# Patient Record
Sex: FEMALE | Race: WHITE | Employment: PART TIME | URBAN - METROPOLITAN AREA
[De-identification: names, ages, dates, MRNs, and addresses within clinical notes are randomized per-mention and may not be internally consistent; named-entity substitution may affect disease eponyms.]

---

## 2017-04-28 ENCOUNTER — HOSPITAL ENCOUNTER (OUTPATIENT)
Dept: MAMMOGRAPHY | Age: 47
Discharge: HOME | End: 2017-04-28
Attending: FAMILY MEDICINE
Payer: COMMERCIAL

## 2017-04-28 DIAGNOSIS — Z12.31 ENCOUNTER FOR SCREENING MAMMOGRAM FOR BREAST CANCER: ICD-10-CM

## 2017-04-28 PROCEDURE — 77067 SCR MAMMO BI INCL CAD: CPT

## 2017-08-29 ENCOUNTER — APPOINTMENT (RX ONLY)
Dept: URBAN - METROPOLITAN AREA CLINIC 23 | Facility: CLINIC | Age: 47
Setting detail: DERMATOLOGY
End: 2017-08-29

## 2017-08-29 ENCOUNTER — HOSPITAL ENCOUNTER (OUTPATIENT)
Dept: GENERAL RADIOLOGY | Age: 47
Discharge: HOME | End: 2017-08-29
Payer: COMMERCIAL

## 2017-08-29 DIAGNOSIS — Z00.00 ENCOUNTER FOR GENERAL ADULT MEDICAL EXAMINATION WITHOUT ABNORMAL FINDINGS: ICD-10-CM

## 2017-08-29 DIAGNOSIS — L21.8 OTHER SEBORRHEIC DERMATITIS: ICD-10-CM

## 2017-08-29 DIAGNOSIS — Z87.2 PERSONAL HISTORY OF DISEASES OF THE SKIN AND SUBCUTANEOUS TISSUE: ICD-10-CM

## 2017-08-29 DIAGNOSIS — D49.2 NEOPLASM OF UNSPECIFIED BEHAVIOR OF BONE, SOFT TISSUE, AND SKIN: ICD-10-CM

## 2017-08-29 DIAGNOSIS — Z20.1 EXPOSURE TO TB: ICD-10-CM

## 2017-08-29 DIAGNOSIS — D22 MELANOCYTIC NEVI: ICD-10-CM

## 2017-08-29 DIAGNOSIS — L81.4 OTHER MELANIN HYPERPIGMENTATION: ICD-10-CM

## 2017-08-29 DIAGNOSIS — D17 BENIGN LIPOMATOUS NEOPLASM: ICD-10-CM

## 2017-08-29 DIAGNOSIS — Z71.89 OTHER SPECIFIED COUNSELING: ICD-10-CM

## 2017-08-29 PROBLEM — D48.5 NEOPLASM OF UNCERTAIN BEHAVIOR OF SKIN: Status: ACTIVE | Noted: 2017-08-29

## 2017-08-29 PROBLEM — Z71.1 PERSON WITH FEARED HEALTH COMPLAINT IN WHOM NO DIAGNOSIS IS MADE: Status: ACTIVE | Noted: 2017-08-29

## 2017-08-29 PROBLEM — D22.5 MELANOCYTIC NEVI OF TRUNK: Status: ACTIVE | Noted: 2017-08-29

## 2017-08-29 PROBLEM — D22.39 MELANOCYTIC NEVI OF OTHER PARTS OF FACE: Status: ACTIVE | Noted: 2017-08-29

## 2017-08-29 PROBLEM — D17.22 BENIGN LIPOMATOUS NEOPLASM OF SKIN AND SUBCUTANEOUS TISSUE OF LEFT ARM: Status: ACTIVE | Noted: 2017-08-29

## 2017-08-29 PROBLEM — D22.62 MELANOCYTIC NEVI OF LEFT UPPER LIMB, INCLUDING SHOULDER: Status: ACTIVE | Noted: 2017-08-29

## 2017-08-29 PROBLEM — D23.71 OTHER BENIGN NEOPLASM OF SKIN OF RIGHT LOWER LIMB, INCLUDING HIP: Status: ACTIVE | Noted: 2017-08-29

## 2017-08-29 PROBLEM — D22.61 MELANOCYTIC NEVI OF RIGHT UPPER LIMB, INCLUDING SHOULDER: Status: ACTIVE | Noted: 2017-08-29

## 2017-08-29 PROCEDURE — 71020 XR CHEST PA LAT: CPT

## 2017-08-29 PROCEDURE — 11310 SHAVE SKIN LESION 0.5 CM/<: CPT

## 2017-08-29 PROCEDURE — ? OTHER

## 2017-08-29 PROCEDURE — ? SHAVE REMOVAL

## 2017-08-29 PROCEDURE — ? COUNSELING

## 2017-08-29 PROCEDURE — ? BIOPSY BY SHAVE METHOD

## 2017-08-29 PROCEDURE — 99214 OFFICE O/P EST MOD 30 MIN: CPT | Mod: 25

## 2017-08-29 PROCEDURE — 11100: CPT | Mod: 59

## 2017-08-29 PROCEDURE — ? PRESCRIPTION

## 2017-08-29 RX ORDER — KETOCONAZOLE 20.5 MG/ML
SHAMPOO, SUSPENSION TOPICAL
Qty: 1 | Refills: 11 | Status: ERX | COMMUNITY
Start: 2017-08-29 | End: 2019-01-13

## 2017-08-29 RX ADMIN — KETOCONAZOLE: 20.5 SHAMPOO, SUSPENSION TOPICAL at 00:00

## 2017-08-29 ASSESSMENT — LOCATION SIMPLE DESCRIPTION DERM
LOCATION SIMPLE: RIGHT UPPER BACK
LOCATION SIMPLE: RIGHT SHOULDER
LOCATION SIMPLE: LEFT UPPER ARM
LOCATION SIMPLE: POSTERIOR SCALP
LOCATION SIMPLE: SCALP
LOCATION SIMPLE: RIGHT THIGH
LOCATION SIMPLE: LEFT POSTERIOR UPPER ARM
LOCATION SIMPLE: RIGHT CHEEK
LOCATION SIMPLE: RIGHT POPLITEAL SKIN
LOCATION SIMPLE: LEFT SHOULDER
LOCATION SIMPLE: RIGHT POSTERIOR UPPER ARM
LOCATION SIMPLE: RIGHT FOREHEAD
LOCATION SIMPLE: ABDOMEN

## 2017-08-29 ASSESSMENT — LOCATION DETAILED DESCRIPTION DERM
LOCATION DETAILED: RIGHT INFERIOR LATERAL MALAR CHEEK
LOCATION DETAILED: LEFT ANTERIOR PROXIMAL UPPER ARM
LOCATION DETAILED: RIGHT LATERAL UPPER BACK
LOCATION DETAILED: POSTERIOR MID-PARIETAL SCALP
LOCATION DETAILED: LEFT SUPERIOR PARIETAL SCALP
LOCATION DETAILED: RIGHT LATERAL ABDOMEN
LOCATION DETAILED: RIGHT INFERIOR FOREHEAD
LOCATION DETAILED: LEFT POSTERIOR SHOULDER
LOCATION DETAILED: RIGHT POSTERIOR SHOULDER
LOCATION DETAILED: PERIUMBILICAL SKIN
LOCATION DETAILED: EPIGASTRIC SKIN
LOCATION DETAILED: RIGHT ANTERIOR DISTAL THIGH
LOCATION DETAILED: LEFT ANTERIOR SHOULDER
LOCATION DETAILED: LEFT PROXIMAL POSTERIOR UPPER ARM
LOCATION DETAILED: RIGHT PROXIMAL POSTERIOR UPPER ARM
LOCATION DETAILED: RIGHT POPLITEAL SKIN

## 2017-08-29 ASSESSMENT — LOCATION ZONE DERM
LOCATION ZONE: SCALP
LOCATION ZONE: FACE
LOCATION ZONE: TRUNK
LOCATION ZONE: LEG
LOCATION ZONE: ARM

## 2017-08-29 NOTE — PROCEDURE: BIOPSY BY SHAVE METHOD
Silver Nitrate Text: The wound bed was treated with silver nitrate after the biopsy was performed.
Detail Level: Detailed
Cryotherapy Text: The wound bed was treated with cryotherapy after the biopsy was performed.
Billing Type: Third-Party Bill
Dressing: bandage
Render Post-Care Instructions In Note?: yes
X Size Of Lesion In Cm: 0
Notification Instructions: Patient will be notified of biopsy results. However, patient instructed to call the office if not contacted within 2 weeks.
Electrodesiccation Text: The wound bed was treated with electrodesiccation after the biopsy was performed.
Bill [id number] For Specimen Handling/Conveyance To Laboratory?: no
Accession #: [facility]
Curettage Text: The wound bed was treated with curettage after the biopsy was performed.
Wound Care: Vaseline
Anesthesia Volume In Cc: 0.1
Post-Care Instructions: I reviewed with the patient in detail post-care instructions. Patient is to keep the biopsy site dry overnight, and then apply vaseline twice daily until healed. Patient may apply hydrogen peroxide soaks to remove any crusting. Patient given a wound care sheet.
Anesthesia Type: 1% lidocaine without epinephrine and a 1:10 solution of 8.4% sodium bicarbonate
Type Of Destruction Used: Curettage
Biopsy Method: Personna blade
Consent: Written consent was obtained and risks were reviewed including but not limited to scarring, infection, bleeding, scabbing, incomplete removal, nerve damage and allergy to anesthesia.
Electrodesiccation And Curettage Text: The wound bed was treated with electrodesiccation and curettage after the biopsy was performed.
Biopsy Type: H and E
Hemostasis: Aluminum Chloride

## 2017-08-29 NOTE — PROCEDURE: SHAVE REMOVAL
Bill [id number] For Specimen Handling/Conveyance To Laboratory?: no
Accession #: [facility]
Path Notes (To The Dermatopathologist): Please check margins.
X Size Of Lesion In Cm (Optional): 0
Biopsy Method: Personna blade
Post-Care Instructions: I reviewed with the patient in detail post-care instructions. Patient is to keep the biopsy site dry overnight, and then apply vaseline twice daily until healed. Patient may apply hydrogen peroxide soaks to remove any crusting. Wound care sheet provided.
Anesthesia Volume In Cc: 0.5
Detail Level: Detailed
Wound Care: Vaseline
Size Of Lesion In Cm (Required): 0.4
Medical Necessity Clause: This procedure was medically necessary because the lesion that was rubs on shirt, scarf, and irritated.
Anesthesia Type: 1% lidocaine without epinephrine and a 1:10 solution of 8.4% sodium bicarbonate
Consent was obtained from the patient. The risks and benefits to therapy were discussed in detail. Specifically, the risks of infection, scarring, bleeding, prolonged wound healing, incomplete removal, allergy to anesthesia, nerve injury and recurrence were addressed. Prior to the procedure, the treatment site was clearly identified and confirmed by the patient. All components of Universal Protocol/PAUSE Rule completed.
Medical Necessity Information: It is in your best interest to select a reason for this procedure from the list below. All of these items fulfill various CMS LCD requirements except the new and changing color options.
Billing Type: Third-Party Bill
Hemostasis: Aluminum Chloride
Notification Instructions: Patient will be notified of biopsy results. However, patient instructed to call the office if not contacted within 2 weeks.
Render Post-Care Instructions In Note?: yes

## 2017-08-29 NOTE — PROCEDURE: OTHER
Detail Level: Zone
Note Text (......Xxx Chief Complaint.): This diagnosis correlates with the
Other (Free Text): Per pt, previously excised but has recurred. Recommended see surgeon that removed it previously
Other (Free Text): Pt complains of itching in these locations, skin is normal. \\n\\nSamples given: pramosone to use bid, coupon for Cera Ve anti itch cream. \\n\\nRecommended she stop essential oils as fragrance makes itching worse.

## 2018-01-01 ENCOUNTER — RX ONLY (OUTPATIENT)
Age: 48
Setting detail: RX ONLY
End: 2018-01-01

## 2018-01-16 ENCOUNTER — APPOINTMENT (RX ONLY)
Dept: URBAN - METROPOLITAN AREA CLINIC 23 | Facility: CLINIC | Age: 48
Setting detail: DERMATOLOGY
End: 2018-01-16

## 2018-01-16 DIAGNOSIS — D22 MELANOCYTIC NEVI: ICD-10-CM

## 2018-01-16 DIAGNOSIS — Z87.2 PERSONAL HISTORY OF DISEASES OF THE SKIN AND SUBCUTANEOUS TISSUE: ICD-10-CM

## 2018-01-16 DIAGNOSIS — L82.1 OTHER SEBORRHEIC KERATOSIS: ICD-10-CM

## 2018-01-16 DIAGNOSIS — L81.4 OTHER MELANIN HYPERPIGMENTATION: ICD-10-CM

## 2018-01-16 PROBLEM — D22.5 MELANOCYTIC NEVI OF TRUNK: Status: ACTIVE | Noted: 2018-01-16

## 2018-01-16 PROBLEM — L55.1 SUNBURN OF SECOND DEGREE: Status: ACTIVE | Noted: 2018-01-16

## 2018-01-16 PROBLEM — D22.62 MELANOCYTIC NEVI OF LEFT UPPER LIMB, INCLUDING SHOULDER: Status: ACTIVE | Noted: 2018-01-16

## 2018-01-16 PROBLEM — D22.61 MELANOCYTIC NEVI OF RIGHT UPPER LIMB, INCLUDING SHOULDER: Status: ACTIVE | Noted: 2018-01-16

## 2018-01-16 PROCEDURE — 99213 OFFICE O/P EST LOW 20 MIN: CPT | Mod: 25

## 2018-01-16 PROCEDURE — ? COUNSELING

## 2018-01-16 PROCEDURE — ? MEDICAL PHOTOGRAPHY REVIEW

## 2018-01-16 PROCEDURE — 11300 SHAVE SKIN LESION 0.5 CM/<: CPT

## 2018-01-16 PROCEDURE — ? SHAVE REMOVAL

## 2018-01-16 ASSESSMENT — LOCATION DETAILED DESCRIPTION DERM
LOCATION DETAILED: PERIUMBILICAL SKIN
LOCATION DETAILED: RIGHT POPLITEAL SKIN
LOCATION DETAILED: RIGHT MID-UPPER BACK
LOCATION DETAILED: RIGHT POSTERIOR SHOULDER
LOCATION DETAILED: RIGHT PROXIMAL POSTERIOR UPPER ARM
LOCATION DETAILED: RIGHT LATERAL ABDOMEN
LOCATION DETAILED: RIGHT LATERAL UPPER BACK
LOCATION DETAILED: RIGHT PROXIMAL CALF
LOCATION DETAILED: LEFT PROXIMAL POSTERIOR UPPER ARM
LOCATION DETAILED: LEFT POSTERIOR SHOULDER
LOCATION DETAILED: RIGHT INFERIOR LATERAL MALAR CHEEK
LOCATION DETAILED: LEFT ANTERIOR DISTAL UPPER ARM

## 2018-01-16 ASSESSMENT — LOCATION SIMPLE DESCRIPTION DERM
LOCATION SIMPLE: RIGHT UPPER BACK
LOCATION SIMPLE: LEFT POSTERIOR UPPER ARM
LOCATION SIMPLE: RIGHT POSTERIOR UPPER ARM
LOCATION SIMPLE: RIGHT POPLITEAL SKIN
LOCATION SIMPLE: RIGHT CALF
LOCATION SIMPLE: LEFT UPPER ARM
LOCATION SIMPLE: LEFT SHOULDER
LOCATION SIMPLE: RIGHT CHEEK
LOCATION SIMPLE: RIGHT SHOULDER
LOCATION SIMPLE: ABDOMEN

## 2018-01-16 ASSESSMENT — LOCATION ZONE DERM
LOCATION ZONE: LEG
LOCATION ZONE: ARM
LOCATION ZONE: TRUNK
LOCATION ZONE: FACE

## 2018-01-16 NOTE — PROCEDURE: SHAVE REMOVAL
Detail Level: Detailed
Render Post-Care Instructions In Note?: yes
Notification Instructions: Patient will be notified of biopsy results. However, patient instructed to call the office if not contacted within 2 weeks.
Wound Care: Vaseline
Hemostasis: Aluminum Chloride
Bill For Surgical Tray: no
Accession #: PC
Medical Necessity Clause: This procedure was medically necessary because the lesion that was
Size Of Lesion In Cm (Required): 0.3
Size Of Margin In Cm (Margins Are Not Added To Billing Dimensions): -
Biopsy Method: Personna blade
Anesthesia Volume In Cc: 0.5
Anesthesia Type: 1% lidocaine without epinephrine and a 1:10 solution of 8.4% sodium bicarbonate
Consent was obtained from the patient. The risks and benefits to therapy were discussed in detail. Specifically, the risks of infection, scarring, bleeding, prolonged wound healing, incomplete removal, allergy to anesthesia, nerve injury and recurrence were addressed. Prior to the procedure, the treatment site was clearly identified and confirmed by the patient. All components of Universal Protocol/PAUSE Rule completed.
Path Notes (To The Dermatopathologist): Please check margins.
X Size Of Lesion In Cm (Optional): 0
Billing Type: Third-Party Bill
Post-Care Instructions: I reviewed with the patient in detail post-care instructions. Patient is to keep the biopsy site dry overnight, and then apply vaseline twice daily until healed. Patient may apply hydrogen peroxide soaks to remove any crusting. Wound care sheet provided.
Medical Necessity Information: It is in your best interest to select a reason for this procedure from the list below. All of these items fulfill various CMS LCD requirements except the new and changing color options.

## 2018-01-16 NOTE — PROCEDURE: MEDICAL PHOTOGRAPHY REVIEW
Detail Level: Detailed
Number Of Photographs Reviewed: 4
Review Findings: no new or changing lesions

## 2018-04-26 PROBLEM — R10.13 DYSPEPSIA: Status: ACTIVE | Noted: 2018-04-26

## 2018-05-09 ENCOUNTER — HOSPITAL ENCOUNTER (OUTPATIENT)
Dept: MAMMOGRAPHY | Age: 48
Discharge: HOME | End: 2018-05-09
Attending: FAMILY MEDICINE
Payer: COMMERCIAL

## 2018-05-09 DIAGNOSIS — Z12.31 ENCOUNTER FOR SCREENING MAMMOGRAM FOR BREAST CANCER: ICD-10-CM

## 2018-05-09 PROCEDURE — 77067 SCR MAMMO BI INCL CAD: CPT

## 2018-07-12 ENCOUNTER — APPOINTMENT (RX ONLY)
Dept: URBAN - METROPOLITAN AREA CLINIC 23 | Facility: CLINIC | Age: 48
Setting detail: DERMATOLOGY
End: 2018-07-12

## 2018-07-12 DIAGNOSIS — D18.0 HEMANGIOMA: ICD-10-CM

## 2018-07-12 DIAGNOSIS — Z87.2 PERSONAL HISTORY OF DISEASES OF THE SKIN AND SUBCUTANEOUS TISSUE: ICD-10-CM

## 2018-07-12 DIAGNOSIS — L91.8 OTHER HYPERTROPHIC DISORDERS OF THE SKIN: ICD-10-CM

## 2018-07-12 DIAGNOSIS — D22 MELANOCYTIC NEVI: ICD-10-CM

## 2018-07-12 DIAGNOSIS — L90.5 SCAR CONDITIONS AND FIBROSIS OF SKIN: ICD-10-CM

## 2018-07-12 DIAGNOSIS — L57.0 ACTINIC KERATOSIS: ICD-10-CM

## 2018-07-12 PROBLEM — D22.72 MELANOCYTIC NEVI OF LEFT LOWER LIMB, INCLUDING HIP: Status: ACTIVE | Noted: 2018-07-12

## 2018-07-12 PROBLEM — D23.71 OTHER BENIGN NEOPLASM OF SKIN OF RIGHT LOWER LIMB, INCLUDING HIP: Status: ACTIVE | Noted: 2018-07-12

## 2018-07-12 PROBLEM — D22.5 MELANOCYTIC NEVI OF TRUNK: Status: ACTIVE | Noted: 2018-07-12

## 2018-07-12 PROBLEM — D18.01 HEMANGIOMA OF SKIN AND SUBCUTANEOUS TISSUE: Status: ACTIVE | Noted: 2018-07-12

## 2018-07-12 PROCEDURE — ? COUNSELING

## 2018-07-12 PROCEDURE — ? PATIENT SPECIFIC COUNSELING

## 2018-07-12 PROCEDURE — ? SKIN TAG REMOVAL

## 2018-07-12 PROCEDURE — 99213 OFFICE O/P EST LOW 20 MIN: CPT | Mod: 25

## 2018-07-12 PROCEDURE — 11200 RMVL SKIN TAGS UP TO&INC 15: CPT

## 2018-07-12 PROCEDURE — ? ADDITIONAL NOTES

## 2018-07-12 ASSESSMENT — LOCATION ZONE DERM
LOCATION ZONE: ARM
LOCATION ZONE: EAR
LOCATION ZONE: LEG
LOCATION ZONE: TRUNK
LOCATION ZONE: FACE

## 2018-07-12 ASSESSMENT — LOCATION DETAILED DESCRIPTION DERM
LOCATION DETAILED: RIGHT RIB CAGE
LOCATION DETAILED: LEFT SUPERIOR HELIX
LOCATION DETAILED: RIGHT INFRAMAMMARY CREASE (INNER QUADRANT)
LOCATION DETAILED: PERIUMBILICAL SKIN
LOCATION DETAILED: RIGHT INFERIOR LATERAL MALAR CHEEK
LOCATION DETAILED: LEFT LATERAL PROXIMAL PRETIBIAL REGION
LOCATION DETAILED: LEFT PROXIMAL PRETIBIAL REGION
LOCATION DETAILED: RIGHT POPLITEAL SKIN
LOCATION DETAILED: RIGHT LATERAL SUPERIOR CHEST
LOCATION DETAILED: LEFT ANTERIOR DISTAL UPPER ARM
LOCATION DETAILED: LEFT DISTAL PRETIBIAL REGION
LOCATION DETAILED: LEFT ANTERIOR DISTAL THIGH
LOCATION DETAILED: LEFT INFERIOR MEDIAL UPPER BACK
LOCATION DETAILED: RIGHT LATERAL ABDOMEN

## 2018-07-12 ASSESSMENT — LOCATION SIMPLE DESCRIPTION DERM
LOCATION SIMPLE: LEFT EAR
LOCATION SIMPLE: RIGHT POPLITEAL SKIN
LOCATION SIMPLE: LEFT THIGH
LOCATION SIMPLE: RIGHT BREAST
LOCATION SIMPLE: LEFT UPPER ARM
LOCATION SIMPLE: CHEST
LOCATION SIMPLE: RIGHT CHEEK
LOCATION SIMPLE: ABDOMEN
LOCATION SIMPLE: LEFT PRETIBIAL REGION
LOCATION SIMPLE: LEFT UPPER BACK

## 2018-07-12 NOTE — PROCEDURE: SKIN TAG REMOVAL
Anesthesia Volume In Cc: 3
Add Associated Diagnoses If Applicable When Selecting Medical Necessity: Yes
Include Z78.9 (Other Specified Conditions Influencing Health Status) As An Associated Diagnosis?: No
Medical Necessity Information: It is in your best interest to select a reason for this procedure from the list below. All of these items fulfill various CMS LCD requirements except the new and changing color options.
Consent: Written consent obtained and the risks of skin tag removal was reviewed with the patient including but not limited to bleeding, pigmentary change, infection, pain, and remote possibility of scarring.
Detail Level: Detailed
Medical Necessity Clause: This procedure was medically necessary because the lesions that were treated were:

## 2019-01-01 ENCOUNTER — RX ONLY (OUTPATIENT)
Age: 49
Setting detail: RX ONLY
End: 2019-01-01

## 2019-01-14 ENCOUNTER — APPOINTMENT (RX ONLY)
Dept: URBAN - METROPOLITAN AREA CLINIC 23 | Facility: CLINIC | Age: 49
Setting detail: DERMATOLOGY
End: 2019-01-14

## 2019-01-14 DIAGNOSIS — L57.0 ACTINIC KERATOSIS: ICD-10-CM

## 2019-01-14 DIAGNOSIS — D22 MELANOCYTIC NEVI: ICD-10-CM

## 2019-01-14 DIAGNOSIS — L91.8 OTHER HYPERTROPHIC DISORDERS OF THE SKIN: ICD-10-CM

## 2019-01-14 DIAGNOSIS — Z87.2 PERSONAL HISTORY OF DISEASES OF THE SKIN AND SUBCUTANEOUS TISSUE: ICD-10-CM

## 2019-01-14 DIAGNOSIS — D18.0 HEMANGIOMA: ICD-10-CM

## 2019-01-14 DIAGNOSIS — L82.1 OTHER SEBORRHEIC KERATOSIS: ICD-10-CM

## 2019-01-14 PROBLEM — D22.72 MELANOCYTIC NEVI OF LEFT LOWER LIMB, INCLUDING HIP: Status: ACTIVE | Noted: 2019-01-14

## 2019-01-14 PROBLEM — D22.61 MELANOCYTIC NEVI OF RIGHT UPPER LIMB, INCLUDING SHOULDER: Status: ACTIVE | Noted: 2019-01-14

## 2019-01-14 PROBLEM — D22.62 MELANOCYTIC NEVI OF LEFT UPPER LIMB, INCLUDING SHOULDER: Status: ACTIVE | Noted: 2019-01-14

## 2019-01-14 PROBLEM — D18.01 HEMANGIOMA OF SKIN AND SUBCUTANEOUS TISSUE: Status: ACTIVE | Noted: 2019-01-14

## 2019-01-14 PROBLEM — L29.8 OTHER PRURITUS: Status: ACTIVE | Noted: 2019-01-14

## 2019-01-14 PROBLEM — E66.01 OBESITY, MORBID (HCC): Status: ACTIVE | Noted: 2019-01-14

## 2019-01-14 PROBLEM — D22.71 MELANOCYTIC NEVI OF RIGHT LOWER LIMB, INCLUDING HIP: Status: ACTIVE | Noted: 2019-01-14

## 2019-01-14 PROBLEM — H91.90 UNSPECIFIED HEARING LOSS, UNSPECIFIED EAR: Status: ACTIVE | Noted: 2019-01-14

## 2019-01-14 PROBLEM — D22.5 MELANOCYTIC NEVI OF TRUNK: Status: ACTIVE | Noted: 2019-01-14

## 2019-01-14 PROCEDURE — ? SKIN TAG REMOVAL

## 2019-01-14 PROCEDURE — 17000 DESTRUCT PREMALG LESION: CPT

## 2019-01-14 PROCEDURE — ? RECOMMENDATIONS

## 2019-01-14 PROCEDURE — ? LIQUID NITROGEN

## 2019-01-14 PROCEDURE — ? COUNSELING

## 2019-01-14 PROCEDURE — 99214 OFFICE O/P EST MOD 30 MIN: CPT | Mod: 25

## 2019-01-14 PROCEDURE — 11200 RMVL SKIN TAGS UP TO&INC 15: CPT | Mod: 59

## 2019-01-14 ASSESSMENT — LOCATION SIMPLE DESCRIPTION DERM
LOCATION SIMPLE: RIGHT ANTERIOR NECK
LOCATION SIMPLE: LEFT POSTERIOR UPPER ARM
LOCATION SIMPLE: LEFT PRETIBIAL REGION
LOCATION SIMPLE: POSTERIOR NECK
LOCATION SIMPLE: RIGHT POPLITEAL SKIN
LOCATION SIMPLE: RIGHT CALF
LOCATION SIMPLE: ABDOMEN
LOCATION SIMPLE: LEFT UPPER BACK
LOCATION SIMPLE: LEFT UPPER ARM
LOCATION SIMPLE: CHEST
LOCATION SIMPLE: LEFT EAR
LOCATION SIMPLE: LEFT THIGH
LOCATION SIMPLE: RIGHT POSTERIOR UPPER ARM
LOCATION SIMPLE: RIGHT PRETIBIAL REGION
LOCATION SIMPLE: RIGHT CHEEK

## 2019-01-14 ASSESSMENT — LOCATION DETAILED DESCRIPTION DERM
LOCATION DETAILED: RIGHT POPLITEAL SKIN
LOCATION DETAILED: RIGHT INFERIOR LATERAL NECK
LOCATION DETAILED: LEFT ANTERIOR DISTAL THIGH
LOCATION DETAILED: LEFT PROXIMAL POSTERIOR UPPER ARM
LOCATION DETAILED: LEFT INFERIOR MEDIAL UPPER BACK
LOCATION DETAILED: RIGHT LATERAL ABDOMEN
LOCATION DETAILED: RIGHT LATERAL SUPERIOR CHEST
LOCATION DETAILED: RIGHT DISTAL PRETIBIAL REGION
LOCATION DETAILED: RIGHT DISTAL CALF
LOCATION DETAILED: LEFT MEDIAL TRAPEZIAL NECK
LOCATION DETAILED: LEFT SUPERIOR HELIX
LOCATION DETAILED: PERIUMBILICAL SKIN
LOCATION DETAILED: RIGHT DISTAL POSTERIOR UPPER ARM
LOCATION DETAILED: RIGHT INFERIOR LATERAL MALAR CHEEK
LOCATION DETAILED: LEFT PROXIMAL PRETIBIAL REGION
LOCATION DETAILED: LEFT DISTAL PRETIBIAL REGION
LOCATION DETAILED: LEFT ANTERIOR DISTAL UPPER ARM

## 2019-01-14 ASSESSMENT — LOCATION ZONE DERM
LOCATION ZONE: LEG
LOCATION ZONE: TRUNK
LOCATION ZONE: FACE
LOCATION ZONE: EAR
LOCATION ZONE: NECK
LOCATION ZONE: ARM

## 2019-01-14 NOTE — PROCEDURE: LIQUID NITROGEN
Render Post-Care Instructions In Note?: yes
Duration Of Freeze Thaw-Cycle (Seconds): 20
Number Of Freeze-Thaw Cycles: 1 freeze-thaw cycle
Detail Level: Detailed
Consent: The patient's consent was obtained including but not limited to risks of crusting, scabbing, blistering, scarring, darker or lighter pigmentary change, recurrence, incomplete removal and infection.
Post-Care Instructions: I reviewed with the patient in detail post-care instructions. Patient is to wear sunprotection, and avoid picking at any of the treated lesions. Pt may apply Vaseline to crusted or scabbing areas.

## 2019-01-14 NOTE — PROCEDURE: SKIN TAG REMOVAL
Medical Necessity Clause: This procedure was medically necessary because the lesions that were treated were:
Consent: Written consent obtained and the risks of skin tag removal was reviewed with the patient including but not limited to bleeding, pigmentary change, infection, pain, and remote possibility of scarring.
Add Associated Diagnoses If Applicable When Selecting Medical Necessity: Yes
Medical Necessity Information: It is in your best interest to select a reason for this procedure from the list below. All of these items fulfill various CMS LCD requirements except the new and changing color options.
Detail Level: Detailed
Include Z78.9 (Other Specified Conditions Influencing Health Status) As An Associated Diagnosis?: No
Anesthesia Volume In Cc: 3

## 2019-01-14 NOTE — PROCEDURE: RECOMMENDATIONS
Recommendation Preamble: The following recommendations were made during the visit: consultation with Briseida  in regards to laser.
Detail Level: Zone

## 2019-01-25 ENCOUNTER — APPOINTMENT (RX ONLY)
Dept: URBAN - METROPOLITAN AREA CLINIC 23 | Facility: CLINIC | Age: 49
Setting detail: DERMATOLOGY
End: 2019-01-25

## 2019-01-25 DIAGNOSIS — Z41.9 ENCOUNTER FOR PROCEDURE FOR PURPOSES OTHER THAN REMEDYING HEALTH STATE, UNSPECIFIED: ICD-10-CM

## 2019-01-25 PROCEDURE — ? GENTLEYAG

## 2019-01-25 ASSESSMENT — LOCATION DETAILED DESCRIPTION DERM
LOCATION DETAILED: PERIUMBILICAL SKIN
LOCATION DETAILED: LEFT MEDIAL SUPERIOR CHEST

## 2019-01-25 ASSESSMENT — LOCATION SIMPLE DESCRIPTION DERM
LOCATION SIMPLE: ABDOMEN
LOCATION SIMPLE: CHEST

## 2019-01-25 ASSESSMENT — LOCATION ZONE DERM: LOCATION ZONE: TRUNK

## 2019-01-25 NOTE — PROCEDURE: GENTLEYAG
Spot Size: 3 mm
Cooling: DCD setting
External Cooling Fan Speed: 0
Post-Care Instructions: I reviewed with the patient in detail post-care instructions. Patient should avoid sun for a minimum of 4 weeks before and after treatment.
Detail Level: Detailed
Pulse Duration: 3 ms
Fluence: 160
Consent: Written consent obtained, risks reviewed including but not limited to crusting, scabbing, blistering, scarring, darker or lighter pigmentary change, paradoxical hair regrowth, incomplete removal of hair and infection.
Treatment Number: 1
Price (Use Numbers Only, No Special Characters Or $): 918
Laser Type: Nd:YAG 1064nm
Endpoint: Immediate endpoint: perifollicular erythema and edema. Vaseline and ice applied. Post care reviewed with patients.\\n\\nCherries

## 2019-02-07 ENCOUNTER — HOSPITAL ENCOUNTER (OUTPATIENT)
Dept: LAB | Age: 49
Discharge: HOME | End: 2019-02-07

## 2019-02-07 PROCEDURE — 88312 SPECIAL STAINS GROUP 1: CPT

## 2019-02-07 PROCEDURE — 88305 TISSUE EXAM BY PATHOLOGIST: CPT

## 2019-03-26 ENCOUNTER — HOSPITAL ENCOUNTER (INPATIENT)
Age: 49
LOS: 2 days | Discharge: HOME | DRG: 274 | End: 2019-03-28
Attending: EMERGENCY MEDICINE | Admitting: INTERNAL MEDICINE
Payer: COMMERCIAL

## 2019-03-26 ENCOUNTER — APPOINTMENT (OUTPATIENT)
Dept: GENERAL RADIOLOGY | Age: 49
DRG: 274 | End: 2019-03-26
Attending: EMERGENCY MEDICINE
Payer: COMMERCIAL

## 2019-03-26 DIAGNOSIS — I47.1 SVT (SUPRAVENTRICULAR TACHYCARDIA) (HCC): Primary | ICD-10-CM

## 2019-03-26 PROBLEM — R07.9 CHEST PAIN: Status: ACTIVE | Noted: 2019-03-26

## 2019-03-26 PROBLEM — R06.02 SOB (SHORTNESS OF BREATH): Status: ACTIVE | Noted: 2019-03-26

## 2019-03-26 PROBLEM — I47.29 NSVT (NONSUSTAINED VENTRICULAR TACHYCARDIA): Status: ACTIVE | Noted: 2019-03-26

## 2019-03-26 PROBLEM — R77.8 ELEVATED TROPONIN: Status: ACTIVE | Noted: 2019-03-26

## 2019-03-26 LAB
ALBUMIN SERPL-MCNC: 3.1 G/DL (ref 3.5–5)
ALBUMIN/GLOB SERPL: 0.8 {RATIO} (ref 1.2–3.5)
ALP SERPL-CCNC: 107 U/L (ref 50–136)
ALT SERPL-CCNC: 30 U/L (ref 12–65)
ANION GAP SERPL CALC-SCNC: 8 MMOL/L (ref 7–16)
AST SERPL-CCNC: 54 U/L (ref 15–37)
ATRIAL RATE: 197 BPM
BASOPHILS # BLD: 0.1 K/UL (ref 0–0.2)
BASOPHILS NFR BLD: 0 % (ref 0–2)
BILIRUB SERPL-MCNC: 0.4 MG/DL (ref 0.2–1.1)
BUN SERPL-MCNC: 15 MG/DL (ref 6–23)
CALCIUM SERPL-MCNC: 8.9 MG/DL (ref 8.3–10.4)
CALCULATED P AXIS, ECG09: 71 DEGREES
CALCULATED R AXIS, ECG10: 14 DEGREES
CALCULATED T AXIS, ECG11: 87 DEGREES
CHLORIDE SERPL-SCNC: 106 MMOL/L (ref 98–107)
CO2 SERPL-SCNC: 25 MMOL/L (ref 21–32)
CREAT SERPL-MCNC: 1.07 MG/DL (ref 0.6–1)
D DIMER PPP FEU-MCNC: 0.29 UG/ML(FEU)
DIAGNOSIS, 93000: NORMAL
DIFFERENTIAL METHOD BLD: ABNORMAL
EOSINOPHIL # BLD: 0.1 K/UL (ref 0–0.8)
EOSINOPHIL NFR BLD: 1 % (ref 0.5–7.8)
ERYTHROCYTE [DISTWIDTH] IN BLOOD BY AUTOMATED COUNT: 14.3 % (ref 11.9–14.6)
GLOBULIN SER CALC-MCNC: 4.1 G/DL (ref 2.3–3.5)
GLUCOSE SERPL-MCNC: 152 MG/DL (ref 65–100)
HCT VFR BLD AUTO: 44.8 % (ref 35.8–46.3)
HGB BLD-MCNC: 14.2 G/DL (ref 11.7–15.4)
IMM GRANULOCYTES # BLD AUTO: 0.1 K/UL (ref 0–0.5)
IMM GRANULOCYTES NFR BLD AUTO: 1 % (ref 0–5)
LYMPHOCYTES # BLD: 1.9 K/UL (ref 0.5–4.6)
LYMPHOCYTES NFR BLD: 16 % (ref 13–44)
MCH RBC QN AUTO: 28.6 PG (ref 26.1–32.9)
MCHC RBC AUTO-ENTMCNC: 31.7 G/DL (ref 31.4–35)
MCV RBC AUTO: 90.3 FL (ref 79.6–97.8)
MONOCYTES # BLD: 0.6 K/UL (ref 0.1–1.3)
MONOCYTES NFR BLD: 5 % (ref 4–12)
NEUTS SEG # BLD: 9.2 K/UL (ref 1.7–8.2)
NEUTS SEG NFR BLD: 77 % (ref 43–78)
NRBC # BLD: 0 K/UL (ref 0–0.2)
PLATELET # BLD AUTO: 285 K/UL (ref 150–450)
PMV BLD AUTO: 11.9 FL (ref 9.4–12.3)
POTASSIUM SERPL-SCNC: 5.1 MMOL/L (ref 3.5–5.1)
PROT SERPL-MCNC: 7.2 G/DL (ref 6.3–8.2)
Q-T INTERVAL, ECG07: 240 MS
QRS DURATION, ECG06: 70 MS
QTC CALCULATION (BEZET), ECG08: 423 MS
RBC # BLD AUTO: 4.96 M/UL (ref 4.05–5.2)
SODIUM SERPL-SCNC: 139 MMOL/L (ref 136–145)
TROPONIN I BLD-MCNC: 0.1 NG/ML (ref 0.02–0.05)
TROPONIN I BLD-MCNC: 0.21 NG/ML (ref 0.02–0.05)
TROPONIN I SERPL-MCNC: 1.34 NG/ML (ref 0.02–0.05)
TROPONIN I SERPL-MCNC: <0.02 NG/ML (ref 0.02–0.05)
VENTRICULAR RATE, ECG03: 187 BPM
WBC # BLD AUTO: 11.9 K/UL (ref 4.3–11.1)

## 2019-03-26 PROCEDURE — 84484 ASSAY OF TROPONIN QUANT: CPT

## 2019-03-26 PROCEDURE — 74011250636 HC RX REV CODE- 250/636

## 2019-03-26 PROCEDURE — 99285 EMERGENCY DEPT VISIT HI MDM: CPT | Performed by: EMERGENCY MEDICINE

## 2019-03-26 PROCEDURE — 93458 L HRT ARTERY/VENTRICLE ANGIO: CPT

## 2019-03-26 PROCEDURE — C8929 TTE W OR WO FOL WCON,DOPPLER: HCPCS

## 2019-03-26 PROCEDURE — 93005 ELECTROCARDIOGRAM TRACING: CPT | Performed by: EMERGENCY MEDICINE

## 2019-03-26 PROCEDURE — 65660000000 HC RM CCU STEPDOWN

## 2019-03-26 PROCEDURE — 71045 X-RAY EXAM CHEST 1 VIEW: CPT

## 2019-03-26 PROCEDURE — 74011250636 HC RX REV CODE- 250/636: Performed by: INTERNAL MEDICINE

## 2019-03-26 PROCEDURE — 85379 FIBRIN DEGRADATION QUANT: CPT

## 2019-03-26 PROCEDURE — 77030019569 HC BND COMPR RAD TERU -B

## 2019-03-26 PROCEDURE — 96375 TX/PRO/DX INJ NEW DRUG ADDON: CPT | Performed by: EMERGENCY MEDICINE

## 2019-03-26 PROCEDURE — 85025 COMPLETE CBC W/AUTO DIFF WBC: CPT

## 2019-03-26 PROCEDURE — 74011250636 HC RX REV CODE- 250/636: Performed by: NURSE PRACTITIONER

## 2019-03-26 PROCEDURE — 96374 THER/PROPH/DIAG INJ IV PUSH: CPT | Performed by: EMERGENCY MEDICINE

## 2019-03-26 PROCEDURE — 99152 MOD SED SAME PHYS/QHP 5/>YRS: CPT

## 2019-03-26 PROCEDURE — B2111ZZ FLUOROSCOPY OF MULTIPLE CORONARY ARTERIES USING LOW OSMOLAR CONTRAST: ICD-10-PCS | Performed by: INTERNAL MEDICINE

## 2019-03-26 PROCEDURE — 74011000250 HC RX REV CODE- 250: Performed by: INTERNAL MEDICINE

## 2019-03-26 PROCEDURE — 77030004534 HC CATH ANGI DX INFN CARD -A

## 2019-03-26 PROCEDURE — B2151ZZ FLUOROSCOPY OF LEFT HEART USING LOW OSMOLAR CONTRAST: ICD-10-PCS | Performed by: INTERNAL MEDICINE

## 2019-03-26 PROCEDURE — 74011636320 HC RX REV CODE- 636/320: Performed by: INTERNAL MEDICINE

## 2019-03-26 PROCEDURE — 80053 COMPREHEN METABOLIC PANEL: CPT

## 2019-03-26 PROCEDURE — 4A023N7 MEASUREMENT OF CARDIAC SAMPLING AND PRESSURE, LEFT HEART, PERCUTANEOUS APPROACH: ICD-10-PCS | Performed by: INTERNAL MEDICINE

## 2019-03-26 PROCEDURE — 74011250637 HC RX REV CODE- 250/637: Performed by: NURSE PRACTITIONER

## 2019-03-26 PROCEDURE — 77030015766

## 2019-03-26 PROCEDURE — C1894 INTRO/SHEATH, NON-LASER: HCPCS

## 2019-03-26 PROCEDURE — C1769 GUIDE WIRE: HCPCS

## 2019-03-26 PROCEDURE — 36415 COLL VENOUS BLD VENIPUNCTURE: CPT

## 2019-03-26 RX ORDER — LORATADINE 10 MG/1
10 TABLET ORAL DAILY
Status: DISCONTINUED | OUTPATIENT
Start: 2019-03-27 | End: 2019-03-28 | Stop reason: HOSPADM

## 2019-03-26 RX ORDER — HEPARIN SODIUM 200 [USP'U]/100ML
2 INJECTION, SOLUTION INTRAVENOUS CONTINUOUS
Status: ACTIVE | OUTPATIENT
Start: 2019-03-26 | End: 2019-03-26

## 2019-03-26 RX ORDER — FENTANYL CITRATE 50 UG/ML
25-100 INJECTION, SOLUTION INTRAMUSCULAR; INTRAVENOUS
Status: DISCONTINUED | OUTPATIENT
Start: 2019-03-26 | End: 2019-03-28 | Stop reason: HOSPADM

## 2019-03-26 RX ORDER — SODIUM CHLORIDE 9 MG/ML
75 INJECTION, SOLUTION INTRAVENOUS CONTINUOUS
Status: DISCONTINUED | OUTPATIENT
Start: 2019-03-26 | End: 2019-03-26

## 2019-03-26 RX ORDER — ACETAMINOPHEN 325 MG/1
650 TABLET ORAL
Status: DISCONTINUED | OUTPATIENT
Start: 2019-03-26 | End: 2019-03-27 | Stop reason: SDUPTHER

## 2019-03-26 RX ORDER — LIDOCAINE HYDROCHLORIDE 10 MG/ML
2-10 INJECTION INFILTRATION; PERINEURAL
Status: DISCONTINUED | OUTPATIENT
Start: 2019-03-26 | End: 2019-03-28 | Stop reason: HOSPADM

## 2019-03-26 RX ORDER — HEPARIN SODIUM 5000 [USP'U]/ML
4000 INJECTION, SOLUTION INTRAVENOUS; SUBCUTANEOUS ONCE
Status: COMPLETED | OUTPATIENT
Start: 2019-03-26 | End: 2019-03-26

## 2019-03-26 RX ORDER — ONDANSETRON 2 MG/ML
4 INJECTION INTRAMUSCULAR; INTRAVENOUS
Status: ACTIVE | OUTPATIENT
Start: 2019-03-26 | End: 2019-03-27

## 2019-03-26 RX ORDER — SODIUM CHLORIDE 0.9 % (FLUSH) 0.9 %
5-40 SYRINGE (ML) INJECTION EVERY 8 HOURS
Status: DISCONTINUED | OUTPATIENT
Start: 2019-03-26 | End: 2019-03-26

## 2019-03-26 RX ORDER — MECLIZINE HYDROCHLORIDE 25 MG/1
25 TABLET ORAL
Status: DISCONTINUED | OUTPATIENT
Start: 2019-03-26 | End: 2019-03-28 | Stop reason: HOSPADM

## 2019-03-26 RX ORDER — ADENOSINE 3 MG/ML
6 INJECTION, SOLUTION INTRAVENOUS
Status: COMPLETED | OUTPATIENT
Start: 2019-03-26 | End: 2019-03-26

## 2019-03-26 RX ORDER — METOPROLOL TARTRATE 25 MG/1
12.5 TABLET, FILM COATED ORAL 2 TIMES DAILY
Status: DISCONTINUED | OUTPATIENT
Start: 2019-03-26 | End: 2019-03-28 | Stop reason: HOSPADM

## 2019-03-26 RX ORDER — SODIUM CHLORIDE 0.9 % (FLUSH) 0.9 %
5-40 SYRINGE (ML) INJECTION AS NEEDED
Status: DISCONTINUED | OUTPATIENT
Start: 2019-03-26 | End: 2019-03-27 | Stop reason: SDUPTHER

## 2019-03-26 RX ORDER — HYDROCODONE BITARTRATE AND ACETAMINOPHEN 5; 325 MG/1; MG/1
1 TABLET ORAL
Status: DISCONTINUED | OUTPATIENT
Start: 2019-03-26 | End: 2019-03-27 | Stop reason: SDUPTHER

## 2019-03-26 RX ORDER — ATORVASTATIN CALCIUM 40 MG/1
40 TABLET, FILM COATED ORAL DAILY
Status: DISCONTINUED | OUTPATIENT
Start: 2019-03-26 | End: 2019-03-28 | Stop reason: HOSPADM

## 2019-03-26 RX ORDER — MORPHINE SULFATE 2 MG/ML
2 INJECTION, SOLUTION INTRAMUSCULAR; INTRAVENOUS
Status: DISCONTINUED | OUTPATIENT
Start: 2019-03-26 | End: 2019-03-28 | Stop reason: HOSPADM

## 2019-03-26 RX ORDER — FAMOTIDINE 20 MG/1
20 TABLET, FILM COATED ORAL 2 TIMES DAILY
Status: DISCONTINUED | OUTPATIENT
Start: 2019-03-26 | End: 2019-03-28 | Stop reason: HOSPADM

## 2019-03-26 RX ORDER — MONTELUKAST SODIUM 10 MG/1
10 TABLET ORAL
Status: DISCONTINUED | OUTPATIENT
Start: 2019-03-26 | End: 2019-03-28 | Stop reason: HOSPADM

## 2019-03-26 RX ORDER — NITROGLYCERIN 0.4 MG/1
0.4 TABLET SUBLINGUAL
Status: DISCONTINUED | OUTPATIENT
Start: 2019-03-26 | End: 2019-03-28 | Stop reason: HOSPADM

## 2019-03-26 RX ORDER — ADENOSINE 3 MG/ML
INJECTION, SOLUTION INTRAVENOUS
Status: COMPLETED
Start: 2019-03-26 | End: 2019-03-26

## 2019-03-26 RX ORDER — SODIUM CHLORIDE 0.9 % (FLUSH) 0.9 %
5-40 SYRINGE (ML) INJECTION EVERY 8 HOURS
Status: DISCONTINUED | OUTPATIENT
Start: 2019-03-26 | End: 2019-03-27 | Stop reason: SDUPTHER

## 2019-03-26 RX ORDER — HEPARIN SODIUM 5000 [USP'U]/100ML
12-25 INJECTION, SOLUTION INTRAVENOUS
Status: DISCONTINUED | OUTPATIENT
Start: 2019-03-26 | End: 2019-03-26

## 2019-03-26 RX ORDER — MIDAZOLAM HYDROCHLORIDE 1 MG/ML
.5-2 INJECTION, SOLUTION INTRAMUSCULAR; INTRAVENOUS
Status: DISCONTINUED | OUTPATIENT
Start: 2019-03-26 | End: 2019-03-28 | Stop reason: HOSPADM

## 2019-03-26 RX ORDER — GUAIFENESIN 100 MG/5ML
324 LIQUID (ML) ORAL DAILY
Status: DISCONTINUED | OUTPATIENT
Start: 2019-03-26 | End: 2019-03-27

## 2019-03-26 RX ADMIN — FENTANYL CITRATE 50 MCG: 50 INJECTION, SOLUTION INTRAMUSCULAR; INTRAVENOUS at 14:45

## 2019-03-26 RX ADMIN — HEPARIN SODIUM 2 ML/HR: 5000 INJECTION, SOLUTION INTRAVENOUS; SUBCUTANEOUS at 14:38

## 2019-03-26 RX ADMIN — MIDAZOLAM HYDROCHLORIDE 2 MG: 1 INJECTION, SOLUTION INTRAMUSCULAR; INTRAVENOUS at 14:45

## 2019-03-26 RX ADMIN — HEPARIN SODIUM 2 ML: 10000 INJECTION, SOLUTION INTRAVENOUS; SUBCUTANEOUS at 14:52

## 2019-03-26 RX ADMIN — MIDAZOLAM HYDROCHLORIDE 2 MG: 1 INJECTION, SOLUTION INTRAMUSCULAR; INTRAVENOUS at 14:51

## 2019-03-26 RX ADMIN — ADENOSINE 6 MG: 3 INJECTION, SOLUTION INTRAVENOUS at 08:39

## 2019-03-26 RX ADMIN — ASPIRIN 81 MG 324 MG: 81 TABLET ORAL at 13:48

## 2019-03-26 RX ADMIN — PERFLUTREN 1 ML: 6.52 INJECTION, SUSPENSION INTRAVENOUS at 13:00

## 2019-03-26 RX ADMIN — MONTELUKAST SODIUM 10 MG: 10 TABLET, FILM COATED ORAL at 22:06

## 2019-03-26 RX ADMIN — HEPARIN SODIUM 4000 UNITS: 5000 INJECTION INTRAVENOUS; SUBCUTANEOUS at 14:03

## 2019-03-26 RX ADMIN — SODIUM CHLORIDE 75 ML/HR: 900 INJECTION, SOLUTION INTRAVENOUS at 13:47

## 2019-03-26 RX ADMIN — Medication 10 ML: at 22:06

## 2019-03-26 RX ADMIN — LIDOCAINE HYDROCHLORIDE 3 ML: 10 INJECTION, SOLUTION INFILTRATION; PERINEURAL at 14:51

## 2019-03-26 RX ADMIN — METOPROLOL TARTRATE 12.5 MG: 25 TABLET ORAL at 17:14

## 2019-03-26 RX ADMIN — ATORVASTATIN CALCIUM 40 MG: 40 TABLET, FILM COATED ORAL at 13:48

## 2019-03-26 RX ADMIN — IOPAMIDOL 90 ML: 755 INJECTION, SOLUTION INTRAVENOUS at 15:00

## 2019-03-26 RX ADMIN — FENTANYL CITRATE 50 MCG: 50 INJECTION, SOLUTION INTRAMUSCULAR; INTRAVENOUS at 14:51

## 2019-03-26 RX ADMIN — FAMOTIDINE 20 MG: 20 TABLET ORAL at 17:14

## 2019-03-26 NOTE — ED TRIAGE NOTES
Pt was brought to ER from upstairs emergently. Pt has HR of 195 and states her chest is tight and hurting, not radiating anywhere. Pt states it began at 0630 this morning and has not stopped. Pt denies being on any new meds and and was resting when this occurred. Pt states \"it feels like her heart is racing out of her chest\". Pt takes a 81mg aspirin.

## 2019-03-26 NOTE — H&P
[address] Cardiology History & Physical  
  
Date of  Admission: 3/26/2019  8:15 AM  
 
Primary Care Physician: Champ Welch Primary Cardiologist: BRO Admitting Physician: Amy Lopez CC: SVt with elevated troponin HPI:  Miles Alvarez is a 47 y.o. female with no prior cardiac history. She reports intermittent heart palpitations for months that would only last few seconds and resolve. Additional h/o adrenal insuff and been on supplements for years (diagnosed at a [facility] and has been on Natural supplementation for years; never seen endocrinology). Patient presented to ED at [facility] with heart palpitations. 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 She went to her floor and did several valsalva maneuvers without any relief. She then went to 3rd floor for nurse to check her. Nurse on floor checked pulse and was 204. She came down to ED for further care. She reports fatigue/decreased functional capacity over last several weeks and blaming it on her allergies. She would walk and get chest tightness than she would ignore. No family h/o premature CAD. In ED, EKG showed SVT rate 187. She was given adenosine and HR decreased to 95. Labs showed trop #1 was neg then trop #2 was  0.1, the #3 was . 21. D. Dimer neg. All other labs unremarkable. Currently remains with chest tightness; soreness scale 4/10 prior to ED visit 7/10. During my visit short run NSVT noted on ED monitor. Past Medical History:  
Diagnosis Date  Adrenal disorder, other 2007  
 adrenal fatigue  Bone spur of foot   
 right foot  Cancer ([facility] [address] 11/14/2017 Atypical skin  Chronic pain   
 lower back  Cough  Environmental allergies  GERD (gastroesophageal reflux disease)  History of positive PPD 1992  
 History of renal stone 4/1/2013  Insomnia  Lumbar degenerative disc disease 4/1/2013  Sore throat  Spastic bladder 2008  Thromboembolus ([address] [address] \" possible DVT after IVF procedure and loss of pregnancy \"  Vertigo  Vitamin D deficiency 2009  Wheezing Past Surgical History:  
Procedure Laterality Date  HX BLADDER SUSPENSION  2009  HX BREAST BIOPSY Right 2000  
 r excision infected spiderbite IMF  HX GYN  2006, 2008  
 removal of fibroid tumors from uterus x 2  
 HX MALIGNANT SKIN LESION EXCISION Right 11/14/2017 , rogelio Ruth; jawline  HX MOHS PROCEDURES Right 05/2015 Dr. Jonas Velazquez; precancerous abdomen Min [place] MYRINGOTOMY    
 [id number], 2007, 2014, 2015; Dr. Latosha Hartley  HX ORTHOPAEDIC Right 05/01/2015 Dr. Alma Matthew; rotater cuff  HX OTHER SURGICAL  2012 Fatty tumor removed from left shoulder  HX PARTIAL HYSTERECTOMY  11/11/15  
 robotic-assisted laparoscopic with salpingectomy bilateral; Dr. Eleni Miller; secondary to fibroid uterus/menorrhagia [address]  HX UROLOGICAL  2009  
 bladder sling; Dr. Salome Rice  HX WISDOM TEETH EXTRACTION Allergies Allergen Reactions  Adhesive Tape-Silicones Contact Dermatitis Causes blisters  Ciprofloxacin Rash  Sulfa (Sulfonamide Antibiotics) Rash Social History Socioeconomic History  Marital status:  Spouse name: Not on file  Number of children: Not on file  Years of education: Not on file  Highest education level: Not on file Occupational History  Not on file Social Needs  Financial resource strain: Not on file  Food insecurity:  
  Worry: Not on file Inability: Not on file  Transportation needs:  
  Medical: Not on file Non-medical: Not on file Tobacco Use  Smoking status: Never Smoker  Smokeless tobacco: Never Used Substance and Sexual Activity  Alcohol use: No  
 Drug use: No  
 Sexual activity: Yes  
  Partners: Male Lifestyle  Physical activity:  
  Days per week: Not on file Minutes per session: Not on file  Stress: Not on file Relationships  Social connections:  
  Talks on phone: Not on file Gets together: Not on file Attends [employer]ology service: Not on file Active member of club or organization: Not on file Attends meetings of clubs or organizations: Not on file Relationship status: Not on file  Intimate partner violence:  
  Fear of current or ex partner: Not on file Emotionally abused: Not on file Physically abused: Not on file Forced sexual activity: Not on file Other Topics Concern  Not on file Social History Narrative  Not on file Family History Problem Relation Age of Onset  Hypertension Father  Cancer Maternal Grandfather   
     bladder cancer  Psychiatric Disorder Maternal Grandfather Alzheimers  Cancer Paternal Grandmother 80  
     pancreatic cancer  Psychiatric Disorder Paternal Grandmother Alzheimer's  No Known Problems Mother  MS Sister  Neuropathy Brother  Cancer Paternal Aunt   
     colon, thyroid  Cancer Paternal Uncle   
     brain tumor, pancreatic, lung, colon  Stroke Paternal Aunt  Psychiatric Disorder Maternal Aunt   
     vascular dementia  Cancer Other   
     grandfather unknown  MS Other   
     grandfather unknown No current facility-administered medications for this encounter. Current Outpatient Medications Medication Sig  
 OTHER,NON-FORMULARY, Indications: Methyl Protect one cap daily Abundio Thomas, Indications: Iodoral one tab daily  OTHER,NON-FORMULARY, Indications: Catecholacalm cap tid  mv-min/iron/folic/calcium/vitK (WOMEN'S MULTIVITAMIN PO) Take  by mouth.  raNITIdine (ZANTAC) 150 mg tablet TAKE 1 TABLET BY MOUTH TWO  TIMES DAILY  mirabegron ER (MYRBETRIQ) 50 mg ER tablet Take 1 Tab by mouth daily.  melatonin 1 mg tablet Take  by mouth.  beclomethasone dipropionate (QNASL) 80 mcg/actuation HFAA 2 Sprays by Nasal route daily.  ofloxacin (FLOXIN) 0.3 % otic solution Administer 5 Drops into each ear daily.  meclizine (ANTIVERT) 25 mg tablet 1/2 - 1 po q6h prn dizziness  OTHER,NON-FORMULARY, Take 2 Tabs by mouth daily (after lunch). Adrenal complex  PRASTERONE, DHEA, (DHEA PO) Take 15 mg by mouth daily.  SELENIUM PO Take 1 Tab by mouth daily.  cholecalciferol, vitamin D3, 1,000 unit/drop drop Take 5 Drops by mouth daily.  OTHER nightly. Progesterone 40 mg daily days 1-14 and 30 mg 1 every am and 2 every pm days 15-28  allergy injection 2 shots every 3 weeks  LACTOBACILLUS ACIDOPHILUS (PROBIOTIC PO) Take 1 tablet by mouth daily.  montelukast (SINGULAIR) 10 mg tablet Take 10 mg by mouth nightly.  Levocetirizine (XYZAL) 5 mg tablet Take 5 mg by mouth daily. Take / use AM day of surgery  per anesthesia protocols.  Aspirin, Buffered 81 mg Tab Take 81 mg by mouth every morning. Take / use AM day of surgery  per anesthesia protocols. Review of Systems Review of Systems Constitution: Negative for diaphoresis and malaise/fatigue. HENT: Negative for congestion. Cardiovascular: Positive for chest pain (tightness) and palpitations. Negative for claudication, cyanosis, dyspnea on exertion, irregular heartbeat, leg swelling, near-syncope, orthopnea, paroxysmal nocturnal dyspnea and syncope. Respiratory: Positive for shortness of breath. Negative for cough and wheezing. Endocrine: Negative for cold intolerance and heat intolerance. Hematologic/Lymphatic: Does not bruise/bleed easily. Skin: Negative for nail changes. Neurological: Negative for dizziness, headaches and weakness. Subjective:  
 
Visit Vitals /71 Pulse 77 Temp 97.8 °F (36.6 °C) Resp 17 Ht 5' 6\" (1.676 m) Wt 116.1 kg (256 lb) LMP 10/30/2015 (Approximate) Comment: 11/11/15 SpO2 97% BMI 41.32 kg/m² No intake/output data recorded. No intake/output data recorded. Physical Exam: 
General: Well Developed, Well Nourished, No Acute Distress HEENT: pupils equal and round, no abnormalities noted Neck: supple, no JVD, no carotid bruits Heart: S1S2 with RRR without murmurs or gallops Lungs: Clear throughout auscultation bilaterally without adventitious sounds Abd: soft, nontender, nondistended, with good bowel sounds Ext: warm, no edema, calves supple/nontender, pulses 2+ bilaterally Skin: warm and dry Psychiatric: Normal mood and affect Neurologic: Alert and oriented X 3 Cardiographics Telemetry: SR 
ECG: Supraventricular tachycardia Non-specific ST-t wave changes Echocardiogram:  
 
Labs:  
Recent Results (from the past 24 hour(s)) EKG, 12 LEAD, INITIAL Collection Time: 03/26/19  8:25 AM  
Result Value Ref Range Ventricular Rate 187 BPM  
 Atrial Rate 197 BPM  
 QRS Duration 70 ms Q-T Interval 240 ms QTC Calculation (Bezet) 423 ms Calculated P Axis 71 degrees Calculated R Axis 14 degrees Calculated T Axis 87 degrees Diagnosis    
  !! AGE AND GENDER SPECIFIC ECG ANALYSIS !! Supraventricular tachycardia Non-specific ST-t wave changes Abnormal ECG No previous ECGs available Confirmed by Rom Alba MD (), Miles Keating ([id number]) on 3/26/2019 9:23:26 AM 
  
D DIMER Collection Time: 03/26/19  8:33 AM  
Result Value Ref Range D DIMER 0.29 <0.56 ug/ml(FEU) CBC WITH AUTOMATED DIFF Collection Time: 03/26/19  8:34 AM  
Result Value Ref Range WBC 11.9 (H) 4.3 - 11.1 K/uL  
 RBC 4.96 4.05 - 5.2 M/uL  
 HGB 14.2 11.7 - 15.4 g/dL HCT 44.8 35.8 - 46.3 % MCV 90.3 79.6 - 97.8 FL  
 MCH 28.6 26.1 - 32.9 PG  
 MCHC 31.7 31.4 - 35.0 g/dL  
 RDW 14.3 11.9 - 14.6 % PLATELET 894 477 - 698 K/uL MPV 11.9 9.4 - 12.3 FL ABSOLUTE NRBC 0.00 0.0 - 0.2 K/uL  
 DF AUTOMATED NEUTROPHILS 77 43 - 78 % LYMPHOCYTES 16 13 - 44 %  MONOCYTES 5 4.0 - 12.0 %  
 EOSINOPHILS 1 0.5 - 7.8 % BASOPHILS 0 0.0 - 2.0 % IMMATURE GRANULOCYTES 1 0.0 - 5.0 %  
 ABS. NEUTROPHILS 9.2 (H) 1.7 - 8.2 K/UL  
 ABS. LYMPHOCYTES 1.9 0.5 - 4.6 K/UL  
 ABS. MONOCYTES 0.6 0.1 - 1.3 K/UL  
 ABS. EOSINOPHILS 0.1 0.0 - 0.8 K/UL  
 ABS. BASOPHILS 0.1 0.0 - 0.2 K/UL  
 ABS. IMM. GRANS. 0.1 0.0 - 0.5 K/UL METABOLIC PANEL, COMPREHENSIVE Collection Time: 03/26/19  8:34 AM  
Result Value Ref Range Sodium 139 136 - 145 mmol/L Potassium 5.1 3.5 - 5.1 mmol/L Chloride 106 98 - 107 mmol/L  
 CO2 25 21 - 32 mmol/L Anion gap 8 7 - 16 mmol/L Glucose 152 (H) 65 - 100 mg/dL BUN 15 6 - 23 MG/DL Creatinine 1.07 (H) 0.6 - 1.0 MG/DL  
 GFR est AA >60 >60 ml/min/1.73m2 GFR est non-AA 58 (L) >60 ml/min/1.73m2 Calcium 8.9 8.3 - 10.4 MG/DL Bilirubin, total 0.4 0.2 - 1.1 MG/DL  
 ALT (SGPT) 30 12 - 65 U/L  
 AST (SGOT) 54 (H) 15 - 37 U/L Alk. phosphatase 107 50 - 136 U/L Protein, total 7.2 6.3 - 8.2 g/dL Albumin 3.1 (L) 3.5 - 5.0 g/dL Globulin 4.1 (H) 2.3 - 3.5 g/dL A-G Ratio 0.8 (L) 1.2 - 3.5    
TROPONIN I Collection Time: 03/26/19  8:34 AM  
Result Value Ref Range Troponin-I, Qt. <0.02 (L) 0.02 - 0.05 NG/ML  
POC TROPONIN-I Collection Time: 03/26/19 10:43 AM  
Result Value Ref Range Troponin-I (POC) 0.1 (H) 0.02 - 0.05 ng/ml POC TROPONIN-I Collection Time: 03/26/19 12:02 PM  
Result Value Ref Range Troponin-I (POC) 0.21 (H) 0.02 - 0.05 ng/ml Patient has been seen and examined by Dr. Kris Bass and he agrees with the following assessment and plan: 
 
 Assessment/Plan:  
  
  Principal Problem: 
  Elevated troponin (3/26/2019)- likely troponin leak from SVT but with chest tightness with recent  functional capacity changes. Will admit to tele, echo, start hep gtt, IVFs,  mg now, BB and statin. Keep npo for LHC likely today. Serial troponins. Active Problems: 
  Chest pain (3/26/2019)- see above SVT (supraventricular tachycardia) ([facility] [address] (3/26/2019)- aborted post adenosine. See above with adding   BB. SOB (shortness of breath) (3/26/2019)- see above and echo ordered NSVT (nonsustained ventricular tachycardia) ([facility] [address] (3/26/2019)- see above Adrenal insuff- discussed seeing endocrinology for w/u. Sol Miranda NP 
3/26/2019 12:35 PM 
 
I have personally seen and examined patient and agree with above assessment. I agree and confirm with findings with additional details/exceptions as listed below: No prior cardiac history. History of reported adrenal insufficiency diagnosed with prior salivary testing at a wellness center and taking natural supplements (never seen endocrine). Issues with intermittent palpitations for years which have been transient. Also some increased palpitations over the last few weeks. Had an episode this morning while coming to work and subsequently noted with heart rates up around 180-200. Went to the ER and SVT noted on EKG. Had chest discomfort/dyspnea. Has also had some increased dyspnea on exertion when doing some of her activities over the last couple weeks. In ER, SVT aborted with adenosine. Still currently reports some chest discomfort. Initial mildly elevated troponin at 0.1 and last check of 0.21. Also while in the ED, short run of nonsustained ventricular tachycardia. Plan coronary angiogram.  Continue heparin for now. Elevated troponin likely demand related but with persistent chest discomfort after SVT aborted and noted short run of NSVT, along with progressive recent dyspnea, plan coronary angiogram.  Risks/benefits/alternatives discussed with patient who agrees to procedure. Follow-up echocardiogram. Discussed possible contribution of supplements to clinical presentation/SVT and long-term recommend endocrinology evaluation.  Also SVT appears jeison dependent and consideration for ablation versus jeison agent and reassess post angiogram.  
Further recommendations pending clinical course.  
 
Nohelia Reddy MD 
3/26/2019  2:37 PM

## 2019-03-26 NOTE — PROGRESS NOTES
TRANSFER - IN REPORT: 
 
Verbal report received from [facility] RN(name) on Neema Pedersen  being received from cath lab(unit) for routine progression of care Report consisted of patients Situation, Background, Assessment and  
Recommendations(SBAR). Information from the following report(s) Procedure Summary was reviewed with the receiving nurse. Opportunity for questions and clarification was provided. Assessment completed upon patients arrival to unit and care assumed.

## 2019-03-26 NOTE — PROCEDURES
[address] [facility]  CARDIAC CATH    Name:  Isaiah Nascimento  MR#:  [id number]  :  1970  ACCOUNT #:  [de-identified]  DATE OF SERVICE:  2019    PROCEDURES PERFORMED:  Left heart cath, selective coronary arteriography, and left ventriculogram via the right radial approach. INDICATION:  Chest pain, mildly elevated troponin in the setting of SVT. Cardiac catheterization arranged by Dr. Jessica Medina. TECHNIQUE AND FINDINGS:  After informed consent was obtained, the patient was brought to the cardiac catheterization lab. The right radial artery was prepped and draped in the usual sterile fashion. Utilizing the modified Seldinger technique and micropuncture needle, the right radial artery was entered. A 6-Singaporean Terumo slender sheath was placed without difficulty. Radial cocktail consisting of 2000 units of heparin, 2 mg of verapamil and 200 mcg of nitroglycerin was administered. A 5-Singaporean Tiger 4.0 catheter was used to select and engage the ostium of the left main coronary artery and right coronary artery respectively. Selective injection verification was performed. A pigtail catheter was used to cross the aortic valve into the left ventricle. Hemodynamic measurements and left ventriculogram were obtained. Left ventricular aortic pressure gradient was obtained by pullback technique. At the conclusion of the diagnostic procedure, the radial sheath was removed and a pneumatic band was placed with excellent hemostasis. No complications were encountered. SEDATION:  Patient received 4 mg of Versed and 50 mcg of fentanyl for monitored supervised conscious sedation. Sedation start time was 1446 with a procedure completion time of 1509. Sedation was administered by Armida Lazo RN under my supervision. CONTRAST:  Isovue 90. HEMODYNAMICS RESULTS:  1. Aortic pressure 125/92.   2.  Left ventricular end-diastolic pressure is 15.  3.  There is no significant gradient across the aortic valve. ANGIOGRAPHIC RESULTS:  1. Left main coronary artery:  Large caliber vessel. Angiographically normal.  2.  LAD:  It is a medium caliber vessel. Angiographically normal.  3.  Ramus intermedius:  Medium caliber vessel. Normal.  4.  Left circumflex:  Large caliber nondominant vessel. Angiographically normal.  5.  First obtuse marginal artery:  Large caliber vessel. Angiographically normal.  6.  Right coronary artery is medium caliber dominant vessel. Angiographically normal.  7.  Right PDA:  Small caliber vessel. Normal.  8.  Right posterolateral branch:  Medium caliber vessel. Normal.  9.  Left ventriculogram shows normal left ventricular systolic function, EF 48-47%. Aortic root is nondilated. CONCLUSION:  1. Normal coronary arteries. 2.  Normal left ventricular systolic function. PLAN:  Ongoing medical therapy.       Hassel Denver, MD      [id number]  D:  03/26/2019 15:15  T:  03/26/2019 16:13  JOB #:  [id number]

## 2019-03-26 NOTE — PROGRESS NOTES
TRANSFER - OUT REPORT: 
 
Verbal report given to David Varela RN(name) on Marine Noble  being transferred to CPRU(unit) for routine progression of care Report consisted of patients Situation, Background, Assessment and  
Recommendations(SBAR). Information from the following report(s) Procedure Summary, MAR and Cardiac Rhythm NSR was reviewed with the receiving nurse. Lines:  
Peripheral IV 03/26/19 Left Hand (Active) Site Assessment Clean, dry, & intact 3/26/2019 11:09 AM  
Phlebitis Assessment 0 3/26/2019 11:09 AM  
Infiltration Assessment 0 3/26/2019 11:09 AM  
Dressing Status Clean, dry, & intact 3/26/2019 11:09 AM  
  
 
Opportunity for questions and clarification was provided. Patient transported with: 
 Registered Nurse [facility] Dr Alejandro Dove Diagnostic only Right radial access - TR band @ 1509 w/ 9ml air in band - site C/D/I Versed 4mg IV Fent 100mcg IV

## 2019-03-26 NOTE — PROGRESS NOTES
TRANSFER - OUT REPORT: 
 
Verbal report given to HERNÁN Roberts on Sheeba Winkler  being transferred to [facility] routine post - op Report consisted of patients Situation, Background, Assessment and  
Recommendations(SBAR). Information from the following report(s) SBAR, Procedure Summary, Intake/Output and MAR was reviewed with the receiving nurse. Lines:  
Peripheral IV 03/26/19 Left Hand (Active) Site Assessment Clean, dry, & intact; Clean;Dry 3/26/2019  3:18 PM  
Phlebitis Assessment 0 3/26/2019  3:18 PM  
Infiltration Assessment 0 3/26/2019  3:18 PM  
Dressing Status Clean, dry, & intact; Clean;Dry 3/26/2019  3:18 PM  
Dressing Type Tape;Transparent 3/26/2019  3:18 PM  
Hub Color/Line Status Patent; Infusing;Pink 3/26/2019  3:18 PM  
  
 
Opportunity for questions and clarification was provided.    
 
Patient transported with:

## 2019-03-26 NOTE — ED NOTES
This RN applied pads to patients and EKG monitoring at this time. Dr. Tai Arriola administered 6mg of Adenosine at [id number]. Pts HR decreased 95 at this time. Pt continuing to deep breathe.

## 2019-03-26 NOTE — ED PROVIDER NOTES
Patient is a 48 yo female who presents with heart palpitations and rapid heart rate. 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 States some chest tightness, denies any nausea or vomiting, no fevers or chills. The history is provided by the patient. No  was used. Rapid Heart Rate Associated symptoms include chest pain. Pertinent negatives include no abdominal pain, no headaches and no shortness of breath. Past Medical History:  
Diagnosis Date  Adrenal disorder, other 2007  
 adrenal fatigue  Bone spur of foot   
 right foot  Cancer ([facility] 11/14/2017 Atypical skin  Chronic pain   
 lower back  Cough  Environmental allergies  GERD (gastroesophageal reflux disease)  History of positive PPD 1992  
 History of renal stone 4/1/2013  Insomnia  Lumbar degenerative disc disease 4/1/2013  Sore throat  Spastic bladder 2008  Thromboembolus ([facility] [address] \" possible DVT after IVF procedure and loss of pregnancy \"  Vertigo  Vitamin D deficiency 2009  Wheezing Past Surgical History:  
Procedure Laterality Date  HX BLADDER SUSPENSION  2009  HX BREAST BIOPSY Right 2000  
 r excision infected spiderbite IMF  HX GYN  2006, 2008  
 removal of fibroid tumors from uterus x 2  
 HX MALIGNANT SKIN LESION EXCISION Right 11/14/2017 , rogelio Ruth; jawline  HX MOHS PROCEDURES Right 05/2015 Dr. Javon Forrester; precancerous abdomen Austyn Reyes MYRINGOTOMY    
 [id number], 2007, 2014, 2015; Dr. Brenda Fiore  HX ORTHOPAEDIC Right 05/01/2015 Dr. Torres Lam; rotater cuff  HX OTHER SURGICAL  2012 Fatty tumor removed from left shoulder  HX PARTIAL HYSTERECTOMY  11/11/15  
 robotic-assisted laparoscopic with salpingectomy bilateral; Dr. Salima Boston; secondary to fibroid uterus/menorrhagia [address] [address] [place]  HX UROLOGICAL  2009  
 bladder sling; Dr. Alia Lopez  HX WISDOM TEETH EXTRACTION Family History:  
Problem Relation Age of Onset  Hypertension Father  Cancer Maternal Grandfather   
     bladder cancer  Psychiatric Disorder Maternal Grandfather Alzheimers  Cancer Paternal Grandmother 80  
     pancreatic cancer  Psychiatric Disorder Paternal Grandmother Alzheimer's  No Known Problems Mother  MS Sister  Neuropathy Brother  Cancer Paternal Aunt   
     colon, thyroid  Cancer Paternal Uncle   
     brain tumor, pancreatic, lung, colon  Stroke Paternal Aunt  Psychiatric Disorder Maternal Aunt   
     vascular dementia  Cancer Other   
     grandfather unknown  MS Other   
     grandfather unknown Social History Socioeconomic History  Marital status:  Spouse name: Not on file  Number of children: Not on file  Years of education: Not on file  Highest education level: Not on file Occupational History  Not on file Social Needs  Financial resource strain: Not on file  Food insecurity:  
  Worry: Not on file Inability: Not on file  Transportation needs:  
  Medical: Not on file Non-medical: Not on file Tobacco Use  Smoking status: Never Smoker  Smokeless tobacco: Never Used Substance and Sexual Activity  Alcohol use: No  
 Drug use: No  
 Sexual activity: Yes  
  Partners: Male Lifestyle  Physical activity:  
  Days per week: Not on file Minutes per session: Not on file  Stress: Not on file Relationships  Social connections:  
  Talks on phone: Not on file Gets together: Not on file Attends [employer] service: Not on file Active member of club or organization: Not on file Attends meetings of clubs or organizations: Not on file Relationship status: Not on file  Intimate partner violence:  
  Fear of current or ex partner: Not on file Emotionally abused: Not on file Physically abused: Not on file Forced sexual activity: Not on file Other Topics Concern  Not on file Social History Narrative  Not on file ALLERGIES: Adhesive tape-silicones; Ciprofloxacin; and Sulfa (sulfonamide antibiotics) Review of Systems Constitutional: Negative for chills and fever. HENT: Negative for rhinorrhea and sore throat. Eyes: Negative for visual disturbance. Respiratory: Negative for cough and shortness of breath. Cardiovascular: Positive for chest pain and palpitations. Negative for leg swelling. Gastrointestinal: Negative for abdominal pain, diarrhea, nausea and vomiting. Genitourinary: Negative for dysuria and frequency. Musculoskeletal: Negative for back pain and neck pain. Skin: Negative for rash. Neurological: Negative for weakness and headaches. Psychiatric/Behavioral: The patient is not nervous/anxious. Vitals:  
 03/26/19 3826 03/26/19 9967 03/26/19 0446 03/26/19 5229 BP: 107/70 111/66 125/66 Pulse: (!) 204 100 95 92 Resp: 19 18 25 15 Temp:      
SpO2: 95% 96% 96% 97% Weight:      
Height:      
      
 
Physical Exam  
Constitutional: She is oriented to person, place, and time. She appears well-developed and well-nourished. HENT:  
Head: Normocephalic. Right Ear: External ear normal.  
Left Ear: External ear normal.  
Eyes: Pupils are equal, round, and reactive to light. Conjunctivae and EOM are normal.  
Neck: Normal range of motion. Neck supple. No tracheal deviation present. Cardiovascular: Regular rhythm, normal heart sounds and intact distal pulses. No murmur heard. Rapid rate to 200, regular rhythm Pulmonary/Chest: Effort normal and breath sounds normal. No respiratory distress. Abdominal: Soft. There is no tenderness. Musculoskeletal: Normal range of motion. Neurological: She is alert and oriented to person, place, and time. No cranial nerve deficit. Skin: No rash noted. Nursing note and vitals reviewed. MDM Number of Diagnoses or Management Options Amount and/or Complexity of Data Reviewed Clinical lab tests: ordered and reviewed Tests in the radiology section of CPT®: ordered and reviewed Tests in the medicine section of CPT®: ordered and reviewed Review and summarize past medical records: yes Risk of Complications, Morbidity, and/or Mortality Presenting problems: high Diagnostic procedures: high Management options: high Patient Progress Patient progress: stable Procedures Recent Results (from the past 12 hour(s)) EKG, 12 LEAD, INITIAL Collection Time: 03/26/19  8:25 AM  
Result Value Ref Range Ventricular Rate 187 BPM  
 Atrial Rate 197 BPM  
 QRS Duration 70 ms Q-T Interval 240 ms QTC Calculation (Bezet) 423 ms Calculated P Axis 71 degrees Calculated R Axis 14 degrees Calculated T Axis 87 degrees Diagnosis    
  !! AGE AND GENDER SPECIFIC ECG ANALYSIS !! Supraventricular tachycardia Non-specific ST-t wave changes Abnormal ECG No previous ECGs available Confirmed by Julia Marquez MD (), Bertha Suarez ([id number]) on 3/26/2019 9:23:26 AM 
  
D DIMER Collection Time: 03/26/19  8:33 AM  
Result Value Ref Range D DIMER 0.29 <0.56 ug/ml(FEU) CBC WITH AUTOMATED DIFF Collection Time: 03/26/19  8:34 AM  
Result Value Ref Range WBC 11.9 (H) 4.3 - 11.1 K/uL  
 RBC 4.96 4.05 - 5.2 M/uL  
 HGB 14.2 11.7 - 15.4 g/dL HCT 44.8 35.8 - 46.3 % MCV 90.3 79.6 - 97.8 FL  
 MCH 28.6 26.1 - 32.9 PG  
 MCHC 31.7 31.4 - 35.0 g/dL  
 RDW 14.3 11.9 - 14.6 % PLATELET 516 736 - 634 K/uL MPV 11.9 9.4 - 12.3 FL ABSOLUTE NRBC 0.00 0.0 - 0.2 K/uL  
 DF AUTOMATED NEUTROPHILS 77 43 - 78 % LYMPHOCYTES 16 13 - 44 % MONOCYTES 5 4.0 - 12.0 % EOSINOPHILS 1 0.5 - 7.8 %  BASOPHILS 0 0.0 - 2.0 %  
 IMMATURE GRANULOCYTES 1 0.0 - 5.0 %  
 ABS. NEUTROPHILS 9.2 (H) 1.7 - 8.2 K/UL  
 ABS. LYMPHOCYTES 1.9 0.5 - 4.6 K/UL  
 ABS. MONOCYTES 0.6 0.1 - 1.3 K/UL  
 ABS. EOSINOPHILS 0.1 0.0 - 0.8 K/UL  
 ABS. BASOPHILS 0.1 0.0 - 0.2 K/UL  
 ABS. IMM. GRANS. 0.1 0.0 - 0.5 K/UL METABOLIC PANEL, COMPREHENSIVE Collection Time: 03/26/19  8:34 AM  
Result Value Ref Range Sodium 139 136 - 145 mmol/L Potassium 5.1 3.5 - 5.1 mmol/L Chloride 106 98 - 107 mmol/L  
 CO2 25 21 - 32 mmol/L Anion gap 8 7 - 16 mmol/L Glucose 152 (H) 65 - 100 mg/dL BUN 15 6 - 23 MG/DL Creatinine 1.07 (H) 0.6 - 1.0 MG/DL  
 GFR est AA >60 >60 ml/min/1.73m2 GFR est non-AA 58 (L) >60 ml/min/1.73m2 Calcium 8.9 8.3 - 10.4 MG/DL Bilirubin, total 0.4 0.2 - 1.1 MG/DL  
 ALT (SGPT) 30 12 - 65 U/L  
 AST (SGOT) 54 (H) 15 - 37 U/L Alk. phosphatase 107 50 - 136 U/L Protein, total 7.2 6.3 - 8.2 g/dL Albumin 3.1 (L) 3.5 - 5.0 g/dL Globulin 4.1 (H) 2.3 - 3.5 g/dL A-G Ratio 0.8 (L) 1.2 - 3.5    
TROPONIN I Collection Time: 03/26/19  8:34 AM  
Result Value Ref Range Troponin-I, Qt. <0.02 (L) 0.02 - 0.05 NG/ML  
POC TROPONIN-I Collection Time: 03/26/19 10:43 AM  
Result Value Ref Range Troponin-I (POC) 0.1 (H) 0.02 - 0.05 ng/ml POC TROPONIN-I Collection Time: 03/26/19 12:02 PM  
Result Value Ref Range Troponin-I (POC) 0.21 (H) 0.02 - 0.05 ng/ml Xr Chest [facility] Result Date: 3/26/2019 Portable chest x-ray INDICATION: Supraventricular tachycardia. Chest tightness COMPARISON: 08/29/2017 FINDINGS: EKG leads are present over the chest. Cardiac silhouette and mediastinal contour normal. Lungs are clear. No pulmonary edema or pleural effusion. The surrounding bones are intact. IMPRESSION: No acute abnormality. 48 yo female with SVT: 
 
 Troponin continues to rise, discussed with cardiology and will admit for further workup and trending troponins.

## 2019-03-26 NOTE — PROCEDURES
Brief Cardiac Procedure Note    Patient: Sandra Atkins MRN: [id number]  SSN: [id number]    YOB: 1970  Age: 47 y.o. Sex: female      Date of Procedure: 3/26/2019     Pre-procedure Diagnosis: Chest pain    Post-procedure Diagnosis: Non-cardiac chest pain    Procedure: Left Heart Catheterization    Brief Description of Procedure: As above    Performed By: Americo He MD     Assistants: None    Anesthesia: Moderate Sedation    Estimated Blood Loss: Less than 10 mL      Specimens: None    Implants: None    Findings: Normal coronary arteries    Complications: None    Recommendations: Continue medical therapy.     Signed By: Americo He MD     March 26, 2019

## 2019-03-26 NOTE — ED NOTES
Pt resting in her bed with family at bedside. Pt states not being in any discomfort at this time. Bed locked and lowered with call light within reach.

## 2019-03-26 NOTE — ED NOTES
TRANSFER - OUT REPORT: 
 
Verbal report given to [address] (name) on Radha Weaver  being transferred to room 302 (unit) for routine progression of care Report consisted of patients Situation, Background, Assessment and  
Recommendations(SBAR). Information from the following report(s) ED Summary was reviewed with the receiving nurse. Lines:  
Peripheral IV 03/26/19 Left Hand (Active) Site Assessment Clean, dry, & intact 3/26/2019 11:09 AM  
Phlebitis Assessment 0 3/26/2019 11:09 AM  
Infiltration Assessment 0 3/26/2019 11:09 AM  
Dressing Status Clean, dry, & intact 3/26/2019 11:09 AM  
  
 
Opportunity for questions and clarification was provided. Patient transported with: 
 Monitor, pt belongings, fluids.

## 2019-03-26 NOTE — PROGRESS NOTES
Patient admitted to floor. Placed on monitor. Assessed. Discussed POC. Instructed to keep right wrist immobile. Verbalized understanding. Oriented to room. Call light within reach. Instructed to call for assist. 
 
Dual admission skin assessment completed. Right radial cath site. TR band in place. No other abnormalities noted. Sacrum intact.

## 2019-03-26 NOTE — PROGRESS NOTES
TRANSFER - IN REPORT: 
 
Verbal report received from Parker Mai RN (name) on Farrah Ferro  being received from Cath Lab (unit) for routine progression of care Report consisted of patients Situation, Background, Assessment and  
Recommendations(SBAR). Information from the following report(s) SBAR, Kardex, Procedure Summary and MAR was reviewed with the receiving nurse. Opportunity for questions and clarification was provided. Assessment completed upon patients arrival to unit and care assumed.

## 2019-03-26 NOTE — PROGRESS NOTES
Bedside and Verbal shift change report given to self (oncoming nurse) by Robert Sinclair (offgoing nurse). Report included the following information SBAR, Kardex, Intake/Output and MAR.

## 2019-03-27 ENCOUNTER — ANESTHESIA EVENT (OUTPATIENT)
Dept: SURGERY | Age: 49
DRG: 274 | End: 2019-03-27
Payer: COMMERCIAL

## 2019-03-27 ENCOUNTER — ANESTHESIA (OUTPATIENT)
Dept: SURGERY | Age: 49
DRG: 274 | End: 2019-03-27
Payer: COMMERCIAL

## 2019-03-27 LAB
ACT BLD: 312 SECS (ref 70–128)
ANION GAP SERPL CALC-SCNC: 6 MMOL/L (ref 7–16)
BUN SERPL-MCNC: 14 MG/DL (ref 6–23)
CALCIUM SERPL-MCNC: 8.4 MG/DL (ref 8.3–10.4)
CHLORIDE SERPL-SCNC: 106 MMOL/L (ref 98–107)
CHOLEST SERPL-MCNC: 161 MG/DL
CO2 SERPL-SCNC: 26 MMOL/L (ref 21–32)
CREAT SERPL-MCNC: 0.84 MG/DL (ref 0.6–1)
ERYTHROCYTE [DISTWIDTH] IN BLOOD BY AUTOMATED COUNT: 14.3 % (ref 11.9–14.6)
GLUCOSE SERPL-MCNC: 87 MG/DL (ref 65–100)
HCT VFR BLD AUTO: 38.6 % (ref 35.8–46.3)
HDLC SERPL-MCNC: 50 MG/DL (ref 40–60)
HDLC SERPL: 3.2 {RATIO}
HGB BLD-MCNC: 12.3 G/DL (ref 11.7–15.4)
LDLC SERPL CALC-MCNC: 86 MG/DL
LIPID PROFILE,FLP: ABNORMAL
MCH RBC QN AUTO: 28.7 PG (ref 26.1–32.9)
MCHC RBC AUTO-ENTMCNC: 31.9 G/DL (ref 31.4–35)
MCV RBC AUTO: 90.2 FL (ref 79.6–97.8)
NRBC # BLD: 0 K/UL (ref 0–0.2)
PLATELET # BLD AUTO: 236 K/UL (ref 150–450)
PMV BLD AUTO: 11.3 FL (ref 9.4–12.3)
POTASSIUM SERPL-SCNC: 4.2 MMOL/L (ref 3.5–5.1)
RBC # BLD AUTO: 4.28 M/UL (ref 4.05–5.2)
SODIUM SERPL-SCNC: 138 MMOL/L (ref 136–145)
TRIGL SERPL-MCNC: 125 MG/DL (ref 35–150)
TROPONIN I SERPL-MCNC: 1.23 NG/ML (ref 0.02–0.05)
VLDLC SERPL CALC-MCNC: 25 MG/DL (ref 6–23)
WBC # BLD AUTO: 8.9 K/UL (ref 4.3–11.1)

## 2019-03-27 PROCEDURE — C1893 INTRO/SHEATH, FIXED,NON-PEEL: HCPCS

## 2019-03-27 PROCEDURE — 93005 ELECTROCARDIOGRAM TRACING: CPT | Performed by: INTERNAL MEDICINE

## 2019-03-27 PROCEDURE — 36415 COLL VENOUS BLD VENIPUNCTURE: CPT

## 2019-03-27 PROCEDURE — 85347 COAGULATION TIME ACTIVATED: CPT

## 2019-03-27 PROCEDURE — 93621 COMP EP EVL L PAC&REC C SINS: CPT

## 2019-03-27 PROCEDURE — 74011250637 HC RX REV CODE- 250/637: Performed by: INTERNAL MEDICINE

## 2019-03-27 PROCEDURE — 80048 BASIC METABOLIC PNL TOTAL CA: CPT

## 2019-03-27 PROCEDURE — 77030027107 HC PTCH EXT REF CARTO3 J&J -F

## 2019-03-27 PROCEDURE — 76060000034 HC ANESTHESIA 1.5 TO 2 HR: Performed by: INTERNAL MEDICINE

## 2019-03-27 PROCEDURE — 02583ZZ DESTRUCTION OF CONDUCTION MECHANISM, PERCUTANEOUS APPROACH: ICD-10-PCS | Performed by: INTERNAL MEDICINE

## 2019-03-27 PROCEDURE — 74011250637 HC RX REV CODE- 250/637: Performed by: NURSE PRACTITIONER

## 2019-03-27 PROCEDURE — 80061 LIPID PANEL: CPT

## 2019-03-27 PROCEDURE — 93622 COMP EP EVAL L VENTR PAC&REC: CPT

## 2019-03-27 PROCEDURE — 93462 L HRT CATH TRNSPTL PUNCTURE: CPT

## 2019-03-27 PROCEDURE — 74011000250 HC RX REV CODE- 250

## 2019-03-27 PROCEDURE — 77030020506 HC NDL TRNSPTL NRG BAYL -F

## 2019-03-27 PROCEDURE — 74011000258 HC RX REV CODE- 258

## 2019-03-27 PROCEDURE — 84484 ASSAY OF TROPONIN QUANT: CPT

## 2019-03-27 PROCEDURE — C1732 CATH, EP, DIAG/ABL, 3D/VECT: HCPCS

## 2019-03-27 PROCEDURE — 77030035291 HC TBNG PMP SMARTABLATE J&J -B

## 2019-03-27 PROCEDURE — 93613 INTRACARDIAC EPHYS 3D MAPG: CPT

## 2019-03-27 PROCEDURE — 93662 INTRACARDIAC ECG (ICE): CPT

## 2019-03-27 PROCEDURE — 93623 PRGRMD STIMJ&PACG IV RX NFS: CPT

## 2019-03-27 PROCEDURE — 85027 COMPLETE CBC AUTOMATED: CPT

## 2019-03-27 PROCEDURE — 74011250636 HC RX REV CODE- 250/636

## 2019-03-27 PROCEDURE — 93653 COMPRE EP EVAL TX SVT: CPT

## 2019-03-27 PROCEDURE — C1894 INTRO/SHEATH, NON-LASER: HCPCS

## 2019-03-27 PROCEDURE — C1759 CATH, INTRA ECHOCARDIOGRAPHY: HCPCS

## 2019-03-27 PROCEDURE — 65660000000 HC RM CCU STEPDOWN

## 2019-03-27 RX ORDER — PROPOFOL 10 MG/ML
INJECTION, EMULSION INTRAVENOUS
Status: DISCONTINUED | OUTPATIENT
Start: 2019-03-27 | End: 2019-03-27 | Stop reason: HOSPADM

## 2019-03-27 RX ORDER — GUAIFENESIN 100 MG/5ML
81 LIQUID (ML) ORAL DAILY
Status: DISCONTINUED | OUTPATIENT
Start: 2019-03-27 | End: 2019-03-28 | Stop reason: HOSPADM

## 2019-03-27 RX ORDER — HEPARIN SODIUM 1000 [USP'U]/ML
INJECTION, SOLUTION INTRAVENOUS; SUBCUTANEOUS AS NEEDED
Status: DISCONTINUED | OUTPATIENT
Start: 2019-03-27 | End: 2019-03-27 | Stop reason: HOSPADM

## 2019-03-27 RX ORDER — ACETAMINOPHEN 325 MG/1
650 TABLET ORAL
Status: DISCONTINUED | OUTPATIENT
Start: 2019-03-27 | End: 2019-03-28 | Stop reason: HOSPADM

## 2019-03-27 RX ORDER — SODIUM CHLORIDE, SODIUM LACTATE, POTASSIUM CHLORIDE, CALCIUM CHLORIDE 600; 310; 30; 20 MG/100ML; MG/100ML; MG/100ML; MG/100ML
INJECTION, SOLUTION INTRAVENOUS
Status: DISCONTINUED | OUTPATIENT
Start: 2019-03-27 | End: 2019-03-27 | Stop reason: HOSPADM

## 2019-03-27 RX ORDER — PROTAMINE SULFATE 10 MG/ML
INJECTION, SOLUTION INTRAVENOUS AS NEEDED
Status: DISCONTINUED | OUTPATIENT
Start: 2019-03-27 | End: 2019-03-27 | Stop reason: HOSPADM

## 2019-03-27 RX ORDER — SODIUM CHLORIDE 0.9 % (FLUSH) 0.9 %
5-40 SYRINGE (ML) INJECTION AS NEEDED
Status: DISCONTINUED | OUTPATIENT
Start: 2019-03-27 | End: 2019-03-28 | Stop reason: HOSPADM

## 2019-03-27 RX ORDER — SODIUM CHLORIDE 0.9 % (FLUSH) 0.9 %
5-40 SYRINGE (ML) INJECTION EVERY 8 HOURS
Status: DISCONTINUED | OUTPATIENT
Start: 2019-03-27 | End: 2019-03-28 | Stop reason: HOSPADM

## 2019-03-27 RX ORDER — HYDROCODONE BITARTRATE AND ACETAMINOPHEN 5; 325 MG/1; MG/1
1 TABLET ORAL
Status: DISCONTINUED | OUTPATIENT
Start: 2019-03-27 | End: 2019-03-28 | Stop reason: HOSPADM

## 2019-03-27 RX ORDER — PROPOFOL 10 MG/ML
INJECTION, EMULSION INTRAVENOUS AS NEEDED
Status: DISCONTINUED | OUTPATIENT
Start: 2019-03-27 | End: 2019-03-27 | Stop reason: HOSPADM

## 2019-03-27 RX ADMIN — FAMOTIDINE 20 MG: 20 TABLET ORAL at 17:48

## 2019-03-27 RX ADMIN — PROPOFOL 70 MG: 10 INJECTION, EMULSION INTRAVENOUS at 15:26

## 2019-03-27 RX ADMIN — Medication 10 ML: at 05:22

## 2019-03-27 RX ADMIN — PROPOFOL 30 MG: 10 INJECTION, EMULSION INTRAVENOUS at 15:35

## 2019-03-27 RX ADMIN — ASPIRIN 81 MG 81 MG: 81 TABLET ORAL at 10:00

## 2019-03-27 RX ADMIN — HEPARIN SODIUM 18000 UNITS: 1000 INJECTION, SOLUTION INTRAVENOUS; SUBCUTANEOUS at 16:06

## 2019-03-27 RX ADMIN — METOPROLOL TARTRATE 12.5 MG: 25 TABLET ORAL at 08:33

## 2019-03-27 RX ADMIN — HYDROCODONE BITARTRATE AND ACETAMINOPHEN 1 TABLET: 5; 325 TABLET ORAL at 20:35

## 2019-03-27 RX ADMIN — FAMOTIDINE 20 MG: 20 TABLET ORAL at 08:34

## 2019-03-27 RX ADMIN — METOPROLOL TARTRATE 12.5 MG: 25 TABLET ORAL at 17:48

## 2019-03-27 RX ADMIN — PROPOFOL 20 MG: 10 INJECTION, EMULSION INTRAVENOUS at 15:36

## 2019-03-27 RX ADMIN — ATORVASTATIN CALCIUM 40 MG: 40 TABLET, FILM COATED ORAL at 08:33

## 2019-03-27 RX ADMIN — LORATADINE 10 MG: 10 TABLET ORAL at 08:33

## 2019-03-27 RX ADMIN — SODIUM CHLORIDE, SODIUM LACTATE, POTASSIUM CHLORIDE, CALCIUM CHLORIDE: 600; 310; 30; 20 INJECTION, SOLUTION INTRAVENOUS at 15:23

## 2019-03-27 RX ADMIN — PROPOFOL 100 MCG/KG/MIN: 10 INJECTION, EMULSION INTRAVENOUS at 15:26

## 2019-03-27 RX ADMIN — Medication 10 ML: at 21:39

## 2019-03-27 RX ADMIN — PROTAMINE SULFATE 50 MG: 10 INJECTION, SOLUTION INTRAVENOUS at 16:37

## 2019-03-27 RX ADMIN — MONTELUKAST SODIUM 10 MG: 10 TABLET, FILM COATED ORAL at 21:39

## 2019-03-27 NOTE — CONSULTS
[facility] Cardiology/Electrophyiology Consult                Date of  Admission: 3/26/2019  8:15 AM     CC/Reason for consult: SVT    Floyd Mas is a 47 y.o. female admitted for Elevated troponin [R74.8]; Chest pain [R07.9]. 47 y.o. female with no prior cardiac history who presented to the ER with complaints of rapid palpitations, chest pain and shortness of breath and was noted to be in SVT. Pt report she has had intermittent palpitations for months that would only last few seconds and resolve. 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 She went to her floor and did several valsalva maneuvers without any relief. She then went to 3rd floor for nurse to check her. Nurse on floor checked pulse and was 204. She came down to ED for further care. She reports fatigue/decreased functional capacity over last several weeks and blaming it on her allergies. She would walk and get chest tightness than she would ignore. No family h/o premature CAD. In ED, EKG showed SVT rate 187. She was given adenosine and HR decreased to 95. Labs showed trop #1 was neg then trop #2 was  0.1, the #3 was . 21. D. Dimer neg. She underwent cardiac cath that was unremarkable. Pt SVT is a recurrent problem, uncontrolled and worsening, no aggravating or alleviating factors with symptoms as noted above. Cardiac PMH: (Old records have been reviewed and summarized below)  Cath (3/27/19):  Findings: Normal coronary arteries    TTE (3/27/19): normal EF, normal echo    Patient Active Problem List   Diagnosis Code    Lumbar degenerative disc disease M51.36    History of renal stone Z87.442    History of positive PPD R76.11    Acute venous embolism and thrombosis of other specified veins(453.89) I82.890    Allergic rhinitis J30.9    Urge incontinence N39.41    Urinary frequency R35.0    Dyspepsia R10.13    Obesity, morbid ([address] E66.01    Chest pain R07.9  SVT [facility]ra[facility]ricular tachycardia) ([facility]) I47.1    Elevated troponin R74.8    SOB (shortness of breath) R06.02    NSVT (nonsustained ventricular tachycardia) ([facility]) I47.2       Past Medical History:   Diagnosis Date    Adrenal disorder, other 2007    adrenal fatigue    Bone spur of foot     right foot     Cancer ([facility] 11/14/2017    Atypical skin    Chronic pain     lower back    Cough     Environmental allergies     GERD (gastroesophageal reflux disease)     History of positive PPD 1992    History of renal stone 4/1/2013    Insomnia     Lumbar degenerative disc disease 4/1/2013    Sore throat     Spastic bladder 2008    Thromboembolus ([facility]     \" possible DVT after IVF procedure and loss of pregnancy \"     Vertigo     Vitamin D deficiency 2009    Wheezing       Past Surgical History:   Procedure Laterality Date    HX BLADDER SUSPENSION  2009    HX BREAST BIOPSY Right 2000    r excision infected spiderbite IMF    HX GYN  2006, 2008    removal of fibroid tumors from uterus x 2    H[facility] MALIG[facility]    HX MOHS PROCEDURES Right 05/2015    Dr. Enio Mckeon; precancerous abdomen    HX MYRINGOTOMY      1978, 2007, 2014, 2015; Dr. Nydia Germain Right 05/01/2015    Dr. Jony Hancock; rotater cuff    HX OTHER SURGICAL  2012    Fatty tumor removed from left shoulder    HX PARTIAL HYSTERECTOMY  11/11/15    robotic-assisted laparoscopic with salpingectomy bilateral; Dr. Glen Mahoney; secondary to fibroid uterus/menorrhagia    HX RHINOPLASTY  1985    HX TONSIL AND ADENOIDECTOMY  1978    HX UROLOGICAL  2009    bladder sling; Dr. Reddy Bell EXTRACTION       Allergies   Allergen Reactions    Adhesive Tape-Silicones Contact Dermatitis     Causes blisters    Ciprofloxacin Rash    Sulfa (Sulfonamide Antibiotics) Rash      Family History   Problem Relation Age of Onset    Hypertension Father     Cancer Maternal Grandfather         bladder cancer    Psychiatric Disorder Maternal Grandfather         Alzheimers    Cancer Paternal Grandmother 80        pancreatic cancer    Psychiatric Disorder Paternal Grandmother         Alzheimer's    No Known Problems Mother     MS Sister     Neuropathy Brother     Cancer Paternal Aunt         colon, thyroid    Cancer Paternal Uncle         brain tumor, pancreatic, lung, colon    Stroke Paternal Aunt     Psychiatric Disorder Maternal Aunt         vascular dementia    Cancer Other         grandfather unknown    MS Other         grandfather unknown        Current Facility-Administered Medications   Medication Dose Route Frequency    beclomethasone dipropionate HFAA 2 Spray (Patient Supplied)  2 Spray Nasal DAILY    loratadine (CLARITIN) tablet 10 mg  10 mg Oral DAILY    meclizine (ANTIVERT) tablet 25 mg  25 mg Oral Q6H PRN    mirabegron ER (MYRBETRIQ) tablet 50 mg (Patient Supplied)  50 mg Oral DAILY    montelukast (SINGULAIR) tablet 10 mg  10 mg Oral QHS    multivitamin, stress formula (STRESS TAB) tablet 1 Tab  1 Tab Oral DAILY    famotidine (PEPCID) tablet 20 mg  20 mg Oral BID    sodium chloride (NS) flush 5-40 mL  5-40 mL IntraVENous Q8H    sodium chloride (NS) flush 5-40 mL  5-40 mL IntraVENous PRN    nitroglycerin (NITROSTAT) tablet 0.4 mg  0.4 mg SubLINGual Q5MIN PRN    morphine injection 2 mg  2 mg IntraVENous Q4H PRN    aspirin chewable tablet 324 mg  324 mg Oral DAILY    metoprolol tartrate (LOPRESSOR) tablet 12.5 mg  12.5 mg Oral BID    atorvastatin (LIPITOR) tablet 40 mg  40 mg Oral DAILY    fentaNYL citrate (PF) injection  mcg   mcg IntraVENous Multiple    lidocaine (XYLOCAINE) 10 mg/mL (1 %) injection 2-10 mL  2-10 mL IntraDERMal Multiple    midazolam (VERSED) injection 0.5-2 mg  0.5-2 mg IntraVENous Multiple    sodium chloride (NS) flush 5-40 mL  5-40 mL IntraVENous PRN    acetaminophen (TYLENOL) tablet 650 mg  650 mg Oral Q4H PRN    HYDROcodone-acetaminophen (NORCO) 5-325 mg per tablet 1 Tab  1 Tab Oral Q4H PRN    ondansetron (ZOFRAN) injection 4 mg  4 mg IntraVENous ONCE PRN       Review of Symptoms:  A comprehensive ROS was performed with the pertinent positives and negatives mentioned in the HPI, all other systems reviewed and are negative       Physical Exam  Vitals:    03/26/19 1713 03/26/19 2112 03/27/19 0029 03/27/19 0523   BP: 137/58 135/60 139/69 142/59   Pulse: 79 79 81 74   Resp: 20 17 17 17   Temp: 97.8 °F (36.6 °C) 98 °F (36.7 °C) 98.2 °F (36.8 °C) 96.2 °F (35.7 °C)   SpO2: 97% 96% 98% 96%   Weight:    120.1 kg (264 lb 12.8 oz)   Height:           Physical Exam:  General appearance - Alert, well appearing, and in no distress   Mental status - Affect appropriate to mood. Eyes - Sclerae anicteric,  ENMT - Hearing grossly normal bilaterally, Dental hygiene good. Neck - Carotids upstroke normal bilaterally, no bruits, no JVD. Resp - Clear to auscultation, no wheezes, rales or rhonchi, symmetric air entry. Heart - Normal rate, regular rhythm, normal S1, S2, no murmurs, rubs, clicks or gallops. GI - Soft, nontender, nondistended, no masses or organomegaly. Neurological - Grossly intact - normal speech, no focal findings  Musculoskeletal - No joint tenderness, deformity or swelling, no muscular tenderness noted. Extremities - Peripheral pulses normal, no pedal edema, no clubbing or cyanosis. Skin - Normal coloration and turgor. Psych -  oriented to person, place, and time.        Cardiographics    Telemetry:   ECG (Indpendently visualized and interpreted): narrow complex tachycardia, rate 187  Echocardiogram: see above    Labs:   Recent Labs     03/27/19  0406 03/26/19  0834    139   K 4.2 5.1   BUN 14 15   CREA 0.84 1.07*   GLU 87 152*   WBC 8.9 11.9*   HGB 12.3 14.2   HCT 38.6 44.8    285   TRIGL 125  --    HDL 50  --         Assessment:      Principal Problem:    Elevated troponin (3/26/2019)    Active Problems:    Chest pain (3/26/2019)      SVT (supraventricular tachycardia) (HCC) (3/26/2019)      SOB (shortness of breath) (3/26/2019)      NSVT (nonsustained ventricular tachycardia) ([address] (3/26/2019)           Plan:   1. SVT, uncontrolled: I had a long discussion with the Pt today regarding the diagnosis of supraventricular tachycardia and treatment options including AV jeison blocking drugs, antiarrhythmic therapy, catheter ablation and/or the combination of the above. I discussed at length the advantages and disadvantages of all treatment strategies and possible complications from cardiac ablation including femoral vascular access complications, damage to the heart tissue, bleeding around the heart, the need for emergent open heart surgery, heart attack, stroke, possible need for pacemaker post procedure, or even death. We also discussed some of the limitations of an electrophysiology study including non-inducibility that would limit ablation options. After discussion, the patient would like to proceed. --EPS and possible SVT ablation    2. Elevated troponin: demand ischemia, normal cardiac cath, plan as noted above. Thank you very much for this referral. We appreciate the opportunity to participate in this patient's care. We will follow along with above stated plan. Austin Ward MD, MS  Cardiology/Electrophysiology

## 2019-03-27 NOTE — PROGRESS NOTES
Care Management Interventions PCP Verified by CM: Yes(has f/u appt 4/30/2019) Palliative Care Criteria Met (RRAT>21 & CHF Dx)?: No(RRAT 3 Dx Chest pain ) Transition of Care Consult (CM Consult): Discharge Planning Discharge Durable Medical Equipment: No(none) Physical Therapy Consult: No 
Occupational Therapy Consult: No 
Speech Therapy Consult: No 
Current Support Network: Lives with Spouse Confirm Follow Up Transport: Self Plan discussed with Pt/Family/Caregiver: Yes Freedom of Choice Offered: Yes Discharge Location Discharge Placement: Home Met with patient for d/c planning. Patient alert and oriented x 3, independent of ADL's and lives with her . 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 She requires no DME and is able to drive. She has [employer] and is able to obtain her medications at [facility] [phone] for short term medications and long term medications she obtains through [facility] mail order. She voices no concerns or needs for d/c. Current d/c plan is home with spouse when medically stable.   
-

## 2019-03-27 NOTE — PROGRESS NOTES
TRANSFER - IN REPORT: 
 
Ver[facility] (name[facility] on Radhika Cowart  being received from Cath Lab (unit) for routine progression of care Report consisted of patients Situation, Background, Assessment and  
Recommendations(SBAR). Information from the following report(s) SBAR, Kardex, Procedure Summary and MAR was reviewed with the receiving nurse. Opportunity for questions and clarification was provided. Assessment completed upon patients arrival to unit and care assumed.

## 2019-03-27 NOTE — PROGRESS NOTES
TRANSFER - OUT REPORT: 
 
Verbal report given to Misa Waller RN(name) on Marine Noble  being transferred to telemetry(unit) for routine progression of care Report consisted of patients Situation, Background, Assessment and  
Recommendations(SBAR). Information from the following report(s) Procedure Summary and Cardiac Rhythm normal sinus was reviewed with the receiving nurse. Lines:  
Peripheral IV 03/26/19 Left Hand (Active) Site Assessment Clean, dry, & intact 3/27/2019 11:55 AM  
Phlebitis Assessment 0 3/27/2019 11:55 AM  
Infiltration Assessment 0 3/27/2019 11:55 AM  
Dressing Status Clean, dry, & intact 3/27/2019 11:55 AM  
Dressing Type Tape;Transparent 3/27/2019 11:55 AM  
Hub Color/Line Status Patent 3/27/2019 11:55 AM  
  
 
Opportunity for questions and clarification was provided. Patient transported with: 
 O2 @ 0 liters

## 2019-03-27 NOTE — PROGRESS NOTES
Critical result called to RN by lab resulting a troponin of 1.34. Dr Pallavi Angeles notified and no new orders received. Will continue to monitor.

## 2019-03-27 NOTE — PROGRESS NOTES
Initial visit to assess pt's spiritual needs. Pt in cath lab for a procedure, no family member present. Left a card.  
 
 
Chaplain Preet Mclean MDiv,ThM,PhD

## 2019-03-27 NOTE — PROGRESS NOTES
Bedside and verbal report given to HERNÁN Roberts by Gianfranco Scruggs. Report included the following information SBAR, Kardex, Intake/Output and MAR. Oncoming RN assumed care of the patient.

## 2019-03-27 NOTE — PROCEDURES
Pre-Electrophysiology Diagnosis  1. Supraventricular tachycardia     Procedure Performed  1. Comprehensive EP Study  2. Ablation of a supraventricular tachycardia  3. Left atrial pacing recording from the coronary sinus. 4. 3-D Electroanatomical mapping  5. Transeptal puncture  6. Intracardiac echocardiography  7. LV pacing and recording  8. IV drug infusion    Anesthesia: MAC     Estimated Blood Loss: Less than 10 mL     Specimens: * No specimens in log *     Procedure: The patient was brought to the electrophysiology lab in the fasting state. The patient was then prepped and draped in sterile fashion. 1% local lidocaine was infiltrated into the subcutaneous tissues in the right inguinal crease overlying the right femoral vein. Venous access was then obtained x3 using modified Seldinger technique with placement of three 8 Fr short sidearm sheaths (during the procedure one of the 8 Fr short sheaths was exchanged for an 8.5 Fr SLO long sheath). A decapolar CS catheter was inserted via an 8Fr sheath and positioned in the mid coronary sinus, used during the EP study to interpret LA activation and used to pace the left atrium. A [employer] irrigated ablation catheter was inserted via an 8Fr sheath and positioned in the RV and at the location of the His. A comprehensive EP study was performed with attempted arrhythmia induction (see measurements of intra-cardiac conduction and induced SVT description below). The EP study included assessment of AV conduction with CS pacing, VA conduction with right ventricular pacing, baseline measurements and His localization. At baseline, retrograde conduction was noted to be eccentric with distal CS activation as the earliest atrial activation.   With retrograde 1:1 pacing, the CS activation was non-decremental and the activation pattern was initially a fusion of retrograde VA conduction and retrograde left sided AP conduction and reached VA block prior to AP block. Atrial pacing revealed conduction only down the AV node with no antegrade AP conduction. There was no evidence of dual AV node physiology. The patient was placed on isuprel and further attempts were made to induce a tachycardia, and the patient consistently developed a narrow complex tachycardia with a VA time of 90 msec. With the introduction of His refractory PVCs, the atrial activation was advanced and reset the tachycardia all consistent with orthodromic AVRT. Jay Jay Belch was used to create a LA map, and using ICE catheter guidance with direct visualization of the foramen ovale, a transeptal puncture was created with advancement of the SLO catheter into the left atrium. Weight based heparin was initiated and a heparin drip started to maintain a goal -350. An 3.5mm porous-irrigated Smart Touch BioSClickstter map catheter was advanced through the SLO into the left atrium and CARTO was used to create an activation map of the mitral annulus at the earliest retrograde ventricular activation while pacing the distal CS. The ablation catheter was also placed in the LV, documented and interpreted LV electrograms and used to visualize retrograde AP conduction. At the lateral anterior left atrium, there was a signal consistent with an AP potential with mapping the earliest atrial activation during SVT. With RF, there tachycardia terminated after about 3-4 seconds. Additional RF lesions were placed for consolidation and with lateral LV pacing just under the AP there was no retrograde AP conduction. Lack of AP conduction remained after a 20 minute waiting period. Post ablation, an EP study was performed revealing no evidence of an additional AP and no evidence of sustained dual AV jeison physiology.            Baseline Intervals    QRS duration: 67 msec  DC interval: 160 msec  RR interval: 701 msec  AH interval: 70 msec  HV interval: 46 msec    EP Study    AV Wenchebach: 330 msec  AV jeison ERP: less than or equal to 210 msec  Atrial ERP: 210 msec  VA [place]bach: 320 msec    Tachycardia Cycle Length: 337 msec    At the completion of the final comprehensive EP study, all catheters were removed, and sheaths pulled. The patient tolerated the procedure well with no acute complications recognized. Post Procedure Diagnosis: successful left anterolateral AP ablation for orthodromic AVRT    Summary:   1. Comprehensive EP study with induction of an SVT. 2. Successful ablation of left sided AP for AVRT. 3. Pt tolerated the procedure well. 4. Family updated. Teresa Ward MD, MS  Clinical Cardiac Electrophysiology  3/27/2019  4:24 PM

## 2019-03-27 NOTE — PROGRESS NOTES
Bedside and Verbal shift change report given to self (oncoming nurse) by Nai Williamson (offgoing nurse). Report included the following information SBAR, Kardex, Intake/Output and MAR.

## 2019-03-27 NOTE — ANESTHESIA POSTPROCEDURE EVALUATION
Procedure(s): 
EP STUDY. total IV anesthesia Anesthesia Post Evaluation Patient location during evaluation: bedside Patient participation: complete - patient participated Level of consciousness: responsive to verbal stimuli Pain management: satisfactory to patient Airway patency: patent Anesthetic complications: no 
Cardiovascular status: hemodynamically stable Respiratory status: spontaneous ventilation Hydration status: stable Vitals Value Taken Time /68 3/27/2019  4:57 PM  
Temp 36.4 °C (97.5 °F) 3/27/2019  4:56 PM  
Pulse 77 3/27/2019  4:59 PM  
Resp 12 3/27/2019  4:59 PM  
SpO2 94 % 3/27/2019  4:59 PM  
Vitals shown include unvalidated device data.

## 2019-03-27 NOTE — ANESTHESIA PREPROCEDURE EVALUATION
Relevant Problems No relevant active problems Anesthetic History No history of anesthetic complications Review of Systems / Medical History Patient summary reviewed, nursing notes reviewed and pertinent labs reviewed Pulmonary Within defined limits Neuro/Psych Within defined limits Cardiovascular Dysrhythmias : SVT Exercise tolerance: >4 METS Comments: Echo- normal EF no valve abnl. Cath- clean coronaries GI/Hepatic/Renal 
  
GERD: well controlled Endo/Other Morbid obesity Other Findings Physical Exam 
 
Airway Mallampati: II 
TM Distance: > 6 cm Neck ROM: normal range of motion Mouth opening: Normal 
 
 Cardiovascular Regular rate and rhythm,  S1 and S2 normal,  no murmur, click, rub, or gallop Dental 
No notable dental hx Pulmonary Breath sounds clear to auscultation Abdominal 
 
 
 
 Other Findings Anesthetic Plan ASA: 3 Anesthesia type: total IV anesthesia Induction: Intravenous Anesthetic plan and risks discussed with: Patient Discussed TIVA with its benefits (lower risk of nausea and sore throat, etc.) and risks including possible awareness, patient understands and elects to proceed

## 2019-03-28 VITALS
SYSTOLIC BLOOD PRESSURE: 135 MMHG | WEIGHT: 260.6 LBS | HEART RATE: 93 BPM | RESPIRATION RATE: 18 BRPM | TEMPERATURE: 97.6 F | DIASTOLIC BLOOD PRESSURE: 82 MMHG | BODY MASS INDEX: 41.88 KG/M2 | OXYGEN SATURATION: 97 % | HEIGHT: 66 IN

## 2019-03-28 LAB
ATRIAL RATE: 73 BPM
CALCULATED P AXIS, ECG09: 55 DEGREES
CALCULATED R AXIS, ECG10: 14 DEGREES
CALCULATED T AXIS, ECG11: 54 DEGREES
DIAGNOSIS, 93000: NORMAL
P-R INTERVAL, ECG05: 154 MS
Q-T INTERVAL, ECG07: 414 MS
QRS DURATION, ECG06: 70 MS
QTC CALCULATION (BEZET), ECG08: 456 MS
VENTRICULAR RATE, ECG03: 73 BPM

## 2019-03-28 PROCEDURE — 74011250637 HC RX REV CODE- 250/637: Performed by: NURSE PRACTITIONER

## 2019-03-28 PROCEDURE — 74011250637 HC RX REV CODE- 250/637: Performed by: INTERNAL MEDICINE

## 2019-03-28 RX ORDER — METOPROLOL TARTRATE 25 MG/1
12.5 TABLET, FILM COATED ORAL 2 TIMES DAILY
Qty: 15 TAB | Refills: 11 | Status: SHIPPED | OUTPATIENT
Start: 2019-03-28 | End: 2019-04-11 | Stop reason: SDUPTHER

## 2019-03-28 RX ORDER — ATORVASTATIN CALCIUM 40 MG/1
40 TABLET, FILM COATED ORAL DAILY
Qty: 30 TAB | Refills: 11 | Status: SHIPPED | OUTPATIENT
Start: 2019-03-28 | End: 2019-03-28

## 2019-03-28 RX ADMIN — ATORVASTATIN CALCIUM 40 MG: 40 TABLET, FILM COATED ORAL at 08:13

## 2019-03-28 RX ADMIN — B-COMPLEX W/ C & FOLIC ACID TAB 1 TABLET: TAB at 08:13

## 2019-03-28 RX ADMIN — LORATADINE 10 MG: 10 TABLET ORAL at 08:13

## 2019-03-28 RX ADMIN — ASPIRIN 81 MG 81 MG: 81 TABLET ORAL at 08:13

## 2019-03-28 RX ADMIN — METOPROLOL TARTRATE 12.5 MG: 25 TABLET ORAL at 08:13

## 2019-03-28 RX ADMIN — FAMOTIDINE 20 MG: 20 TABLET ORAL at 08:13

## 2019-03-28 RX ADMIN — Medication 10 ML: at 05:18

## 2019-03-28 NOTE — PROGRESS NOTES
Care Management Interventions PCP Verified by CM: Yes(has f/u appt 4/30/2019) Palliative Care Criteria Met (RRAT>21 & CHF Dx)?: No(RRAT 3 Dx Chest pain ) Mode of Transport at Discharge: Other (see comment)() Transition of Care Consult (CM Consult): Discharge Planning Discharge Durable Medical Equipment: No(none) Physical Therapy Consult: No 
Occupational Therapy Consult: No 
Speech Therapy Consult: No 
Current Support Network: Lives with Spouse Confirm Follow Up Transport: Self Plan discussed with Pt/Family/Caregiver: Yes Freedom of Choice Offered: Yes Discharge Location Discharge Placement: Home Patient ready for d/c home. She voices no concerns or needs. Patient d/c home with spouse.

## 2019-03-28 NOTE — PROGRESS NOTES
[facility] CARDIOLOGY PROGRESS NOTE 
      
 
3/28/2019 6:50 AM 
 
Admit Date: 3/26/2019 Admit Diagnosis: Elevated troponin [R74.8]; Chest pain [R07.9] Subjective: No complaints this AM, no chest pain or shortness of breath Interval History: (History of pertinent interval events obtained from nursing staff) S/p cardiac ablation yesterday without immediate complications ROS: 
GEN:  No fever or chills Cardiovascular:  As noted above Pulmonary:  As noted above Neuro:  No new focal motor or sensory loss Objective:  
 
Vitals:  
 03/27/19 1800 03/27/19 2046 03/28/19 0122 03/28/19 9790 BP: 132/76 121/79 100/60 105/56 Pulse: 76 73 77 78 Resp: 18 19 19 19 Temp: 98 °F (36.7 °C) 98.1 °F (36.7 °C) 97.9 °F (36.6 °C) 97.8 °F (36.6 °C) SpO2: 98% 95% 96% 95% Weight:    118.2 kg (260 lb 9.6 oz) Height:      
 
 
Physical Exam: 
Martha Quilcene, Well Nourished, No Acute Distress, Alert & Oriented x 3, appropriate mood. Neck- supple, no JVD 
CV- regular rate and rhythm no MRG Lung- clear bilaterally Abd- soft, nontender, nondistended Ext- no edema bilaterally, R femoral access site without hematoma or bruits Skin- warm and dry Current Facility-Administered Medications Medication Dose Route Frequency  aspirin chewable tablet 81 mg  81 mg Oral DAILY  sodium chloride (NS) flush 5-40 mL  5-40 mL IntraVENous Q8H  
 sodium chloride (NS) flush 5-40 mL  5-40 mL IntraVENous PRN  
 acetaminophen (TYLENOL) tablet 650 mg  650 mg Oral Q4H PRN  
 HYDROcodone-acetaminophen (NORCO) 5-325 mg per tablet 1 Tab  1 Tab Oral Q4H PRN  
 beclomethasone dipropionate HFAA 2 Spray (Patient Supplied)  2 Spray Nasal DAILY  loratadine (CLARITIN) tablet 10 mg  10 mg Oral DAILY  meclizine (ANTIVERT) tablet 25 mg  25 mg Oral Q6H PRN  mirabegron ER (MYRBETRIQ) tablet 50 mg (Patient Supplied)  50 mg Oral DAILY  montelukast (SINGULAIR) tablet 10 mg  10 mg Oral QHS  multivitamin, stress formula (STRESS TAB) tablet 1 Tab  1 Tab Oral DAILY  famotidine (PEPCID) tablet 20 mg  20 mg Oral BID  nitroglycerin (NITROSTAT) tablet 0.4 mg  0.4 mg SubLINGual Q5MIN PRN  
 morphine injection 2 mg  2 mg IntraVENous Q4H PRN  
 metoprolol tartrate (LOPRESSOR) tablet 12.5 mg  12.5 mg Oral BID  atorvastatin (LIPITOR) tablet 40 mg  40 mg Oral DAILY  fentaNYL citrate (PF) injection  mcg   mcg IntraVENous Multiple  lidocaine (XYLOCAINE) 10 mg/mL (1 %) injection 2-10 mL  2-10 mL IntraDERMal Multiple  midazolam (VERSED) injection 0.5-2 mg  0.5-2 mg IntraVENous Multiple Data Review:  
Recent Results (from the past 24 hour(s)) POC ACTIVATED CLOTTING TIME Collection Time: 03/27/19  4:22 PM  
Result Value Ref Range Activated Clotting Time (POC) 312 (H) 70 - 128 SECS  
EKG, 12 LEAD, INITIAL Collection Time: 03/27/19  6:16 PM  
Result Value Ref Range Ventricular Rate 73 BPM  
 Atrial Rate 73 BPM  
 P-R Interval 154 ms QRS Duration 70 ms Q-T Interval 414 ms QTC Calculation (Bezet) 456 ms Calculated P Axis 55 degrees Calculated R Axis 14 degrees Calculated T Axis 54 degrees Diagnosis Normal sinus rhythm Normal ECG When compared with ECG of 26-MAR-2019 08:25, 
Vent. rate has decreased  BPM 
ST no longer depressed in Lateral leads T wave inversion no longer evident in Lateral leads EKG:  (EKG has been independently visualized by me with interpretation below) Assessment:  
 
Principal Problem: 
  Elevated troponin (3/26/2019) Active Problems: 
  Chest pain (3/26/2019) SVT (supraventricular tachycardia) ([address] (3/26/2019) SOB (shortness of breath) (3/26/2019) NSVT (nonsustained ventricular tachycardia) ([address] (3/26/2019) Plan: 1.  SVT:  Pt underwent cardiac ablation for SVT with ablation of left sided AP yesterday without immediate complication, no questions or concerns this AM, ready and stable for discharge with appropriate follow up. Teresa Ward MD 
Cardiology/Electrophysiology

## 2019-03-28 NOTE — PROGRESS NOTES
Problem: Falls - Risk of 
Goal: *Absence of Falls Description Document Yehuda Dust Fall Risk and appropriate interventions in the flowsheet. Outcome: Progressing Towards Goal 
Note:  
Fall Risk Interventions: 
  
Pt instructed to call for assistance. Medication Interventions: Patient to call before getting OOB, Teach patient to arise slowly

## 2019-03-28 NOTE — PROGRESS NOTES
Right groin suture removed successfully. 4x4 gauze and tegaderm applied. Site is clean, dry and intact with no apparent bleeding or hematoma. Will continue to monitor.

## 2019-03-28 NOTE — DISCHARGE INSTRUCTIONS
Cardiac Catheterization/Angiography Discharge Instructions    *Check the puncture site frequently for swelling or bleeding. If you see any bleeding, lie down and apply pressure over the area with a clean town or washcloth. Notify your doctor for any redness, swelling, drainage or oozing from the puncture site. Notify your doctor for any fever or chills. *If the leg or arm with the puncture becomes cold, numb or painful, call [facility] at  [phone]. *Activity should be limited for the next 48 hours. Climb stairs as little as possible and avoid any stooping, bending or strenuous activity for 48 hours. No heavy lifting (anything over 10 pounds) for three days. *Do not drive for 48 hours. *You may resume your usual diet. Drink more fluids than usual.    *Have a responsible person drive you home and stay with you for at least 24 hours after your heart catheterization/angiography. *You may remove the bandage from your Right and Arm and Groin in 24 hours. You may shower in 24 hours. No tub baths, hot tubs or swimming for one week. Do not place any lotions, creams, powders, ointments over the puncture site for one week. You may place a clean band-aid over the puncture site each day for 5 days. Change this daily. Patient Education        Electrophysiology Study and Catheter Ablation: What to Expect at [address] [place] [address] had an electrophysiology study for a problem with your heartbeat. You may also have had a catheter ablation to try to correct the problem. You may have swelling, bruising, or a small lump around the site where the catheters went into your body. These should go away in 3 to 4 weeks. Do not exercise hard or lift anything heavy for a week. You may be able to go back to work and to your normal routine in 1 or 2 days. This care sheet gives you a general idea about how long it will take for you to recover. But each person recovers at a different pace.  Follow the steps below to get better as quickly as possible. How can you care for yourself at home? Activity    · For 1 week, do not lift anything that would make you strain. This may include heavy grocery bags and milk containers, a heavy briefcase or backpack, cat litter or dog food bags, a vacuum , or a child.     · For 1 week, do not exercise hard or do any activity that could strain your blood vessels or the site where the catheters went into your body.     · Ask your doctor when it is okay to have sex.     · You may shower 24 to 48 hours after the procedure, if your doctor okays it. Pat the incision dry. Do not take a bath for 1 week, or until your doctor tells you it is okay. Diet   · You can eat your normal diet. If your stomach is upset, try bland, low-fat foods like plain rice, broiled chicken, toast, and yogurt.     · Drink plenty of fluids (unless your doctor tells you not to). Medicines    · Your doctor will tell you if and when you can restart your medicines. He or she will also give you instructions about taking any new medicines.     · If you take blood thinners, such as warfarin (Coumadin), clopidogrel (Plavix), or aspirin, be sure to talk to your doctor. He or she will tell you if and when to start taking those medicines again. Make sure that you understand exactly what your doctor wants you to do.     · Ask your doctor if you can take acetaminophen (Tylenol) for pain. Do not take aspirin for 3 days, unless your doctor says it is okay.     · Check with your doctor before you take aspirin or anti-inflammatory medicines to reduce pain and swelling. These include ibuprofen (Advil, Motrin) and naproxen (Aleve).   · Make sure you know which heart medicines to continue and which ones to stop.  Ask your doctor if you are not sure.   [place] site care    · You can remove your bandages the day after the procedure.     · If you are bleeding, lie down and press on the area for 15 minutes to try to make it stop. If the bleeding does not stop, call your doctor or seek immediate medical care. Follow-up care is a key part of your treatment and safety. Be sure to make and go to all appointments, and call your doctor if you are having problems. It's also a good idea to know your test results and keep a list of the medicines you take. When should you call for help? Call 911 anytime you think you may need emergency care. For example, call if:    · You passed out (lost consciousness).     · You have symptoms of a heart attack. These may include:  ? Chest pain or pressure, or a strange feeling in the chest.  ? Sweating. ? Shortness of breath. ? Nausea or vomiting. ? Pain, pressure, or a strange feeling in the back, neck, jaw, or upper belly, or in one or both shoulders or arms. ? Lightheadedness or sudden weakness. ? A fast or irregular heartbeat. After you call 911, the  may tell you to chew 1 adult-strength or 2 to 4 low-dose aspirin. Wait for an ambulance. Do not try to drive yourself.     · You have symptoms of a stroke. These may include:  ? Sudden numbness, tingling, weakness, or loss of movement in your face, arm, or leg, especially on only one side of your body. ? Sudden vision changes. ? Sudden trouble speaking. ? Sudden confusion or trouble understanding simple statements. ? Sudden problems with walking or balance. ? A sudden, severe headache that is different from past headaches.    Call your doctor now or seek immediate medical care if:    · You are bleeding from the area where the catheter was put in your artery.     · You have a fast-growing, painful lump at the catheter site.     · You have signs of infection, such as:  ? Increased pain, swelling, warmth, or redness. ? Red streaks leading from the catheter site. ? Pus draining from the catheter site.   ? A fever.     · Your leg or arm looks blue or feels cold, numb, or tingly.    Watch closely for any changes in your health, and be sure to contact your doctor if you have any problems. Where can you learn more? Go to http://clifton-bereket.info/. Enter [phone] in the search box to learn more about \"Electrophysiology Study and Catheter Ablation: What to Expect at Home. \"  Current as of: July 22, 2018  Content Version: 11.9  © 8776-8179 [employer]. Care instructions adapted under license by [employer] (which disclaims liability or warranty for this information). If you have questions about a medical condition or this instruction, always ask your healthcare professional. [place] [address] any warranty or liability for your use of this information.

## 2019-03-28 NOTE — PROGRESS NOTES
Discharge instructions reviewed with patient. Prescriptions given for lopressor and med info sheets provided for all new medications. Opportunity for questions provided. Patient voiced understanding of all discharge instructions.

## 2019-03-28 NOTE — DISCHARGE SUMMARY
[address] [facility] Cardiology Discharge Summary     Patient ID:  Dyana Heimlich  [id number]  47 y.o.  1970    Admit date: 3/26/2019    Discharge date:  3/28/2019    Admitting Physician: Sandy Burton MD     Discharge Physician: CUBA Gerard/Dr. Ronald Camara     Admission Diagnoses: Elevated troponin [R74.8]  Chest pain [R07.9]    Discharge Diagnoses:    Diagnosis    Chest pain    SVT (supraventricular tachycardia) ([address]    Elevated troponin    SOB (shortness of breath)    NSVT (nonsustained ventricular tachycardia) (HCC)    Obesity, morbid (HCC)    Dyspepsia    Urge incontinence    Urinary frequency    Allergic rhinitis    Acute venous embolism and thrombosis of other specified veins(453.89)    Lumbar degenerative disc disease    History of renal stone    History of positive PPD       Cardiology Procedures this admission:  Diagnostic left heart catheterization, echo, SVT ablation   Consults: EP    Hospital Course: Patient presented to the emergency department of [facility] with complaints of palpitations and fatigue. She was noted to be in SVT w . She was given adenosine and HR decreased to 95. Patient was evaluated and subsequently admitted for further cardiac evaluation and treatment. Cardiac enzymes increased to . 21 on third troponin (first two negative). On monitor in ER she had a brief run of NSVT. She was placed on heparin. [facility] was planned for 3-26-19. Patient underwent cardiac catheterization by Dr. Loida Gómez which showed normal coronaries. Patient tolerated the procedure well and returned to the telemetry floor for recovery. An echocardiogram was performed with report as follows:     -  Left ventricle: Systolic function was normal. Ejection fraction was  estimated in the range of 60 % to 65 %. There were no regional wall motion  abnormalities. Wall thickness was mildly increased. EP was consulted and felt she needed an SVT ablation.   Elevated troponin thought to be due to demand ischemia. He was taken to the cath lab on 3-27 by Dr Fran Short and SVT ablation performed. Pt tolerated the procedure and was monitored on telemetry. The morning of 3/28/2019 patient was up feeling well without any complaints of chest pain or shortness of breath. Patient's right radial and femoral scath sites were clean, dry and intact without hematoma or bruit. Patient's labs were WNL. Patient was seen and examined by Dr. Fran Short and determined stable and ready for discharge. The patient will follow up with [facility] Dr Fran Short April 19 at 11:00[facility] office. DISPOSITION: The patient is being discharged home in stable condition on a low saturated fat, low cholesterol and low salt diet. The patient is instructed to advance activities as tolerated to the limit of fatigue or shortness of breath. The patient is instructed to avoid all heavy lifting for 5 days. The patient is instructed to watch the cath site for bleeding/oozing; if seen, the patient is instructed to apply firm pressure with a clean cloth and call [facility] at [phone]. The patient is instructed to watch for signs of infection which include: increasing area of redness, fever/hot to touch or purulent drainage at the catheterization site. The patient is instructed not to soak in a bathtub for 7-10 days, but is cleared to shower. The patient is instructed to call the office or return to the ER for immediate evaluation for any shortness of breath or chest pain not relieved by NTG. Discharge Exam:   Visit Vitals  /56 (BP 1 Location: Right arm, BP Patient Position: At rest)   Pulse 78   Temp 97.8 °F (36.6 °C)   Resp 19   Ht 5' 6\" (1.676 m)   Wt 118.2 kg (260 lb 9.6 oz)   LMP 10/30/2015 (Approximate) Comment: 11/11/15   SpO2 95%   BMI 42.06 kg/m²     Patient has been seen by Dr. Fran Short: see his progress note for exam details.     Recent Results (from the past 24 hour(s))   POC ACTIVATED CLOTTING TIME Collection Time: 03/27/19  4:22 PM   Result Value Ref Range    Activated Clotting Time (POC) 312 (H) 70 - 128 SECS   EKG, 12 LEAD, INITIAL    Collection Time: 03/27/19  6:16 PM   Result Value Ref Range    Ventricular Rate 73 BPM    Atrial Rate 73 BPM    P-R Interval 154 ms    QRS Duration 70 ms    Q-T Interval 414 ms    QTC Calculation (Bezet) 456 ms    Calculated P Axis 55 degrees    Calculated R Axis 14 degrees    Calculated T Axis 54 degrees    Diagnosis       Normal sinus rhythm  Normal ECG  When compared with ECG of 26-MAR-2019 08:25,  Vent. rate has decreased  BPM  ST no longer depressed in Lateral leads  T wave inversion no longer evident in Lateral leads  Confirmed by Donnie James ([id number]) on 3/28/2019 7:56:30 AM           Patient Instructions:   Current Discharge Medication List      START taking these medications    Details   metoprolol tartrate (LOPRESSOR) 25 mg tablet Take 0.5 Tabs by mouth two (2) times a day. Qty: 15 Tab, Refills: 6         CONTINUE these medications which have NOT CHANGED    Details   !! OTHER,NON-FORMULARY, Indications: Methyl Protect one cap daily      !! OTHER,NON-FORMULARY, Indications: Iodoral one tab daily      !! OTHER,NON-FORMULARY, Indications: Catecholacalm cap tid      mv-min/iron/folic/calcium/vitK (WOMEN'S MULTIVITAMIN PO) Take  by mouth. raNITIdine (ZANTAC) 150 mg tablet TAKE 1 TABLET BY MOUTH TWO  TIMES DAILY  Qty: 180 Tab, Refills: 3    Associated Diagnoses: Dyspepsia      mirabegron ER (MYRBETRIQ) 50 mg ER tablet Take 1 Tab by mouth daily. Qty: 30 Tab, Refills: 11    Associated Diagnoses: Urge incontinence      melatonin 1 mg tablet Take  by mouth. beclomethasone dipropionate (QNASL) 80 mcg/actuation HFAA 2 Sprays by Nasal route daily. Qty: 8.7 g, Refills: 11    Associated Diagnoses: Non-seasonal allergic rhinitis, unspecified allergic rhinitis trigger      ofloxacin (FLOXIN) 0.3 % otic solution Administer 5 Drops into each ear daily.   Qty: 10 mL, Refills: 5    Associated Diagnoses: Bilateral otitis media, unspecified chronicity, unspecified otitis media type      meclizine (ANTIVERT) 25 mg tablet 1/2 - 1 po q6h prn dizziness  Qty: 30 Tab, Refills: 1    Associated Diagnoses: Dizziness      !! OTHER,NON-FORMULARY, Take 2 Tabs by mouth daily (after lunch). Adrenal complex      PRASTERONE, DHEA, (DHEA PO) Take 15 mg by mouth daily. SELENIUM PO Take 1 Tab by mouth daily. cholecalciferol, vitamin D3, 1,000 unit/drop drop Take 5 Drops by mouth daily. OTHER nightly. Progesterone 40 mg daily days 1-14 and 30 mg 1 every am and 2 every pm days 15-28      allergy injection 2 shots every 3 weeks      LACTOBACILLUS ACIDOPHILUS (PROBIOTIC PO) Take 1 tablet by mouth daily. montelukast (SINGULAIR) 10 mg tablet Take 10 mg by mouth nightly. Levocetirizine (XYZAL) 5 mg tablet Take 5 mg by mouth daily. Take / use AM day of surgery  per anesthesia protocols. Aspirin, Buffered 81 mg Tab Take 81 mg by mouth every morning. Take / use AM day of surgery  per anesthesia protocols. !! - Potential duplicate medications found. Please discuss with provider.             Signed:  Joe Vargas PA-C  3/28/2019  8:06 AM

## 2019-03-28 NOTE — PROGRESS NOTES
Bedside and verbal report given to Dwayne Rodriguez RN by Pierre Enriquez. Report included the following information SBAR, Kardex, Intake/Output and MAR. Oncoming RN assumed care of the patient. R groin ablation site visualized with oncoming RN. Site appears to have no bleeding or hematoma.

## 2019-04-30 PROBLEM — I47.29 NSVT (NONSUSTAINED VENTRICULAR TACHYCARDIA): Status: RESOLVED | Noted: 2019-03-26 | Resolved: 2019-04-30

## 2019-04-30 PROBLEM — R07.9 CHEST PAIN: Status: RESOLVED | Noted: 2019-03-26 | Resolved: 2019-04-30

## 2019-04-30 PROBLEM — R77.8 ELEVATED TROPONIN: Status: RESOLVED | Noted: 2019-03-26 | Resolved: 2019-04-30

## 2019-05-07 ENCOUNTER — APPOINTMENT (OUTPATIENT)
Dept: PHYSICAL THERAPY | Age: 49
End: 2019-05-07
Payer: COMMERCIAL

## 2019-05-14 ENCOUNTER — HOSPITAL ENCOUNTER (OUTPATIENT)
Dept: PHYSICAL THERAPY | Age: 49
Discharge: HOME | End: 2019-05-14
Payer: COMMERCIAL

## 2019-05-14 PROCEDURE — 97161 PT EVAL LOW COMPLEX 20 MIN: CPT

## 2019-05-14 PROCEDURE — 97110 THERAPEUTIC EXERCISES: CPT

## 2019-05-14 PROCEDURE — 97530 THERAPEUTIC ACTIVITIES: CPT

## 2019-05-14 NOTE — PROGRESS NOTES
Kasey Perkins  : 1970  [place]: [facility] [place]  Secondary[facility]  [facility] [address] [facility], [place], [facility] [address] [facility]  [phone]   [phone]        OUTPATIENT PHYSICAL THERAPY: Daily Treatment Note 2019  Visit Count:  1  In addition to the evaluation, the following treatments were rendered. MEDICAL/REFERRING DIAGNOSIS:  Overactive bladder [N32.81]   REFERRING PHYSICIAN: Anastasiia Ramos MD  Pre-treatment Symptoms/Complaints:  Patient   Pain: Initial:   0 Post Session:  0/10   Medications Last Reviewed:  19    Updated Objective Findings:  See evaluation note from today   TREATMENT:   THERAPEUTIC ACTIVITY: ( 23 minutes): Therapeutic activities  including functional activity education on appropriate voiding frequency, first urge deferment, adequate types of spacing of fluid intake and bowel health contributions on overall health to improve self-management of condition. Bladder diary issued this visit with extensive instructions on completing over the course of 3 days (2 weekdays, 1 weekend day) in order to reveal contributions to symptoms, patterns and increase patient's awareness of condition. THERAPEUTIC EXERCISE: (15 minutes minutes):  Exercises per grid below to improve strength and coordination. Required moderate visual, verbal, manual and tactile cues to promote proper body alignment and promote proper body posture. Progressed complexity of movement as indicated.      Date:  19 Date:   Date:     Activity/Exercise Parameters Parameters Parameters   Supine endurance kegels x10 with 5 sec hold     Supine quick flick Kegels x5     Diaphragmatic breathing 5'                                 (Used abbreviations: MET - muscle energy technique; SCS- Strain counter strain; CTM- Connective tissue mobilizations; CR- Contract/relax; SP- Sustained pressure, TrP- Trigger point release, IASTM- Instrument assisted soft tissue mobilizations, TDN- Trigger point dry needling). Exercises:  Patient instructed in pelvic floor exercises listed below:  Supine endurance kegels 5 sec holds, quick flick kegels x5. [employer]  The following educational topics were reviewed with patient:  Urge suppression techniques, healthy bladder habits, bladder irritants. Treatment/Session Summary:    · Response to Treatment:  Patient verbalized understanding and exhibited no complications of procedures performed.   · Communication/Consultation:  None today  · Equipment provided today:  None today  · Recommendations/Intent for next treatment session: Next visit will focus on biofeedback, bladder diary, continued education on urge deferrment  Total Treatment Billable Duration:  38 minutes  PT Patient Time In/Time Out  Time In: 1425  Time Out: [address], DPT    Future Appointments   Date Time Provider Tramaine Morgan   5/20/2019  5:00 PM SFE RANDALL BI ROOM 3 SFERMAM SFE   5/21/2019  2:30 PM HENRI Shipman SFD   6/18/2019  2:30 PM HENRI RiveraDORPT SFD   6/25/2019  2:30 PM HENRI RievraDORPT SFD   7/16/2019  3:00 PM SFE ASSESSMENT RM 02 SFEORPA SFE   5/7/2020  9:00 AM Rohith Aponte MD SSA FPA FPA

## 2019-05-14 NOTE — PROGRESS NOTES
Marline Robbins  : 1970  [place]: [place]  Secondary:  Therapy Center at [facility] [address] [facility], [place] [address] [address] [facility]  [phone]   [phone]        OUTPATIENT PHYSICAL THERAPY:Initial Assessment, Daily Note and PM 2019    ICD-10: Treatment Diagnosis: Mixed incontinence (N39.46), Muscle weakness (generalized) (M62.81), Other lack of coordination (R27.8)  Precautions/Allergies:   Adhesive tape-silicones; Ciprofloxacin; and Sulfa (sulfonamide antibiotics)   MD Orders: Evaluate and treat MEDICAL/REFERRING DIAGNOSIS:  Overactive bladder [N32.81]   DATE OF ONSET:   REFERRING PHYSICIAN: Flory Young MD  RETURN PHYSICIAN APPOINTMENT:      INITIAL ASSESSMENT:  Marline Robbins is a 47y.o. year old female with mixed incontinence. Contributing factors include a pelvic floor strength via P/E/R/F = 2/4/4/5. Patient's right PF strength = 2/5, Left = 3/5. Patient is unable localize PF musculature without cueing. Exhibits compensatory muscle strategies including gluteal muscle, abdominal muscles and adductor muscles. Noted pain on palpation of R levator ani mm, BLE adductor muscles and abdominal muscles. Absent cough reflex. She would benefit form skilled PT to address the above deficits. She would benefit from participating in a PF strengthening program with biofeedback for correct mm activation for improved mm activation as well as education on urge suppression techniques. PROBLEM LIST (Impacting functional limitations):  1. Decreased Strength  2. Decreased Activity Tolerance  3. Increased Fatigue  4. Decreased [place] with Home Exercise Program INTERVENTIONS PLANNED:  1. Electrical Stimulation  2. Home Exercise Program (HEP)  3. Neuromuscular Re-education/Strengthening  4. Therapeutic Activites  5. Therapeutic Exercise/Strengthening  6.  Transcutaneous Electrical Nerve Stimulation (TENS)   TREATMENT PLAN:  Effective Dates: 2019 TO 8/12/2019 (90 days). Frequency/Duration: 1 time a week for 90 Days  GOALS: (Goals have been discussed and agreed upon with patient.)  Short term Functional Goals: Time  Frame: 6 weeks  1. Pelvic floor muscle strength will improve to at least 3/5 allowing for no more than 3 episodes of urinary incontinence per week. 2. Patient will present with normal pelvic floor muscle coordination to reduce risk of leakage. 3. Patient will verbalize the effects of bladder irritants and avoid as able to reduce abnormal bladder urgency. 4. Patient will use the pelvic brace exercise with all increases in intra-abdominal pressure to reduce or prevent stress urinary incontinence episodes. 5. Patient will decrease nocturia to 1x or less to minimize negative impact on her sleep health (currently nocturia 2-3 times per night with interrupted sleep). 6. Patient will be able to demonstrate effective pelvic floor exercises for home program, progressing to longer holds in upright, in order to improve muscle endurance and prevent UUI en route to the bathroom. Discharge Goals: Time Frame: 12 weeks  1. Pelvic floor muscle strength will improve to at least 4/5 allowing for no more than 1 episodes of urinary incontinence per month. 2. Patient's self-reported pad usage will decrease to 0 pads to reduce financial strain associated within incontinence. 3. Patient will be able to utilize deferral strategies to reach bathroom >75% of the time without UUI. 4. Patient will demonstrate improved pelvic floor muscle coordination for bladder control by ability to perform at least 10 quick contractions in 10 seconds. 5. Patient will improve pelvic floor musculature endurance to 10 repetitions to reduce episodes of urinary incontinence when walking to the bathroom (currently performs 4 repetitions). Outcome Measure: Tool Used: Jimmie's Health Questionnaire  Score:  Initial: 69  Most Recent: 69  (Date: 5/14/19 )   Interpretation of Score:  The entire symptom severity scale was scored on a (best) to 100 (worst) scale. Medical Necessity:   · Patient demonstrates excellent rehab potential due to higher previous functional level. Reason for Services/Other Comments:  · Patient continues to require modification of therapeutic interventions to increase complexity of exercises. Total Duration: Evaluation 15 minutes, treatment 38 minutes  PT Patient Time In/Time Out  Time In: 1423  Time Out: 1530    Rehabilitation Potential For Stated Goals: Excellent  Regarding Sree Espinosa's therapy, I certify that the treatment plan above will be carried out by a therapist or under their direction. Thank you for this referral,  Carl Knight DPT     Referring Physician Signature: Sarahy Murphy MD              Date                    PAIN/SUBJECTIVE:   Initial:   0/10 Post Session:  0/10     HISTORY:   History of Present Injury/Illness (Reason for Referral):  History of reemex adjustable sling in 2009 for stress incontinence which helped some, however has had urinary incontinence off/on over the past 17 years. Multiple fibroids. Dawn catheter ripped urethra during fibroidectomy. She has had urodynamic testing performed and the MD determiend she did not have stress incontinence, at least it was not reproducible with coughing, laughing, sneezing during her testing - she reports she does have loss of urine in these situations at home. Reports that she has urgency upon getting out of the car and going up the steps at home. 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 Steps seem to be a trigger. Difficulty with long car rides due to surgery. Daughter has special needs which is increasingly stressful. Changed from Oxybutinin after being on it for 10 years about 6 months ago and was switched to Myrebetriq 6. Initially she had improvement for but now is worse than ever. Some pain on pubic bone with intercourse but otherwise no pelvic pain or dyspareunia. Urinary: Frequency 10-12 x/day, 2-3 x/night. Positive for mixed urinary incontinence (АНДРЕЙ), urgency, frequency, incomplete emptying, urinary hesitancy, dysuria, hematuria, nocturia. She reports hematuria which her MD is aware of and she has had throughout her lifespan. Pt uses pads for protection; 2 pads per day (PPD). Denies stress incomplete emptying, urinary hesitancy, dysuria. Fluid intake: 70-80 oz water/day; bladder irritants include: orange juice, apple juice, occasional caffeine (all only occasional). Bowel: Frequency daily 2-3. Negative for pain with bowel movement (BM), pushing/straining with BM, incomplete emptying, fecal incontinence, constipation. Use of stool softeners or laxatives? no  Sexual: Pt is sexually active. Male partners. Contraception/birth control: No - hysterectomy. Dysparunia: yes if he hits sling dyspareunia. Pelvic Organ Prolapse/Pelvic Pain: Location: none per MD and patient. Past Medical History/Comorbidities:   Ms. Whit Rodriguez  has a past medical history of Adrenal disorder, other (2007), Bone spur of foot, Cancer ([facility] [address] (11/14/2017), Chronic pain, Cough, Environmental allergies, GERD (gastroesophageal reflux disease), History of positive PPD (1992), History of renal stone (4/1/2013), Insomnia, Lumbar degenerative disc disease (4/1/2013), Sore throat, Spastic bladder (2008), Thromboembolus ([address] Vertigo, Vitamin D deficiency (2009), and Wheezing.   Ms. Whit Rodriguez  has a past surgical history that includes hx myringotomy; hx bladder suspension (2009); hx tonsil and adenoidectomy (1978); hx other surgical (2012); hx wisdom teeth extraction; hx mohs procedure (Right, 05/2015); hx partial hysterectomy (11/11/15); hx orthopaedic (Right, 05/01/2015); hx rhinoplasty (1985); hx urological (2009); hx malignant skin lesion excision (Right, 11/14/2017); hx breast biopsy (Right, 2000); pr cardiac surg procedure unlist (03/272019); hx colonoscopy (02/07/2019); and hx gyn (2006, 2008). Social History/Living Environment: ,   , works at an [employer] and independent. Have you ever had any pelvic trauma (orthopedic in nature, fall, MVA, etc.)? Ripped goode catheter during fibroidectomy in 2003. Have you ever experienced any unwanted physical or sexual contact? No  Have you ever experienced any form of medical trauma (GYN, urological, GI, etc)? Goode catheter ripped out. Prior Level of Function/Work/Activity:  Independent with ambulation, ADLs. Reports difficulty with donning shoes due to body habitus. Aware of need for weight loss. Ambulatory/Rehab Services H2 Model Falls Risk Assessment    Risk Factors:       No Risk Factors Identified Ability to Rise from Chair:       (0)  Ability to rise in a single movement    Falls Prevention Plan:       No modifications necessary   Total: (5 or greater = High Risk): 0   n ©2010 [facility] [address] [place] [address] Patent #[id number]. Federal Law prohibits the replication, distribution or use without written permission from [facility] [employer]     Current Medications:       Current Outpatient Medications:     solifenacin (VESICARE) 5 mg tablet, Take 5 mg by mouth., Disp: , Rfl:     raNITIdine (ZANTAC) 150 mg tablet, TAKE 1 TABLET BY MOUTH TWO  TIMES DAILY, Disp: 180 Tab, Rfl: 3    mirabegron ER (MYRBETRIQ) 50 mg ER tablet, Take 1 Tab by mouth daily. , Disp: 30 Tab, Rfl: 11    fluticasone propionate (FLOVENT DISKUS) 100 mcg/actuation dsdv, Take  by inhalation. , Disp: , Rfl:     OTHER,NON-FORMULARY,, Indications: Methyl Protect one cap daily, Disp: , Rfl:     OTHER,NON-FORMULARY,, Indications:  Iodoral one tab daily, Disp: , Rfl:     OTHER,NON-FORMULARY,, Indications: Catecholacalm cap tid, Disp: , Rfl:     mv-min/iron/folic/calcium/vitK (WOMEN'S MULTIVITAMIN PO), Take  by mouth., Disp: , Rfl:     melatonin 1 mg tablet, Take  by mouth., Disp: , Rfl:     beclomethasone dipropionate (QNASL) 80 mcg/actuation HFAA, 2 Sprays by Nasal route daily. , Disp: 8.7 g, Rfl: 11    OTHER,NON-FORMULARY,, Take 2 Tabs by mouth daily (after lunch). Adrenal complex, Disp: , Rfl:     PRASTERONE, DHEA, (DHEA PO), Take 15 mg by mouth daily. , Disp: , Rfl:     SELENIUM PO, Take 1 Tab by mouth daily. , Disp: , Rfl:     cholecalciferol, vitamin D3, 1,000 unit/drop drop, Take 5 Drops by mouth daily. , Disp: , Rfl:     OTHER, nightly. Progesterone 40 mg daily days 1-14 and 30 mg 1 every am and 2 every pm days 15-28, Disp: , Rfl:     allergy injection, 2 shots every 3 weeks, Disp: , Rfl:     LACTOBACILLUS ACIDOPHILUS (PROBIOTIC PO), Take 1 tablet by mouth daily. , Disp: , Rfl:     montelukast (SINGULAIR) 10 mg tablet, Take 10 mg by mouth nightly., Disp: , Rfl:     Levocetirizine (XYZAL) 5 mg tablet, Take 5 mg by mouth daily. Take / use AM day of surgery  per anesthesia protocols. , Disp: , Rfl:     Aspirin, Buffered 81 mg Tab, Take 81 mg by mouth every morning. Take / use AM day of surgery  per anesthesia protocols. , Disp: , Rfl:    Date Last Reviewed:  5/14/2019   Number of Personal Factors/Comorbidities that affect the Plan of Care: 3+: HIGH COMPLEXITY   EXAMINATION:   Patient gave consent for internal pelvic floor muscle exam. Chaperone offered, patient declined. OBSERVATION:   External Observation:   · Voluntary Contraction: present  · Voluntary Relaxation: present  · Involuntary Contraction: no contraction seen  · Involuntary Relaxation: n/a  · Perineal Body Assessment: normal  · Anal Riley: normal  · Skin Integrity: intact. · Vaginal Vault Size: within normal limits    PALPATION:  Superficial Pelvic Floor Musculature (PFM): Tender? Intermediate PFM Tender? Deep PFM Tender?    R Superficial Transverse Perineal n R Deep Transverse Perineal n R. Puborectalis n   L Superficial Transverse Perineal n L Deep Transverse Perineal n L. Puborectalis n   R Ischiocavernosus n R Compressor Urethra n R. Pubococcygeus y   L Ischiocavernosus n L Compressor Urethra n L. Pubococcygeus n   R Bulbocavernosus n   R. Ileococcygeus y   L Bulbocavernosus n   L. Ileococcygeus n       R. Obturator Internus n       L. Obturator Internus n       R. Coccygeus y       L. Coccygeus n     RANGE OF MOTION:  Decreased - decreased contraction present, WFL bearing down    STRENGTH:  P: Power, E: Endurance, R: Repetitions, QF: Quick Flicks, TrA: Transverse Abdominus  P 2   E 4   R 4   QF 5   TrA present     COORDINATION:  Diaphragmatic breathing (DB): patient able to perform with cueing    SPECIAL TESTS:  Q-tip test: normal  Supine cough test: cough reflex not present. Able to locate and perform with verbal cues. POP-Q: (Pelvic Organ Prolapse - Quantification Exam) per MD note: none present     Body Structures Involved:  1. Nerves  2. Muscles Body Functions Affected:  1. Sensory/Pain  2. Genitourinary  3. Neuromusculoskeletal  4. Movement Related Activities and Participation Affected:  1. Self Care  2. Interpersonal Interactions and Relationships  3.  Community, Social and [place]   Number of elements (examined above) that affect the Plan of Care: 3: MODERATE COMPLEXITY   CLINICAL PRESENTATION:   Presentation: Stable and uncomplicated: LOW COMPLEXITY   CLINICAL DECISION MAKING:   Use of outcome tool(s) and clinical judgement create a POC that gives a: Clear prediction of patient's progress: LOW COMPLEXITY      PT Patient Time In/Time Out  Time In: 1423  Time Out: 17412 Wilfredo Colmenares, DPT

## 2019-05-20 ENCOUNTER — HOSPITAL ENCOUNTER (OUTPATIENT)
Dept: MAMMOGRAPHY | Age: 49
Discharge: HOME | End: 2019-05-20
Attending: FAMILY MEDICINE
Payer: COMMERCIAL

## 2019-05-20 DIAGNOSIS — Z12.31 VISIT FOR SCREENING MAMMOGRAM: ICD-10-CM

## 2019-05-20 PROCEDURE — 77067 SCR MAMMO BI INCL CAD: CPT

## 2019-05-21 ENCOUNTER — HOSPITAL ENCOUNTER (OUTPATIENT)
Dept: PHYSICAL THERAPY | Age: 49
Discharge: HOME | End: 2019-05-21
Payer: COMMERCIAL

## 2019-05-21 PROCEDURE — 97140 MANUAL THERAPY 1/> REGIONS: CPT

## 2019-05-21 PROCEDURE — 97530 THERAPEUTIC ACTIVITIES: CPT

## 2019-05-21 PROCEDURE — 97110 THERAPEUTIC EXERCISES: CPT

## 2019-05-21 NOTE — PROGRESS NOTES
Edwin Linares  : 1970  Primary: [place] Ovens [place]  Secondary:  Therapy Center at [facility]  [address], [place] [address] [address] [facility]  [phone]   [phone]        OUTPATIENT PHYSICAL THERAPY: Daily Treatment Note 2019  Visit Count:  2    MEDICAL/REFERRING DIAGNOS work. IS:  Overactive bladder [N32.81]   REFERRING PHYSICIAN: Sherolyn Spurling, MD  Pre-treatment Symptoms/Complaints:  Leakage over the weekend at work and at home. Just small leakages to where she needed (or should have) changed the pad. No large leaks. She admits that while doing the bladder diary, she was able to perform urge suppression techniques and decreased her voids per day to 5-8, however still experienced leaking in these instances. Reports she will be out of town for the next two weeks. Pain: Initial:   0 Post Session:  0/10   Medications Last Reviewed:  19    Updated Objective Findings:  none   TREATMENT:   THERAPEUTIC ACTIVITY: (25 minutes): Therapeutic activities  including functional activity education on controlling urinary urge including quick flicks, diaphragmatic breathing and distractions techniques to improve self-management of condition. Patient reports she feels doing a Sudoku puzzle would be a good distraction technique for her to utilize in order to control her urinay urge. She plans to incorporate this into her daily life. Patient will begin a voiding schedule of every 1 hour this week to begin bladder retraining strategies. Further functional activity education provided including review of bladder diary and identification of triggers and diet that may affect her bladder urgency to increase patient's independence of managing her symptoms. THERAPEUTIC EXERCISE: (15 minutes):  Exercises per grid below to improve strength and coordination.   Required moderate visual, verbal, manual and tactile cues to promote proper body alignment and promote proper body posture well as to locate correct muscles without substitutions of rectus abdominis or gluteal muscles. Progressed complexity of movement as indicated. Required cocontraction of transfer abdominal mm to improve pelvic floor muscle contraction. Date:  5/14/19 Date:  5/21/19 Date:     Activity/Exercise Parameters Parameters Parameters   Supine endurance kegels x10 with 5 sec hold x10 with 5 second holds    Supine quick flick Kegels x5 2x5    Diaphragmatic breathing 5' 5'    tranverse abdominal contractions  x10 with 3 second holds                        Manual Therapy: (13 minutes): Soft tissue mobilization was utilized and necessary because of the patient's painful spasm and restricted motion of soft tissue. Trigger point release and rolling performed to B adductor muscles with improvement noted in tenderness. Connective tissue mobilization/scar mobilization performed in abdominal region as well as rolling to improve abdominal muscle contractions and facilitate PFM contraction. (Used abbreviations: MET - muscle energy technique; SCS- Strain counter strain; CTM- Connective tissue mobilizations; CR- Contract/relax; SP- Sustained pressure, TrP- Trigger point release, IASTM- Instrument assisted soft tissue mobilizations, TDN- Trigger point dry needling). [employer]  The following educational topics were reviewed with patient:  urge suppression techniques, reviewed bladder diary, toileting positioning. Treatment/Session Summary:    · Response to Treatment:  Patient verbalized understanding and exhibited no complications of procedures performed. · Communication/Consultation:  None today  · Equipment provided today:  None today  · Recommendations/Intent for next treatment session: Next visit will focus on biofeedback, continued education on urge deferrment, progression of Kegel exercises and abdominal bracing.   Total Treatment Billable Duration:  53 minutes     Sunita Stroud DPT    Future Appointments Date Time Provider Tramaine Morgan   5/21/2019  2:30 PM Rony Vasquez, [facility]   6/18/2019  2:30 PM Cody JERONIMO DPT SFDORPT SFD   6/25/2019  2:30 PM Fabi Easton DPT SFDORPT SFD   7/16/2019  3:00 PM SFE ASSESSMENT RM 02 SFEORPA SFE   5/7/2020  9:00 AM Catie Sandoval MD SSA FPA FPA

## 2019-06-18 ENCOUNTER — HOSPITAL ENCOUNTER (OUTPATIENT)
Dept: PHYSICAL THERAPY | Age: 49
Discharge: HOME | End: 2019-06-18
Payer: COMMERCIAL

## 2019-06-18 PROCEDURE — 97110 THERAPEUTIC EXERCISES: CPT

## 2019-06-18 NOTE — PROGRESS NOTES
Iram Ibrahim  : 1970  Primary: [place]  Secondary:  Therapy Center at [facility]  [address] [facility] [address], [address], [place], [address] [facility]  [phone]   [phone]        OUTPATIENT PHYSICAL THERAPY: Daily Treatment Note 2019  Visit Count:  3    MEDICAL/REFERRING DIAGNOS work. IS:  Overactive bladder [N32.81]   REFERRING PHYSICIAN: Linda Briggs MD  Pre-treatment Symptoms/Complaints: Patient just returned from vacation today. Reports over the two weeks, she had 2 episodes of large leakage where she had to change underwear but does admit she limited her fluid intake during this vacation. She reports ability to perform quick flick muscle contractions, however has not been working on her endurance holds that much. Discussed returning bowel regiment to normal with normal food/fluid intake. Overall reports she feels she is progressing. Pain: Initial: Pain Intensity 1: 0 0 Post Session:  0/10   Medications Last Reviewed:  19    Updated Objective Findings:  none   TREATMENT:   THERAPEUTIC ACTIVITY: (minutes): Therapeutic activities  including functional activity education on controlling urinary urge including quick flicks, diaphragmatic breathing and distractions techniques to improve self-management of condition. Patient reports she feels doing a Sudoku puzzle would be a good distraction technique for her to utilize in order to control her urinay urge. She plans to incorporate this into her daily life. Patient will begin a voiding schedule of every 1 hour this week to begin bladder retraining strategies. Further functional activity education provided including review of bladder diary and identification of triggers and diet that may affect her bladder urgency to increase patient's independence of managing her symptoms. THERAPEUTIC EXERCISE: (38 minutes):  Exercises per grid below to improve strength and coordination.   Required moderate visual, verbal, manual and tactile cues to promote proper body alignment and promote proper body posture well as to locate correct muscles without substitutions of rectus abdominis or gluteal muscles. Progressed complexity of movement as indicated. Required cocontraction of transfer abdominal mm to improve pelvic floor muscle contraction. Used biofeedback this session to provide visual cues to patient and improve awareness/propriocepton of muscles. Good progress in recruitment noted throughout session. seated     Date:  5/14/19 Date:  5/21/19 Date:     Activity/Exercise Parameters Parameters Parameters   Supine endurance kegels x10 with 5 sec hold x10 with 5 second holds    Supine quick flick Kegels x5 2x5    Diaphragmatic breathing 5' 5' 5'   tranverse abdominal contractions  x10 with 3 second holds 5   Seated endurance kegels   2x10 with 10 second holds (unable)  3x10 with 5 second holds   Seated quick flicks   [id number]           Manual Therapy: ( minutes): Soft tissue mobilization was utilized and necessary because of the patient's painful spasm and restricted motion of soft tissue. Trigger point release and rolling performed to B adductor muscles with improvement noted in tenderness. Connective tissue mobilization/scar mobilization performed in abdominal region as well as rolling to improve abdominal muscle contractions and facilitate PFM contraction. (Used abbreviations: MET - muscle energy technique; SCS- Strain counter strain; CTM- Connective tissue mobilizations; CR- Contract/relax; SP- Sustained pressure, TrP- Trigger point release, IASTM- Instrument assisted soft tissue mobilizations, TDN- Trigger point dry needling). [employer]  The following educational topics were reviewed with patient:  urge supression techniques, bowel habits. Treatment/Session Summary:    · Response to Treatment:  Patient verbalized understanding and exhibited no complications of procedures performed.   · Communication/Consultation:  None today  · Equipment provided today:  None today  · Recommendations/Intent for next treatment session: Next visit will focus on biofeedback, continued education on urge deferrment, progression of Kegel exercises and abdominal bracing.   Total Treatment Billable Duration:  38 minutes  PT Patient Time In/Time Out  Time In: 1416  Time Out: 9396  Solitario Ma, PT, DPT    Future Appointments   Date Time Provider Tramaine Morgan   6/25/2019  2:30 PM Harinder Marquis PT, DPT [facility] SFD   7/2/2019  2:30 PM Craig Stone PT, DPT [facility] [id number]   7/9/2019  2:30 PM Craig Stone PT, DPT [facility] Elisha Tidwell   7/16/2019  3:00 PM SFE ASSESSMENT RM 02 SFEORPA SFE   5/7/2020  9:00 AM Vee Harding MD SSA FPA FPA

## 2019-06-25 ENCOUNTER — HOSPITAL ENCOUNTER (OUTPATIENT)
Dept: PHYSICAL THERAPY | Age: 49
Discharge: HOME | End: 2019-06-25
Payer: COMMERCIAL

## 2019-06-25 PROCEDURE — 97014 ELECTRIC STIMULATION THERAPY: CPT

## 2019-06-25 PROCEDURE — 97110 THERAPEUTIC EXERCISES: CPT

## 2019-06-25 PROCEDURE — 97140 MANUAL THERAPY 1/> REGIONS: CPT

## 2019-06-25 NOTE — PROGRESS NOTES
Oneco Salts  : 1970  [place]  Secondary:  [facility] [address], [place], [facility]  [phone]   [phone]        OUTPATIENT PHYSICAL THERAPY: Daily Treatment Note 2019  Visit Count:  4    MEDICAL/REFERRING DIAGNOS work. IS:  Overactive bladder [N32.81]   REFERRING PHYSICIAN: Dwayne Valverde MD  Pre-treatment Symptoms/Complaints: Patient reports improvement in her urinary deferral techniques, however is still experiencing leakage. She practiced endurance holds this week, reports she is able to hold 3-4 seconds but overall she continues to feel like she is progressing. Had to change clothes 1 time in the past week, which is progress. Pain: Initial:   0 Post Session:  0/10   Medications Last Reviewed:  19    Updated Objective Findings:  none   TREATMENT:   THERAPEUTIC ACTIVITY: (minutes): Therapeutic activities  including functional activity education on controlling urinary urge including quick flicks, diaphragmatic breathing and distractions techniques to improve self-management of condition. Patient reports she feels doing a Sudoku puzzle would be a good distraction technique for her to utilize in order to control her urinay urge. She plans to incorporate this into her daily life. Patient will begin a voiding schedule of every 1 hour this week to begin bladder retraining strategies. Further functional activity education provided including review of bladder diary and identification of triggers and diet that may affect her bladder urgency to increase patient's independence of managing her symptoms. THERAPEUTIC EXERCISE: (15 minutes):  Exercises per grid below to improve strength and coordination.   Required moderate visual, verbal, manual and tactile cues to promote proper body alignment and promote proper body posture well as to locate correct muscles without substitutions of rectus abdominis or gluteal muscles. Progressed complexity of movement as indicated. Required cocontraction of transfer abdominal mm to improve pelvic floor muscle contraction. Used biofeedback this session to provide visual cues to patient and improve awareness/propriocepton of muscles. Good progress in recruitment noted throughout session. seated     Date:  5/14/19 Date:  5/21/19 Date:   Date:  6/25/19    Activity/Exercise Parameters Parameters Parameters     Supine endurance kegels x10 with 5 sec hold x10 with 5 second holds      Supine quick flick Kegels x5 2x5      Diaphragmatic breathing 5' 5' 5'     tranverse abdominal contractions  x10 with 3 second holds 5     Seated endurance kegels   2x10 with 10 second holds (unable)  3x10 with 5 second holds     Seated quick flicks   [id number]     Supine bridging with contraction per bridge    x12 with cues for breathing    Supine bridging with endurance hold contraction PFM and trA    x12 with cues for breathing    Supine clam shells with contraction per bridge    x12 with cues for breathing    Standing heel raise with PFM and trA contraction    x12 with cues for breathing      Manual Therapy: ( 25 minutes): Soft tissue mobilization was utilized and necessary because of the patient's painful spasm and restricted motion of soft tissue. Trigger point release and rolling performed to B adductor muscles with improvement noted in tenderness. Connective tissue mobilization/scar mobilization performed in abdominal region as well as rolling to improve abdominal muscle contractions and facilitate PFM contraction. (Used abbreviations: MET - muscle energy technique; SCS- Strain counter strain; CTM- Connective tissue mobilizations; CR- Contract/relax; SP- Sustained pressure, TrP- Trigger point release, IASTM- Instrument assisted soft tissue mobilizations, TDN- Trigger point dry needling). MODALITIES: (15 (untimed) minutes):       *  Electrical Stimulation Therapy (for urge incontinence) was provided with intensity adjusted throughout treatment to patient tolerance. Electrodes placed on RLE posterior tibial nerve distribution on 12 Hz with 10:10 settings     [facility] Portal  The following educational topics were reviewed with patient:  None today. Treatment/Session Summary:    · Response to Treatment:  Patient verbalized understanding and exhibited no complications of procedures performed. · Communication/Consultation:  None today  · Equipment provided today:  None today  · Recommendations/Intent for next treatment session: Next visit will focus on biofeedback, continued education on urge deferrment, progression of Kegel exercises and abdominal bracing.   Total Treatment Billable Duration:  40 minutes     Lori Crowley, PT, DPT    Future Appointments   Date Time Provider Tramaine Morgan   6/25/2019  2:30 PM Jud Lofton, PT, DPT [facility] SFD   7/2/2019  2:30 PM Miguel Sun, PT, DPT [facility] SFD   7/9/2019  2:30 PM Miguel Sun PT, DPT [facility]   7/16/2019  3:00 PM SFE ASSESSMENT RM 02 SFEORPA SFE   5/7/2020  9:00 AM Dre Morley MD [facility] FPA FPA

## 2019-07-02 ENCOUNTER — HOSPITAL ENCOUNTER (OUTPATIENT)
Dept: PHYSICAL THERAPY | Age: 49
Discharge: HOME | End: 2019-07-02
Payer: COMMERCIAL

## 2019-07-02 PROCEDURE — 97140 MANUAL THERAPY 1/> REGIONS: CPT

## 2019-07-02 PROCEDURE — 97110 THERAPEUTIC EXERCISES: CPT

## 2019-07-02 NOTE — PROGRESS NOTES
Nichole Molina  : 1970  Primary: Abraham [place]ana [place]  Secondary:  [facility] [address], [place], [facility] [address] [facility]  [phone]   [phone]        OUTPATIENT PHYSICAL THERAPY: Daily Treatment Note 2019  Visit Count:  5    MEDICAL/REFERRING DIAGNOS work. IS:  Overactive bladder [N32.81]   REFERRING PHYSICIAN: Carlos Bain MD  Pre-treatment Symptoms/Complaints: Patient reports improvement in her urinary deferral techniques, has been able to completely defer urination until she decided to void 3 times and reports no leakage this week. She has continued to practice her PFM contractions including quick flicks and endurance holds and expresses she feels more confidence in locating and correctly activation PFM musculature. Did not have to change clothes at all this week. Great progress towards goals with less leakage, improved deferral technique and improved location and activation of PFM muscles. Pain: Initial:   0 Post Session:  0/10   Medications Last Reviewed:  19    Updated Objective Findings:  none   TREATMENT:   THERAPEUTIC ACTIVITY: (minutes): Therapeutic activities  including functional activity education on controlling urinary urge including quick flicks, diaphragmatic breathing and distractions techniques to improve self-management of condition. Patient reports she feels doing a Sudoku puzzle would be a good distraction technique for her to utilize in order to control her urinay urge. She plans to incorporate this into her daily life. Patient will begin a voiding schedule of every 1 hour this week to begin bladder retraining strategies. Further functional activity education provided including review of bladder diary and identification of triggers and diet that may affect her bladder urgency to increase patient's independence of managing her symptoms.     THERAPEUTIC EXERCISE: (28 minutes):  Exercises per grid below to improve strength and coordination. Required moderate visual, verbal, manual and tactile cues to promote proper body alignment and promote proper body posture well as to locate correct muscles without substitutions of rectus abdominis or gluteal muscles. Progressed complexity of movement as indicated. Required cocontraction of transfer abdominal mm to improve pelvic floor muscle contraction. Used biofeedback this session to provide visual cues to patient and improve awareness/propriocepton of muscles. Good progress in recruitment noted throughout session. seated     Date:  5/14/19 Date:  5/21/19 Date:   Date:  6/25/19 Date:   7/2/19   Activity/Exercise Parameters Parameters Parameters     Supine endurance kegels x10 with 5 sec hold x10 with 5 second holds   x10 with 3 second hold   Supine quick flick Kegels x5 2x5   3x10   Diaphragmatic breathing 5' 5' 5'     tranverse abdominal contractions  x10 with 3 second holds 5  x110   Seated endurance kegels   2x10 with 10 second holds (unable)  3x10 with 5 second holds  2x10 with 3 second holds   Seated quick flicks   [id number]     Supine bridging with contraction per bridge    x12 with cues for breathing    Supine bridging with endurance hold contraction PFM and trA    x12 with cues for breathing    Supine clam shells with contraction per bridge    x12 with cues for breathing    Standing heel raise with PFM and trA contraction    x12 with cues for breathing 5x5 did not require cues for breathing   Sit to stand with PFM contraction     3x5, noted patient with difficulty maintaining PFM contraction   Standing marching with PFM contraction     5x5             Manual Therapy: ( 27 minutes): Soft tissue mobilization was utilized and necessary because of the patient's painful spasm and restricted motion of soft tissue. Trigger point release and rolling performed to B adductor muscles with improvement noted in tenderness.  Connective tissue mobilization/scar mobilization performed in abdominal region as well as rolling to improve abdominal muscle contractions and facilitate PFM contraction. (Used abbreviations: MET - muscle energy technique; SCS- Strain counter strain; CTM- Connective tissue mobilizations; CR- Contract/relax; SP- Sustained pressure, TrP- Trigger point release, IASTM- Instrument assisted soft tissue mobilizations, TDN- Trigger point dry needling). [email or web address] Portal  The following educational topics were reviewed with patient:  Patient provided with [facility] - BECKER Diet May Affect Your Bladder\" handout and continued discussion of deferral techniques. discussed attempting to spend days at home without use of pad. .  Treatment/Session Summary:    · Response to Treatment:  Patient verbalized understanding and exhibited no complications of procedures performed. · Communication/Consultation:  None today  · Equipment provided today:  None today  · Recommendations/Intent for next treatment session: Next visit will focus on biofeedback, continued education on urge deferrment, progression of Kegel exercises to more functional activities and abdominal bracing.   Total Treatment Billable Duration:  55 minutes     Marco Dietrich PT, DPT    Future Appointments   Date Time Provider Tramaine Morgan   7/2/2019  2:30 PM Quiana Lam PT, DPT [facility] SFD   7/9/2019  2:30 PM Ira Guallpa PT, DPT [facility]   7/16/2019 10:15 AM Pete Silverman PT, DPT [facility] SFD   7/16/2019  3:00 PM SFE ASSESSMENT RM 02 SFEORPA SFE   7/23/2019  2:30 PM Ira Guallpa PT, DPT [facility]   5/7/2020  9:00 AM Yolanda Nye MD SSA FPA FPA

## 2019-07-09 ENCOUNTER — HOSPITAL ENCOUNTER (OUTPATIENT)
Dept: PHYSICAL THERAPY | Age: 49
Discharge: HOME | End: 2019-07-09
Payer: COMMERCIAL

## 2019-07-09 PROCEDURE — 97110 THERAPEUTIC EXERCISES: CPT

## 2019-07-09 PROCEDURE — 97140 MANUAL THERAPY 1/> REGIONS: CPT

## 2019-07-09 PROCEDURE — 97530 THERAPEUTIC ACTIVITIES: CPT

## 2019-07-09 NOTE — PROGRESS NOTES
Susan Alex  : 1970  Primary: [place]on [place]  Secondary:  [facility] at [facility] [address], [address], [place], [address] [place]  [phone]   [phone]        OUTPATIENT PHYSICAL THERAPY: Daily Treatment Note 2019  Visit Count:  6    MEDICAL/REFERRING DIAGNOS work. IS:  Overactive bladder [N32.81]   REFERRING PHYSICIAN: Elena Mcdermott MD  Pre-treatment Symptoms/Complaints: Patient reports improvement in her urinary deferral techniques, has been able to completely defer urination until she decided to void without having to think about strategies, states, \"It's coming more naturally. \" She has continued to practice her PFM contractions including quick flicks and endurance holds and expresses she feels more confidence in locating and correctly activation PFM musculature. Had one episode of leakage resulting in fully soaking a pad, but otherwise no leakage this week. Great progress towards goals with less leakage, improved deferral technique and improved location and activation of PFM muscles. Pain: Initial:   0 Post Session:  0/10   Medications Last Reviewed:  19    Updated Objective Findings:  none   TREATMENT:   THERAPEUTIC ACTIVITY: (10 minutes): Therapeutic activities  including functional activity education on controlling urinary urge including quick flicks, diaphragmatic breathing and distractions techniques to improve self-management of condition. Discussed removing use of pad when she is going to be home all day one day this week to begin weaning from pads. THERAPEUTIC EXERCISE: (23 minutes):  Exercises per grid below to improve strength and coordination. Required moderate visual, verbal, manual and tactile cues to promote proper body alignment and promote proper body posture well as to locate correct muscles without substitutions of rectus abdominis or gluteal muscles. Progressed complexity of movement as indicated.  Required cocontraction of transfer abdominal mm to improve pelvic floor muscle contraction. Good progress in recruitment noted throughout session and patient able to determine when she has lost contraction and correct     Date:  5/14/19 Date:  5/21/19 Date:   Date:  6/25/19 Date:   7/2/19 Date:  7/9/19    Activity/Exercise Parameters Parameters Parameters       Supine endurance kegels x10 with 5 sec hold x10 with 5 second holds   x10 with 3 second hold     Supine quick flick Kegels x5 2x5   3x10     Diaphragmatic breathing 5' 5' 5'       tranverse abdominal contractions  x10 with 3 second holds 5  x110     Seated endurance kegels   2x10 with 10 second holds (unable)  3x10 with 5 second holds  2x10 with 3 second holds     Seated quick flicks   [id number]       Supine bridging with contraction per bridge    x12 with cues for breathing      Supine bridging with endurance hold contraction PFM and trA    x12 with cues for breathing      Supine clam shells with contraction per bridge    x12 with cues for breathing      Standing heel raise with PFM and trA contraction    x12 with cues for breathing 5x5 did not require cues for breathing     Sit to stand with PFM contraction     3x5, noted patient with difficulty maintaining PFM contraction     Standing marching with PFM contraction     5x5     Lifting weighted box with PFM and TrA contraction      x10    Lifting weight box with PFM and TrA endurance hold      x5 10' walk    Chops (abdominal rotation exercise) with PFM and TrA contraction      B x10                           Manual Therapy: ( 30 minutes): Soft tissue mobilization was utilized and necessary because of the patient's painful spasm and restricted motion of soft tissue. Trigger point release and rolling performed to B adductor muscles with improvement noted in tenderness.  Connective tissue mobilization/scar mobilization performed in abdominal region as well as rolling to improve abdominal muscle contractions and facilitate PFM contraction. (Used abbreviations: MET - muscle energy technique; SCS- Strain counter strain; CTM- Connective tissue mobilizations; CR- Contract/relax; SP- Sustained pressure, TrP- Trigger point release, IASTM- Instrument assisted soft tissue mobilizations, TDN- Trigger point dry needling). [employer] Portal  The following educational topics were reviewed with patient:  Attempting a day without a pad to wean from pad usage. Treatment/Session Summary:    · Response to Treatment:  Patient verbalized understanding and exhibited no complications of procedures performed. · Communication/Consultation:  None today  · Equipment provided today:  None today  · Recommendations/Intent for next treatment session: Next visit will focus on biofeedback, continued education on urge deferrment, progression of Kegel exercises to more functional activities and abdominal bracing.   Total Treatment Billable Duration:  53 minutes  PT Patient Time In/Time Out  Time In: 2833  Time Out: Wu Salguero, PT, DPT    Future Appointments   Date Time Provider Tramaine Morgan   7/16/2019 11:00 AM Bernetta Libman, PT, DPT [facility]   7/16/2019  3:00 PM SFE ASSESSMENT RM 02 SFEORPA SFE   7/23/2019  2:30 PM Elizabeth Mejía, PT, DPT [facility]   5/7/2020  9:00 AM Ara Santizo MD [facility] FPA FPA

## 2019-07-16 ENCOUNTER — HOSPITAL ENCOUNTER (OUTPATIENT)
Dept: PHYSICAL THERAPY | Age: 49
Discharge: HOME | End: 2019-07-16
Payer: COMMERCIAL

## 2019-07-16 ENCOUNTER — HOSPITAL ENCOUNTER (OUTPATIENT)
Dept: SURGERY | Age: 49
Discharge: HOME | End: 2019-07-16
Attending: PLASTIC SURGERY
Payer: COMMERCIAL

## 2019-07-16 VITALS
SYSTOLIC BLOOD PRESSURE: 117 MMHG | BODY MASS INDEX: 42.57 KG/M2 | DIASTOLIC BLOOD PRESSURE: 75 MMHG | HEIGHT: 67 IN | RESPIRATION RATE: 16 BRPM | OXYGEN SATURATION: 95 % | HEART RATE: 91 BPM | WEIGHT: 271.25 LBS | TEMPERATURE: 97.7 F

## 2019-07-16 LAB — HGB BLD-MCNC: 14.6 G/DL (ref 11.7–15.4)

## 2019-07-16 PROCEDURE — 97110 THERAPEUTIC EXERCISES: CPT

## 2019-07-16 PROCEDURE — 97140 MANUAL THERAPY 1/> REGIONS: CPT

## 2019-07-16 PROCEDURE — 85018 HEMOGLOBIN: CPT

## 2019-07-16 RX ORDER — OFLOXACIN 3 MG/ML
1 SOLUTION/ DROPS OPHTHALMIC AS NEEDED
COMMUNITY

## 2019-07-16 NOTE — PERIOP NOTES
Patient verified name and     Order for consent not found in EHR and unable to match consent with case posting; patient verified. Type 2 surgery, walk in assessment complete. Labs per surgeon: none;   Labs per anesthesia protocol: hemoglobin; results 14.6~within anesthesia guidelines. EKG: recent EKG 19~within anesthesia guidelines, placed on chart along with ECHO 3/26/19; cath report 3/29/19; cardiology progress note 19 ~all for anesthesia review. Hibiclens and instructions given per hospital policy. Patient provided with and instructed on educational handouts including Guide to Surgery, Pain Management, Hand Hygiene, Blood Transfusion Education, and [facility] Brochure. Patient answered medical/surgical history questions at their best of ability. All prior to admission medications documented in [facility]. Original medication prescription bottle NOT visualized during patient appointment. Patient instructed to hold all vitamins 7 days prior to surgery and NSAIDS 5 days prior to surgery, patient verbalized understanding. Prescription medications to hold: none    Patient instructed to continue previous medications as prescribed prior to surgery and to take the following medications the day of surgery according to anesthesia guidelines with a small sip of water: use Qnasal spray, Flovent Diskus inhaler, Xyzal, Myrbetriq, Zantac. Patient teach back successful and patient demonstrates knowledge of instructions.

## 2019-07-16 NOTE — PROGRESS NOTES
Malgorzata Mcclelland  : 1970  [place]: [place]  Secondary:  [facility] at [facility] [address], [address], [place], [address] [facility]  [phone]   [phone]        OUTPATIENT PHYSICAL THERAPY: Daily Treatment Note 2019  Visit Count:  7    MEDICAL/REFERRING DIAGNOS work. IS:  Overactive bladder [N32.81]   REFERRING PHYSICIAN: Jeffrey Bahena MD  Pre-treatment Symptoms/Complaints: Patient reports improvement in her urinary deferral techniques, has been able to completely defer urination until she decided to void without having to think about strategies, states, \"It's coming more naturally. \" She has continued to practice her PFM contractions including quick flicks and endurance holds and expresses she feels more confidence in locating and correctly activation PFM musculature. She awakens only once per night at this time. Reports \"I'm 80% better. \" Had one episode of leakage resulting in quarter size leakage when she was attempting to go without a pad, but otherwise no leakage this week. Great progress towards goals with less leakage, improved deferral technique and improved location and activation of PFM muscles as well as endurance. Pain: Initial:   0 Post Session:  0/10   Medications Last Reviewed:  19    Updated Objective Findings:  none   TREATMENT:   THERAPEUTIC ACTIVITY: (0 minutes): Therapeutic activities  including functional activity education on controlling urinary urge including quick flicks, diaphragmatic breathing and distractions techniques to improve self-management of condition. Discussed removing use of pad when she is going to be home all day one day this week to begin weaning from pads. THERAPEUTIC EXERCISE: (23 minutes):  Exercises per grid below to improve strength and coordination.   Required moderate visual, verbal, manual and tactile cues to promote proper body alignment and promote proper body posture well as to locate correct muscles without substitutions of rectus abdominis or gluteal muscles. Progressed complexity of movement as indicated. Required cocontraction of transfer abdominal mm to improve pelvic floor muscle contraction. Good progress in recruitment noted throughout session and patient able to determine when she has lost contraction and correct     Date:  5/14/19 Date:  5/21/19 Date:   Date:  6/25/19 Date:   7/2/19 Date:  7/9/19 Date:  7/16/19   Activity/Exercise Parameters Parameters Parameters       Supine endurance kegels x10 with 5 sec hold x10 with 5 second holds   x10 with 3 second hold     Supine quick flick Kegels x5 2x5   3x10     Diaphragmatic breathing 5' 5' 5'       tranverse abdominal contractions  x10 with 3 second holds 5  x110     Seated endurance kegels   2x10 with 10 second holds (unable)  3x10 with 5 second holds  2x10 with 3 second holds     Seated quick flicks   [id number]       Supine bridging with contraction per bridge    x12 with cues for breathing      Supine bridging with endurance hold contraction PFM and trA    x12 with cues for breathing      Supine clam shells with contraction per bridge    x12 with cues for breathing      Standing heel raise with PFM and trA contraction    x12 with cues for breathing 5x5 did not require cues for breathing     Sit to stand with PFM contraction     3x5, noted patient with difficulty maintaining PFM contraction     Standing marching with PFM contraction     5x5     Lifting weighted box with PFM and TrA contraction      x10    Lifting weight box with PFM and TrA endurance hold      x5 10' walk    Chops (abdominal rotation exercise) with PFM and TrA contraction      B x10     Wall sits       4 x 15' with endurance hold   Monster walk       4x10 feet with contraction on each squat   Hip 3 way        x10 B A   Standing row       x10 B A               Manual Therapy: ( 30 minutes):  Soft tissue mobilization was utilized and necessary because of the patient's painful spasm and restricted motion of soft tissue. Trigger point release and rolling performed to B adductor muscles with improvement noted in tenderness. Connective tissue mobilization/scar mobilization performed in abdominal region as well as rolling to improve abdominal muscle contractions and facilitate PFM contraction. (Used abbreviations: MET - muscle energy technique; SCS- Strain counter strain; CTM- Connective tissue mobilizations; CR- Contract/relax; SP- Sustained pressure, TrP- Trigger point release, IASTM- Instrument assisted soft tissue mobilizations, TDN- Trigger point dry needling). [employer] Portal  The following educational topics were reviewed with patient:  Attempting a day without a pad to wean from pad usage. Treatment/Session Summary:    · Response to Treatment:  Patient verbalized understanding and exhibited no complications of procedures performed. · Communication/Consultation:  None today  · Equipment provided today:  None today  · Recommendations/Intent for next treatment session: Next visit will focus on biofeedback, continued education on urge deferrment, progression of Kegel exercises to more functional activities and abdominal bracing.   Total Treatment Billable Duration:  53 minutes     Alicia Bailey PT, DPT    Future Appointments   Date Time Provider Tramaine Morgan   7/16/2019 11:00 AM Ruddy Hudson PT, DPT [facility]   7/16/2019  3:00 PM SFE ASSESSMENT RM 02 SFEORPA SFE   7/23/2019  2:30 PM Leny Wallace PT, DPT [facility]   7/24/2019 12:00 AM SFE [facility] SFE   5/7/2020  9:00 AM MD KATHERINE Zuniga FPA FPA

## 2019-07-23 ENCOUNTER — HOSPITAL ENCOUNTER (OUTPATIENT)
Dept: PHYSICAL THERAPY | Age: 49
Discharge: HOME | End: 2019-07-23
Payer: COMMERCIAL

## 2019-07-23 ENCOUNTER — ANESTHESIA EVENT (OUTPATIENT)
Dept: SURGERY | Age: 49
End: 2019-07-23
Payer: COMMERCIAL

## 2019-07-23 PROCEDURE — 97140 MANUAL THERAPY 1/> REGIONS: CPT

## 2019-07-23 PROCEDURE — 97110 THERAPEUTIC EXERCISES: CPT

## 2019-07-23 NOTE — PROGRESS NOTES
Lamberto Barraza  : 1970  [place]  Secondary:  [facility] at [facility]  [address], [address], [address], [address] [facility]  [phone]   [phone]        OUTPATIENT PHYSICAL THERAPY: Daily Treatment Note 2019  Visit Count:  8    MEDICAL/REFERRING DIAGNOS work. IS:  Overactive bladder [N32.81]   REFERRING PHYSICIAN: Cris Aj MD  Pre-treatment Symptoms/Complaints: Patient reports she had a \"bad week. \" had to come off hormone therapy and off several of her bladder medications resulting in significant increase in incontinence this week despite attempts to utilize urge suppression techniques and had to change clothes x4 due to loss of urine. Increase in stressful situations with daughter and has surgery tomorrow. Reports she will be off medications for at least a week post surgery. Reports she was feeling significantly better prior to coming off her bladder medications. Pain: Initial: Pain Intensity 1: 0 0 Post Session:  0/10   Medications Last Reviewed:  19    Updated Objective Findings:  none   TREATMENT:   THERAPEUTIC ACTIVITY: (0 minutes): Therapeutic activities  including functional activity education on controlling urinary urge including quick flicks, diaphragmatic breathing and distractions techniques to improve self-management of condition. Discussed removing use of pad when she is going to be home all day one day this week to begin weaning from pads. THERAPEUTIC EXERCISE: (15 minutes):  Exercises per grid below to improve strength and coordination. Required moderate visual, verbal, manual and tactile cues to promote proper body alignment and promote proper body posture well as to locate correct muscles without substitutions of rectus abdominis or gluteal muscles. Progressed complexity of movement as indicated. Required cocontraction of transfer abdominal mm to improve pelvic floor muscle contraction.  Good progress in recruitment noted throughout session and patient able to determine when she has lost contraction and correct. Biofeedback utilized this session with significant improvement in endurance ability and coordination of PFM. Date:  7/23/19 Date:  5/21/19 Date:   Date:  6/25/19 Date:   7/2/19 Date:  7/9/19 Date:  7/16/19   Activity/Exercise Parameters Parameters Parameters       Supine endurance kegels 2x10 with 10 sec hold x10 with 5 second holds   x10 with 3 second hold     Supine quick flick Kegels 2I92 2x5   3x10     Diaphragmatic breathing  5' 5'       tranverse abdominal contractions  x10 with 3 second holds 5  x110     Seated endurance kegels   2x10 with 10 second holds (unable)  3x10 with 5 second holds  2x10 with 3 second holds     Seated quick flicks   3E97       Supine bridging with contraction per bridge    x12 with cues for breathing      Supine bridging with endurance hold contraction PFM and trA    x12 with cues for breathing      Supine clam shells with contraction per bridge    x12 with cues for breathing      Standing heel raise with PFM and trA contraction    x12 with cues for breathing 5x5 did not require cues for breathing     Sit to stand with PFM contraction     3x5, noted patient with difficulty maintaining PFM contraction     Standing marching with PFM contraction     5x5     Lifting weighted box with PFM and TrA contraction      x10    Lifting weight box with PFM and TrA endurance hold      x5 10' walk    Chops (abdominal rotation exercise) with PFM and TrA contraction      B x10     Wall sits       4 x 15' with endurance hold   Monster walk       4x10 feet with contraction on each squat   Hip 3 way        x10 B A   Standing row       x10 B A               Manual Therapy: ( 23 minutes): Soft tissue mobilization was utilized and necessary because of the patient's painful spasm and restricted motion of soft tissue.  Trigger point release and rolling performed to B adductor muscles with improvement noted in tenderness. Connective tissue mobilization/scar mobilization performed in abdominal region as well as rolling to improve abdominal muscle contractions and facilitate PFM contraction. (Used abbreviations: MET - muscle energy technique; SCS- Strain counter strain; CTM- Connective tissue mobilizations; CR- Contract/relax; SP- Sustained pressure, TrP- Trigger point release, IASTM- Instrument assisted soft tissue mobilizations, TDN- Trigger point dry needling). Carena Portal  The following educational topics were reviewed with patient:  None today. Treatment/Session Summary:    · Response to Treatment:  Patient verbalized understanding and exhibited no complications of procedures performed. · Communication/Consultation:  None today  · Equipment provided today:  None today  · Recommendations/Intent for next treatment session: Next visit will focus on biofeedback, continued education on urge deferrment, progression of Kegel exercises to more functional activities and abdominal bracing.   Total Treatment Billable Duration:  53 minutes  PT Patient Time In/Time Out  Time In: 4812  Time Out: 0886  Evelyn Mart PT, DPT    Future Appointments   Date Time Provider Tramaine Morgan   7/24/2019 12:00 AM E [facility]   8/20/2019 10:15 AM Cooper Holt PT, DPT [facility]   5/7/2020  9:00 AM Brie Armendariz MD [facility]PA [facility]

## 2019-07-24 ENCOUNTER — HOSPITAL ENCOUNTER (OUTPATIENT)
Age: 49
Setting detail: OUTPATIENT SURGERY
Discharge: HOME | End: 2019-07-24
Attending: PLASTIC SURGERY | Admitting: PLASTIC SURGERY
Payer: COMMERCIAL

## 2019-07-24 ENCOUNTER — ANESTHESIA (OUTPATIENT)
Dept: SURGERY | Age: 49
End: 2019-07-24
Payer: COMMERCIAL

## 2019-07-24 ENCOUNTER — HOSPITAL ENCOUNTER (OUTPATIENT)
Dept: SURGERY | Age: 49
Setting detail: OUTPATIENT SURGERY
Discharge: HOME | End: 2019-07-24
Payer: SELF-PAY

## 2019-07-24 VITALS
DIASTOLIC BLOOD PRESSURE: 59 MMHG | TEMPERATURE: 98.9 F | RESPIRATION RATE: 18 BRPM | BODY MASS INDEX: 42.6 KG/M2 | HEART RATE: 90 BPM | WEIGHT: 272 LBS | SYSTOLIC BLOOD PRESSURE: 117 MMHG | OXYGEN SATURATION: 96 %

## 2019-07-24 PROCEDURE — 74011250636 HC RX REV CODE- 250/636

## 2019-07-24 PROCEDURE — 77030016570 HC BLNKT BAIR HGGR 3M -B: Performed by: ANESTHESIOLOGY

## 2019-07-24 PROCEDURE — 77030031139 HC SUT VCRL2 J&J -A: Performed by: PLASTIC SURGERY

## 2019-07-24 PROCEDURE — 74011250636 HC RX REV CODE- 250/636: Performed by: ANESTHESIOLOGY

## 2019-07-24 PROCEDURE — 77030009845 HC ONTMNT BACITRCN PATT -A: Performed by: PLASTIC SURGERY

## 2019-07-24 PROCEDURE — 74011250636 HC RX REV CODE- 250/636: Performed by: PLASTIC SURGERY

## 2019-07-24 PROCEDURE — 74011000250 HC RX REV CODE- 250: Performed by: PLASTIC SURGERY

## 2019-07-24 PROCEDURE — 77030027515 HC TBNG LIPO SUC MDCO -B: Performed by: PLASTIC SURGERY

## 2019-07-24 PROCEDURE — 77030020255 HC SOL INJ LR 1000ML BG: Performed by: PLASTIC SURGERY

## 2019-07-24 PROCEDURE — 77030037088 HC TUBE ENDOTRACH ORAL NSL COVD-A: Performed by: ANESTHESIOLOGY

## 2019-07-24 PROCEDURE — 77030008459 HC STPLR SKN COOP -B: Performed by: PLASTIC SURGERY

## 2019-07-24 PROCEDURE — 76210000020 HC REC RM PH II FIRST 0.5 HR: Performed by: PLASTIC SURGERY

## 2019-07-24 PROCEDURE — 77030032490 HC SLV COMPR SCD KNE COVD -B: Performed by: PLASTIC SURGERY

## 2019-07-24 PROCEDURE — 76010000172 HC OR TIME 2.5 TO 3 HR INTENSV-TIER 1: Performed by: PLASTIC SURGERY

## 2019-07-24 PROCEDURE — 76060000038 HC ANESTHESIA 3.5 TO 4 HR: Performed by: PLASTIC SURGERY

## 2019-07-24 PROCEDURE — 76210000063 HC OR PH I REC FIRST 0.5 HR: Performed by: PLASTIC SURGERY

## 2019-07-24 PROCEDURE — 74011000250 HC RX REV CODE- 250

## 2019-07-24 PROCEDURE — 76060000036 HC ANESTHESIA 2.5 TO 3 HR: Performed by: PLASTIC SURGERY

## 2019-07-24 PROCEDURE — 76010000160 HC OR TIME 0.5 TO 1 HR INTENSV-TIER 1

## 2019-07-24 PROCEDURE — 76060000032 HC ANESTHESIA 0.5 TO 1 HR

## 2019-07-24 PROCEDURE — 77030018836 HC SOL IRR NACL ICUM -A: Performed by: PLASTIC SURGERY

## 2019-07-24 PROCEDURE — 77030019908 HC STETH ESOPH SIMS -A: Performed by: ANESTHESIOLOGY

## 2019-07-24 PROCEDURE — 77030008467 HC STPLR SKN COVD -B: Performed by: PLASTIC SURGERY

## 2019-07-24 PROCEDURE — 76010000173 HC OR TIME 3 TO 3.5 HR INTENSV-TIER 1: Performed by: PLASTIC SURGERY

## 2019-07-24 PROCEDURE — 77030039425 HC BLD LARYNG TRULITE DISP TELE -A: Performed by: ANESTHESIOLOGY

## 2019-07-24 PROCEDURE — 77030008536 HC TBNG LIPO SUC GRAM -A: Performed by: PLASTIC SURGERY

## 2019-07-24 PROCEDURE — 74011250637 HC RX REV CODE- 250/637: Performed by: ANESTHESIOLOGY

## 2019-07-24 PROCEDURE — 77030002966 HC SUT PDS J&J -A: Performed by: PLASTIC SURGERY

## 2019-07-24 PROCEDURE — 88307 TISSUE EXAM BY PATHOLOGIST: CPT

## 2019-07-24 RX ORDER — EPHEDRINE SULFATE 50 MG/ML
INJECTION, SOLUTION INTRAVENOUS AS NEEDED
Status: DISCONTINUED | OUTPATIENT
Start: 2019-07-24 | End: 2019-07-24 | Stop reason: HOSPADM

## 2019-07-24 RX ORDER — GLYCOPYRROLATE 0.2 MG/ML
INJECTION INTRAMUSCULAR; INTRAVENOUS AS NEEDED
Status: DISCONTINUED | OUTPATIENT
Start: 2019-07-24 | End: 2019-07-24 | Stop reason: HOSPADM

## 2019-07-24 RX ORDER — FENTANYL CITRATE 50 UG/ML
100 INJECTION, SOLUTION INTRAMUSCULAR; INTRAVENOUS ONCE
Status: DISCONTINUED | OUTPATIENT
Start: 2019-07-24 | End: 2019-07-24 | Stop reason: HOSPADM

## 2019-07-24 RX ORDER — DEXAMETHASONE SODIUM PHOSPHATE 4 MG/ML
INJECTION, SOLUTION INTRA-ARTICULAR; INTRALESIONAL; INTRAMUSCULAR; INTRAVENOUS; SOFT TISSUE AS NEEDED
Status: DISCONTINUED | OUTPATIENT
Start: 2019-07-24 | End: 2019-07-24 | Stop reason: HOSPADM

## 2019-07-24 RX ORDER — LIDOCAINE HYDROCHLORIDE 10 MG/ML
0.1 INJECTION INFILTRATION; PERINEURAL AS NEEDED
Status: DISCONTINUED | OUTPATIENT
Start: 2019-07-24 | End: 2019-07-24 | Stop reason: HOSPADM

## 2019-07-24 RX ORDER — FENTANYL CITRATE 50 UG/ML
INJECTION, SOLUTION INTRAMUSCULAR; INTRAVENOUS AS NEEDED
Status: DISCONTINUED | OUTPATIENT
Start: 2019-07-24 | End: 2019-07-24 | Stop reason: HOSPADM

## 2019-07-24 RX ORDER — KETOROLAC TROMETHAMINE 30 MG/ML
INJECTION, SOLUTION INTRAMUSCULAR; INTRAVENOUS AS NEEDED
Status: DISCONTINUED | OUTPATIENT
Start: 2019-07-24 | End: 2019-07-24 | Stop reason: HOSPADM

## 2019-07-24 RX ORDER — HYDROMORPHONE HYDROCHLORIDE 2 MG/ML
0.5 INJECTION, SOLUTION INTRAMUSCULAR; INTRAVENOUS; SUBCUTANEOUS
Status: DISCONTINUED | OUTPATIENT
Start: 2019-07-24 | End: 2019-07-24 | Stop reason: HOSPADM

## 2019-07-24 RX ORDER — EPINEPHRINE 1 MG/ML
INJECTION, SOLUTION, CONCENTRATE INTRAVENOUS AS NEEDED
Status: DISCONTINUED | OUTPATIENT
Start: 2019-07-24 | End: 2019-07-24 | Stop reason: HOSPADM

## 2019-07-24 RX ORDER — BUPIVACAINE HYDROCHLORIDE 5 MG/ML
INJECTION, SOLUTION EPIDURAL; INTRACAUDAL AS NEEDED
Status: DISCONTINUED | OUTPATIENT
Start: 2019-07-24 | End: 2019-07-24 | Stop reason: HOSPADM

## 2019-07-24 RX ORDER — ROCURONIUM BROMIDE 10 MG/ML
INJECTION, SOLUTION INTRAVENOUS AS NEEDED
Status: DISCONTINUED | OUTPATIENT
Start: 2019-07-24 | End: 2019-07-24 | Stop reason: HOSPADM

## 2019-07-24 RX ORDER — NALOXONE HYDROCHLORIDE 0.4 MG/ML
0.2 INJECTION, SOLUTION INTRAMUSCULAR; INTRAVENOUS; SUBCUTANEOUS AS NEEDED
Status: DISCONTINUED | OUTPATIENT
Start: 2019-07-24 | End: 2019-07-24 | Stop reason: HOSPADM

## 2019-07-24 RX ORDER — ACETAMINOPHEN 10 MG/ML
INJECTION, SOLUTION INTRAVENOUS AS NEEDED
Status: DISCONTINUED | OUTPATIENT
Start: 2019-07-24 | End: 2019-07-24 | Stop reason: HOSPADM

## 2019-07-24 RX ORDER — GABAPENTIN 300 MG/1
300 CAPSULE ORAL ONCE
Status: COMPLETED | OUTPATIENT
Start: 2019-07-24 | End: 2019-07-24

## 2019-07-24 RX ORDER — PROPOFOL 10 MG/ML
INJECTION, EMULSION INTRAVENOUS AS NEEDED
Status: DISCONTINUED | OUTPATIENT
Start: 2019-07-24 | End: 2019-07-24 | Stop reason: HOSPADM

## 2019-07-24 RX ORDER — LIDOCAINE HYDROCHLORIDE 20 MG/ML
INJECTION, SOLUTION EPIDURAL; INFILTRATION; INTRACAUDAL; PERINEURAL AS NEEDED
Status: DISCONTINUED | OUTPATIENT
Start: 2019-07-24 | End: 2019-07-24 | Stop reason: HOSPADM

## 2019-07-24 RX ORDER — NEOSTIGMINE METHYLSULFATE 1 MG/ML
INJECTION INTRAVENOUS AS NEEDED
Status: DISCONTINUED | OUTPATIENT
Start: 2019-07-24 | End: 2019-07-24 | Stop reason: HOSPADM

## 2019-07-24 RX ORDER — MIDAZOLAM HYDROCHLORIDE 1 MG/ML
2 INJECTION, SOLUTION INTRAMUSCULAR; INTRAVENOUS
Status: DISCONTINUED | OUTPATIENT
Start: 2019-07-24 | End: 2019-07-24 | Stop reason: HOSPADM

## 2019-07-24 RX ORDER — SUCCINYLCHOLINE CHLORIDE 20 MG/ML
INJECTION INTRAMUSCULAR; INTRAVENOUS AS NEEDED
Status: DISCONTINUED | OUTPATIENT
Start: 2019-07-24 | End: 2019-07-24 | Stop reason: HOSPADM

## 2019-07-24 RX ORDER — MIDAZOLAM HYDROCHLORIDE 1 MG/ML
2 INJECTION, SOLUTION INTRAMUSCULAR; INTRAVENOUS ONCE
Status: COMPLETED | OUTPATIENT
Start: 2019-07-24 | End: 2019-07-24

## 2019-07-24 RX ORDER — ONDANSETRON 2 MG/ML
INJECTION INTRAMUSCULAR; INTRAVENOUS AS NEEDED
Status: DISCONTINUED | OUTPATIENT
Start: 2019-07-24 | End: 2019-07-24 | Stop reason: HOSPADM

## 2019-07-24 RX ORDER — OXYCODONE HYDROCHLORIDE 5 MG/1
5 TABLET ORAL
Status: DISCONTINUED | OUTPATIENT
Start: 2019-07-24 | End: 2019-07-24 | Stop reason: HOSPADM

## 2019-07-24 RX ORDER — APREPITANT 40 MG/1
40 CAPSULE ORAL ONCE
Status: COMPLETED | OUTPATIENT
Start: 2019-07-24 | End: 2019-07-24

## 2019-07-24 RX ORDER — SODIUM CHLORIDE, SODIUM LACTATE, POTASSIUM CHLORIDE, CALCIUM CHLORIDE 600; 310; 30; 20 MG/100ML; MG/100ML; MG/100ML; MG/100ML
75 INJECTION, SOLUTION INTRAVENOUS CONTINUOUS
Status: DISCONTINUED | OUTPATIENT
Start: 2019-07-24 | End: 2019-07-24 | Stop reason: HOSPADM

## 2019-07-24 RX ORDER — SCOLOPAMINE TRANSDERMAL SYSTEM 1 MG/1
1 PATCH, EXTENDED RELEASE TRANSDERMAL ONCE
Status: DISCONTINUED | OUTPATIENT
Start: 2019-07-24 | End: 2019-07-24 | Stop reason: HOSPADM

## 2019-07-24 RX ADMIN — SODIUM CHLORIDE, SODIUM LACTATE, POTASSIUM CHLORIDE, AND CALCIUM CHLORIDE: 600; 310; 30; 20 INJECTION, SOLUTION INTRAVENOUS at 13:57

## 2019-07-24 RX ADMIN — PROPOFOL 200 MG: 10 INJECTION, EMULSION INTRAVENOUS at 12:23

## 2019-07-24 RX ADMIN — EPHEDRINE SULFATE 5 MG: 50 INJECTION, SOLUTION INTRAVENOUS at 13:07

## 2019-07-24 RX ADMIN — SODIUM CHLORIDE, SODIUM LACTATE, POTASSIUM CHLORIDE, AND CALCIUM CHLORIDE 25 ML/HR: 600; 310; 30; 20 INJECTION, SOLUTION INTRAVENOUS at 10:39

## 2019-07-24 RX ADMIN — EPHEDRINE SULFATE 10 MG: 50 INJECTION, SOLUTION INTRAVENOUS at 12:38

## 2019-07-24 RX ADMIN — LIDOCAINE HYDROCHLORIDE 0.1 ML: 10 INJECTION, SOLUTION INFILTRATION; PERINEURAL at 10:39

## 2019-07-24 RX ADMIN — LIDOCAINE HYDROCHLORIDE 80 MG: 20 INJECTION, SOLUTION EPIDURAL; INFILTRATION; INTRACAUDAL; PERINEURAL at 12:23

## 2019-07-24 RX ADMIN — FENTANYL CITRATE 25 MCG: 50 INJECTION, SOLUTION INTRAMUSCULAR; INTRAVENOUS at 13:29

## 2019-07-24 RX ADMIN — ROCURONIUM BROMIDE 10 MG: 10 INJECTION, SOLUTION INTRAVENOUS at 14:02

## 2019-07-24 RX ADMIN — EPHEDRINE SULFATE 10 MG: 50 INJECTION, SOLUTION INTRAVENOUS at 13:15

## 2019-07-24 RX ADMIN — ROCURONIUM BROMIDE 20 MG: 10 INJECTION, SOLUTION INTRAVENOUS at 12:29

## 2019-07-24 RX ADMIN — GLYCOPYRROLATE 0.4 MG: 0.2 INJECTION INTRAMUSCULAR; INTRAVENOUS at 15:27

## 2019-07-24 RX ADMIN — FENTANYL CITRATE 25 MCG: 50 INJECTION, SOLUTION INTRAMUSCULAR; INTRAVENOUS at 14:59

## 2019-07-24 RX ADMIN — KETOROLAC TROMETHAMINE 30 MG: 30 INJECTION, SOLUTION INTRAMUSCULAR; INTRAVENOUS at 15:21

## 2019-07-24 RX ADMIN — ROCURONIUM BROMIDE 10 MG: 10 INJECTION, SOLUTION INTRAVENOUS at 12:23

## 2019-07-24 RX ADMIN — ACETAMINOPHEN 1000 MG: 10 INJECTION, SOLUTION INTRAVENOUS at 15:05

## 2019-07-24 RX ADMIN — ONDANSETRON 4 MG: 2 INJECTION INTRAMUSCULAR; INTRAVENOUS at 15:18

## 2019-07-24 RX ADMIN — NEOSTIGMINE METHYLSULFATE 3 MG: 1 INJECTION INTRAVENOUS at 15:27

## 2019-07-24 RX ADMIN — Medication 3 G: at 12:32

## 2019-07-24 RX ADMIN — FENTANYL CITRATE 25 MCG: 50 INJECTION, SOLUTION INTRAMUSCULAR; INTRAVENOUS at 12:51

## 2019-07-24 RX ADMIN — FENTANYL CITRATE 25 MCG: 50 INJECTION, SOLUTION INTRAMUSCULAR; INTRAVENOUS at 13:54

## 2019-07-24 RX ADMIN — ROCURONIUM BROMIDE 10 MG: 10 INJECTION, SOLUTION INTRAVENOUS at 13:13

## 2019-07-24 RX ADMIN — DEXAMETHASONE SODIUM PHOSPHATE 10 MG: 4 INJECTION, SOLUTION INTRA-ARTICULAR; INTRALESIONAL; INTRAMUSCULAR; INTRAVENOUS; SOFT TISSUE at 12:47

## 2019-07-24 RX ADMIN — GABAPENTIN 300 MG: 300 CAPSULE ORAL at 10:33

## 2019-07-24 RX ADMIN — FENTANYL CITRATE 100 MCG: 50 INJECTION, SOLUTION INTRAMUSCULAR; INTRAVENOUS at 12:23

## 2019-07-24 RX ADMIN — EPHEDRINE SULFATE 5 MG: 50 INJECTION, SOLUTION INTRAVENOUS at 13:12

## 2019-07-24 RX ADMIN — APREPITANT 40 MG: 40 CAPSULE ORAL at 11:42

## 2019-07-24 RX ADMIN — MIDAZOLAM 2 MG: 1 INJECTION INTRAMUSCULAR; INTRAVENOUS at 11:46

## 2019-07-24 RX ADMIN — SUCCINYLCHOLINE CHLORIDE 160 MG: 20 INJECTION INTRAMUSCULAR; INTRAVENOUS at 12:23

## 2019-07-24 NOTE — ANESTHESIA PREPROCEDURE EVALUATION
Relevant Problems   No relevant active problems       Anesthetic History     PONV          Review of Systems / Medical History  Patient summary reviewed, nursing notes reviewed and pertinent labs reviewed    Pulmonary  Within defined limits        Undiagnosed apnea         Neuro/Psych   Within defined limits           Cardiovascular            Dysrhythmias : SVT      Exercise tolerance: >4 METS  Comments: Echo- normal EF no valve abnl.   Cath- clean coronaries   GI/Hepatic/Renal     GERD: well controlled           Endo/Other        Morbid obesity and arthritis     Other Findings            Physical Exam    Airway  Mallampati: II  TM Distance: > 6 cm  Neck ROM: normal range of motion   Mouth opening: Normal     Cardiovascular  Regular rate and rhythm,  S1 and S2 normal,  no murmur, click, rub, or gallop  Rhythm: regular  Rate: normal         Dental  No notable dental hx       Pulmonary  Breath sounds clear to auscultation               Abdominal  GI exam deferred       Other Findings            Anesthetic Plan    ASA: 3  Anesthesia type: general          Induction: Intravenous  Anesthetic plan and risks discussed with: Patient and Spouse

## 2019-07-24 NOTE — H&P
History and Physical      Review of Systems    Physical Exam    46 yo WF chronic upper back, neck, shoulder pain and large breasts. No hx breast gland pathology. PMH: Asthma, hx TB exposure, Intrauterine clots and IVC filter, no DVT or PE, Skin lesions, continence issues, bladder sling, T&A, rotator cuff, Septoplasty & turbinates, in-vitro fert. Meds:  Numerous supplements, asthma meds, zantac, omeprazole. ASA held weeks ago    Allergic: Sulfa, Cipro, tape    EXAM:    Chest Clear  Heart RRR, no M  Breasts: large ptotic, no masses or discharge, 39 JJ wants B/C. Plan 800+ gm excision.     A/P:  Symptomatic breast hypertrophy for reduction mammaplasty

## 2019-08-20 ENCOUNTER — HOSPITAL ENCOUNTER (OUTPATIENT)
Dept: PHYSICAL THERAPY | Age: 49
Discharge: HOME | End: 2019-08-20
Payer: COMMERCIAL

## 2019-08-20 PROCEDURE — 97140 MANUAL THERAPY 1/> REGIONS: CPT

## 2019-08-20 PROCEDURE — 97530 THERAPEUTIC ACTIVITIES: CPT

## 2019-08-20 NOTE — PROGRESS NOTES
Emeka Gresham  : 1970  Primary: [facility]  Secondary:  [facility] at [facility]  [address] [facility], [address] [place] [address], [facility] [address] [facility]  [phone]   [phone]        OUTPATIENT PHYSICAL THERAPY:Recertification and AM 2019    ICD-10: Treatment Diagnosis: Mixed incontinence (N39.46), Muscle weakness (generalized) (M62.81), Other lack of coordination (R27.8)  Precautions/Allergies:   Adhesive tape-silicones; Ciprofloxacin; and Sulfa (sulfonamide antibiotics)   MD Orders: Evaluate and treat MEDICAL/REFERRING DIAGNOSIS:  Overactive bladder [N32.81]   DATE OF ONSET:   REFERRING PHYSICIAN: Joanie Mckinnon MD  RETURN PHYSICIAN APPOINTMENT:      Progress With Physical Therapy Thus Far:  Emeka Gresham is a 47y.o. year old female with mixed incontinence. Contributing factors include a pelvic floor strength via P/E/R/F = 3/5//10. Patient's right PF strength = 3/5, Left = 3/5. Patient is now able to localize PF musculature without cueing and exhibits improved strength. She was progressing well with physical therapy, reporting 75-85% improvement in incontinence,  however she had a planned breat reduction in July which required taking several weeks off of physical therapy as well as having to come off medications that were supporting her resulting in increased incontinence over the past month and a half. She continues to exhibit some compensatory muscle strategies and breathing holding when completing PFM contractions, which improves with cues. Noted pain on palpation of BLE adductor muscles and abdominal muscles. She would benefit form skilled PT to address the above deficits. She would benefit from participating in a PF strengthening program with biofeedback for correct mm activation for improved mm activation as well as education on urge suppression techniques. PROBLEM LIST (Impacting functional limitations):  1. Decreased Strength  2.  Decreased Activity Tolerance  3. Increased Fatigue  4. Decreased [place] with Home Exercise Program INTERVENTIONS PLANNED:  1. Balance Exercise  2. Electrical Stimulation  3. Home Exercise Program (HEP)  4. Manual Therapy  5. Neuromuscular Re-education/Strengthening  6. Therapeutic Activites  7. Therapeutic Exercise/Strengthening  8. Transcutaneous Electrical Nerve Stimulation (TENS)  9. Transfer Training   TREATMENT PLAN:  Effective Dates: 08/20/19 to 11/18/2019   Frequency/Duration: 1 time a week for 90 Days  GOALS: (Goals have been discussed and agreed upon with patient.)  Short term Functional Goals: Time  Frame: 6 weeks  1. Pelvic floor muscle strength will improve to at least 4/5 allowing for no more than 1 episodes of urinary incontinence per week. Goal Updated 8/20/19  2. Patient will present with normal pelvic floor muscle coordination to reduce risk of leakage. 3. Patient will verbalize the effects of bladder irritants and avoid as able to reduce abnormal bladder urgency. 4. Patient will use the pelvic brace exercise with all increases in intra-abdominal pressure to reduce or prevent stress urinary incontinence episodes. 5. Patient will decrease nocturia to 1x or less to minimize negative impact on her sleep health (currently nocturia 2-3 times per night with interrupted sleep). 6. Patient will be able to demonstrate effective pelvic floor exercises for home program, progressing to longer holds in upright, in order to improve muscle endurance and prevent UUI en route to the bathroom. Discharge Goals: Time Frame: 12 weeks  1. Pelvic floor muscle strength will improve to at least 4/5 allowing for no more than 1 episodes of urinary incontinence per month. 2. Patient's self-reported pad usage will decrease to 0 pads to reduce financial strain associated within incontinence. 3. Patient will be able to utilize deferral strategies to reach bathroom >75% of the time without UUI.   4. Patient will demonstrate improved pelvic floor muscle endurance for bladder control by ability to perform a PFM contraction for 10 seconds. 5. Patient will improve pelvic floor musculature endurance to 10 repetitions to reduce episodes of urinary incontinence when walking to the bathroom (currently performs 4 repetitions). Outcome Measure: Tool Used: Jimmie's Health Questionnaire  Score:  Initial: 69  Most Recent: 69  (Date: 5/14/19 )   Interpretation of Score: The entire symptom severity scale was scored on a (best) to 100 (worst) scale. Medical Necessity:   · Patient demonstrates excellent rehab potential due to higher previous functional level. Reason for Services/Other Comments:  · Patient continues to require modification of therapeutic interventions to increase complexity of exercises. Total Duration:   PT Patient Time In/Time Out  Time In: 1022  Time Out: 1120    Rehabilitation Potential For Stated Goals: Excellent  Regarding Sree Espinosa's therapy, I certify that the treatment plan above will be carried out by a therapist or under their direction. Thank you for this referral,  Merlene Soto, PT, DPT     Referring Physician Signature: Chris Juan MD              Date                    PAIN/SUBJECTIVE:   Initial: Pain Intensity 1: 0 0/10 Post Session:  0/10     HISTORY:   History of Present Injury/Illness (Reason for Referral):  History of reemex adjustable sling in 2009 for stress incontinence which helped some, however has had urinary incontinence off/on over the past 17 years. Multiple fibroids. Dawn catheter ripped urethra during fibroidectomy. She has had urodynamic testing performed and the MD determiend she did not have stress incontinence, at least it was not reproducible with coughing, laughing, sneezing during her testing - she reports she does have loss of urine in these situations at home. Reports that she has urgency upon getting out of the car and going up the steps at home.  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 Steps seem to be a trigger. Difficulty with long car rides due to surgery. Daughter has special needs which is increasingly stressful. Changed from Oxybutinin after being on it for 10 years about 6 months ago and was switched to Myrebetriq 6. Initially she had improvement for but now is worse than ever. Some pain on pubic bone with intercourse but otherwise no pelvic pain or dyspareunia. Urinary: Frequency 10-12 x/day, 2-3 x/night. Positive for mixed urinary incontinence (АНДРЕЙ), urgency, frequency, incomplete emptying, urinary hesitancy, dysuria, hematuria, nocturia. She reports hematuria which her MD is aware of and she has had throughout her lifespan. Pt uses pads for protection; 2 pads per day (PPD). Denies stress incomplete emptying, urinary hesitancy, dysuria. Fluid intake: 70-80 oz water/day; bladder irritants include: orange juice, apple juice, occasional caffeine (all only occasional). Bowel: Frequency daily 2-3. Negative for pain with bowel movement (BM), pushing/straining with BM, incomplete emptying, fecal incontinence, constipation. Use of stool softeners or laxatives? no  Sexual: Pt is sexually active. Male partners. Contraception/birth control: No - hysterectomy. Dysparunia: yes if he hits sling dyspareunia. Pelvic Organ Prolapse/Pelvic Pain: Location: none per MD and patient.     Past Medical History/Comorbidities:   Ms. Sarah Schuster  has a past medical history of Adrenal disorder, other (2007), Allergic rhinitis, Bone spur of foot, Cancer ([facility] [address] (11/14/2017), Chest pain, Chronic pain, Cough, Dyspepsia, Elevated troponin, Environmental allergies, GERD (gastroesophageal reflux disease), History of positive PPD (1992), History of renal stone (4/1/2013), Insomnia, Lumbar degenerative disc disease (4/1/2013), Nausea & vomiting, Obesity, Snores, SOB (shortness of breath), Sore throat, Spastic bladder (2008), SVT (supraventricular tachycardia) ([address] Thromboembolus ([address] Urge incontinence, Urinary frequency, Vertigo, Vitamin D deficiency (2009), Wheezing, and Wheezing. She also has no past medical history of Aneurysm ([address] Asthma, Autoimmune disease ([address] CAD (coronary artery disease), Chronic kidney disease, Chronic obstructive pulmonary disease ([address] Coagulation disorder ([address] Diabetes ([address] Difficult intubation, Endocarditis, Heart failure ([address] Hypertension, Liver disease, Malignant hyperthermia due to anesthesia, Nicotine vapor product user, Non-nicotine vapor product user, Pseudocholinesterase deficiency, Psychiatric disorder, PUD (peptic ulcer disease), Rheumatic fever, Seizures ([address] Sleep apnea, Stroke ([address] or Thyroid disease. Ms. Polly Cardenas  has a past surgical history that includes hx myringotomy; hx bladder suspension (2009); hx tonsil and adenoidectomy (1978); hx other surgical (2012); hx wisdom teeth extraction; hx mohs procedure (Right, 05/2015); hx partial hysterectomy (11/11/15); hx orthopaedic (Right, 05/01/2015); hx rhinoplasty (1985); hx urological (2009); hx malignant skin lesion excision (Right, 11/14/2017); hx breast biopsy (Right, 2000); pr cardiac surg procedure unlist (03/27/2019); hx colonoscopy (02/07/2019); hx gyn (2006, 2008); hx heart catheterization (03/26/2019); and hx breast reduction (Bilateral, 7/24/2019). Social History/Living Environment: ,   , works at an [employer] and independent. Have you ever had any pelvic trauma (orthopedic in nature, fall, MVA, etc.)? Ripped goode catheter during fibroidectomy in 2003. Have you ever experienced any unwanted physical or sexual contact? No  Have you ever experienced any form of medical trauma (GYN, urological, GI, etc)? Goode catheter ripped out. Prior Level of Function/Work/Activity:  Independent with ambulation, ADLs.  Reports difficulty with donning shoes due to body habitus. Aware of need for weight loss. Ambulatory/Rehab Services H2 Model Falls Risk Assessment    Risk Factors:       No Risk Factors Identified Ability to Rise from Chair:       (0)  Ability to rise in a single movement    Falls Prevention Plan:       No modifications necessary   Total: (5 or greater = High Risk): 0   n ©2010 [facility] . [facility] States Patent #[id number]. Federal Law prohibits the replication, distribution or use without written permission from [facility] of [address] [place] [address]     Current Medications:       Current Outpatient Medications:     ofloxacin (FLOXIN) 0.3 % ophthalmic solution, 1 Drop as needed. In bilateral ears if needed, Disp: , Rfl:     solifenacin (VESICARE) 5 mg tablet, Take 5 mg by mouth nightly. Indications: Frequent Urination, Disp: , Rfl:     raNITIdine (ZANTAC) 150 mg tablet, TAKE 1 TABLET BY MOUTH TWO  TIMES DAILY (Patient taking differently: Take 150 mg by mouth two (2) times a day. TAKE 1 TABLET BY MOUTH TWO  TIMES DAILY; Take / use AM day of surgery  per anesthesia protocols. Indications: gastroesophageal reflux disease), Disp: 180 Tab, Rfl: 3    mirabegron ER (MYRBETRIQ) 50 mg ER tablet, Take 1 Tab by mouth daily. (Patient taking differently: Take 50 mg by mouth daily. Take / use AM day of surgery  per anesthesia protocols. Indications: Frequent Urination), Disp: 30 Tab, Rfl: 11    fluticasone propionate (FLOVENT DISKUS) 100 mcg/actuation dsdv, Take 2 Puffs by inhalation two (2) times a day. Take / use AM day of surgery  per anesthesia protocols. Indications: Controller Medication for Asthma, Disp: , Rfl:     OTHER,NON-FORMULARY,, Indications: Methyl Protect one cap daily, Disp: , Rfl:     OTHER,NON-FORMULARY,, daily. Iodine /potassium supplement  Indications: Iodoral 1/2 tab daily, Disp: , Rfl:     OTHER,NON-FORMULARY,, three (3) times daily as needed.  Indications: Catecholacalm cap tid, Disp: , Rfl:     mv-min/iron/folic/calcium/vitK (WOMEN'S MULTIVITAMIN PO), Take  by mouth., Disp: , Rfl:     melatonin 1 mg tablet, Take 1 mg by mouth nightly., Disp: , Rfl:     beclomethasone dipropionate (QNASL) 80 mcg/actuation HFAA, 2 Sprays by Nasal route daily. (Patient taking differently: 2 Sprays by Nasal route daily. Take / use AM day of surgery  per anesthesia protocols. Indications: inflammation of the nose due to an allergy), Disp: 8.7 g, Rfl: 11    OTHER,NON-FORMULARY,, Take 2 Tabs by mouth daily (after lunch). Adrenal complex, Disp: , Rfl:     PRASTERONE, DHEA, (DHEA PO), Take 15 mg by mouth daily. DHEA/testosterone in the progesterone cream, Disp: , Rfl:     SELENIUM PO, Take 1 Tab by mouth daily. , Disp: , Rfl:     cholecalciferol, vitamin D3, 1,000 unit/drop drop, Take 5 Drops by mouth daily. , Disp: , Rfl:     allergy injection, 2 shots every 3 weeks, Disp: , Rfl:     LACTOBACILLUS ACIDOPHILUS (PROBIOTIC PO), Take 1 tablet by mouth daily. , Disp: , Rfl:     montelukast (SINGULAIR) 10 mg tablet, Take 10 mg by mouth nightly. Indications: inflammation of the nose due to an allergy, Controller Medication for Asthma, Disp: , Rfl:     Levocetirizine (XYZAL) 5 mg tablet, Take 5 mg by mouth daily. Take / use AM day of surgery  per anesthesia protocols. Indications: inflammation of the nose due to an allergy, Disp: , Rfl:    Date Last Reviewed:  8/20/2019   Number of Personal Factors/Comorbidities that affect the Plan of Care: 3+: HIGH COMPLEXITY   EXAMINATION:   Patient gave consent for internal pelvic floor muscle exam. Chaperone offered, patient declined. OBSERVATION:   External Observation:   · Voluntary Contraction: present  · Voluntary Relaxation: present  · Involuntary Contraction: present  · Involuntary Relaxation: present  · Perineal Body Assessment: normal  · Anal Parrish: normal  · Skin Integrity: intact.   · Vaginal Vault Size: within normal limits    PALPATION:  Superficial Pelvic Floor Musculature (PFM): Tender? Intermediate PFM Tender? Deep PFM Tender? R Superficial Transverse Perineal n R Deep Transverse Perineal n R. Puborectalis n   L Superficial Transverse Perineal n L Deep Transverse Perineal n L. Puborectalis n   R Ischiocavernosus n R Compressor Urethra n R. Pubococcygeus n   L Ischiocavernosus n L Compressor Urethra n L. Pubococcygeus n   R Bulbocavernosus n   R. Ileococcygeus n   L Bulbocavernosus n   L. Ileococcygeus n       R. Obturator Internus n       L. Obturator Internus n       R. Coccygeus n       L. Coccygeus n     RANGE OF MOTION:  Decreased - decreased contraction present, WFL bearing down    STRENGTH:  P: Power, E: Endurance, R: Repetitions, QF: Quick Flicks, TrA: Transverse Abdominus  P 3   E 5   R 4   QF 10   TrA present     COORDINATION:  Diaphragmatic breathing (DB): patient able to perform with cueing    SPECIAL TESTS:  Q-tip test: normal  Supine cough test: cough reflex not present. Able to locate and perform with verbal cues. POP-Q: (Pelvic Organ Prolapse - Quantification Exam) per MD note: none present     Body Structures Involved:  1. Nerves  2. Muscles Body Functions Affected:  1. Sensory/Pain  2. Genitourinary  3. Neuromusculoskeletal  4. Movement Related Activities and Participation Affected:  1. Self Care  2. Interpersonal Interactions and Relationships  3.  Community, Social and [place]   Number of elements (examined above) that affect the Plan of Care: 3: MODERATE COMPLEXITY   CLINICAL PRESENTATION:   Presentation: Evolving clinical presentation with changing clinical characteristics: MODERATE COMPLEXITY   CLINICAL DECISION MAKING:   Use of outcome tool(s) and clinical judgement create a POC that gives a: Questionable prediction of patient's progress: MODERATE COMPLEXITY      PT Patient Time In/Time Out  Time In: 1022  Time Out: Dariusz PT, DPT

## 2019-08-20 NOTE — PROGRESS NOTES
Maru Chung  : 1970  Primary: Malia Michaels Of Donita Kramer*  [facility]:  [facility], [address]  [phone]   [phone]        OUTPATIENT PHYSICAL THERAPY: Daily Treatment Note 2019  Visit Count:  Visit 1    MEDICAL/REFERRING DIAGNOS work. IS:  Overactive bladder [N32.81]   REFERRING PHYSICIAN: Rick Barajas MD  Pre-treatment Symptoms/Complaints: Patient reports continued increase in episodes of incontinence, reports she is still off her bladder medications and hormone therapy since breat reduction surgery. She reports beginning a weight loss program soon, encouraged her and discussed how this will likely improve outcomes of PF therapy and decreased incidence of incontinence episodes. She has not completely lost urine, however has continued to wear pads and experiencing more leaking. Reports she is permitted to begin bladder medications/hormmone therapy again this week. Reports she was feeling significantly better prior to coming off her bladder medications. Pain: Initial: Pain Intensity 1: 0  Post Session:  0/10   Medications Last Reviewed:  19    Updated Objective Findings:  none   TREATMENT:   THERAPEUTIC ACTIVITY: (0 minutes): Therapeutic activities  including functional activity education on controlling urinary urge including quick flicks, diaphragmatic breathing and distractions techniques to improve self-management of condition. Discussed removing use of pad when she is going to be home all day one day this week to begin weaning from pads. THERAPEUTIC EXERCISE: (15 minutes):  Exercises per grid below to improve strength and coordination. Required moderate visual, verbal, manual and tactile cues to promote proper body alignment and promote proper body posture well as to locate correct muscles without substitutions of rectus abdominis or gluteal muscles.   Progressed complexity of movement as indicated. Required cocontraction of transfer abdominal mm to improve pelvic floor muscle contraction. Good progress in recruitment noted throughout session and patient able to determine when she has lost contraction and correct. Date:  8/20/19 Date:  5/21/19 Date:   Date:  6/25/19 Date:   7/2/19 Date:  7/9/19 Date:  7/16/19   Activity/Exercise Parameters Parameters Parameters       Supine endurance kegels 4x10 with 10 sec hold with focus on breathing x10 with 5 second holds   x10 with 3 second hold     Supine quick flick Kegels [id number] 2x5   3x10     Diaphragmatic breathing x10' 5' 5'       tranverse abdominal contractions x10 x10 with 3 second holds 5  x110     Seated endurance kegels   2x10 with 10 second holds (unable)  3x10 with 5 second holds  2x10 with 3 second holds     Seated quick flicks   [id number]       Supine bridging with contraction per bridge    x12 with cues for breathing      Supine bridging with endurance hold contraction PFM and trA    x12 with cues for breathing      Supine clam shells with contraction per bridge    x12 with cues for breathing      Standing heel raise with PFM and trA contraction    x12 with cues for breathing 5x5 did not require cues for breathing     Sit to stand with PFM contraction     3x5, noted patient with difficulty maintaining PFM contraction     Standing marching with PFM contraction     5x5     Lifting weighted box with PFM and TrA contraction      x10    Lifting weight box with PFM and TrA endurance hold      x5 10' walk    Chops (abdominal rotation exercise) with PFM and TrA contraction      B x10     Wall sits       4 x 15' with endurance hold   Monster walk       4x10 feet with contraction on each squat   Hip 3 way        x10 B A   Standing row       x10 B A               Manual Therapy: ( 40 minutes): Soft tissue mobilization was utilized and necessary because of the patient's painful spasm and restricted motion of soft tissue.  Trigger point release and rolling performed to B adductor muscles with improvement noted in tenderness. Connective tissue mobilization/scar mobilization performed in abdominal region as well as rolling to improve abdominal muscle contractions and facilitate PFM contraction. (Used abbreviations: MET - muscle energy technique; SCS- Strain counter strain; CTM- Connective tissue mobilizations; CR- Contract/relax; SP- Sustained pressure, TrP- Trigger point release, IASTM- Instrument assisted soft tissue mobilizations, TDN- Trigger point dry needling). [employer]  The following educational topics were reviewed with patient:  None today. Treatment/Session Summary:    · Response to Treatment:  Patient verbalized understanding and exhibited no complications of procedures performed. · Communication/Consultation:  None today  · Equipment provided today:  None today  · Recommendations/Intent for next treatment session: Next visit will focus on biofeedback, continued education on urge deferrment, progression of Kegel exercises to more functional activities and abdominal bracing.   Total Treatment Billable Duration:  55 minutes  PT Patient Time In/Time Out  Time In: 1022  Time Out: [address] PT, DPT    Future Appointments   Date Time Provider Tramaine Cathy   8/27/2019  1:00 PM Ruddy Hudson PT, DPT [facility]   5/7/2020  9:00 AM Rickie Butterfield MD [facility] FPA FPA

## 2019-08-27 ENCOUNTER — HOSPITAL ENCOUNTER (OUTPATIENT)
Dept: PHYSICAL THERAPY | Age: 49
Discharge: HOME | End: 2019-08-27
Payer: COMMERCIAL

## 2019-08-27 PROCEDURE — 97110 THERAPEUTIC EXERCISES: CPT

## 2019-08-27 PROCEDURE — 97530 THERAPEUTIC ACTIVITIES: CPT

## 2019-08-27 PROCEDURE — 97140 MANUAL THERAPY 1/> REGIONS: CPT

## 2019-08-27 NOTE — PROGRESS NOTES
Susan Abi  : 1970  Primary: Sheron Cruz Of [facility]ramer*  Secondary:  [facility], [facility], [address]  [phone]   [phone]        OUTPATIENT PHYSICAL THERAPY: Daily Treatment Note 2019  Visit Count:  Visit 2    MEDICAL/REFERRING DIAGNOS work. IS:  Overactive bladder [N32.81]   REFERRING PHYSICIAN: Elena Mcdermott MD  Pre-treatment Symptoms/Complaints: Patient with 1 episode of severe urinary incontinence this week, daily small leakages noted, which is still improved from initial evaluation. She began bladder medications and hormone therapy again last Saturday, was evaluated for sleep apnea and reports she will likely be getting BIPAP in the near future due to results of sleep apnea test. Discussed the relevance of this in terms of urinary incontinence. She has been experiencing increased stress lately with the starting of school with her daughter and notes incontinence worse with high stress. Her eventual list is to be able to maintain continence without use of anticholinergics, as her doctor discussed with her increased incidence of dementia in patients with long term use of anticholinergics. Pain: Initial:   0/10 Post Session:  0/10   Medications Last Reviewed:  19    Updated Objective Findings:  none   TREATMENT:   THERAPEUTIC ACTIVITY: (15 minutes): Instruction on functional pelvic brace exercise including feedforward activation of pelvic floor muscles on exhale prior to activity that increases intra-abdominal pressure (IAP) such as cough, sneeze, laugh, sit to stand, lifting object to decrease pressure on pelvic organs during exertional activities.  Instruction on coordinated pelvic floor and diaphragmatic breathing to improve kinesthetic awareness of pelvic muscle mobility and restore proper motor recruitment patterns with breathing, posture, and functional movement (e.g. appropriate lift/contraction with increased IAP such as a cough, laugh, sneeze to prevent incontinence). Functional training on progressive relaxation training to deregulate nervous system and improve urgency when experiencing stressful activities. THERAPEUTIC EXERCISE: (10 minutes):  Exercises per grid below to improve strength and coordination. Required moderate visual, verbal, manual and tactile cues to promote proper body alignment and promote proper body posture well as to locate correct muscles without substitutions of rectus abdominis or gluteal muscles. Progressed complexity of movement as indicated. Required cocontraction of transfer abdominal mm to improve pelvic floor muscle contraction. Good progress in recruitment noted throughout session and patient able to determine when she has lost contraction and correct.       Date:  8/20/19 Date:  8/27/19 Date:   Date:  6/25/19 Date:   7/2/19 Date:  7/9/19 Date:  7/16/19   Activity/Exercise Parameters Parameters Parameters       Supine endurance kegels 4x10 with 10 sec hold with focus on breathing    x10 with 3 second hold     Supine quick flick Kegels [id number] R04 with focus on breathing with PF drops in between each contraction   3x10     Diaphragmatic breathing x10' 10' during all exercise to improve coordination of breathing and PFM contraction 5'       tranverse abdominal contractions x10  5  x110     Seated endurance kegels   2x10 with 10 second holds (unable)  3x10 with 5 second holds  2x10 with 3 second holds     Seated quick flicks   [id number]       Supine bridging with contraction per bridge    x12 with cues for breathing      Supine bridging with endurance hold contraction PFM and trA    x12 with cues for breathing      Supine clam shells with contraction per bridge    x12 with cues for breathing      Standing heel raise with PFM and trA contraction    x12 with cues for breathing 5x5 did not require cues for breathing     Sit to stand with PFM contraction     3x5, noted patient with difficulty maintaining PFM contraction     Standing marching with PFM contraction     5x5     Lifting weighted box with PFM and TrA contraction      x10    Lifting weight box with PFM and TrA endurance hold      x5 10' walk    Chops (abdominal rotation exercise) with PFM and TrA contraction      B x10     Wall sits       4 x 15' with endurance hold   Monster walk       4x10 feet with contraction on each squat   Hip 3 way        x10 B A   Standing row       x10 B A               Manual Therapy: ( 30 minutes): Soft tissue mobilization was utilized and necessary because of the patient's painful spasm and restricted motion of soft tissue. Trigger point release and rolling performed to B adductor muscles with improvement noted in tenderness. Connective tissue mobilization/scar mobilization performed in abdominal region as well as rolling to improve abdominal muscle contractions and facilitate PFM contraction. (Used abbreviations: MET - muscle energy technique; SCS- Strain counter strain; CTM- Connective tissue mobilizations; CR- Contract/relax; SP- Sustained pressure, TrP- Trigger point release, IASTM- Instrument assisted soft tissue mobilizations, TDN- Trigger point dry needling). [email or web address] Portal  The following educational topics were reviewed with patient:  None today. Treatment/Session Summary:    · Response to Treatment:  Patient verbalized understanding and exhibited no complications of procedures performed. · Communication/Consultation:  None today  · Equipment provided today:  None today  · Recommendations/Intent for next treatment session: Next visit will focus on biofeedback, continued education on urge deferrment, progression of Kegel exercises to more functional activities and abdominal bracing as well as relaxation techniques to improve down regulation of nervous system.   Total Treatment Billable Duration:  55 minutes  PT Patient Time In/Time Out  Time In: 1254  Time Out: [address] JEANNE Veliz, DPT    Future Appointments   Date Time Provider Tramaine Morgan   9/10/2019  1:00 PM Ed Wu PT, DPT [facility]   5/7/2020  9:00 AM Boni Gordon MD [facility] FPA FPA

## 2019-09-10 ENCOUNTER — HOSPITAL ENCOUNTER (OUTPATIENT)
Dept: PHYSICAL THERAPY | Age: 49
Discharge: HOME | End: 2019-09-10
Payer: COMMERCIAL

## 2019-09-10 PROCEDURE — 97140 MANUAL THERAPY 1/> REGIONS: CPT

## 2019-09-10 PROCEDURE — 97110 THERAPEUTIC EXERCISES: CPT

## 2019-09-10 NOTE — PROGRESS NOTES
Liban Pinto  : 1970  Primary: Karl Rodríguez Of Donita Kramer*  Secondary:  [facility]  [address], [address] [place] [address], [address]  [phone]   [phone]        OUTPATIENT PHYSICAL THERAPY: Daily Treatment Note 9/10/2019  Visit Count:  Visit 2    MEDICAL/REFERRING DIAGNOS work. IS:  Overactive bladder [N32.81]   REFERRING PHYSICIAN: Shannan Santoro MD  Pre-treatment Symptoms/Complaints: Patient with GI bug this week experiencing nausea and vomiting and urinary incontinence with increased intraabdominal pressure as well as fecal incontinence. Multiple stressors present this week as well. Her goal is to be able to maintain continence without use of anticholinergics, as her doctor discussed with her increased incidence of dementia in patients with long term use of anticholinergics. Reports dehydration this week, encouraged fluids with electrolytes and discussing with MD if cramps don't improve. Pain: Initial: Pain Intensity 1: 0 0/10 Post Session:  0/10   Medications Last Reviewed:  09/10/19    Updated Objective Findings:  none   TREATMENT:   THERAPEUTIC ACTIVITY: ( minutes): Instruction on functional pelvic brace exercise including feedforward activation of pelvic floor muscles on exhale prior to activity that increases intra-abdominal pressure (IAP) such as cough, sneeze, laugh, sit to stand, lifting object to decrease pressure on pelvic organs during exertional activities. Instruction on coordinated pelvic floor and diaphragmatic breathing to improve kinesthetic awareness of pelvic muscle mobility and restore proper motor recruitment patterns with breathing, posture, and functional movement (e.g. appropriate lift/contraction with increased IAP such as a cough, laugh, sneeze to prevent incontinence).  Functional training on progressive relaxation training to deregulate nervous system and improve urgency when experiencing stressful activities. THERAPEUTIC EXERCISE: (10 minutes):  Exercises per grid below to improve strength and coordination. Required moderate visual, verbal, manual and tactile cues to promote proper body alignment and promote proper body posture well as to locate correct muscles without substitutions of rectus abdominis or gluteal muscles. Progressed complexity of movement as indicated. Required cocontraction of transfer abdominal mm to improve pelvic floor muscle contraction. Good progress in recruitment noted throughout session and patient able to determine when she has lost contraction and correct.       Date:  8/20/19 Date:  8/27/19 Date:   Date:  6/25/19 Date:   7/2/19 Date:  7/9/19 Date:  7/16/19   Activity/Exercise Parameters Parameters Parameters       Supine endurance kegels 4x10 with 10 sec hold with focus on breathing    x10 with 3 second hold     Supine quick flick Kegels 3D94 H09 with focus on breathing with PF drops in between each contraction   3x10     Diaphragmatic breathing x10' 10' during all exercise to improve coordination of breathing and PFM contraction 5'       tranverse abdominal contractions x10  5  x110     Seated endurance kegels   2 x 10 with verbal cues for drop on inhale, contraction on exhale to improve breathing mechanics   2x10 with 3 second holds 2 x 10 with verbal cues for drop on inhale, contraction on exhale to improve breathing mechanics     Seated quick flicks          Supine bridging with contraction per bridge    x12 with cues for breathing      Supine bridging with endurance hold contraction PFM and trA    x12 with cues for breathing      Supine clam shells with contraction per bridge    x12 with cues for breathing      Standing heel raise with PFM and trA contraction    x12 with cues for breathing 5x5 did not require cues for breathing     Sit to stand with PFM contraction     3x5, noted patient with difficulty maintaining PFM contraction     Standing marching with PFM contraction     5x5     Lifting weighted box with PFM and TrA contraction      x10    Lifting weight box with PFM and TrA endurance hold      x5 10' walk    Chops (abdominal rotation exercise) with PFM and TrA contraction      B x10     Wall sits       4 x 15' with endurance hold   Monster walk       4x10 feet with contraction on each squat   Hip 3 way        x10 B A   Standing row       x10 B A   Modified prudencio stretch   Attempted, patient experienced cramping in B hip flexors         Manual Therapy: ( 35 minutes): Soft tissue mobilization was utilized and necessary because of the patient's painful spasm and restricted motion of soft tissue. Trigger point release and rolling performed to B adductor muscles with improvement noted in tenderness. Trigger point release in B hip flexors. Connective tissue mobilization/scar mobilization performed in abdominal region as well as rolling to improve abdominal muscle contractions and facilitate PFM contraction. (Used abbreviations: MET - muscle energy technique; SCS- Strain counter strain; CTM- Connective tissue mobilizations; CR- Contract/relax; SP- Sustained pressure, TrP- Trigger point release, IASTM- Instrument assisted soft tissue mobilizations, TDN- Trigger point dry needling). [employer] Portal  The following educational topics were reviewed with patient:  None today. Treatment/Session Summary:    · Response to Treatment:  Patient experienced cramping when attempting to stretch B hip flexors after hip flexor trigger point release. · Communication/Consultation:  None today  · Equipment provided today:  None today  · Recommendations/Intent for next treatment session: Next visit will focus on biofeedback, continued education on urge deferrment, progression of Kegel exercises to more functional activities and abdominal bracing as well as relaxation techniques to improve down regulation of nervous system.   Total Treatment Billable Duration:  45 minutes  PT Patient Time In/Time Out  Time In: 6823  Time Out: 1345  Kathrine Freeman, PT, DPT    Future Appointments   Date Time Provider Tramaine Morgan   9/26/2019  1:00 PM Manuel Aguilar PT, DPT [facility]   5/7/2020  9:00 AM Nito Castaneda MD [facility] FPA FPA

## 2019-09-26 ENCOUNTER — HOSPITAL ENCOUNTER (OUTPATIENT)
Dept: PHYSICAL THERAPY | Age: 49
Discharge: HOME | End: 2019-09-26
Payer: COMMERCIAL

## 2019-09-26 PROCEDURE — 97140 MANUAL THERAPY 1/> REGIONS: CPT

## 2019-09-26 PROCEDURE — 97530 THERAPEUTIC ACTIVITIES: CPT

## 2019-09-26 PROCEDURE — 97110 THERAPEUTIC EXERCISES: CPT

## 2019-10-24 ENCOUNTER — HOSPITAL ENCOUNTER (OUTPATIENT)
Dept: PHYSICAL THERAPY | Age: 49
Discharge: HOME | End: 2019-10-24
Payer: COMMERCIAL

## 2019-10-24 PROCEDURE — 97140 MANUAL THERAPY 1/> REGIONS: CPT

## 2019-10-24 NOTE — PROGRESS NOTES
Ed Shorten  : 1970  Primary: Mackenzie Faustin Of Donita Kramer*  [facility]:  [facility], [facility], [place], [facility]  [phone]   [phone]      OUTPATIENT PHYSICAL THERAPY: Progress Note 10/24/2019  Visit Count:  Visit 5    MEDICAL/REFERRING DIAGNOS work. IS:  Overactive bladder [N32.81]   REFERRING PHYSICIAN: Steven Torres MD  Pre-treatment Symptoms/Complaints: Patient reports improvement in leakage overall, having nights where she can sleep throughout the night without issues, however occasionally having \"a small bubble pop, then completely losing control,\" occasionally. She has returned to 75% overall improvement in urinary symptoms. Overall, she has made progress towards short term goals 4, 5 and 6, long term goals 3, 4 and 5. Overall had made steady progress throughout therapy care with setback noted when she had to stop bladder medications and had breast reduction surgery. Noted she has also lost 26 lbs on a diet and reports improvement with this. Improved control of urine upon going into stairs and unlocking door to get inside when she gets home. Still has some urge incontinence with running water later at night. Pain: Initial: Pain Intensity 1: 0 0/10 Post Session:  0/10   Medications Last Reviewed:  10/24/19    Updated Objective Findings:  none   TREATMENT:   THERAPEUTIC ACTIVITY: (  minutes): Instruction on functional pelvic brace exercise including feedforward activation of pelvic floor muscles on exhale prior to activity that increases intra-abdominal pressure (IAP) such as cough, sneeze, laugh, sit to stand, lifting object to decrease pressure on pelvic organs during exertional activities.  Instruction on coordinated pelvic floor and diaphragmatic breathing to improve kinesthetic awareness of pelvic muscle mobility and restore proper motor recruitment patterns with breathing, posture, and functional movement (e.g. appropriate lift/contraction with increased IAP such as a cough, laugh, sneeze to prevent incontinence). Functional training on progressive relaxation training to deregulate nervous system and improve urgency when experiencing stressful activities. THERAPEUTIC EXERCISE: ( minutes):  Exercises per grid below to improve strength and coordination. Required moderate visual, verbal, manual and tactile cues to promote proper body alignment and promote proper body posture well as to locate correct muscles without substitutions of rectus abdominis or gluteal muscles. Progressed complexity of movement as indicated. Required cocontraction of transfer abdominal mm to improve pelvic floor muscle contraction. Good progress in recruitment noted throughout session and patient able to determine when she has lost contraction and correct. Date:  9/26/19         Activity/Exercise Parameters         Supine endurance kegels 4x10 with 10 sec hold with focus on breathing         Supine quick flick Kegels 2T44 without substitution of TrA contraction         Diaphragmatic breathing          tranverse abdominal contractions          Seated endurance kegels          Seated quick flicks          Supine bridging with contraction per bridge          Supine bridging with endurance hold contraction PFM and trA          Supine clam shells with contraction per bridge          Standing heel raise with PFM and trA contraction          Sit to stand with PFM contraction          Standing marching with PFM contraction          Lifting weighted box with PFM and TrA contraction          Lifting weight box with PFM and TrA endurance hold          Chops (abdominal rotation exercise) with PFM and TrA contraction          Wall sits          Monster walk          Hip 3 way           Standing row          Modified prudencio stretch            Manual Therapy: ( 55 minutes):  Soft tissue mobilization was utilized and necessary because of the patient's painful spasm and restricted motion of soft tissue. Trigger point release and rolling performed to B adductor muscles and abdominal muscles with improvement noted in tenderness. Trigger point release in B hip flexors. Connective tissue mobilization/scar mobilization performed in abdominal region as well as rolling to improve abdominal muscle contractions and facilitate PFM contraction. Instrument assisted soft tissue mobilization with improvement noted in muscle flexibility and motion. Contract relax used to stretch B hamstring muscles with improvement noted in range with stretching. (Used abbreviations: MET - muscle energy technique; SCS- Strain counter strain; CTM- Connective tissue mobilizations; CR- Contract/relax; SP- Sustained pressure, TrP- Trigger point release, IASTM- Instrument assisted soft tissue mobilizations, TDN- Trigger point dry needling). [employer] Portal  The following educational topics were reviewed with patient:  Urge suppression techniques reiterated as patient with difficulty following them with increments of 5-10 minutes of suppression initially, increasing to 2-3 hours eventually. Distraction techniques including podcasts to improve urgency while in the car. Treatment/Session Summary:    · Response to Treatment:  Patient verbalized understanding of all treatment. · Communication/Consultation:  None today  · Equipment provided today:  None today  · Recommendations/Intent for next treatment session: Next visit will focus on biofeedback, continued education on urge deferrment, progression of Kegel exercises to more functional activities and abdominal bracing as well as relaxation techniques to improve down regulation of nervous system.   Total Treatment Billable Duration:  55 minutes  PT Patient Time In/Time Out  Time In: 3244  Time Out: Nohemy Ambrosio, PT, DPT    Future Appointments   Date Time Provider Tramaine Cathy   11/7/2019  2:00 PM Fitz Cosme, PT, DPT [facility] SFD   11/26/2019  2:00 PM Darinel Crawford, PT, DPT [facility]   5/7/2020  9:00 AM Therman Epley Redgie Leavens, MD [facility] FPA FPA

## 2019-10-31 NOTE — PROGRESS NOTES
Myrtle Baugh  : 1970  Primary: [facility]  Secondary:  [facility] [address] [facility], [place] [address] [address]  [phone]   [phone]        OUTPATIENT PHYSICAL THERAPY:Progress Report 10/24/19   ICD-10: Treatment Diagnosis: Mixed incontinence (N39.46), Muscle weakness (generalized) (M62.81), Other lack of coordination (R27.8)  Precautions/Allergies:   Adhesive tape-silicones; Ciprofloxacin; and Sulfa (sulfonamide antibiotics)   MD Orders: Evaluate and treat MEDICAL/REFERRING DIAGNOSIS:  Overactive bladder [N32.81]   DATE OF ONSET:   REFERRING PHYSICIAN: Hua Jarvis MD  RETURN PHYSICIAN APPOINTMENT:      Progress Note:  Myrtle Baugh is a 47y.o. year old female with mixed incontinence. She has been seen for 14 visits. Patient reports improvement in leakage overall, having nights where she can sleep throughout the night without issues, however occasionally having \"a small bubble pop, then completely losing control,\" occasionally. She has returned to 75% overall improvement in urinary symptoms. Overall, she has made progress towards short term goals 4, 5 and 6, long term goals 3, 4 and 5. Overall had made steady progress throughout therapy care with setback noted when she had to stop bladder medications and had breast reduction surgery. Noted she has also lost 26 lbs on a diet and reports improvement with this. Improved control of urine upon going into stairs and unlocking door to get inside when she gets home. Still has some urge incontinence with running water later at night. Recommend continued therapy with focus on manual therapy techniques to improve patient's ability to coordinate PFM and abdominal muscles as well as continued strengthening to decrease patient's urinary symptoms. She has made good progress throughout therapy despite setback when she had to come off all bladder medications.  Reports significant improvement from before therapy when she was on 2 bladder medications. PROBLEM LIST (Impacting functional limitations):  1. Decreased Strength  2. Decreased Activity Tolerance  3. Increased Fatigue  4. Decreased [place] with Home Exercise Program INTERVENTIONS PLANNED:  1. Electrical Stimulation  2. Home Exercise Program (HEP)  3. Neuromuscular Re-education/Strengthening  4. Therapeutic Activites  5. Therapeutic Exercise/Strengthening  6. Transcutaneous Electrical Nerve Stimulation (TENS)   TREATMENT PLAN:  Effective Dates: 08/20/19 to 11/18/2019 (90 days). Frequency/Duration: 1 time a week for 90 Days  GOALS: (Goals have been discussed and agreed upon with patient.)  Short term Functional Goals: Time  Frame: 6 weeks  1. Pelvic floor muscle strength will improve to at least 3/5 allowing for no more than 3 episodes of urinary incontinence per week. 2. Patient will present with normal pelvic floor muscle coordination to reduce risk of leakage. 3. Patient will verbalize the effects of bladder irritants and avoid as able to reduce abnormal bladder urgency. 4. Patient will use the pelvic brace exercise with all increases in intra-abdominal pressure to reduce or prevent stress urinary incontinence episodes. 5. Patient will decrease nocturia to 1x or less to minimize negative impact on her sleep health (currently nocturia 2-3 times per night with interrupted sleep). 6. Patient will be able to demonstrate effective pelvic floor exercises for home program, progressing to longer holds in upright, in order to improve muscle endurance and prevent UUI en route to the bathroom. Discharge Goals: Time Frame: 12 weeks  1. Pelvic floor muscle strength will improve to at least 4/5 allowing for no more than 1 episodes of urinary incontinence per month. 2. Patient's self-reported pad usage will decrease to 0 pads to reduce financial strain associated within incontinence.   3. Patient will be able to utilize deferral strategies to reach bathroom >75% of the time without UUI. 4. Patient will demonstrate improved pelvic floor muscle coordination for bladder control by ability to perform at least 10 quick contractions in 10 seconds. 5. Patient will improve pelvic floor musculature endurance to 10 repetitions to reduce episodes of urinary incontinence when walking to the bathroom (currently performs 4 repetitions). Outcome Measure: Tool Used: Jimmie's Health Questionnaire  Score:  Initial: 69  Most Recent: 69  (Date: 5/14/19 )   Interpretation of Score: The entire symptom severity scale was scored on a (best) to 100 (worst) scale. Medical Necessity:   · Patient demonstrates excellent rehab potential due to higher previous functional level. Reason for Services/Other Comments:  · Patient continues to require modification of therapeutic interventions to increase complexity of exercises. Total Duration: Evaluation 15 minutes, treatment 38 minutes       Rehabilitation Potential For Stated Goals: Excellent  Regarding Sree Espinosa's therapy, I certify that the treatment plan above will be carried out by a therapist or under their direction. Thank you for this referral,  Yolie Pedersen, PT, DPT     Referring Physician Signature: Rico Lynn MD              Date                    PAIN/SUBJECTIVE:   Initial:   0/10 Post Session:  0/10     HISTORY:   History of Present Injury/Illness (Reason for Referral):  History of reemex adjustable sling in 2009 for stress incontinence which helped some, however has had urinary incontinence off/on over the past 17 years. Multiple fibroids. Dawn catheter ripped urethra during fibroidectomy. She has had urodynamic testing performed and the MD determiend she did not have stress incontinence, at least it was not reproducible with coughing, laughing, sneezing during her testing - she reports she does have loss of urine in these situations at home.  Reports that she has urgency upon getting out of the car and going up the steps at home. 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 Steps seem to be a trigger. Difficulty with long car rides due to surgery. Daughter has special needs which is increasingly stressful. Changed from Oxybutinin after being on it for 10 years about 6 months ago and was switched to Myrebetriq 6. Initially she had improvement for but now is worse than ever. Some pain on pubic bone with intercourse but otherwise no pelvic pain or dyspareunia. Urinary: Frequency 7-9 x/day, 0-2 x/night. Positive for mixed urinary incontinence (АНДРЕЙ), urgency, frequency, incomplete emptying, urinary hesitancy, dysuria, hematuria, nocturia. She reports hematuria which her MD is aware of and she has had throughout her lifespan. Pt uses pads for protection; 2 pads per day (PPD). Denies stress incomplete emptying, urinary hesitancy, dysuria. Fluid intake: 70-80 oz water/day; bladder irritants include: orange juice, apple juice, occasional caffeine (all only occasional). Bowel: Frequency daily 2-3. Negative for pain with bowel movement (BM), pushing/straining with BM, incomplete emptying, fecal incontinence, constipation. Use of stool softeners or laxatives? no  Sexual: Pt is sexually active. Male partners. Contraception/birth control: No - hysterectomy. Dysparunia: yes if he hits sling dyspareunia. Pelvic Organ Prolapse/Pelvic Pain: Location: none per MD and patient.     Past Medical History/Comorbidities:   Ms. Priya Wilder  has a past medical history of Adrenal disorder, other (2007), Allergic rhinitis, Bone spur of foot, Cancer ([facility] [address] (11/14/2017), Chest pain, Chronic pain, Cough, Dyspepsia, Elevated troponin, Environmental allergies, GERD (gastroesophageal reflux disease), History of positive PPD (1992), History of renal stone (4/1/2013), Insomnia, Lumbar degenerative disc disease (4/1/2013), Nausea & vomiting, Obesity, Snores, SOB (shortness of breath), Sore throat, Spastic bladder (2008), SVT (supraventricular tachycardia) ([address] Thromboembolus ([address] Urge incontinence, Urinary frequency, Vertigo, Vitamin D deficiency (2009), Wheezing, and Wheezing. She also has no past medical history of Aneurysm ([address] Asthma, Autoimmune disease ([address] CAD (coronary artery disease), Chronic kidney disease, Chronic obstructive pulmonary disease ([address] Coagulation disorder ([address] Diabetes ([address] Difficult intubation, Endocarditis, Heart failure ([address] Hypertension, Liver disease, Malignant hyperthermia due to anesthesia, Nicotine vapor product user, Non-nicotine vapor product user, Pseudocholinesterase deficiency, Psychiatric disorder, PUD (peptic ulcer disease), Rheumatic fever, Seizures ([address] Sleep apnea, Stroke ([address] or Thyroid disease. Ms. Daniella Quinn  has a past surgical history that includes hx myringotomy; hx bladder suspension (2009); hx tonsil and adenoidectomy (1978); hx other surgical (2012); hx wisdom teeth extraction; hx mohs procedure (Right, 05/2015); hx partial hysterectomy (11/11/15); hx orthopaedic (Right, 05/01/2015); hx rhinoplasty (1985); hx urological (2009); hx malignant skin lesion excision (Right, 11/14/2017); hx breast biopsy (Right, 2000); pr cardiac surg procedure unlist (03/27/2019); hx colonoscopy (02/07/2019); hx gyn (2006, 2008); hx heart catheterization (03/26/2019); and hx breast reduction (Bilateral, 7/24/2019). Social History/Living Environment: ,   , works at [employer] and independent. Have you ever had any pelvic trauma (orthopedic in nature, fall, MVA, etc.)? Ripped goode catheter during fibroidectomy in 2003. Have you ever experienced any unwanted physical or sexual contact? No  Have you ever experienced any form of medical trauma (GYN, urological, GI, etc)? Goode catheter ripped out.     Prior Level of Function/Work/Activity:  Independent with ambulation, ADLs. Reports difficulty with donning shoes due to body habitus. Aware of need for weight loss. Ambulatory/Rehab Services H2 Model Falls Risk Assessment    Risk Factors:       No Risk Factors Identified Ability to Rise from Chair:       (0)  Ability to rise in a single movement    Falls Prevention Plan:       No modifications necessary   Total: (5 or greater = High Risk): 0   n ©2010 [facility] [place] [address] [place] [address] Patent #[id number]. Federal Law prohibits the replication, distribution or use without written permission from [facility] [employer]     Current Medications:       Current Outpatient Medications:     ofloxacin (FLOXIN) 0.3 % ophthalmic solution, 1 Drop as needed. In bilateral ears if needed, Disp: , Rfl:     solifenacin (VESICARE) 5 mg tablet, Take 5 mg by mouth nightly. Indications: Frequent Urination, Disp: , Rfl:     raNITIdine (ZANTAC) 150 mg tablet, TAKE 1 TABLET BY MOUTH TWO  TIMES DAILY (Patient taking differently: Take 150 mg by mouth two (2) times a day. TAKE 1 TABLET BY MOUTH TWO  TIMES DAILY; Take / use AM day of surgery  per anesthesia protocols. Indications: gastroesophageal reflux disease), Disp: 180 Tab, Rfl: 3    mirabegron ER (MYRBETRIQ) 50 mg ER tablet, Take 1 Tab by mouth daily. (Patient taking differently: Take 50 mg by mouth daily. Take / use AM day of surgery  per anesthesia protocols. Indications: Frequent Urination), Disp: 30 Tab, Rfl: 11    fluticasone propionate (FLOVENT DISKUS) 100 mcg/actuation dsdv, Take 2 Puffs by inhalation two (2) times a day. Take / use AM day of surgery  per anesthesia protocols. Indications: Controller Medication for Asthma, Disp: , Rfl:     OTHER,NON-FORMULARY,, Indications: Methyl Protect one cap daily, Disp: , Rfl:     OTHER,NON-FORMULARY,, daily. Iodine /potassium supplement  Indications:  Iodoral 1/2 tab daily, Disp: , Rfl:     OTHER,NON-FORMULARY,, three (3) times daily as needed. Indications: Catecholacalm cap tid, Disp: , Rfl:     mv-min/iron/folic/calcium/vitK (WOMEN'S MULTIVITAMIN PO), Take  by mouth., Disp: , Rfl:     melatonin 1 mg tablet, Take 1 mg by mouth nightly., Disp: , Rfl:     beclomethasone dipropionate (QNASL) 80 mcg/actuation HFAA, 2 Sprays by Nasal route daily. (Patient taking differently: 2 Sprays by Nasal route daily. Take / use AM day of surgery  per anesthesia protocols. Indications: inflammation of the nose due to an allergy), Disp: 8.7 g, Rfl: 11    OTHER,NON-FORMULARY,, Take 2 Tabs by mouth daily (after lunch). Adrenal complex, Disp: , Rfl:     PRASTERONE, DHEA, (DHEA PO), Take 15 mg by mouth daily. DHEA/testosterone in the progesterone cream, Disp: , Rfl:     SELENIUM PO, Take 1 Tab by mouth daily. , Disp: , Rfl:     cholecalciferol, vitamin D3, 1,000 unit/drop drop, Take 5 Drops by mouth daily. , Disp: , Rfl:     allergy injection, 2 shots every 3 weeks, Disp: , Rfl:     LACTOBACILLUS ACIDOPHILUS (PROBIOTIC PO), Take 1 tablet by mouth daily. , Disp: , Rfl:     montelukast (SINGULAIR) 10 mg tablet, Take 10 mg by mouth nightly. Indications: inflammation of the nose due to an allergy, Controller Medication for Asthma, Disp: , Rfl:     Levocetirizine (XYZAL) 5 mg tablet, Take 5 mg by mouth daily. Take / use AM day of surgery  per anesthesia protocols. Indications: inflammation of the nose due to an allergy, Disp: , Rfl:    Date Last Reviewed:  10/31/2019   Number of Personal Factors/Comorbidities that affect the Plan of Care: 3+: HIGH COMPLEXITY   EXAMINATION:   Patient gave consent for internal pelvic floor muscle exam. Chaperone offered, patient declined. OBSERVATION:   External Observation:   · Voluntary Contraction: present  · Voluntary Relaxation: present  · Involuntary Contraction: present  · Involuntary Relaxation: present  · Perineal Body Assessment: normal  · Anal Boswell: normal  · Skin Integrity: intact.   · Vaginal Vault Size: within normal limits    PALPATION:  Superficial Pelvic Floor Musculature (PFM): Tender? Intermediate PFM Tender? Deep PFM Tender? R Superficial Transverse Perineal n R Deep Transverse Perineal n R. Puborectalis n   L Superficial Transverse Perineal n L Deep Transverse Perineal n L. Puborectalis n   R Ischiocavernosus n R Compressor Urethra n R. Pubococcygeus n   L Ischiocavernosus n L Compressor Urethra n L. Pubococcygeus n   R Bulbocavernosus n   R. Ileococcygeus n   L Bulbocavernosus n   L. Ileococcygeus n       R. Obturator Internus n       L. Obturator Internus n       R. Coccygeus n       L. Coccygeus n     RANGE OF MOTION:  Decreased - decreased contraction present, WFL bearing down    STRENGTH:  P: Power, E: Endurance, R: Repetitions, QF: Quick Flicks, TrA: Transverse Abdominus  P 3   E 6   R 6   QF 7   TrA present     COORDINATION:  Diaphragmatic breathing (DB): patient able to perform with cueing    SPECIAL TESTS:  Q-tip test: normal  Supine cough test: cough reflex not present. Able to locate and perform with verbal cues. POP-Q: (Pelvic Organ Prolapse - Quantification Exam) per MD note: none present     Body Structures Involved:  1. Nerves  2. Muscles Body Functions Affected:  1. Sensory/Pain  2. Genitourinary  3. Neuromusculoskeletal  4. Movement Related Activities and Participation Affected:  1. Self Care  2. Interpersonal Interactions and Relationships  3.  Community, Social and [place]   Number of elements (examined above) that affect the Plan of Care: 3: MODERATE COMPLEXITY   CLINICAL PRESENTATION:   Presentation: Stable and uncomplicated: LOW COMPLEXITY   CLINICAL DECISION MAKING:   Use of outcome tool(s) and clinical judgement create a POC that gives a: Clear prediction of patient's progress: LOW COMPLEXITY           Luz Maria Ek, PT, DPT

## 2019-10-31 NOTE — PROGRESS NOTES
Kiara Kay  : 1970  Primary: [facility]  Secondary:  [facility], [address] [place], [place] [address] [address] [facility]  [phone]   [phone]        OUTPATIENT PHYSICAL THERAPY:Progress Report 19   ICD-10: Treatment Diagnosis: Mixed incontinence (N39.46), Muscle weakness (generalized) (M62.81), Other lack of coordination (R27.8)  Precautions/Allergies:   Adhesive tape-silicones; Ciprofloxacin; and Sulfa (sulfonamide antibiotics)   MD Orders: Evaluate and treat MEDICAL/REFERRING DIAGNOSIS:  Overactive bladder [N32.81]   DATE OF ONSET:   REFERRING PHYSICIAN: Gabriele Barajas MD  RETURN PHYSICIAN APPOINTMENT:      Progress Report:  Kiara Kay is a 47y.o. year old female with mixed incontinence. She has been seen for 8 visits and reports she has been able to completely defer urination without leakage at times. She has progressed to one episode of nocturia and has had at least one week with no episodes of leakage when she attempted to go without a pad. She presents with improved activation of PFM and endurance this session as well as improved self-efficacy. Reports she is \"80% better\" at this time. Noted she is to have her breast reduction surgery and voices concern about coming off her bladder medications for this. Recommend continued once weekly PT visits to work on increasing PFM strength/endurance, improving suppression techniques and. She has met short term goals 1,3,5,6 and long term goal 3. PROBLEM LIST (Impacting functional limitations):  1. Decreased Strength  2. Decreased Activity Tolerance  3. Increased Fatigue  4. Decreased [place] with Home Exercise Program INTERVENTIONS PLANNED:  1. Electrical Stimulation  2. Home Exercise Program (HEP)  3. Manual Therapy  4. Neuromuscular Re-education/Strengthening  5. Therapeutic Activites  6. Therapeutic Exercise/Strengthening  7.  Transcutaneous Electrical Nerve Stimulation (TENS)  8. Ultrasound (US)   TREATMENT PLAN:  Effective Dates: 5/14/2019 TO 8/12/2019 (90 days). Frequency/Duration: 1 time a week for 90 Days  GOALS: (Goals have been discussed and agreed upon with patient.)  Short term Functional Goals: Time  Frame: 6 weeks  1. Pelvic floor muscle strength will improve to at least 3/5 allowing for no more than 3 episodes of urinary incontinence per week. 2. Patient will present with normal pelvic floor muscle coordination to reduce risk of leakage. 3. Patient will verbalize the effects of bladder irritants and avoid as able to reduce abnormal bladder urgency. 4. Patient will use the pelvic brace exercise with all increases in intra-abdominal pressure to reduce or prevent stress urinary incontinence episodes. 5. Patient will decrease nocturia to 1x or less to minimize negative impact on her sleep health (currently nocturia 2-3 times per night with interrupted sleep). 6. Patient will be able to demonstrate effective pelvic floor exercises for home program, progressing to longer holds in upright, in order to improve muscle endurance and prevent UUI en route to the bathroom. Discharge Goals: Time Frame: 12 weeks  1. Pelvic floor muscle strength will improve to at least 4/5 allowing for no more than 1 episodes of urinary incontinence per month. 2. Patient's self-reported pad usage will decrease to 0 pads to reduce financial strain associated within incontinence. 3. Patient will be able to utilize deferral strategies to reach bathroom >75% of the time without UUI. 4. Patient will demonstrate improved pelvic floor muscle coordination for bladder control by ability to perform at least 10 quick contractions in 10 seconds. 5. Patient will improve pelvic floor musculature endurance to 10 repetitions to reduce episodes of urinary incontinence when walking to the bathroom (currently performs 4 repetitions). Outcome Measure:    Tool Used: Jimmie's Health Questionnaire  Score: Initial: 69  Most Recent: 69  (Date: 5/14/19 )   Interpretation of Score: The entire symptom severity scale was scored on a (best) to 100 (worst) scale. Medical Necessity:   · Patient demonstrates excellent rehab potential due to higher previous functional level. Reason for Services/Other Comments:  · Patient continues to require modification of therapeutic interventions to increase complexity of exercises. Total Duration:  53 minutes   Time In: 1058  Time Out 1200    Rehabilitation Potential For Stated Goals: Excellent  Regarding Sree Espinosa's therapy, I certify that the treatment plan above will be carried out by a therapist or under their direction. Thank you for this referral,  Rui Berkowitz, PT, DPT     Referring Physician Signature: Steven Torres MD              Date                    PAIN/SUBJECTIVE:   Initial:   0/10 Post Session:  0/10     HISTORY:   History of Present Injury/Illness (Reason for Referral):  History of reemex adjustable sling in 2009 for stress incontinence which helped some, however has had urinary incontinence off/on over the past 17 years. Multiple fibroids. Dawn catheter ripped urethra during fibroidectomy. She has had urodynamic testing performed and the MD determiend she did not have stress incontinence, at least it was not reproducible with coughing, laughing, sneezing during her testing - she reports she does have loss of urine in these situations at home. Reports that she has urgency upon getting out of the car and going up the steps at home. 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 Steps seem to be a trigger. Difficulty with long car rides due to surgery. Daughter has special needs which is increasingly stressful.      Changed from Oxybutinin after being on it for 10 years about 6 months ago and was switched to Myrebetriq 6. Initially she had improvement for but now is worse than ever. Some pain on pubic bone with intercourse but otherwise no pelvic pain or dyspareunia. Urinary: Frequency 5-7 x/day, 1 x/night. Positive for mixed urinary incontinence (АНДРЕЙ), urgency, frequency, incomplete emptying, urinary hesitancy, dysuria, hematuria, nocturia. She reports hematuria which her MD is aware of and she has had throughout her lifespan. Pt uses pads for protection; 2 pads per day (PPD). Denies stress incomplete emptying, urinary hesitancy, dysuria. Fluid intake: 70-80 oz water/day; bladder irritants include: orange juice, apple juice, occasional caffeine (all only occasional). Bowel: Frequency daily 2-3. Negative for pain with bowel movement (BM), pushing/straining with BM, incomplete emptying, fecal incontinence, constipation. Use of stool softeners or laxatives? no  Sexual: Pt is sexually active. Male partners. Contraception/birth control: No - hysterectomy. Dysparunia: yes if he hits sling dyspareunia. Pelvic Organ Prolapse/Pelvic Pain: Location: none per MD and patient. Past Medical History/Comorbidities:   Ms. Fuad Dejesus  has a past medical history of Adrenal disorder, other (2007), Allergic rhinitis, Bone spur of foot, Cancer ([address] (11/14/2017), Chest pain, Chronic pain, Cough, Dyspepsia, Elevated troponin, Environmental allergies, GERD (gastroesophageal reflux disease), History of positive PPD (1992), History of renal stone (4/1/2013), Insomnia, Lumbar degenerative disc disease (4/1/2013), Nausea & vomiting, Obesity, Snores, SOB (shortness of breath), Sore throat, Spastic bladder (2008), SVT (supraventricular tachycardia) ([address] Thromboembolus ([address] Urge incontinence, Urinary frequency, Vertigo, Vitamin D deficiency (2009), Wheezing, and Wheezing.  She also has no past medical history of Aneurysm ([address] Asthma, Autoimmune disease ([address] CAD (coronary artery disease), Chronic kidney disease, Chronic obstructive pulmonary disease ([address] Coagulation disorder ([address] Diabetes ([address] Difficult intubation, Endocarditis, Heart failure ([address] Hypertension, Liver disease, Malignant hyperthermia due to anesthesia, Nicotine vapor product user, Non-nicotine vapor product user, Pseudocholinesterase deficiency, Psychiatric disorder, PUD (peptic ulcer disease), Rheumatic fever, Seizures ([address] Sleep apnea, Stroke ([address] or Thyroid disease. Ms. Lizzette Valenzuela  has a past surgical history that includes hx myringotomy; hx bladder suspension (2009); hx tonsil and adenoidectomy (1978); hx other surgical (2012); hx wisdom teeth extraction; hx mohs procedure (Right, 05/2015); hx partial hysterectomy (11/11/15); hx orthopaedic (Right, 05/01/2015); hx rhinoplasty (1985); hx urological (2009); hx malignant skin lesion excision (Right, 11/14/2017); hx breast biopsy (Right, 2000); pr cardiac surg procedure unlist (03/27/2019); hx colonoscopy (02/07/2019); hx gyn (2006, 2008); hx heart catheterization (03/26/2019); and hx breast reduction (Bilateral, 7/24/2019). Social History/Living Environment: ,   , works at [employer] and independent. Have you ever had any pelvic trauma (orthopedic in nature, fall, MVA, etc.)? Ripped goode catheter during fibroidectomy in 2003. Have you ever experienced any unwanted physical or sexual contact? No  Have you ever experienced any form of medical trauma (GYN, urological, GI, etc)? Goode catheter ripped out. Prior Level of Function/Work/Activity:  Independent with ambulation, ADLs. Reports difficulty with donning shoes due to body habitus. Aware of need for weight loss.        Ambulatory/Rehab Services H2 Model Falls Risk Assessment    Risk Factors:       No Risk Factors Identified Ability to Rise from Chair:       (0)  Ability to rise in a single movement    Falls Prevention Plan:       No modifications necessary   Total: (5 or greater = High Risk): 0   n ©2010 [employer]. All Rights Reserved. [facility] Patent #[id number]. Federal Law prohibits the replication, distribution or use without written permission from [facility]     Current Medications:       Current Outpatient Medications:     ofloxacin (FLOXIN) 0.3 % ophthalmic solution, 1 Drop as needed. In bilateral ears if needed, Disp: , Rfl:     solifenacin (VESICARE) 5 mg tablet, Take 5 mg by mouth nightly. Indications: Frequent Urination, Disp: , Rfl:     raNITIdine (ZANTAC) 150 mg tablet, TAKE 1 TABLET BY MOUTH TWO  TIMES DAILY (Patient taking differently: Take 150 mg by mouth two (2) times a day. TAKE 1 TABLET BY MOUTH TWO  TIMES DAILY; Take / use AM day of surgery  per anesthesia protocols. Indications: gastroesophageal reflux disease), Disp: 180 Tab, Rfl: 3    mirabegron ER (MYRBETRIQ) 50 mg ER tablet, Take 1 Tab by mouth daily. (Patient taking differently: Take 50 mg by mouth daily. Take / use AM day of surgery  per anesthesia protocols. Indications: Frequent Urination), Disp: 30 Tab, Rfl: 11    fluticasone propionate (FLOVENT DISKUS) 100 mcg/actuation dsdv, Take 2 Puffs by inhalation two (2) times a day. Take / use AM day of surgery  per anesthesia protocols. Indications: Controller Medication for Asthma, Disp: , Rfl:     OTHER,NON-FORMULARY,, Indications: Methyl Protect one cap daily, Disp: , Rfl:     OTHER,NON-FORMULARY,, daily. Iodine /potassium supplement  Indications: Iodoral 1/2 tab daily, Disp: , Rfl:     OTHER,NON-FORMULARY,, three (3) times daily as needed. Indications: Catecholacalm cap tid, Disp: , Rfl:     mv-min/iron/folic/calcium/vitK (WOMEN'S MULTIVITAMIN PO), Take  by mouth., Disp: , Rfl:     melatonin 1 mg tablet, Take 1 mg by mouth nightly., Disp: , Rfl:     beclomethasone dipropionate (QNASL) 80 mcg/actuation HFAA, 2 Sprays by Nasal route daily. (Patient taking differently: 2 Sprays by Nasal route daily. Take / use AM day of surgery  per anesthesia protocols.   Indications: inflammation of the nose due to an allergy), Disp: 8.7 g, Rfl: 11    OTHER,NON-FORMULARY,, Take 2 Tabs by mouth daily (after lunch). Adrenal complex, Disp: , Rfl:     PRASTERONE, DHEA, (DHEA PO), Take 15 mg by mouth daily. DHEA/testosterone in the progesterone cream, Disp: , Rfl:     SELENIUM PO, Take 1 Tab by mouth daily. , Disp: , Rfl:     cholecalciferol, vitamin D3, 1,000 unit/drop drop, Take 5 Drops by mouth daily. , Disp: , Rfl:     allergy injection, 2 shots every 3 weeks, Disp: , Rfl:     LACTOBACILLUS ACIDOPHILUS (PROBIOTIC PO), Take 1 tablet by mouth daily. , Disp: , Rfl:     montelukast (SINGULAIR) 10 mg tablet, Take 10 mg by mouth nightly. Indications: inflammation of the nose due to an allergy, Controller Medication for Asthma, Disp: , Rfl:     Levocetirizine (XYZAL) 5 mg tablet, Take 5 mg by mouth daily. Take / use AM day of surgery  per anesthesia protocols. Indications: inflammation of the nose due to an allergy, Disp: , Rfl:    Date Last Reviewed:  10/31/2019   Number of Personal Factors/Comorbidities that affect the Plan of Care: 3+: HIGH COMPLEXITY   EXAMINATION:   Patient gave consent for internal pelvic floor muscle exam. Chaperone offered, patient declined. OBSERVATION:   External Observation:   · Voluntary Contraction: present  · Voluntary Relaxation: present  · Involuntary Contraction: present  · Involuntary Relaxation: present  · Perineal Body Assessment: normal  · Anal Sontag: normal  · Skin Integrity: intact. · Vaginal Vault Size: within normal limits    PALPATION:  Superficial Pelvic Floor Musculature (PFM): Tender? Intermediate PFM Tender? Deep PFM Tender? R Superficial Transverse Perineal n R Deep Transverse Perineal n R. Puborectalis n   L Superficial Transverse Perineal n L Deep Transverse Perineal n L. Puborectalis n   R Ischiocavernosus n R Compressor Urethra n R. Pubococcygeus n   L Ischiocavernosus n L Compressor Urethra n L.  Pubococcygeus n   R Bulbocavernosus n   R. Ileococcygeus n   L Bulbocavernosus n   L. Ileococcygeus n       R. Obturator Internus n       L. Obturator Internus n       R. Coccygeus n       L. Coccygeus n     RANGE OF MOTION:  Decreased - decreased contraction present, WFL bearing down    STRENGTH:  P: Power, E: Endurance, R: Repetitions, QF: Quick Flicks, TrA: Transverse Abdominus  P 3   E 5   R 5   QF 5   TrA present     COORDINATION:  Diaphragmatic breathing (DB): patient able to perform with cueing    SPECIAL TESTS:  Q-tip test: normal  Supine cough test: cough reflex not present. Able to locate and perform with verbal cues. POP-Q: (Pelvic Organ Prolapse - Quantification Exam) per MD note: none present     Body Structures Involved:  1. Nerves  2. Muscles Body Functions Affected:  1. Sensory/Pain  2. Genitourinary  3. Neuromusculoskeletal  4. Movement Related Activities and Participation Affected:  1. Self Care  2. Interpersonal Interactions and Relationships  3.  Community, Social and [place]   Number of elements (examined above) that affect the Plan of Care: 3: MODERATE COMPLEXITY   CLINICAL PRESENTATION:   Presentation: Stable and uncomplicated: LOW COMPLEXITY   CLINICAL DECISION MAKING:   Use of outcome tool(s) and clinical judgement create a POC that gives a: Clear prediction of patient's progress: LOW COMPLEXITY           Kevin Alvarez PT, DPT

## 2019-10-31 NOTE — PROGRESS NOTES
Homar Vasques  : 1970  Primary: Martín Hogue Of [facility] Nia*  [facility]:  [facility] [address] [facility], [place] [address] [address] [facility]  [phone]   [phone]        OUTPATIENT PHYSICAL THERAPY:Progress Report 19   ICD-10: Treatment Diagnosis: Mixed incontinence (N39.46), Muscle weakness (generalized) (M62.81), Other lack of coordination (R27.8)  Precautions/Allergies:   Adhesive tape-silicones; Ciprofloxacin; and Sulfa (sulfonamide antibiotics)   MD Orders: Evaluate and treat MEDICAL/REFERRING DIAGNOSIS:  Overactive bladder [N32.81]   DATE OF ONSET:   REFERRING PHYSICIAN: Tena Pruitt MD  RETURN PHYSICIAN APPOINTMENT:      Progress Report:  Homar Vasques is a 47y.o. year old female with mixed incontinence. She has been seen for 3 physical therapy visits during which she has been utilizing urge suppression techniques, pelvic floor muscle strengthening/endurance, breathing techniques. She has exhibited progress towards short term goals in that she is able to verbalize bladder irritants, improving muscle strength/coordination of PFM and has been able to decrease instances when she experiences urge urinary incontinence on the way to the restroom. Recommend continued therapy once per week to continue progress with strengthening, urge suppression techniques and decrease episodes of incontinence. Noted patient reports she will miss several visits in the coming months due to having a breast reduction surgery and will have to come off of her bladder medications. Overall, patient is progressing well towards all goals and experiencing short periods with no leakage/urgency. PROBLEM LIST (Impacting functional limitations):  1. Decreased Strength  2. Decreased Activity Tolerance  3. Increased Fatigue  4. Decreased [place] with Home Exercise Program INTERVENTIONS PLANNED:  1. Balance Exercise  2. Electrical Stimulation  3.  Home Exercise Program (HEP)  4. Manual Therapy  5. Neuromuscular Re-education/Strengthening  6. Therapeutic Activites  7. Therapeutic Exercise/Strengthening  8. Transcutaneous Electrical Nerve Stimulation (TENS)   TREATMENT PLAN:  Effective Dates: 5/14/2019 TO 8/12/2019 (90 days). Frequency/Duration: 1 time a week for 90 Days  GOALS: (Goals have been discussed and agreed upon with patient.)  Short term Functional Goals: Time  Frame: 6 weeks  1. Pelvic floor muscle strength will improve to at least 3/5 allowing for no more than 3 episodes of urinary incontinence per week. 2. Patient will present with normal pelvic floor muscle coordination to reduce risk of leakage. 3. Patient will verbalize the effects of bladder irritants and avoid as able to reduce abnormal bladder urgency. 4. Patient will use the pelvic brace exercise with all increases in intra-abdominal pressure to reduce or prevent stress urinary incontinence episodes. 5. Patient will decrease nocturia to 1x or less to minimize negative impact on her sleep health (currently nocturia 2-3 times per night with interrupted sleep). 6. Patient will be able to demonstrate effective pelvic floor exercises for home program, progressing to longer holds in upright, in order to improve muscle endurance and prevent UUI en route to the bathroom. Discharge Goals: Time Frame: 12 weeks  1. Pelvic floor muscle strength will improve to at least 4/5 allowing for no more than 1 episodes of urinary incontinence per month. 2. Patient's self-reported pad usage will decrease to 0 pads to reduce financial strain associated within incontinence. 3. Patient will be able to utilize deferral strategies to reach bathroom >75% of the time without UUI. 4. Patient will demonstrate improved pelvic floor muscle coordination for bladder control by ability to perform at least 10 quick contractions in 10 seconds.    5. Patient will improve pelvic floor musculature endurance to 10 repetitions to reduce episodes of urinary incontinence when walking to the bathroom (currently performs 4 repetitions). Outcome Measure: Tool Used: Jimmie's Health Questionnaire  Score:  Initial: 69  Most Recent: 69  (Date: 5/14/19 )   Interpretation of Score: The entire symptom severity scale was scored on a (best) to 100 (worst) scale. Medical Necessity:   · Patient demonstrates excellent rehab potential due to higher previous functional level. Reason for Services/Other Comments:  · Patient continues to require modification of therapeutic interventions to increase complexity of exercises. Total Duration:  38 minutes   Time in: 1416  Time out: 1457  Rehabilitation Potential For Stated Goals: Excellent  Regarding Sree Espinosa's therapy, I certify that the treatment plan above will be carried out by a therapist or under their direction. Thank you for this referral,  Delicia Cook PT, DPT     Referring Physician Signature: Aaron Strange MD              Date                    PAIN/SUBJECTIVE:   Initial:   0/10 Post Session:  0/10     HISTORY:   History of Present Injury/Illness (Reason for Referral):  History of reemex adjustable sling in 2009 for stress incontinence which helped some, however has had urinary incontinence off/on over the past 17 years. Multiple fibroids. Dawn catheter ripped urethra during fibroidectomy. She has had urodynamic testing performed and the MD determiend she did not have stress incontinence, at least it was not reproducible with coughing, laughing, sneezing during her testing - she reports she does have loss of urine in these situations at home. Reports that she has urgency upon getting out of the car and going up the steps at home. 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 Steps seem to be a trigger. Difficulty with long car rides due to surgery. Daughter has special needs which is increasingly stressful. Changed from Oxybutinin after being on it for 10 years about 6 months ago and was switched to Myrebetriq 6. Initially she had improvement for but now is worse than ever. Some pain on pubic bone with intercourse but otherwise no pelvic pain or dyspareunia. Urinary: Frequency 10-12 x/day, 2-3 x/night. Positive for mixed urinary incontinence (АНДРЕЙ), urgency, frequency, incomplete emptying, urinary hesitancy, dysuria, hematuria, nocturia. She reports hematuria which her MD is aware of and she has had throughout her lifespan. Pt uses pads for protection; 2 pads per day (PPD). Denies stress incomplete emptying, urinary hesitancy, dysuria. Fluid intake: 70-80 oz water/day; bladder irritants include: orange juice, apple juice, occasional caffeine (all only occasional). Bowel: Frequency daily 2-3. Negative for pain with bowel movement (BM), pushing/straining with BM, incomplete emptying, fecal incontinence, constipation. Use of stool softeners or laxatives? no  Sexual: Pt is sexually active. Male partners. Contraception/birth control: No - hysterectomy. Dysparunia: yes if he hits sling dyspareunia. Pelvic Organ Prolapse/Pelvic Pain: Location: none per MD and patient.     Past Medical History/Comorbidities:   Ms. Patricio Wells  has a past medical history of Adrenal disorder, other (2007), Allergic rhinitis, Bone spur of foot, Cancer ([facility] [address] (11/14/2017), Chest pain, Chronic pain, Cough, Dyspepsia, Elevated troponin, Environmental allergies, GERD (gastroesophageal reflux disease), History of positive PPD (1992), History of renal stone (4/1/2013), Insomnia, Lumbar degenerative disc disease (4/1/2013), Nausea & vomiting, Obesity, Snores, SOB (shortness of breath), Sore throat, Spastic bladder (2008), SVT (supraventricular tachycardia) ([address] Thromboembolus ([address] Urge incontinence, Urinary frequency, Vertigo, Vitamin D deficiency (2009), Wheezing, and Wheezing. She also has no past medical history of Aneurysm ([address] Asthma, Autoimmune disease ([address] CAD (coronary artery disease), Chronic kidney disease, Chronic obstructive pulmonary disease ([address] Coagulation disorder ([address] Diabetes ([address] Difficult intubation, Endocarditis, Heart failure ([address] Hypertension, Liver disease, Malignant hyperthermia due to anesthesia, Nicotine vapor product user, Non-nicotine vapor product user, Pseudocholinesterase deficiency, Psychiatric disorder, PUD (peptic ulcer disease), Rheumatic fever, Seizures ([address] Sleep apnea, Stroke ([address] or Thyroid disease. Ms. Anastacio Hardy  has a past surgical history that includes hx myringotomy; hx bladder suspension (2009); hx tonsil and adenoidectomy (1978); hx other surgical (2012); hx wisdom teeth extraction; hx mohs procedure (Right, 05/2015); hx partial hysterectomy (11/11/15); hx orthopaedic (Right, 05/01/2015); hx rhinoplasty (1985); hx urological (2009); hx malignant skin lesion excision (Right, 11/14/2017); hx breast biopsy (Right, 2000); pr cardiac surg procedure unlist (03/27/2019); hx colonoscopy (02/07/2019); hx gyn (2006, 2008); hx heart catheterization (03/26/2019); and hx breast reduction (Bilateral, 7/24/2019). Social History/Living Environment: ,   , works at an [employer] and independent. Have you ever had any pelvic trauma (orthopedic in nature, fall, MVA, etc.)? Ripped goode catheter during fibroidectomy in 2003. Have you ever experienced any unwanted physical or sexual contact? No  Have you ever experienced any form of medical trauma (GYN, urological, GI, etc)? Goode catheter ripped out. Prior Level of Function/Work/Activity:  Independent with ambulation, ADLs. Reports difficulty with donning shoes due to body habitus. Aware of need for weight loss.        Ambulatory/Rehab Services H2 Model Falls Risk Assessment    Risk Factors:       No Risk Factors Identified Ability to Rise from Chair:       (0)  Ability to rise in a single movement    Falls Prevention Plan:       No modifications necessary   Total: (5 or greater = High Risk): 0   n ©2010 [facility] of [facility] [facility] Patent #[id number]. Federal Law prohibits the replication, distribution or use without written permission from [facility] [place]     Current Medications:       Current Outpatient Medications:     ofloxacin (FLOXIN) 0.3 % ophthalmic solution, 1 Drop as needed. In bilateral ears if needed, Disp: , Rfl:     solifenacin (VESICARE) 5 mg tablet, Take 5 mg by mouth nightly. Indications: Frequent Urination, Disp: , Rfl:     raNITIdine (ZANTAC) 150 mg tablet, TAKE 1 TABLET BY MOUTH TWO  TIMES DAILY (Patient taking differently: Take 150 mg by mouth two (2) times a day. TAKE 1 TABLET BY MOUTH TWO  TIMES DAILY; Take / use AM day of surgery  per anesthesia protocols. Indications: gastroesophageal reflux disease), Disp: 180 Tab, Rfl: 3    mirabegron ER (MYRBETRIQ) 50 mg ER tablet, Take 1 Tab by mouth daily. (Patient taking differently: Take 50 mg by mouth daily. Take / use AM day of surgery  per anesthesia protocols. Indications: Frequent Urination), Disp: 30 Tab, Rfl: 11    fluticasone propionate (FLOVENT DISKUS) 100 mcg/actuation dsdv, Take 2 Puffs by inhalation two (2) times a day. Take / use AM day of surgery  per anesthesia protocols. Indications: Controller Medication for Asthma, Disp: , Rfl:     OTHER,NON-FORMULARY,, Indications: Methyl Protect one cap daily, Disp: , Rfl:     OTHER,NON-FORMULARY,, daily. Iodine /potassium supplement  Indications: Iodoral 1/2 tab daily, Disp: , Rfl:     OTHER,NON-FORMULARY,, three (3) times daily as needed.  Indications: Catecholacalm cap tid, Disp: , Rfl:     mv-min/iron/folic/calcium/vitK (WOMEN'S MULTIVITAMIN PO), Take  by mouth., Disp: , Rfl:     melatonin 1 mg tablet, Take 1 mg by mouth nightly., Disp: , Rfl:     beclomethasone dipropionate (QNASL) 80 mcg/actuation HFAA, 2 Sprays by Nasal route daily. (Patient taking differently: 2 Sprays by Nasal route daily. Take / use AM day of surgery  per anesthesia protocols. Indications: inflammation of the nose due to an allergy), Disp: 8.7 g, Rfl: 11    OTHER,NON-FORMULARY,, Take 2 Tabs by mouth daily (after lunch). Adrenal complex, Disp: , Rfl:     PRASTERONE, DHEA, (DHEA PO), Take 15 mg by mouth daily. DHEA/testosterone in the progesterone cream, Disp: , Rfl:     SELENIUM PO, Take 1 Tab by mouth daily. , Disp: , Rfl:     cholecalciferol, vitamin D3, 1,000 unit/drop drop, Take 5 Drops by mouth daily. , Disp: , Rfl:     allergy injection, 2 shots every 3 weeks, Disp: , Rfl:     LACTOBACILLUS ACIDOPHILUS (PROBIOTIC PO), Take 1 tablet by mouth daily. , Disp: , Rfl:     montelukast (SINGULAIR) 10 mg tablet, Take 10 mg by mouth nightly. Indications: inflammation of the nose due to an allergy, Controller Medication for Asthma, Disp: , Rfl:     Levocetirizine (XYZAL) 5 mg tablet, Take 5 mg by mouth daily. Take / use AM day of surgery  per anesthesia protocols. Indications: inflammation of the nose due to an allergy, Disp: , Rfl:    Date Last Reviewed:  10/31/2019   Number of Personal Factors/Comorbidities that affect the Plan of Care: 3+: HIGH COMPLEXITY   EXAMINATION:   Patient gave consent for internal pelvic floor muscle exam. Chaperone offered, patient declined. OBSERVATION:   External Observation:   · Voluntary Contraction: present  · Voluntary Relaxation: present  · Involuntary Contraction: present  · Involuntary Relaxation: n/a  · Perineal Body Assessment: normal  · Anal [place]: normal  · Skin Integrity: intact. · Vaginal Vault Size: within normal limits    PALPATION:  Superficial Pelvic Floor Musculature (PFM): Tender? Intermediate PFM Tender? Deep PFM Tender? R Superficial Transverse Perineal n R Deep Transverse Perineal n R. Puborectalis n   L Superficial Transverse Perineal n L Deep Transverse Perineal n L.  Puborectalis n   R Ischiocavernosus n R Compressor Urethra n R. Pubococcygeus n   L Ischiocavernosus n L Compressor Urethra n L. Pubococcygeus n   R Bulbocavernosus n   R. Ileococcygeus n   L Bulbocavernosus n   L. Ileococcygeus n       R. Obturator Internus n       L. Obturator Internus n       R. Coccygeus n       L. Coccygeus n     RANGE OF MOTION:  Decreased - decreased contraction present, WFL bearing down    STRENGTH:  P: Power, E: Endurance, R: Repetitions, QF: Quick Flicks, TrA: Transverse Abdominus  P 3   E 5   R 5   QF 5   TrA present     COORDINATION:  Diaphragmatic breathing (DB): patient able to perform with cueing    SPECIAL TESTS:  Q-tip test: normal  Supine cough test: cough reflex not present. Able to locate and perform with verbal cues. POP-Q: (Pelvic Organ Prolapse - Quantification Exam) per MD note: none present     Body Structures Involved:  1. Nerves  2. Muscles Body Functions Affected:  1. Sensory/Pain  2. Genitourinary  3. Neuromusculoskeletal  4. Movement Related Activities and Participation Affected:  1. Self Care  2. Interpersonal Interactions and Relationships  3.  Community, Social and [place]   Number of elements (examined above) that affect the Plan of Care: 3: MODERATE COMPLEXITY   CLINICAL PRESENTATION:   Presentation: Stable and uncomplicated: LOW COMPLEXITY   CLINICAL DECISION MAKING:   Use of outcome tool(s) and clinical judgement create a POC that gives a: Clear prediction of patient's progress: LOW COMPLEXITY           Nannette Drop, PT, DPT

## 2019-10-31 NOTE — PROGRESS NOTES
Krystyna Green  : 1970  Primary: [facility]*  Secondary:  [facility], [address], [place], [facility]  [phone]   [phone]        OUTPATIENT PHYSICAL THERAPY:Progress Report 2019   ICD-10: Treatment Diagnosis: Mixed incontinence (N39.46), Muscle weakness (generalized) (M62.81), Other lack of coordination (R27.8)  Precautions/Allergies:   Adhesive tape-silicones; Ciprofloxacin; and Sulfa (sulfonamide antibiotics)   MD Orders: Evaluate and treat MEDICAL/REFERRING DIAGNOSIS:  Overactive bladder [N32.81]   DATE OF ONSET:   REFERRING PHYSICIAN: Nell Oliveros MD  RETURN PHYSICIAN APPOINTMENT:      Progress Report:  Krystyna Green is a 47y.o. year old female with mixed incontinence. She has been seen for 12 visits. She has fluctuated in progress over the course of the last few months with overactive bladder symptoms, initially experiencing a great amount of improvement, however declining when she had to stop anticholinergic medications for an elective breast reduction. She has overall experienced improvement over the course of therapy from initial evaluation with progressing to improved strength/endurance and less incidence of urinary incontinence. She continues to experience severe urgency when driving in the car, especially when reaching home and walking up the stairs. Suspect that excess weight, potential sleep apnea (which she is working towards getting treatment for) and patient reported high level of stress due to her adopted child are playing a role in her delayed progress. She stopped taking one of her anticholinergic medications a few days ago and reports no worsening of incontinence symptoms at this time. She continues to have significant tenderness/pain and trigger points present in abdominal muscles and BLE adductors muscles which may play a role in delayed progress as well.  Recommend continued therapy to work on Petar Bronson, manual therapy to reduce pain/tenderness and improve coordination of muscles as well as continued relaxation techniques to continue to meet patient's goals for therapy. She has maintained several of the goals she previously met, however will reactive STG 5, 6 and LTG 3. PROBLEM LIST (Impacting functional limitations):  1. Decreased Strength  2. Decreased Activity Tolerance  3. Increased Fatigue  4. Decreased Meigs with Home Exercise Program INTERVENTIONS PLANNED:  1. Electrical Stimulation  2. Home Exercise Program (HEP)  3. Neuromuscular Re-education/Strengthening  4. Therapeutic Activites  5. Therapeutic Exercise/Strengthening  6. Transcutaneous Electrical Nerve Stimulation (TENS)   TREATMENT PLAN:  Effective Dates: 5/14/2019 TO 8/12/2019 (90 days). Frequency/Duration: 1 time a week for 90 Days  GOALS: (Goals have been discussed and agreed upon with patient.)  Short term Functional Goals: Time  Frame: 6 weeks  1. Pelvic floor muscle strength will improve to at least 3/5 allowing for no more than 3 episodes of urinary incontinence per week. 2. Patient will present with normal pelvic floor muscle coordination to reduce risk of leakage. 3. Patient will verbalize the effects of bladder irritants and avoid as able to reduce abnormal bladder urgency. 4. Patient will use the pelvic brace exercise with all increases in intra-abdominal pressure to reduce or prevent stress urinary incontinence episodes. 5. Patient will decrease nocturia to 1x or less to minimize negative impact on her sleep health (currently nocturia 2-3 times per night with interrupted sleep). 6. Patient will be able to demonstrate effective pelvic floor exercises for home program, progressing to longer holds in upright, in order to improve muscle endurance and prevent UUI en route to the bathroom. Discharge Goals: Time Frame: 12 weeks  1.  Pelvic floor muscle strength will improve to at least 4/5 allowing for no more than 1 episodes of urinary incontinence per month. 2. Patient's self-reported pad usage will decrease to 0 pads to reduce financial strain associated within incontinence. 3. Patient will be able to utilize deferral strategies to reach bathroom >75% of the time without UUI. 4. Patient will demonstrate improved pelvic floor muscle coordination for bladder control by ability to perform at least 10 quick contractions in 10 seconds. 5. Patient will improve pelvic floor musculature endurance to 10 repetitions to reduce episodes of urinary incontinence when walking to the bathroom (currently performs 4 repetitions). Outcome Measure: Tool Used: Jimmie's Health Questionnaire  Score:  Initial: 69  Most Recent: 69  (Date: 5/14/19 )   Interpretation of Score: The entire symptom severity scale was scored on a (best) to 100 (worst) scale. Medical Necessity:   · Patient demonstrates excellent rehab potential due to higher previous functional level. Reason for Services/Other Comments:  · Patient continues to require modification of therapeutic interventions to increase complexity of exercises. Total Duration: Treatment 68 minutes   Time In: 3945  Time Out: 1405    Rehabilitation Potential For Stated Goals: Excellent  Regarding Sreemarcelo GrantEspinosa's therapy, I certify that the treatment plan above will be carried out by a therapist or under their direction. Thank you for this referral,  Emmanuel Dixon, PT, DPT     Referring Physician Signature: Hua Jarvis MD              Date                    PAIN/SUBJECTIVE:   Initial:   0/10 Post Session:  0/10     HISTORY:   History of Present Injury/Illness (Reason for Referral):  History of reemex adjustable sling in 2009 for stress incontinence which helped some, however has had urinary incontinence off/on over the past 17 years. Multiple fibroids. Dawn catheter ripped urethra during fibroidectomy.  She has had urodynamic testing performed and the MD determiend she did not have stress incontinence, at least it was not reproducible with coughing, laughing, sneezing during her testing - she reports she does have loss of urine in these situations at home. Reports that she has urgency upon getting out of the car and going up the steps at home. 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 Steps seem to be a trigger. Difficulty with long car rides due to surgery. Daughter has special needs which is increasingly stressful. Changed from Oxybutinin after being on it for 10 years about 6 months ago and was switched to Myrebetriq 6. Initially she had improvement for but now is worse than ever. Some pain on pubic bone with intercourse but otherwise no pelvic pain or dyspareunia. Urinary: Frequency 10-12 x/day, 2 x/night. Positive for mixed urinary incontinence (АНДРЕЙ), urgency, frequency, incomplete emptying, urinary hesitancy, dysuria, hematuria, nocturia. She reports hematuria which her MD is aware of and she has had throughout her lifespan. Pt uses pads for protection; 2 pads per day (PPD). Denies stress incomplete emptying, urinary hesitancy, dysuria. Fluid intake: 70-80 oz water/day; bladder irritants include: orange juice, apple juice, occasional caffeine (all only occasional). Bowel: Frequency daily 2-3. Negative for pain with bowel movement (BM), pushing/straining with BM, incomplete emptying, fecal incontinence, constipation. Use of stool softeners or laxatives? no  Sexual: Pt is sexually active. Male partners. Contraception/birth control: No - hysterectomy. Dysparunia: yes if he hits sling dyspareunia. Pelvic Organ Prolapse/Pelvic Pain: Location: none per MD and patient.     Past Medical History/Comorbidities:   Ms. Titus Reaves  has a past medical history of Adrenal disorder, other (2007), Allergic rhinitis, Bone spur of foot, Cancer ([address] (11/14/2017), Chest pain, Chronic pain, Cough, Dyspepsia, Elevated troponin, Environmental allergies, GERD (gastroesophageal reflux disease), History of positive PPD (1992), History of renal stone (4/1/2013), Insomnia, Lumbar degenerative disc disease (4/1/2013), Nausea & vomiting, Obesity, Snores, SOB (shortness of breath), Sore throat, Spastic bladder (2008), SVT (supraventricular tachycardia) ([address] Thromboembolus ([address] Urge incontinence, Urinary frequency, Vertigo, Vitamin D deficiency (2009), Wheezing, and Wheezing. She also has no past medical history of Aneurysm ([address] Asthma, Autoimmune disease ([address] CAD (coronary artery disease), Chronic kidney disease, Chronic obstructive pulmonary disease ([address] Coagulation disorder ([address] Diabetes ([address] Difficult intubation, Endocarditis, Heart failure ([address] Hypertension, Liver disease, Malignant hyperthermia due to anesthesia, Nicotine vapor product user, Non-nicotine vapor product user, Pseudocholinesterase deficiency, Psychiatric disorder, PUD (peptic ulcer disease), Rheumatic fever, Seizures ([address] Sleep apnea, Stroke ([address] or Thyroid disease. Ms. Sunil Holder  has a past surgical history that includes hx myringotomy; hx bladder suspension (2009); hx tonsil and adenoidectomy (1978); hx other surgical (2012); hx wisdom teeth extraction; hx mohs procedure (Right, 05/2015); hx partial hysterectomy (11/11/15); hx orthopaedic (Right, 05/01/2015); hx rhinoplasty (1985); hx urological (2009); hx malignant skin lesion excision (Right, 11/14/2017); hx breast biopsy (Right, 2000); pr cardiac surg procedure unlist (03/27/2019); hx colonoscopy (02/07/2019); hx gyn (2006, 2008); hx heart catheterization (03/26/2019); and hx breast reduction (Bilateral, 7/24/2019). Social History/Living Environment: ,   , works at an [employer] and independent. Have you ever had any pelvic trauma (orthopedic in nature, fall, MVA, etc.)?  Ripped goode catheter during fibroidectomy in 2003. Have you ever experienced any unwanted physical or sexual contact? No  Have you ever experienced any form of medical trauma (GYN, urological, GI, etc)? Dawn catheter ripped out. Prior Level of Function/Work/Activity:  Independent with ambulation, ADLs. Reports difficulty with donning shoes due to body habitus. Aware of need for weight loss. Ambulatory/Rehab Services H2 Model Falls Risk Assessment    Risk Factors:       No Risk Factors Identified Ability to Rise from Chair:       (0)  Ability to rise in a single movement    Falls Prevention Plan:       No modifications necessary   Total: (5 or greater = High Risk): 0   n ©2010 [facility] [address] [place] [address] States Patent #[id number]. Federal Law prohibits the replication, distribution or use without written permission from [facility]     Current Medications:       Current Outpatient Medications:     ofloxacin (FLOXIN) 0.3 % ophthalmic solution, 1 Drop as needed. In bilateral ears if needed, Disp: , Rfl:     solifenacin (VESICARE) 5 mg tablet, Take 5 mg by mouth nightly. Indications: Frequent Urination, Disp: , Rfl:     raNITIdine (ZANTAC) 150 mg tablet, TAKE 1 TABLET BY MOUTH TWO  TIMES DAILY (Patient taking differently: Take 150 mg by mouth two (2) times a day. TAKE 1 TABLET BY MOUTH TWO  TIMES DAILY; Take / use AM day of surgery  per anesthesia protocols. Indications: gastroesophageal reflux disease), Disp: 180 Tab, Rfl: 3    mirabegron ER (MYRBETRIQ) 50 mg ER tablet, Take 1 Tab by mouth daily. (Patient taking differently: Take 50 mg by mouth daily. Take / use AM day of surgery  per anesthesia protocols. Indications: Frequent Urination), Disp: 30 Tab, Rfl: 11    fluticasone propionate (FLOVENT DISKUS) 100 mcg/actuation dsdv, Take 2 Puffs by inhalation two (2) times a day. Take / use AM day of surgery  per anesthesia protocols.   Indications: Controller Medication for Asthma, Disp: , Rfl:   Reddick Stall,, Indications: Methyl Protect one cap daily, Disp: , Rfl:     OTHER,NON-FORMULARY,, daily. Iodine /potassium supplement  Indications: Iodoral 1/2 tab daily, Disp: , Rfl:     OTHER,NON-FORMULARY,, three (3) times daily as needed. Indications: Catecholacalm cap tid, Disp: , Rfl:     mv-min/iron/folic/calcium/vitK (WOMEN'S MULTIVITAMIN PO), Take  by mouth., Disp: , Rfl:     melatonin 1 mg tablet, Take 1 mg by mouth nightly., Disp: , Rfl:     beclomethasone dipropionate (QNASL) 80 mcg/actuation HFAA, 2 Sprays by Nasal route daily. (Patient taking differently: 2 Sprays by Nasal route daily. Take / use AM day of surgery  per anesthesia protocols. Indications: inflammation of the nose due to an allergy), Disp: 8.7 g, Rfl: 11    OTHER,NON-FORMULARY,, Take 2 Tabs by mouth daily (after lunch). Adrenal complex, Disp: , Rfl:     PRASTERONE, DHEA, (DHEA PO), Take 15 mg by mouth daily. DHEA/testosterone in the progesterone cream, Disp: , Rfl:     SELENIUM PO, Take 1 Tab by mouth daily. , Disp: , Rfl:     cholecalciferol, vitamin D3, 1,000 unit/drop drop, Take 5 Drops by mouth daily. , Disp: , Rfl:     allergy injection, 2 shots every 3 weeks, Disp: , Rfl:     LACTOBACILLUS ACIDOPHILUS (PROBIOTIC PO), Take 1 tablet by mouth daily. , Disp: , Rfl:     montelukast (SINGULAIR) 10 mg tablet, Take 10 mg by mouth nightly. Indications: inflammation of the nose due to an allergy, Controller Medication for Asthma, Disp: , Rfl:     Levocetirizine (XYZAL) 5 mg tablet, Take 5 mg by mouth daily. Take / use AM day of surgery  per anesthesia protocols. Indications: inflammation of the nose due to an allergy, Disp: , Rfl:    Date Last Reviewed:  10/31/2019   Number of Personal Factors/Comorbidities that affect the Plan of Care: 3+: HIGH COMPLEXITY   EXAMINATION:   Patient gave consent for internal pelvic floor muscle exam. Chaperone offered, patient declined.     OBSERVATION:   External Observation: · Voluntary Contraction: present  · Voluntary Relaxation: present  · Involuntary Contraction: no contraction seen  · Involuntary Relaxation: n/a  · Perineal Body Assessment: normal  · Anal Bancroft: normal  · Skin Integrity: intact. · Vaginal Vault Size: within normal limits    PALPATION:  Superficial Pelvic Floor Musculature (PFM): Tender? Intermediate PFM Tender? Deep PFM Tender? R Superficial Transverse Perineal n R Deep Transverse Perineal n R. Puborectalis n   L Superficial Transverse Perineal n L Deep Transverse Perineal n L. Puborectalis n   R Ischiocavernosus n R Compressor Urethra n R. Pubococcygeus y   L Ischiocavernosus n L Compressor Urethra n L. Pubococcygeus n   R Bulbocavernosus n   R. Ileococcygeus y   L Bulbocavernosus n   L. Ileococcygeus n       R. Obturator Internus n       L. Obturator Internus n       R. Coccygeus y       L. Coccygeus n     RANGE OF MOTION:  Decreased - decreased contraction present, WFL bearing down    STRENGTH:  P: Power, E: Endurance, R: Repetitions, QF: Quick Flicks, TrA: Transverse Abdominus  P 2   E 4   R 4   QF 5   TrA present     COORDINATION:  Diaphragmatic breathing (DB): patient able to perform with cueing    SPECIAL TESTS:  Q-tip test: normal  Supine cough test: cough reflex not present. Able to locate and perform with verbal cues. POP-Q: (Pelvic Organ Prolapse - Quantification Exam) per MD note: none present     Body Structures Involved:  1. Nerves  2. Muscles Body Functions Affected:  1. Sensory/Pain  2. Genitourinary  3. Neuromusculoskeletal  4. Movement Related Activities and Participation Affected:  1. Self Care  2. Interpersonal Interactions and Relationships  3.  Community, Social and [place]   Number of elements (examined above) that affect the Plan of Care: 3: MODERATE COMPLEXITY   CLINICAL PRESENTATION:   Presentation: Stable and uncomplicated: LOW COMPLEXITY   CLINICAL DECISION MAKING:   Use of outcome tool(s) and clinical judgement create a POC that gives a: Clear prediction of patient's progress: LOW COMPLEXITY           Mayela Dumont, PT, DPT

## 2019-11-07 ENCOUNTER — HOSPITAL ENCOUNTER (OUTPATIENT)
Dept: PHYSICAL THERAPY | Age: 49
Discharge: HOME | End: 2019-11-07
Payer: COMMERCIAL

## 2019-11-07 PROCEDURE — 97140 MANUAL THERAPY 1/> REGIONS: CPT

## 2019-11-07 PROCEDURE — 97110 THERAPEUTIC EXERCISES: CPT

## 2019-11-07 NOTE — PROGRESS NOTES
Vanessa Montero  : 1970  Primary: [facility]  Secondary:  [facility], [address], [place], [facility]  [phone]   [phone]      OUTPATIENT PHYSICAL THERAPY: Progress Note 2019  Visit Count:  Visit 5    MEDICAL/REFERRING DIAGNOS work. IS:  Overactive bladder [N32.81]   REFERRING PHYSICIAN: Aaron Strange MD  Pre-treatment Symptoms/Complaints: Patient reports improvement in leakage overall, having nights where she can sleep throughout the night without issues, however occasionally having \"a small bubble pop, then completely losing control,\" occasionally. She has returned to 75% overall improvement in urinary symptoms. Overall, she has made progress towards short term goals 4, 5 and 6, long term goals 3, 4 and 5. Overall had made steady progress throughout therapy care with setback noted when she had to stop bladder medications and had breast reduction surgery. Noted she has also lost 26 lbs on a diet and reports improvement with this. Improved control of urine upon going into stairs and unlocking door to get inside when she gets home. Still has some urge incontinence with running water later at night. Pain: Initial: Pain Intensity 1: 0 0/10 Post Session:  0/10   Medications Last Reviewed:  19    Updated Objective Findings:  none   TREATMENT:   THERAPEUTIC ACTIVITY: (  minutes): Instruction on functional pelvic brace exercise including feedforward activation of pelvic floor muscles on exhale prior to activity that increases intra-abdominal pressure (IAP) such as cough, sneeze, laugh, sit to stand, lifting object to decrease pressure on pelvic organs during exertional activities.  Instruction on coordinated pelvic floor and diaphragmatic breathing to improve kinesthetic awareness of pelvic muscle mobility and restore proper motor recruitment patterns with breathing, posture, and functional movement (e.g. appropriate lift/contraction with increased IAP such as a cough, laugh, sneeze to prevent incontinence). Functional training on progressive relaxation training to deregulate nervous system and improve urgency when experiencing stressful activities. THERAPEUTIC EXERCISE: (13 minutes):  Exercises per grid below to improve strength and coordination. Required moderate visual, verbal, manual and tactile cues to promote proper body alignment and promote proper body posture well as to locate correct muscles without substitutions of rectus abdominis or gluteal muscles. Progressed complexity of movement as indicated. Required cocontraction of transfer abdominal mm to improve pelvic floor muscle contraction. Good progress in recruitment noted throughout session and patient able to determine when she has lost contraction and correct.       Date:  9/26/19 Date:  11/7/19        Activity/Exercise Parameters         Supine endurance kegels 4x10 with 10 sec hold with focus on breathing 2 x 10 with 10 second hold with focus on breathing        Supine quick flick Kegels 3X80 without substitution of TrA contraction         Diaphragmatic breathing          tranverse abdominal contractions  2 x 10 with focus on breathing        Seated endurance kegels          Seated quick flicks          Supine bridging with contraction per bridge          Supine bridging with endurance hold contraction PFM and trA  x10        Supine clam shells with contraction per bridge          Standing heel raise with PFM and trA contraction          Sit to stand with PFM contraction          Standing marching with PFM contraction          Lifting weighted box with PFM and TrA contraction          Lifting weight box with PFM and TrA endurance hold          Chops (abdominal rotation exercise) with PFM and TrA contraction          Wall sits          Monster walk          Hip 3 way           Standing row          Modified prudencio stretch          Supine heel slides and PFM/TrA contraction  x10                              Manual Therapy: ( 40 minutes): Soft tissue mobilization was utilized and necessary because of the patient's painful spasm and restricted motion of soft tissue. Trigger point release and rolling performed to B adductor muscles and abdominal muscles with improvement noted in tenderness. Trigger point release in B hip flexors. Connective tissue mobilization/scar mobilization performed in abdominal region as well as rolling to improve abdominal muscle contractions and facilitate PFM contraction. Instrument assisted soft tissue mobilization with improvement noted in muscle flexibility and motion    (Used abbreviations: MET - muscle energy technique; SCS- Strain counter strain; CTM- Connective tissue mobilizations; CR- Contract/relax; SP- Sustained pressure, TrP- Trigger point release, IASTM- Instrument assisted soft tissue mobilizations, TDN- Trigger point dry needling). [employer]  The following educational topics were reviewed with patient:  Urge suppression techniques reiterated as patient with difficulty following them with increments of 5-10 minutes of suppression initially, increasing to 2-3 hours eventually. Distraction techniques including podcasts to improve urgency while in the car. Treatment/Session Summary:    · Response to Treatment:  Patient verbalized understanding of all treatment. · Communication/Consultation:  None today  · Equipment provided today:  None today  · Recommendations/Intent for next treatment session: Next visit will focus on continued education on urge deferrment, progression of Kegel exercises to more functional activities and abdominal bracing as well as relaxation techniques to improve down regulation of nervous system.   Total Treatment Billable Duration:  55 minutes  PT Patient Time In/Time Out  Time In: 1349  Time Out: [address], PT, DPT    Future Appointments   Date Time Provider Department [place]   11/26/2019  2:00 PM Rosa Lynne, PT, DPT [facility]   12/10/2019  1:45 PM Jad Griffiths, PT, DPT [facility]   12/17/2019  1:45 PM Jad Griffiths, PT, DPT [facility]   5/7/2020  9:00 AM MD KATHERINE Laureano FPA [facility]

## 2019-11-26 ENCOUNTER — HOSPITAL ENCOUNTER (OUTPATIENT)
Dept: PHYSICAL THERAPY | Age: 49
Discharge: HOME | End: 2019-11-26
Payer: COMMERCIAL

## 2019-11-26 PROCEDURE — 97110 THERAPEUTIC EXERCISES: CPT

## 2019-11-26 PROCEDURE — 97140 MANUAL THERAPY 1/> REGIONS: CPT

## 2019-11-26 NOTE — PROGRESS NOTES
Maria Teresa Mueller  : 1970  Primary: Hay Leyva Of Donita Kramer*  Secondary:  [facility] at [facility], [facility] [place], [address]  [phone]   [phone]        OUTPATIENT PHYSICAL THERAPY:Recertification and PM 2019    ICD-10: Treatment Diagnosis: Mixed incontinence (N39.46), Muscle weakness (generalized) (M62.81), Other lack of coordination (R27.8)  Precautions/Allergies:   Adhesive tape-silicones; Ciprofloxacin; and Sulfa (sulfonamide antibiotics)   MD Orders: Evaluate and treat MEDICAL/REFERRING DIAGNOSIS:  Overactive bladder [N32.81]   DATE OF ONSET:   REFERRING PHYSICIAN: Cesar Sumner MD  RETURN PHYSICIAN APPOINTMENT:      INITIAL ASSESSMENT:  Maria Teresa Mueller is a 47y.o. year old female with mixed incontinence and overactive bladder. She has been seen for 15 visits and has significantly improved her mixed urinary incontinence. She has progressed from rising 2-3 times per night to 0-1 times per night. She has decreased her episodes of urinary incontinence per week to 1-2 and increased pelvic floor muscle strength to 4/5. She continues to have some urgency symptoms and occasional urge urinary incontinence. She has improved in her ability to localize PF muscles and decreased compensatory strategies. Recommend continued weekly physical therapy visits to progress towards goals including continued manual therapy to improve tissue extensibility and allow adequate contraction of PFM, strength exercises to progress to more functional activities. She has met Stg 1, 2, 3, 4, 5 and 6. Progressing towards long term goals. She would benefit form skilled PT to address the above deficits. She would benefit from participating in a PF strengthening program with biofeedback for correct mm activation for improved mm activation as well as education on urge suppression techniques. PROBLEM LIST (Impacting functional limitations):  1.  Decreased Strength  2. Decreased Activity Tolerance  3. Increased Fatigue  4. Decreased [place] with Home Exercise Program INTERVENTIONS PLANNED:  1. Balance Exercise  2. Electrical Stimulation  3. Home Exercise Program (HEP)  4. Manual Therapy  5. Neuromuscular Re-education/Strengthening  6. Therapeutic Activites  7. Therapeutic Exercise/Strengthening  8. Transcutaneous Electrical Nerve Stimulation (TENS)   TREATMENT PLAN:  Effective Dates: 11/26/19 TO 2/24/2020 (90 days). Frequency/Duration: 1 time a week for 90 Days  GOALS: (Goals have been discussed and agreed upon with patient.)  Short term Functional Goals: Time  Frame: 6 weeks Goals Met: 11/27/19  1. Pelvic floor muscle strength will improve to at least 3/5 allowing for no more than 3 episodes of urinary incontinence per week. 2. Patient will present with normal pelvic floor muscle coordination to reduce risk of leakage. 3. Patient will verbalize the effects of bladder irritants and avoid as able to reduce abnormal bladder urgency. 4. Patient will use the pelvic brace exercise with all increases in intra-abdominal pressure to reduce or prevent stress urinary incontinence episodes. 5. Patient will decrease nocturia to 1x or less to minimize negative impact on her sleep health (currently nocturia 2-3 times per night with interrupted sleep). 6. Patient will be able to demonstrate effective pelvic floor exercises for home program, progressing to longer holds in upright, in order to improve muscle endurance and prevent UUI en route to the bathroom. Discharge Goals: Time Frame: 12 weeks  1. Pelvic floor muscle strength will improve to at least 4/5 allowing for no more than 1 episodes of urinary incontinence per month. 2. Patient's self-reported pad usage will decrease to 0 pads to reduce financial strain associated within incontinence. 3. Patient will be able to utilize deferral strategies to reach bathroom >75% of the time without UUI.   4. Patient will demonstrate improved pelvic floor muscle coordination for bladder control by ability to perform at least 10 quick contractions in 10 seconds. 5. Patient will improve pelvic floor musculature endurance to 10 repetitions to reduce episodes of urinary incontinence when walking to the bathroom (currently performs 4 repetitions). Outcome Measure: Tool Used: Jimmie's Health Questionnaire  Score:  Initial: 69  Most Recent: 69  (Date: 5/14/19 )   Interpretation of Score: The entire symptom severity scale was scored on a (best) to 100 (worst) scale. Medical Necessity:   · Patient demonstrates excellent rehab potential due to higher previous functional level. Reason for Services/Other Comments:  · Patient continues to require modification of therapeutic interventions to increase complexity of exercises. PT Patient Time In/Time Out  Time In: 1356  Time Out: 1456    Rehabilitation Potential For Stated Goals: Excellent  Regarding Sree Espinosa's therapy, I certify that the treatment plan above will be carried out by a therapist or under their direction. Thank you for this referral,  Concha Bradley, PT, DPT               PAIN/SUBJECTIVE:   Initial: Pain Intensity 1: 0 0/10 Post Session:  0/10     HISTORY:   History of Present Injury/Illness (Reason for Referral):  History of reemex adjustable sling in 2009 for stress incontinence which helped some, however has had urinary incontinence off/on over the past 17 years. Multiple fibroids. Dawn catheter ripped urethra during fibroidectomy. She has had urodynamic testing performed and the MD determiend she did not have stress incontinence, at least it was not reproducible with coughing, laughing, sneezing during her testing - she reports she does have loss of urine in these situations at home. Reports that she has urgency upon getting out of the car and going up the steps at home.  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 Steps seem to be a trigger. Difficulty with long car rides due to surgery. Daughter has special needs which is increasingly stressful. Changed from Oxybutinin after being on it for 10 years about 6 months ago and was switched to Myrebetriq 6. Initially she had improvement for but now is worse than ever. Some pain on pubic bone with intercourse but otherwise no pelvic pain or dyspareunia. Urinary: Frequency 10-12 x/day, 2-3 x/night. Positive for mixed urinary incontinence (АНДРЕЙ), urgency, frequency, incomplete emptying, urinary hesitancy, dysuria, hematuria, nocturia. She reports hematuria which her MD is aware of and she has had throughout her lifespan. Pt uses pads for protection; 2 pads per day (PPD). Denies stress incomplete emptying, urinary hesitancy, dysuria. Fluid intake: 70-80 oz water/day; bladder irritants include: orange juice, apple juice, occasional caffeine (all only occasional). Bowel: Frequency daily 2-3. Negative for pain with bowel movement (BM), pushing/straining with BM, incomplete emptying, fecal incontinence, constipation. Use of stool softeners or laxatives? no  Sexual: Pt is sexually active. Male partners. Contraception/birth control: No - hysterectomy. Dysparunia: yes if he hits sling dyspareunia. Pelvic Organ Prolapse/Pelvic Pain: Location: none per MD and patient.     Past Medical History/Comorbidities:   Ms. Patricio Wells  has a past medical history of Adrenal disorder, other (2007), Allergic rhinitis, Bone spur of foot, Cancer ([facility] [address] (11/14/2017), Chest pain, Chronic pain, Cough, Dyspepsia, Elevated troponin, Environmental allergies, GERD (gastroesophageal reflux disease), History of positive PPD (1992), History of renal stone (4/1/2013), Insomnia, Lumbar degenerative disc disease (4/1/2013), Nausea & vomiting, Obesity, Snores, SOB (shortness of breath), Sore throat, Spastic bladder (2008), SVT (supraventricular tachycardia) ([address] Thromboembolus ([address] Urge incontinence, Urinary frequency, Vertigo, Vitamin D deficiency (2009), Wheezing, and Wheezing. She also has no past medical history of Aneurysm ([address] Asthma, Autoimmune disease ([address] CAD (coronary artery disease), Chronic kidney disease, Chronic obstructive pulmonary disease ([address] Coagulation disorder ([address] Diabetes ([address] Difficult intubation, Endocarditis, Heart failure ([address] Hypertension, Liver disease, Malignant hyperthermia due to anesthesia, Nicotine vapor product user, Non-nicotine vapor product user, Pseudocholinesterase deficiency, Psychiatric disorder, PUD (peptic ulcer disease), Rheumatic fever, Seizures ([address] Sleep apnea, Stroke ([address] or Thyroid disease. Ms. Bishop Bautista  has a past surgical history that includes hx myringotomy; hx bladder suspension (2009); hx tonsil and adenoidectomy (1978); hx other surgical (2012); hx wisdom teeth extraction; hx mohs procedure (Right, 05/2015); hx partial hysterectomy (11/11/15); hx orthopaedic (Right, 05/01/2015); hx rhinoplasty (1985); hx urological (2009); hx malignant skin lesion excision (Right, 11/14/2017); hx breast biopsy (Right, 2000); pr cardiac surg procedure unlist (03/27/2019); hx colonoscopy (02/07/2019); hx gyn (2006, 2008); hx heart catheterization (03/26/2019); and hx breast reduction (Bilateral, 7/24/2019). Social History/Living Environment: ,   , works at an [place] and independent. Have you ever had any pelvic trauma (orthopedic in nature, fall, MVA, etc.)? Ripped goode catheter during fibroidectomy in 2003. Have you ever experienced any unwanted physical or sexual contact? No  Have you ever experienced any form of medical trauma (GYN, urological, GI, etc)? Goode catheter ripped out. Prior Level of Function/Work/Activity:  Independent with ambulation, ADLs. Reports difficulty with donning shoes due to body habitus.  Aware of need for weight loss.       Ambulatory/Rehab Services H2 Model Falls Risk Assessment    Risk Factors:       No Risk Factors Identified Ability to Rise from Chair:       (0)  Ability to rise in a single movement    Falls Prevention Plan:       No modifications necessary   Total: (5 or greater = High Risk): 0   n ©2010 [facility] of [facility] [facility] Patent #[id number]. Federal Law prohibits the replication, distribution or use without written permission from [facility] [employer]     Current Medications:       Current Outpatient Medications:     ofloxacin (FLOXIN) 0.3 % ophthalmic solution, 1 Drop as needed. In bilateral ears if needed, Disp: , Rfl:     solifenacin (VESICARE) 5 mg tablet, Take 5 mg by mouth nightly. Indications: Frequent Urination, Disp: , Rfl:     raNITIdine (ZANTAC) 150 mg tablet, TAKE 1 TABLET BY MOUTH TWO  TIMES DAILY (Patient taking differently: Take 150 mg by mouth two (2) times a day. TAKE 1 TABLET BY MOUTH TWO  TIMES DAILY; Take / use AM day of surgery  per anesthesia protocols. Indications: gastroesophageal reflux disease), Disp: 180 Tab, Rfl: 3    mirabegron ER (MYRBETRIQ) 50 mg ER tablet, Take 1 Tab by mouth daily. (Patient taking differently: Take 50 mg by mouth daily. Take / use AM day of surgery  per anesthesia protocols. Indications: Frequent Urination), Disp: 30 Tab, Rfl: 11    fluticasone propionate (FLOVENT DISKUS) 100 mcg/actuation dsdv, Take 2 Puffs by inhalation two (2) times a day. Take / use AM day of surgery  per anesthesia protocols. Indications: Controller Medication for Asthma, Disp: , Rfl:     OTHER,NON-FORMULARY,, Indications: Methyl Protect one cap daily, Disp: , Rfl:     OTHER,NON-FORMULARY,, daily. Iodine /potassium supplement  Indications: Iodoral 1/2 tab daily, Disp: , Rfl:     OTHER,NON-FORMULARY,, three (3) times daily as needed.  Indications: Catecholacalm cap tid, Disp: , Rfl:     mv-min/iron/folic/calcium/vitK (WOMEN'S MULTIVITAMIN PO), Take  by mouth., Disp: , Rfl:     melatonin 1 mg tablet, Take 1 mg by mouth nightly., Disp: , Rfl:     beclomethasone dipropionate (QNASL) 80 mcg/actuation HFAA, 2 Sprays by Nasal route daily. (Patient taking differently: 2 Sprays by Nasal route daily. Take / use AM day of surgery  per anesthesia protocols. Indications: inflammation of the nose due to an allergy), Disp: 8.7 g, Rfl: 11    OTHER,NON-FORMULARY,, Take 2 Tabs by mouth daily (after lunch). Adrenal complex, Disp: , Rfl:     PRASTERONE, DHEA, (DHEA PO), Take 15 mg by mouth daily. DHEA/testosterone in the progesterone cream, Disp: , Rfl:     SELENIUM PO, Take 1 Tab by mouth daily. , Disp: , Rfl:     cholecalciferol, vitamin D3, 1,000 unit/drop drop, Take 5 Drops by mouth daily. , Disp: , Rfl:     allergy injection, 2 shots every 3 weeks, Disp: , Rfl:     LACTOBACILLUS ACIDOPHILUS (PROBIOTIC PO), Take 1 tablet by mouth daily. , Disp: , Rfl:     montelukast (SINGULAIR) 10 mg tablet, Take 10 mg by mouth nightly. Indications: inflammation of the nose due to an allergy, Controller Medication for Asthma, Disp: , Rfl:     Levocetirizine (XYZAL) 5 mg tablet, Take 5 mg by mouth daily. Take / use AM day of surgery  per anesthesia protocols. Indications: inflammation of the nose due to an allergy, Disp: , Rfl:    Date Last Reviewed:  11/26/2019   Number of Personal Factors/Comorbidities that affect the Plan of Care: 3+: HIGH COMPLEXITY   EXAMINATION:   Patient gave consent for internal pelvic floor muscle exam. Chaperone offered, patient declined. OBSERVATION:   External Observation:   · Voluntary Contraction: present  · Voluntary Relaxation: present  · Involuntary Contraction: no contraction seen  · Involuntary Relaxation: n/a  · Perineal Body Assessment: normal  · Anal [place]: normal  · Skin Integrity: intact. · Vaginal Vault Size: within normal limits    PALPATION:  Superficial Pelvic Floor Musculature (PFM): Tender? Intermediate PFM Tender?  Deep PFM Tender? R Superficial Transverse Perineal n R Deep Transverse Perineal n R. Puborectalis n   L Superficial Transverse Perineal n L Deep Transverse Perineal n L. Puborectalis n   R Ischiocavernosus n R Compressor Urethra n R. Pubococcygeus y   L Ischiocavernosus n L Compressor Urethra n L. Pubococcygeus n   R Bulbocavernosus n   R. Ileococcygeus y   L Bulbocavernosus n   L. Ileococcygeus n       R. Obturator Internus n       L. Obturator Internus n       R. Coccygeus y       L. Coccygeus n     RANGE OF MOTION:  Decreased - decreased contraction present, WFL bearing down    STRENGTH:  P: Power, E: Endurance, R: Repetitions, QF: Quick Flicks, TrA: Transverse Abdominus  P 4   E 7   R 5   QF 7   TrA present     COORDINATION:  Diaphragmatic breathing (DB): patient able to perform with cueing    SPECIAL TESTS:  Q-tip test: normal  Supine cough test: cough reflex not present. Able to locate and perform with verbal cues. POP-Q: (Pelvic Organ Prolapse - Quantification Exam) per MD note: none present     Body Structures Involved:  1. Nerves  2. Muscles Body Functions Affected:  1. Sensory/Pain  2. Genitourinary  3. Neuromusculoskeletal  4. Movement Related Activities and Participation Affected:  1. Self Care  2. Interpersonal Interactions and Relationships  3.  Community, Social and [place]   Number of elements (examined above) that affect the Plan of Care: 3: MODERATE COMPLEXITY   CLINICAL PRESENTATION:   Presentation: Stable and uncomplicated: LOW COMPLEXITY   CLINICAL DECISION MAKING:   Use of outcome tool(s) and clinical judgement create a POC that gives a: Clear prediction of patient's progress: LOW COMPLEXITY      PT Patient Time In/Time Out  Time In: 1356  Time Out: [address], PT, DPT

## 2019-12-10 ENCOUNTER — HOSPITAL ENCOUNTER (OUTPATIENT)
Dept: PHYSICAL THERAPY | Age: 49
Discharge: HOME | End: 2019-12-10
Payer: COMMERCIAL

## 2019-12-10 PROCEDURE — 97140 MANUAL THERAPY 1/> REGIONS: CPT

## 2019-12-10 PROCEDURE — 97110 THERAPEUTIC EXERCISES: CPT

## 2019-12-10 NOTE — PROGRESS NOTES
Anisha Lamar  : 1970  Primary: Alma Doshi Of Donita Kramer*  Secondary:  [facility], [address], [place], [facility] [address] [facility]  [phone]   [phone]      OUTPATIENT PHYSICAL THERAPY: Progress Note 12/10/2019  Progress Report Due:   Recertification:     MEDICAL/REFERRING DIAGNOS work. IS:  Overactive bladder [N32.81]   REFERRING PHYSICIAN: Isela West MD  Pre-treatment Symptoms/Complaints:Patient reports improvement in leakage overall, no leakage over the past week besides one instance yesterday after she drank an excessive amount of water. Reports she has begun vaginal hormones recently. 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 Pain: Initial: Pain Intensity 1: 7  Pain Location 1: Back  Pain Orientation 1: Left, Lower  Pain Intervention(s) 1: Repositioned, Exercise  Post Session:  7/10   Medications Last Reviewed:  12/10/19    Updated Objective Findings:  none   TREATMENT:   THERAPEUTIC ACTIVITY: (  minutes): Instruction on functional pelvic brace exercise including feedforward activation of pelvic floor muscles on exhale prior to activity that increases intra-abdominal pressure (IAP) such as cough, sneeze, laugh, sit to stand, lifting object to decrease pressure on pelvic organs during exertional activities. Instruction on coordinated pelvic floor and diaphragmatic breathing to improve kinesthetic awareness of pelvic muscle mobility and restore proper motor recruitment patterns with breathing, posture, and functional movement (e.g. appropriate lift/contraction with increased IAP such as a cough, laugh, sneeze to prevent incontinence).  Functional training on progressive relaxation training to deregulate nervous system and improve urgency when experiencing stressful activities. THERAPEUTIC EXERCISE: (13 minutes):  Exercises per grid below to improve strength and coordination. Required moderate visual, verbal, manual and tactile cues to promote proper body alignment and promote proper body posture well as to locate correct muscles without substitutions of rectus abdominis or gluteal muscles. Progressed complexity of movement as indicated. Required cocontraction of transfer abdominal mm to improve pelvic floor muscle contraction. Good progress in recruitment noted throughout session and patient able to determine when she has lost contraction and correct.       Date:  9/26/19 Date:  11/7/19 Date:  11/26/19 Date:   12/10/19      Activity/Exercise Parameters         Supine endurance kegels 4x10 with 10 sec hold with focus on breathing 2 x 10 with 10 second hold with focus on breathing        Supine quick flick Kegels 5T41 without substitution of TrA contraction         Diaphragmatic breathing          tranverse abdominal contractions  2 x 10 with focus on breathing  X10, 3' with manual cues and focus on breathing      Supine SLR with trA and pelvic floor contraction    x10 BLE with manual and verbal cues      Seated quick flicks          Supine bridging with contraction per bridge          Supine bridging with endurance hold contraction PFM and trA  x10        Supine clam shells with contraction per bridge          Standing heel raise with PFM and trA contraction          Sit to stand with PFM contraction          Standing marching with PFM contraction          Lifting weighted box with PFM and TrA contraction          Lifting weight box with PFM and TrA endurance hold          Chops (abdominal rotation exercise) with PFM and TrA contraction          Wall sits          Monster walk          Hip 3 way           Standing row          Modified prudencio stretch          Supine heel slides and PFM/TrA contraction  x10        Sit to stand with PFM   x15       Seated marching with PFM hold   10 sets of 6 reps       deadlift (no weight)   x10 with PFM contraction       marcos pose    x30'      Multifidus exercise in quadruped with trA contraction with hip extension    x5 BLE        Manual Therapy: ( 25 minutes): Soft tissue mobilization was utilized and necessary because of the patient's painful spasm and restricted motion of soft tissue. Trigger point release and rolling performed to B adductor muscles and abdominal muscles with improvement noted in tenderness. Trigger point release in B hip flexors. Connective tissue mobilization/scar mobilization performed in abdominal region as well as rolling to improve abdominal muscle contractions and facilitate PFM contraction. PNF stretching to B hamstrings with reported decrease in back pain. (Used abbreviations: MET - muscle energy technique; SCS- Strain counter strain; CTM- Connective tissue mobilizations; CR- Contract/relax; SP- Sustained pressure, TrP- Trigger point release, IASTM- Instrument assisted soft tissue mobilizations, TDN- Trigger point dry needling). [employer]  The following educational topics were reviewed with patient:  Urge suppression techniques reiterated as patient with difficulty following them with increments of 5-10 minutes of suppression initially, increasing to 2-3 hours eventually. Distraction techniques including podcasts to improve urgency while in the car. Treatment/Session Summary:    · Response to Treatment:  Patient verbalized understanding of all treatment. No adverse response. · Communication/Consultation:  None today  · Equipment provided today:  None today  · Recommendations/Intent for next treatment session: Next visit will focus on continued education on urge deferrment, progression of Kegel exercises to more functional activities and abdominal bracing as well as relaxation techniques to improve down regulation of nervous system.   Total Treatment Billable Duration:  38 minutes  PT Patient Time In/Time Out  Time In: 1353  Time Out: 1432  Hilda Henry, PT, DPT    Future Appointments   Date Time Provider Tramaine Morgan   12/17/2019  1:45 PM Tiesha Last, PT, DPT [facility]   5/7/2020  9:00 AM Kathryn Kang MD [facility] F[facility]

## 2019-12-17 ENCOUNTER — HOSPITAL ENCOUNTER (OUTPATIENT)
Dept: PHYSICAL THERAPY | Age: 49
Discharge: HOME | End: 2019-12-17
Payer: COMMERCIAL

## 2019-12-17 PROCEDURE — 97530 THERAPEUTIC ACTIVITIES: CPT

## 2019-12-17 PROCEDURE — 97140 MANUAL THERAPY 1/> REGIONS: CPT

## 2019-12-17 PROCEDURE — 97110 THERAPEUTIC EXERCISES: CPT

## 2019-12-17 NOTE — PROGRESS NOTES
Miky Day  : 1970  Primary: Khushboo Pendleton Of Don[facility] Kramer*  [facility]:  [facility] [address] [facility], [place], [facility]  [phone]   [phone]      OUTPATIENT PHYSICAL THERAPY: Progress Note 2019  Progress Report Due:   Recertification:     MEDICAL/REFERRING DIAGNOS work. IS:  Overactive bladder [N32.81]   REFERRING PHYSICIAN: Bia Peraza MD  Pre-treatment Symptoms/Complaints:Patient reports improvement in leakage overall, 3 small leakages over the past week since last therapy session, reports some issues with climbing stairs and opening the door still, however overall much improved. Back pain significantly improved today. Reports she will be having sinus surgery this week. Pain: Initial: Pain Intensity 1: 2  Post Session:  0/10   Medications Last Reviewed:  19    Updated Objective Findings:  none   TREATMENT:   THERAPEUTIC ACTIVITY: ( 8  minutes): Instruction on functional pelvic brace exercise including feedforward activation of pelvic floor muscles on exhale prior to activity that increases intra-abdominal pressure (IAP) such as cough, sneeze, laugh, sit to stand, going up stairs - especially when carrying objects, lifting object to decrease pressure on pelvic organs during exertional activities. THERAPEUTIC EXERCISE: (12 minutes):  Exercises per grid below to improve strength and coordination. Required moderate visual, verbal, manual and tactile cues to promote proper body alignment and promote proper body posture well as to locate correct muscles without substitutions of rectus abdominis or gluteal muscles. Progressed complexity of movement as indicated. Required cocontraction of transfer abdominal mm to improve pelvic floor muscle contraction. Good progress in recruitment noted throughout session and patient able to determine when she has lost contraction and correct.       Date:  19 Date:  11/7/19 Date:  11/26/19 Date:   12/10/19 Date:  12/17/19     Activity/Exercise Parameters         Supine endurance kegels 4x10 with 10 sec hold with focus on breathing 2 x 10 with 10 second hold with focus on breathing        Supine quick flick Kegels [id number] without substitution of TrA contraction         Diaphragmatic breathing     3' in supine, \"crocodile breathing\" with body as resistance     tranverse abdominal contractions  2 x 10 with focus on breathing  X10, 3' with manual cues and focus on breathing x10 during quadruped activitites     Supine SLR with trA and pelvic floor contraction    x10 BLE with manual and verbal cues      Seated quick flicks          Supine bridging with contraction per bridge          Supine bridging with endurance hold contraction PFM and trA  x10        Supine clam shells with contraction per bridge          Standing heel raise with PFM and trA contraction          Sit to stand with PFM contraction          Standing marching with PFM contraction          Lifting weighted box with PFM and TrA contraction          Lifting weight box with PFM and TrA endurance hold          Chops (abdominal rotation exercise) with PFM and TrA contraction          Wall sits          Monster walk          Hip 3 way           Standing row          Modified prudencio stretch          Supine heel slides and PFM/TrA contraction  x10        Sit to stand with PFM   x15       Seated marching with PFM hold   10 sets of 6 reps       deadlift (no weight)   x10 with PFM contraction       marcos pose    x30'      Multifidus exercise in quadruped with trA contraction with hip extension    x5 BLE x5 BLE with manual cues to increase support, L with more dificulty       Manual Therapy: ( 35 minutes): Soft tissue mobilization was utilized and necessary because of the patient's painful spasm and restricted motion of soft tissue.  Trigger point release and rolling performed to B adductor muscles and abdominal muscles with improvement noted in tenderness. Trigger point release in B hip flexors. Skin rolling/trigger point release to B hamstrings and piriformis. (Used abbreviations: MET - muscle energy technique; SCS- Strain counter strain; CTM- Connective tissue mobilizations; CR- Contract/relax; SP- Sustained pressure, TrP- Trigger point release, IASTM- Instrument assisted soft tissue mobilizations, TDN- Trigger point dry needling). [facility]  The following educational topics were reviewed with patient:  Urge suppression techniques reiterated as patient with difficulty following them with increments of 5-10 minutes of suppression initially, increasing to 2-3 hours eventually. Distraction techniques including podcasts to improve urgency while in the car. Utilizing contraction when climbing stairs. Treatment/Session Summary:    · Response to Treatment:  Patient verbalized understanding of all treatment. No adverse response. Reported decrease in back pain. · Communication/Consultation:  None today  · Equipment provided today:  None today  · Recommendations/Intent for next treatment session: Next visit will focus on continued education on urge deferrment, progression of Kegel exercises to more functional activities and abdominal bracing as well as relaxation techniques to improve down regulation of nervous system.   Total Treatment Billable Duration:  55 minutes  PT Patient Time In/Time Out  Time In: 1544  Time Out: 0103  Mina Nyhan, PT, DPT    Future Appointments   Date Time Provider Tramaine Morgan   1/7/2020  9:45 AM Nikhil Blevins PT, DPT [facility] [place]   5/7/2020  9:00 AM MD KATHERINE Madrigal FPA FPA

## 2020-01-07 ENCOUNTER — HOSPITAL ENCOUNTER (OUTPATIENT)
Dept: PHYSICAL THERAPY | Age: 50
Discharge: HOME | End: 2020-01-07
Payer: COMMERCIAL

## 2020-01-07 PROCEDURE — 97110 THERAPEUTIC EXERCISES: CPT

## 2020-01-07 PROCEDURE — 97140 MANUAL THERAPY 1/> REGIONS: CPT

## 2020-01-07 NOTE — PROGRESS NOTES
Jarrett Sharpe  : 1970  Primary: Radha Zaldivar Of [place]*  Secondary:  [facility] [address] [facility], [address]  [phone]   [phone]        OUTPATIENT PHYSICAL THERAPY:Progress Report 2020    ICD-10: Treatment Diagnosis: Mixed incontinence (N39.46), Muscle weakness (generalized) (M62.81), Other lack of coordination (R27.8)  Precautions/Allergies:   Adhesive tape-silicones; Ciprofloxacin; and Sulfa (sulfonamide antibiotics)   MD Orders: Evaluate and treat MEDICAL/REFERRING DIAGNOSIS:  Overactive bladder [N32.81]   DATE OF ONSET:   REFERRING PHYSICIAN: Alicia Fraser MD  RETURN PHYSICIAN APPOINTMENT:      INITIAL ASSESSMENT:  Jarrett Sharpe is a 45y.o. year old female with mixed incontinence. She has been seen for 19 physical therapy visits and has significantly improved her mixed urinary incontinence. She has progressed from rising 2-3 times per night to 0-1 times per night. She has decreased her episodes of urinary incontinence per week from daily to 1-2 and increased pelvic floor muscle strength to 4/5. She continues to have some urgency symptoms and occasional urge urinary incontinence. She has improved in her ability to localize PF muscles and decreased compensatory strategies. Improvement in breath holding during trA contraction noted. Recommend continued every other physical therapy visits to progress towards goals including continued manual therapy to improve tissue extensibility and allow adequate contraction of PFM, strength exercises to progress to more functional activities. She has met Stg 1, 2, 3, 4, 5 and 6 and discharge goals 1 and 3.  She plans to stop using her bladder medication in the next month, which anticipate will cause an initial increase in leakage.         She would benefit form skilled PT to address the above deficits.  She would benefit from participating in a PF strengthening program with biofeedback for correct mm activation for improved mm activation as well as education on urge suppression techniques. PROBLEM LIST (Impacting functional limitations):  1. Decreased Strength  2. Decreased Activity Tolerance  3. Increased Fatigue  4. Decreased [place] with Home Exercise Program INTERVENTIONS PLANNED:  1. Balance Exercise  2. Electrical Stimulation  3. Gait Training  4. Home Exercise Program (HEP)  5. Manual Therapy  6. Neuromuscular Re-education/Strengthening  7. Therapeutic Activites  8. Therapeutic Exercise/Strengthening  9. Transcutaneous Electrical Nerve Stimulation (TENS)  10. Transfer Training  11. Ultrasound (US)   TREATMENT PLAN:  Effective Dates: 5/14/2019 TO 8/12/2019 (90 days). Frequency/Duration: 1 time a week for 90 Days  GOALS: (Goals have been discussed and agreed upon with patient.)  Short term Functional Goals: Time  Frame: 6 weeks  1. Pelvic floor muscle strength will improve to at least 3/5 allowing for no more than 3 episodes of urinary incontinence per week. 2. Patient will present with normal pelvic floor muscle coordination to reduce risk of leakage. 3. Patient will verbalize the effects of bladder irritants and avoid as able to reduce abnormal bladder urgency. 4. Patient will use the pelvic brace exercise with all increases in intra-abdominal pressure to reduce or prevent stress urinary incontinence episodes. 5. Patient will decrease nocturia to 1x or less to minimize negative impact on her sleep health (currently nocturia 2-3 times per night with interrupted sleep). 6. Patient will be able to demonstrate effective pelvic floor exercises for home program, progressing to longer holds in upright, in order to improve muscle endurance and prevent UUI en route to the bathroom. Discharge Goals: Time Frame: 12 weeks  1. Pelvic floor muscle strength will improve to at least 4/5 allowing for no more than 1 episodes of urinary incontinence per month.  Goal Met: 1/7/20  2. Patient's self-reported pad usage will decrease to 0 pads to reduce financial strain associated within incontinence. 3. Patient will be able to utilize deferral strategies to reach bathroom >75% of the time without UUI. Goal Met: 1/7/20  4. Patient will demonstrate improved pelvic floor muscle coordination for bladder control by ability to perform at least 10 quick contractions in 10 seconds. 5. Patient will improve pelvic floor musculature endurance to 10 repetitions to reduce episodes of urinary incontinence when walking to the bathroom (currently performs 4 repetitions). Outcome Measure: Tool Used: Jimmie's Health Questionnaire  Score:  Initial: 69  Most Recent: 69  (Date: 5/14/19 )   Interpretation of Score: The entire symptom severity scale was scored on a (best) to 100 (worst) scale. Medical Necessity:   · Patient demonstrates excellent rehab potential due to higher previous functional level. Reason for Services/Other Comments:  · Patient continues to require modification of therapeutic interventions to increase complexity of exercises. Total Duration: Evaluation 15 minutes, treatment 38 minutes  PT Patient Time In/Time Out  Time In: 0950  Time Out: 1043    Rehabilitation Potential For Stated Goals: Excellent  Regarding Sree Espinosa's therapy, I certify that the treatment plan above will be carried out by a therapist or under their direction. Thank you for this referral,  Geneva Montero, PT, DPT     Referring Physician Signature: Anastasiia Ramos MD              Date                    PAIN/SUBJECTIVE:   Initial: Pain Intensity 1: 3  Pain Location 1: Back, Face 0/10 Post Session:  0/10     HISTORY:   History of Present Injury/Illness (Reason for Referral):  History of reemex adjustable sling in 2009 for stress incontinence which helped some, however has had urinary incontinence off/on over the past 17 years. Multiple fibroids. Dawn catheter ripped urethra during fibroidectomy.  She has had urodynamic testing performed and the MD determiend she did not have stress incontinence, at least it was not reproducible with coughing, laughing, sneezing during her testing - she reports she does have loss of urine in these situations at home. Reports that she has urgency upon getting out of the car and going up the steps at home. 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 Steps seem to be a trigger. Difficulty with long car rides due to surgery. Daughter has special needs which is increasingly stressful. Changed from Oxybutinin after being on it for 10 years about 6 months ago and was switched to Myrebetriq 6. Initially she had improvement for but now is worse than ever. Some pain on pubic bone with intercourse but otherwise no pelvic pain or dyspareunia. Urinary: Frequency 10-12 x/day, 2-3 x/night. Positive for mixed urinary incontinence (АНДРЕЙ), urgency, frequency, incomplete emptying, urinary hesitancy, dysuria, hematuria, nocturia. She reports hematuria which her MD is aware of and she has had throughout her lifespan. Pt uses pads for protection; 2 pads per day (PPD). Denies stress incomplete emptying, urinary hesitancy, dysuria. Fluid intake: 70-80 oz water/day; bladder irritants include: orange juice, apple juice, occasional caffeine (all only occasional). Bowel: Frequency daily 2-3. Negative for pain with bowel movement (BM), pushing/straining with BM, incomplete emptying, fecal incontinence, constipation. Use of stool softeners or laxatives? no  Sexual: Pt is sexually active. Male partners. Contraception/birth control: No - hysterectomy. Dysparunia: yes if he hits sling dyspareunia. Pelvic Organ Prolapse/Pelvic Pain: Location: none per MD and patient.     Past Medical History/Comorbidities:   Ms. Mary Proctor  has a past medical history of Adrenal disorder, other (2007), Allergic rhinitis, Bone spur of foot, Cancer ([address] (11/14/2017), Chest pain, Chronic pain, Cough, Dyspepsia, Elevated troponin, Environmental allergies, GERD (gastroesophageal reflux disease), History of positive PPD (1992), History of renal stone (4/1/2013), Insomnia, Lumbar degenerative disc disease (4/1/2013), Nausea & vomiting, Obesity, Snores, SOB (shortness of breath), Sore throat, Spastic bladder (2008), SVT (supraventricular tachycardia) ([address] Thromboembolus ([address] Urge incontinence, Urinary frequency, Vertigo, Vitamin D deficiency (2009), Wheezing, and Wheezing. She also has no past medical history of Aneurysm ([address] Asthma, Autoimmune disease ([address] CAD (coronary artery disease), Chronic kidney disease, Chronic obstructive pulmonary disease ([address] Coagulation disorder ([address] Diabetes ([address] Difficult intubation, Endocarditis, Heart failure ([address] Hypertension, Liver disease, Malignant hyperthermia due to anesthesia, Nicotine vapor product user, Non-nicotine vapor product user, Pseudocholinesterase deficiency, Psychiatric disorder, PUD (peptic ulcer disease), Rheumatic fever, Seizures ([address] Sleep apnea, Stroke ([address] or Thyroid disease. Ms. Aneta Lerner  has a past surgical history that includes hx myringotomy; hx bladder suspension (2009); hx tonsil and adenoidectomy (1978); hx other surgical (2012); hx wisdom teeth extraction; hx mohs procedure (Right, 05/2015); hx partial hysterectomy (11/11/15); hx orthopaedic (Right, 05/01/2015); hx rhinoplasty (1985); hx urological (2009); hx malignant skin lesion excision (Right, 11/14/2017); hx breast biopsy (Right, 2000); pr cardiac surg procedure unlist (03/27/2019); hx colonoscopy (02/07/2019); hx gyn (2006, 2008); hx heart catheterization (03/26/2019); and hx breast reduction (Bilateral, 7/24/2019). Social History/Living Environment: ,   , works at [employer] and independent.      Have you ever had any pelvic trauma (orthopedic in nature, fall, MVA, etc.)? Ripped goode catheter during fibroidectomy in 2003. Have you ever experienced any unwanted physical or sexual contact? No  Have you ever experienced any form of medical trauma (GYN, urological, GI, etc)? Goode catheter ripped out. Prior Level of Function/Work/Activity:  Independent with ambulation, ADLs. Reports difficulty with donning shoes due to body habitus. Aware of need for weight loss. Ambulatory/Rehab Services H2 Model Falls Risk Assessment    Risk Factors:       No Risk Factors Identified Ability to Rise from Chair:       (0)  Ability to rise in a single movement    Falls Prevention Plan:       No modifications necessary   Total: (5 or greater = High Risk): 0   n ©2010 [facility] [address] [place] [address] Patent #[id number]. Federal Law prohibits the replication, distribution or use without written permission from [facility] [employer]     Current Medications:       Current Outpatient Medications:     ofloxacin (FLOXIN) 0.3 % ophthalmic solution, 1 Drop as needed. In bilateral ears if needed, Disp: , Rfl:     solifenacin (VESICARE) 5 mg tablet, Take 5 mg by mouth nightly. Indications: Frequent Urination, Disp: , Rfl:     raNITIdine (ZANTAC) 150 mg tablet, TAKE 1 TABLET BY MOUTH TWO  TIMES DAILY (Patient taking differently: Take 150 mg by mouth two (2) times a day. TAKE 1 TABLET BY MOUTH TWO  TIMES DAILY; Take / use AM day of surgery  per anesthesia protocols. Indications: gastroesophageal reflux disease), Disp: 180 Tab, Rfl: 3    mirabegron ER (MYRBETRIQ) 50 mg ER tablet, Take 1 Tab by mouth daily. (Patient taking differently: Take 50 mg by mouth daily. Take / use AM day of surgery  per anesthesia protocols. Indications: Frequent Urination), Disp: 30 Tab, Rfl: 11    fluticasone propionate (FLOVENT DISKUS) 100 mcg/actuation dsdv, Take 2 Puffs by inhalation two (2) times a day.  Take / use AM day of surgery  per anesthesia protocols. Indications: Controller Medication for Asthma, Disp: , Rfl:     OTHER,NON-FORMULARY,, Indications: Methyl Protect one cap daily, Disp: , Rfl:     OTHER,NON-FORMULARY,, daily. Iodine /potassium supplement  Indications: Iodoral 1/2 tab daily, Disp: , Rfl:     OTHER,NON-FORMULARY,, three (3) times daily as needed. Indications: Catecholacalm cap tid, Disp: , Rfl:     mv-min/iron/folic/calcium/vitK (WOMEN'S MULTIVITAMIN PO), Take  by mouth., Disp: , Rfl:     melatonin 1 mg tablet, Take 1 mg by mouth nightly., Disp: , Rfl:     beclomethasone dipropionate (QNASL) 80 mcg/actuation HFAA, 2 Sprays by Nasal route daily. (Patient taking differently: 2 Sprays by Nasal route daily. Take / use AM day of surgery  per anesthesia protocols. Indications: inflammation of the nose due to an allergy), Disp: 8.7 g, Rfl: 11    OTHER,NON-FORMULARY,, Take 2 Tabs by mouth daily (after lunch). Adrenal complex, Disp: , Rfl:     PRASTERONE, DHEA, (DHEA PO), Take 15 mg by mouth daily. DHEA/testosterone in the progesterone cream, Disp: , Rfl:     SELENIUM PO, Take 1 Tab by mouth daily. , Disp: , Rfl:     cholecalciferol, vitamin D3, 1,000 unit/drop drop, Take 5 Drops by mouth daily. , Disp: , Rfl:     allergy injection, 2 shots every 3 weeks, Disp: , Rfl:     LACTOBACILLUS ACIDOPHILUS (PROBIOTIC PO), Take 1 tablet by mouth daily. , Disp: , Rfl:     montelukast (SINGULAIR) 10 mg tablet, Take 10 mg by mouth nightly. Indications: inflammation of the nose due to an allergy, Controller Medication for Asthma, Disp: , Rfl:     Levocetirizine (XYZAL) 5 mg tablet, Take 5 mg by mouth daily. Take / use AM day of surgery  per anesthesia protocols.   Indications: inflammation of the nose due to an allergy, Disp: , Rfl:    Date Last Reviewed:  1/7/2020   Number of Personal Factors/Comorbidities that affect the Plan of Care: 3+: HIGH COMPLEXITY   EXAMINATION:   Patient gave consent for internal pelvic floor muscle exam. Chaperone offered, patient declined. OBSERVATION:   External Observation:   · Voluntary Contraction: present  · Voluntary Relaxation: present  · Involuntary Contraction: no contraction seen  · Involuntary Relaxation: n/a  · Perineal Body Assessment: normal  · Anal Breda: normal  · Skin Integrity: intact. · Vaginal Vault Size: within normal limits    PALPATION:  Superficial Pelvic Floor Musculature (PFM): Tender? Intermediate PFM Tender? Deep PFM Tender? R Superficial Transverse Perineal n R Deep Transverse Perineal n R. Puborectalis n   L Superficial Transverse Perineal n L Deep Transverse Perineal n L. Puborectalis n   R Ischiocavernosus n R Compressor Urethra n R. Pubococcygeus n   L Ischiocavernosus n L Compressor Urethra n L. Pubococcygeus n   R Bulbocavernosus n   R. Ileococcygeus n   L Bulbocavernosus n   L. Ileococcygeus n       R. Obturator Internus n       L. Obturator Internus n       R. Coccygeus n       L. Coccygeus n     RANGE OF MOTION:  Decreased - decreased contraction present, WFL bearing down    STRENGTH:  P: Power, E: Endurance, R: Repetitions, QF: Quick Flicks, TrA: Transverse Abdominus  P 4   E 7   R 7   QF 8   TrA present     COORDINATION:  Diaphragmatic breathing (DB): patient able to perform with cueing    SPECIAL TESTS:  Q-tip test: normal  Supine cough test: cough reflex not present. Able to locate and perform with verbal cues. POP-Q: (Pelvic Organ Prolapse - Quantification Exam) per MD note: none present     Body Structures Involved:  1. Nerves  2. Muscles Body Functions Affected:  1. Sensory/Pain  2. Genitourinary  3. Neuromusculoskeletal  4. Movement Related Activities and Participation Affected:  1. Self Care  2. Interpersonal Interactions and Relationships  3.  Community, Social and [place]   Number of elements (examined above) that affect the Plan of Care: 3: MODERATE COMPLEXITY   CLINICAL PRESENTATION:   Presentation: Stable and uncomplicated: LOW COMPLEXITY   CLINICAL DECISION MAKING:   Use of outcome tool(s) and clinical judgement create a POC that gives a: Clear prediction of patient's progress: LOW COMPLEXITY      PT Patient Time In/Time Out  Time In: 0950  Time Out: [address], PT, DPT

## 2020-01-07 NOTE — PROGRESS NOTES
Chance Alvarado  : 1970  Primary: Guera Maldonado Of Donita Kramer*  Secondary:  [facility]rapy Center at [address] [place] [address] 68, [address], [place], [address] [facility]  [phone]   [phone]      OUTPATIENT PHYSICAL THERAPY: Progress Note 2020  Progress Report Due: 5291  Recertification:     MEDICAL/REFERRING DIAGNOS work. IS:  Overactive bladder [N32.81]   REFERRING PHYSICIAN: Orestes Hull MD  Pre-treatment Symptoms/Complaints: Patient had sinus surgery over  and has had severe nasal passage pain, which has been improving. Was off Myrbetriq for a few weeks due to not being able to swallow and noted no change with bladder symptoms, however has begun taking this again. She initiated estrogen cream applied to her urethra a few weeks ago and has noted some improvement, \"it feels like it tightens the tissue. \" Did have a small incidence of leakage yesterday, however has continued progress. Reports her MD told her she is permitted to stop taking Myrbetriq and she reports she just filled the prescription and plans to finish it prior to trying to come off of it. Verbalizes some concern of coming off this bladder medication, as she experienced leaking back in Summer 2019 when she came off of it previously. Pain: Initial: Pain Intensity 1: 3  Pain Location 1: Back, Face  Post Session:  0/10   Medications Last Reviewed:  20    Updated Objective Findings:  none   TREATMENT:   THERAPEUTIC ACTIVITY: (  minutes): Instruction on functional pelvic brace exercise including feedforward activation of pelvic floor muscles on exhale prior to activity that increases intra-abdominal pressure (IAP) such as cough, sneeze, laugh, sit to stand, going up stairs - especially when carrying objects, lifting object to decrease pressure on pelvic organs during exertional activities. THERAPEUTIC EXERCISE: (13 minutes):  Exercises per grid below to improve strength and coordination. Required moderate visual, verbal, manual and tactile cues to promote proper body alignment and promote proper body posture well as to locate correct muscles without substitutions of rectus abdominis or gluteal muscles. Progressed complexity of movement as indicated. Required cocontraction of transfer abdominal mm to improve pelvic floor muscle contraction. Good progress in recruitment noted throughout session and patient able to determine when she has lost contraction and correct.       Date:  9/26/19 Date:  11/7/19 Date:  11/26/19 Date:   12/10/19 Date:  12/17/19 Date:   1/7/19    Activity/Exercise Parameters         Supine endurance kegels 4x10 with 10 sec hold with focus on breathing 2 x 10 with 10 second hold with focus on breathing        Supine quick flick Kegels 3P80 without substitution of TrA contraction         Diaphragmatic breathing     3' in supine, \"crocodile breathing\" with body as resistance 3' supine \"crocodile breathing\"    tranverse abdominal contractions  2 x 10 with focus on breathing  X10, 3' with manual cues and focus on breathing x10 during quadruped activitites     Supine SLR with trA and pelvic floor contraction    x10 BLE with manual and verbal cues      Seated quick flicks          Supine bridging with contraction per bridge          Supine bridging with endurance hold contraction PFM and trA  x10        Supine clam shells with contraction per bridge          Standing heel raise with PFM and trA contraction          Sit to stand with PFM contraction          Standing marching with PFM contraction          Lifting weighted box with PFM and TrA contraction          Lifting weight box with PFM and TrA endurance hold          Chops (abdominal rotation exercise) with PFM and TrA contraction          Wall sits          Monster walk          Hip 3 way           Standing row          Modified prudencio stretch          Supine heel slides and PFM/TrA contraction  x10        Sit to stand with PFM x15       Seated marching with PFM hold   10 sets of 6 reps       deadlift (no weight)   x10 with PFM contraction       marcos pose    x30'      Multifidus exercise in quadruped with trA contraction with hip extension    x5 BLE x5 BLE with manual cues to increase support, L with more dificulty     Side stepping with resistance with PFM contraction      2 x 10    Forward diagonal steps with resistance and PFM contraction      2 x 15                Manual Therapy: ( 40 minutes): Soft tissue mobilization was utilized and necessary because of the patient's painful spasm and restricted motion of soft tissue. Trigger point release and rolling performed to B adductor muscles and abdominal muscles with improvement noted in tenderness. Trigger point release in B hip flexors. Skin rolling/trigger point release to B hamstrings and piriformis. Trigger point release performed externally to coccygeus muscle. (Used abbreviations: MET - muscle energy technique; SCS- Strain counter strain; CTM- Connective tissue mobilizations; CR- Contract/relax; SP- Sustained pressure, TrP- Trigger point release, IASTM- Instrument assisted soft tissue mobilizations, TDN- Trigger point dry needling). [employer] Portal  The following educational topics were reviewed with patient: Utilizing contraction when climbing stairs. Treatment/Session Summary:    · Response to Treatment:  Patient verbalized understanding of all treatment. No adverse response. Reported decrease in back pain. · Communication/Consultation:  None today  · Equipment provided today:  None today  · Recommendations/Intent for next treatment session: Next visit will focus on continued education on urge deferrment, progression of Kegel exercises to more functional activities and abdominal bracing as well as relaxation techniques to improve down regulation of nervous system.   Total Treatment Billable Duration:  53 minutes  PT Patient Time In/Time Out  Time In: 2617  Time Out: [facility], PT, DPT    Future Appointments   Date Time Provider Tramaine Cathy   1/21/2020  8:00 AM Jimmy Charles, PT, DPT [facility]   2/4/2020  8:00 AM Katharina Kennedy, PT, DPT [facility]   2/18/2020  8:00 AM Katharina Kennedy, PT, DPT [facility]   5/7/2020  9:00 AM Amanda Cowart MD [facility] FPA FPA

## 2020-01-13 ENCOUNTER — APPOINTMENT (RX ONLY)
Dept: URBAN - METROPOLITAN AREA CLINIC 23 | Facility: CLINIC | Age: 50
Setting detail: DERMATOLOGY
End: 2020-01-13

## 2020-01-13 DIAGNOSIS — D22 MELANOCYTIC NEVI: ICD-10-CM

## 2020-01-13 DIAGNOSIS — Z87.2 PERSONAL HISTORY OF DISEASES OF THE SKIN AND SUBCUTANEOUS TISSUE: ICD-10-CM

## 2020-01-13 DIAGNOSIS — D18.0 HEMANGIOMA: ICD-10-CM

## 2020-01-13 DIAGNOSIS — L72.8 OTHER FOLLICULAR CYSTS OF THE SKIN AND SUBCUTANEOUS TISSUE: ICD-10-CM

## 2020-01-13 DIAGNOSIS — L82.1 OTHER SEBORRHEIC KERATOSIS: ICD-10-CM

## 2020-01-13 PROBLEM — D22.71 MELANOCYTIC NEVI OF RIGHT LOWER LIMB, INCLUDING HIP: Status: ACTIVE | Noted: 2020-01-13

## 2020-01-13 PROBLEM — D22.61 MELANOCYTIC NEVI OF RIGHT UPPER LIMB, INCLUDING SHOULDER: Status: ACTIVE | Noted: 2020-01-13

## 2020-01-13 PROBLEM — D22.5 MELANOCYTIC NEVI OF TRUNK: Status: ACTIVE | Noted: 2020-01-13

## 2020-01-13 PROBLEM — D18.01 HEMANGIOMA OF SKIN AND SUBCUTANEOUS TISSUE: Status: ACTIVE | Noted: 2020-01-13

## 2020-01-13 PROBLEM — D22.62 MELANOCYTIC NEVI OF LEFT UPPER LIMB, INCLUDING SHOULDER: Status: ACTIVE | Noted: 2020-01-13

## 2020-01-13 PROBLEM — D22.72 MELANOCYTIC NEVI OF LEFT LOWER LIMB, INCLUDING HIP: Status: ACTIVE | Noted: 2020-01-13

## 2020-01-13 PROCEDURE — ? COUNSELING

## 2020-01-13 PROCEDURE — ? OBSERVATION

## 2020-01-13 PROCEDURE — 99214 OFFICE O/P EST MOD 30 MIN: CPT

## 2020-01-13 ASSESSMENT — LOCATION SIMPLE DESCRIPTION DERM
LOCATION SIMPLE: ABDOMEN
LOCATION SIMPLE: RIGHT UPPER BACK
LOCATION SIMPLE: CHEST
LOCATION SIMPLE: RIGHT POPLITEAL SKIN
LOCATION SIMPLE: LEFT UPPER BACK
LOCATION SIMPLE: RIGHT PRETIBIAL REGION
LOCATION SIMPLE: RIGHT CHEEK
LOCATION SIMPLE: LEFT THIGH
LOCATION SIMPLE: LEFT UPPER ARM
LOCATION SIMPLE: RIGHT FOOT
LOCATION SIMPLE: RIGHT POSTERIOR UPPER ARM
LOCATION SIMPLE: LEFT POSTERIOR UPPER ARM

## 2020-01-13 ASSESSMENT — LOCATION ZONE DERM
LOCATION ZONE: TRUNK
LOCATION ZONE: FACE
LOCATION ZONE: LEG
LOCATION ZONE: FEET
LOCATION ZONE: ARM

## 2020-01-13 ASSESSMENT — LOCATION DETAILED DESCRIPTION DERM
LOCATION DETAILED: PERIUMBILICAL SKIN
LOCATION DETAILED: LEFT ANTERIOR PROXIMAL THIGH
LOCATION DETAILED: RIGHT POPLITEAL SKIN
LOCATION DETAILED: RIGHT LATERAL ABDOMEN
LOCATION DETAILED: LEFT MEDIAL UPPER BACK
LOCATION DETAILED: LEFT ANTERIOR DISTAL THIGH
LOCATION DETAILED: RIGHT LATERAL DORSAL FOOT
LOCATION DETAILED: MIDDLE STERNUM
LOCATION DETAILED: LEFT DISTAL POSTERIOR UPPER ARM
LOCATION DETAILED: RIGHT DISTAL POSTERIOR UPPER ARM
LOCATION DETAILED: RIGHT MEDIAL UPPER BACK
LOCATION DETAILED: LEFT ANTERIOR DISTAL UPPER ARM
LOCATION DETAILED: RIGHT INFERIOR LATERAL MALAR CHEEK
LOCATION DETAILED: RIGHT PROXIMAL PRETIBIAL REGION
LOCATION DETAILED: LEFT PROXIMAL POSTERIOR UPPER ARM

## 2020-01-21 ENCOUNTER — HOSPITAL ENCOUNTER (OUTPATIENT)
Dept: PHYSICAL THERAPY | Age: 50
Discharge: HOME | End: 2020-01-21
Payer: COMMERCIAL

## 2020-01-21 PROCEDURE — 97140 MANUAL THERAPY 1/> REGIONS: CPT

## 2020-01-21 PROCEDURE — 97110 THERAPEUTIC EXERCISES: CPT

## 2020-01-21 NOTE — PROGRESS NOTES
Adelia Chavis  : 1970  Primary: Silverio Og Of Donita Kramer*  Secondary:  Therapy Center at [facility]  [address], [address] [place] [address] Drive, [place], [facility]  [phone]   [phone]      OUTPATIENT PHYSICAL THERAPY: Progress Note 2020  Progress Report Due: 2685  Recertification:     MEDICAL/REFERRING DIAGNOS work. IS:  Overactive bladder [N32.81]   REFERRING PHYSICIAN: Sravan Holder MD  Pre-treatment Symptoms/Complaints: 3 small accidents in the past 2 weeks. Able to go longer when she gets in the house before having to use the restroom, however door unlocking and walking to bathroom continue to be triggers. Can stand and hear the water running without having to stop what she's doing and run to the restroom. . Still taking Myrbetriq, planning to stop when her prescription runs out per MD recommendation. Getting up more this past week during the night to urinate. Pain: Initial: Pain Intensity 1: 0  Post Session:  0/10   Medications Last Reviewed:  20    Updated Objective Findings:  none   TREATMENT:   THERAPEUTIC ACTIVITY: (  minutes): Instruction on functional pelvic brace exercise including feedforward activation of pelvic floor muscles on exhale prior to activity that increases intra-abdominal pressure (IAP) such as cough, sneeze, laugh, sit to stand, going up stairs - especially when carrying objects, lifting object to decrease pressure on pelvic organs during exertional activities. THERAPEUTIC EXERCISE: (38 minutes):  Exercises per grid below to improve strength and coordination. Required moderate visual, verbal, manual and tactile cues to promote proper body alignment and promote proper body posture well as to locate correct muscles without substitutions of rectus abdominis or gluteal muscles. Progressed complexity of movement as indicated. Required cocontraction of transfer abdominal mm to improve pelvic floor muscle contraction.  Good progress in recruitment noted throughout session and patient able to determine when she has lost contraction and correct.       Date:  9/26/19 Date:  11/7/19 Date:  11/26/19 Date:   12/10/19 Date:  12/17/19 Date:   1/7/20 Date:  1/21/20   Activity/Exercise Parameters         Supine endurance kegels 4x10 with 10 sec hold with focus on breathing 2 x 10 with 10 second hold with focus on breathing        Supine quick flick Kegels 4L75 without substitution of TrA contraction         Diaphragmatic breathing     3' in supine, \"crocodile breathing\" with body as resistance 3' supine \"crocodile breathing\"    tranverse abdominal contractions  2 x 10 with focus on breathing  X10, 3' with manual cues and focus on breathing x10 during quadruped activitites     Supine SLR with trA and pelvic floor contraction    x10 BLE with manual and verbal cues      Seated quick flicks          Supine bridging with contraction per bridge          Supine bridging with endurance hold contraction PFM and trA  x10        Supine clam shells with contraction per bridge          Standing heel raise with PFM and trA contraction          Sit to stand with PFM contraction          Standing marching with PFM contraction          Lifting weighted box with PFM and TrA contraction          Lifting weight box with PFM and TrA endurance hold          Chops (abdominal rotation exercise) with PFM and TrA contraction          Wall sits          Monster walk       x20' forward and backward with blue band around ankles and PFM contraction   Hip 3 way        x10 with blue band   Standing row          Modified prudencio stretch          Supine heel slides and PFM/TrA contraction  x10        Sit to stand with PFM   x15       Seated marching with PFM hold   10 sets of 6 reps       deadlift (no weight)   x10 with PFM contraction       marcos pose    x30'      Multifidus exercise in quadruped with trA contraction with hip extension    x5 BLE x5 BLE with manual cues to increase support, L with more dificulty     Side stepping with resistance with PFM contraction      2 x 10    Forward diagonal steps with resistance and PFM contraction      2 x 15    Stairs with manual resistance of door with acting out key in door and carrying objects while performing PFM contraction       4 steps x10 with manual resistance and verbal cues for when to breathe, contract, relax, and hold     Manual Therapy: ( 30 minutes): Soft tissue mobilization was utilized and necessary because of the patient's painful spasm and restricted motion of soft tissue. Trigger point release and rolling performed to B adductor muscles and abdominal muscles with improvement noted in tenderness. Sustained pressure provided to B iliopsoas with decreased pain. (Used abbreviations: MET - muscle energy technique; SCS- Strain counter strain; CTM- Connective tissue mobilizations; CR- Contract/relax; SP- Sustained pressure, TrP- Trigger point release, IASTM- Instrument assisted soft tissue mobilizations, TDN- Trigger point dry needling). [employer] Portal  The following educational topics were reviewed with patient: Utilizing contraction when climbing stairs. Treatment/Session Summary:    · Response to Treatment:  Patient verbalized understanding of all treatment. Able to perform PFM contraction on stairs x10 without incontinence. Discussed relaxing once in the home and regaining contraction prior to walking into house and setting down groceries/purse/etc as this is when she is experiencing incontinence. · Communication/Consultation:  None today  · Equipment provided today:  None today  · Recommendations/Intent for next treatment session: Next visit will focus on continued education on urge deferrment, progression of Kegel exercises to more functional activities and abdominal bracing as well as relaxation techniques to improve down regulation of nervous system.   Total Treatment Billable Duration:  68 minutes  PT Patient Time In/Time Out  Time In: 7730  Time Out: 0848  Pepper Munoz, PT, DPT    Future Appointments   Date Time Provider Tramaine Cathy   2/4/2020  8:00 AM Noelle Howard, PT, DPT [facility]   2/18/2020  8:00 AM Emory Torres, PT, DPT [facility]   5/7/2020  9:00 AM Eric Kohli MD SSA FPA FPA

## 2020-02-04 ENCOUNTER — HOSPITAL ENCOUNTER (OUTPATIENT)
Dept: PHYSICAL THERAPY | Age: 50
Discharge: HOME | End: 2020-02-04
Payer: COMMERCIAL

## 2020-02-04 PROCEDURE — 97140 MANUAL THERAPY 1/> REGIONS: CPT

## 2020-02-04 PROCEDURE — 97110 THERAPEUTIC EXERCISES: CPT

## 2020-02-04 NOTE — PROGRESS NOTES
Jarrett Sharpe  : 1970  Primary: Radha Zaldivar Of [place] Valarienikos*  Secondary:  [facility]rapy Center at [facility] [address], [address] [facility], [place], [facility]  [phone]   [phone]      OUTPATIENT PHYSICAL THERAPY: Progress Note 2020  Progress Report Due: 3/1/2602  Recertification:     MEDICAL/REFERRING DIAGNOS work. IS:  Overactive bladder [N32.81]   REFERRING PHYSICIAN: Alicia Fraser MD  Pre-treatment Symptoms/Complaints:No leaks since last therapy visit, however did have a full loss of bladder last Thursday. Admits she \"overheld,\" 5 hours between voids. Otherwise, doing well. Filled her last 30 day prescription of Myrbetriq, as she is having some fear of stopping this medication. Notes, \"I don't know where every bathroom is anymore. \" Getting up more this past week during the night to urinate when her daughter wakes her up in the night. Pain: Initial: Pain Intensity 1: 0  Post Session:  0/10   Medications Last Reviewed:  20    Updated Objective Findings:  none   TREATMENT:   THERAPEUTIC ACTIVITY: (  minutes): Instruction on functional pelvic brace exercise including feedforward activation of pelvic floor muscles on exhale prior to activity that increases intra-abdominal pressure (IAP) such as cough, sneeze, laugh, sit to stand, going up stairs - especially when carrying objects, lifting object to decrease pressure on pelvic organs during exertional activities. THERAPEUTIC EXERCISE: (38 minutes):  Exercises per grid below to improve strength and coordination. Required moderate visual, verbal, manual and tactile cues to promote proper body alignment and promote proper body posture well as to locate correct muscles without substitutions of rectus abdominis or gluteal muscles. Progressed complexity of movement as indicated. Required cocontraction of transfer abdominal mm to improve pelvic floor muscle contraction.  Good progress in recruitment noted throughout session and patient able to determine when she has lost contraction and correct.       Date:  2.4.20 Date:  11/7/19 Date:  11/26/19 Date:   12/10/19 Date:  12/17/19 Date:   1/7/20 Date:  1/21/20   Activity/Exercise Parameters         Supine endurance kegels  2 x 10 with 10 second hold with focus on breathing        Supine quick flick Kegels          Diaphragmatic breathing     3' in supine, \"crocodile breathing\" with body as resistance 3' supine \"crocodile breathing\"    tranverse abdominal contractions  2 x 10 with focus on breathing  X10, 3' with manual cues and focus on breathing x10 during quadruped activitites     Supine SLR with trA and pelvic floor contraction    x10 BLE with manual and verbal cues      Seated quick flicks          Supine bridging with contraction per bridge          Supine bridging with endurance hold contraction PFM and trA  x10        Supine clam shells with contraction per bridge          Standing heel raise with PFM and trA contraction          Sit to stand with PFM contraction          Standing marching with PFM contraction          Lifting weighted box with PFM and TrA contraction          Lifting weight box with PFM and TrA endurance hold          Chops (abdominal rotation exercise) with PFM and TrA contraction          Wall sits          Monster walk       x20' forward and backward with blue band around ankles and PFM contraction   Hip 3 way        x10 with blue band   Standing row          Modified prudencio stretch          Supine heel slides and PFM/TrA contraction  x10        Sit to stand with PFM   x15       Seated marching with PFM hold   10 sets of 6 reps       deadlift (no weight)   x10 with PFM contraction       marcos pose x30 seconds   x30'      Multifidus exercise in quadruped with trA contraction with hip extension    x5 BLE x5 BLE with manual cues to increase support, L with more dificulty     Side stepping with resistance with PFM contraction      2 x 10 Forward diagonal steps with resistance and PFM contraction      2 x 15    Stairs with manual resistance of door with acting out key in door and carrying objects while performing PFM contraction       4 steps x10 with manual resistance and verbal cues for when to breathe, contract, relax, and hold   Trunk flexion with hands pumping with PFM and TrA contraction x3 rep hold, 5 reps         Clam shells with PFM and TrA contraction x3 rep hold, 5 reps         Bird dog with BLE movement with PFM and TrA contraction x10         Sidelying hip abduction hold with TrA and PFM contraction 5 second x10         Warrior 1 pose with glute set  30' BLE           Manual Therapy: ( 30 minutes): Soft tissue mobilization was utilized and necessary because of the patient's painful spasm and restricted motion of soft tissue as well as to optimize muscle length for proper support of spinal musculature and bladder support. Trigger point release and rolling performed to B adductor muscles and abdominal muscles with improvement noted in tenderness. Sustained pressure provided to B iliopsoas with decreased pain. Worked on piriformis and gluetus laury/medius for improved support to pelvic organs with optimal muscle length. (Used abbreviations: MET - muscle energy technique; SCS- Strain counter strain; CTM- Connective tissue mobilizations; CR- Contract/relax; SP- Sustained pressure, TrP- Trigger point release, IASTM- Instrument assisted soft tissue mobilizations, TDN- Trigger point dry needling). [employer] Portal  The following educational topics were reviewed with patient: Utilizing contraction when climbing stairs. Treatment/Session Summary:    · Response to Treatment:  Patient verbalized understanding of all treatment. Noted more difficulty performing strengthening on RLE, muscular imbalance present on this side.   · Communication/Consultation:  None today  · Equipment provided today:  None today  · Recommendations/Intent for next treatment session: Next visit will focus on continued education on urge deferrment, progression of Kegel exercises to more functional activities and abdominal bracing as well as relaxation techniques to improve down regulation of nervous system.   Total Treatment Billable Duration:  68 minutes  PT Patient Time In/Time Out  Time In: 6688  Time Out: 9229  Sabrina Vazquez, PT, DPT    Future Appointments   Date Time Provider Tramaine Morgan   2/18/2020  8:00 AM Jolene Saucedo, PT, DPT [facility]   5/7/2020  9:00 AM Robert Sanchez MD SSA FPA FPA

## 2020-02-18 ENCOUNTER — HOSPITAL ENCOUNTER (OUTPATIENT)
Dept: PHYSICAL THERAPY | Age: 50
Discharge: HOME | End: 2020-02-18
Payer: COMMERCIAL

## 2020-03-03 ENCOUNTER — HOSPITAL ENCOUNTER (OUTPATIENT)
Dept: PHYSICAL THERAPY | Age: 50
Discharge: HOME | End: 2020-03-03
Payer: COMMERCIAL

## 2020-03-03 PROCEDURE — 97140 MANUAL THERAPY 1/> REGIONS: CPT

## 2020-03-03 PROCEDURE — 97110 THERAPEUTIC EXERCISES: CPT

## 2020-03-03 NOTE — THERAPY RECERTIFICATION
Ivana Brown  : 1970  Primary: [facility]ar [facility]*  Secondary:  [facility]  [address], [address] [place] [address], [address]  [phone]   [phone]        OUTPATIENT PHYSICAL THERAPY:Recertification and AM 3/3/2020    ICD-10: Treatment Diagnosis: Mixed incontinence (N39.46), Muscle weakness (generalized) (M62.81), Other lack of coordination (R27.8)  Precautions/Allergies:   Adhesive tape-silicones; Ciprofloxacin; and Sulfa (sulfonamide antibiotics)   MD Orders: Evaluate and treat MEDICAL/REFERRING DIAGNOSIS:  Overactive bladder [N32.81]   DATE OF ONSET:   REFERRING PHYSICIAN: Marga Baugh MD  RETURN PHYSICIAN APPOINTMENT:      INITIAL ASSESSMENT:  Ivana Brown is a 45y.o. year old female with mixed incontinence. She has been seen for 22 visits with physical therapy. She has progressed from daily leakage, sometimes full bladder to leakage 0-1 times per month with continued use of pads. She has progressed from using x2 bladder medications to 1 and reports she is planning to discontinue this bladder medication when she runs out of her current prescription per MD suggestion. She has progressed to normal voiding frequency, able to sleep through the night occasionally. She continues to present with decreased abdominal and hip abductor strength, however has progressed to pain free palpation of pelvic musculature, increased pelvic floor strength and endurance. She has had occasional decline due to stopping bladder medications for breast reduction surgery last year and due to sickness, however is leaking less than ever. . Exhibits compensatory muscle strategies including gluteal muscle, abdominal muscles and adductor muscles. Noted pain on palpation of R levator ani mm, BLE adductor muscles and abdominal muscles.  Decreasing frequency to once per month to continue to provide guided instruction as patient is coming off Myrbetriq and likely will have some increase in leaking with cessation of this medication, as she has previously experienced this in the past.. She would benefit form skilled PT to address the above deficits. She would benefit from participating in a PF strengthening program with biofeedback for correct mm activation for improved mm activation as well as education on urge suppression techniques. PROBLEM LIST (Impacting functional limitations):  1. Decreased Strength  2. Decreased Activity Tolerance  3. Increased Fatigue  4. Decreased [place] with Home Exercise Program INTERVENTIONS PLANNED:  1. Electrical Stimulation  2. Home Exercise Program (HEP)  3. Neuromuscular Re-education/Strengthening  4. Therapeutic Activites  5. Therapeutic Exercise/Strengthening  6. Transcutaneous Electrical Nerve Stimulation (TENS)   TREATMENT PLAN:  Effective Dates: 5/14/2019 TO 8/12/2019 (90 days). Frequency/Duration: 1 time a week for 90 Days  GOALS: (Goals have been discussed and agreed upon with patient.)  Short term Functional Goals: Time  Frame: 6 weeks  1. Pelvic floor muscle strength will improve to at least 3/5 allowing for no more than 3 episodes of urinary incontinence per week. 2. Patient will present with normal pelvic floor muscle coordination to reduce risk of leakage. 3. Patient will verbalize the effects of bladder irritants and avoid as able to reduce abnormal bladder urgency. 4. Patient will use the pelvic brace exercise with all increases in intra-abdominal pressure to reduce or prevent stress urinary incontinence episodes. 5. Patient will decrease nocturia to 1x or less to minimize negative impact on her sleep health (currently nocturia 2-3 times per night with interrupted sleep). 6. Patient will be able to demonstrate effective pelvic floor exercises for home program, progressing to longer holds in upright, in order to improve muscle endurance and prevent UUI en route to the bathroom. Discharge Goals: Time Frame: 12 weeks  1. Pelvic floor muscle strength will improve to at least 4/5 allowing for no more than 1 episodes of urinary incontinence per month. 2. Patient's self-reported pad usage will decrease to 0 pads to reduce financial strain associated within incontinence. 3. Patient will be able to utilize deferral strategies to reach bathroom >75% of the time without UUI. 4. Patient will demonstrate improved pelvic floor muscle coordination for bladder control by ability to perform at least 10 quick contractions in 10 seconds. 5. Patient will improve pelvic floor musculature endurance to 10 repetitions to reduce episodes of urinary incontinence when walking to the bathroom (currently performs 4 repetitions). Outcome Measure: Tool Used: Jimmie's Health Questionnaire  Score:  Initial: 69  Most Recent: 69  (Date: 5/14/19 )   Interpretation of Score: The entire symptom severity scale was scored on a (best) to 100 (worst) scale. Medical Necessity:   · Patient demonstrates excellent rehab potential due to higher previous functional level. Reason for Services/Other Comments:  · Patient continues to require modification of therapeutic interventions to increase complexity of exercises. PT Patient Time In/Time Out  Time In: 6884  Time Out: 0840    Rehabilitation Potential For Stated Goals: Excellent  Regarding Sree Espinosa's therapy, I certify that the treatment plan above will be carried out by a therapist or under their direction. Thank you for this referral,  Daiana Ledezma, PT, DPT     Referring Physician Signature: Vinay Powers MD              Date                    PAIN/SUBJECTIVE:   Initial: Pain Intensity 1: 0 0/10 Post Session:  0/10     HISTORY:   History of Present Injury/Illness (Reason for Referral):  History of reemex adjustable sling in 2009 for stress incontinence which helped some, however has had urinary incontinence off/on over the past 17 years. Multiple fibroids.  Dawn catheter ripped urethra during fibroidectomy. She has had urodynamic testing performed and the MD determiend she did not have stress incontinence, at least it was not reproducible with coughing, laughing, sneezing during her testing - she reports she does have loss of urine in these situations at home. Reports that she has urgency upon getting out of the car and going up the steps at home. 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 Steps seem to be a trigger. Difficulty with long car rides due to surgery. Daughter has special needs which is increasingly stressful. Changed from Oxybutinin after being on it for 10 years about 6 months ago and was switched to Myrebetriq 6. Initially she had improvement for but now is worse than ever. Some pain on pubic bone with intercourse but otherwise no pelvic pain or dyspareunia. Urinary: Frequency 10-12 x/day, 2-3 x/night. Positive for mixed urinary incontinence (АНДРЕЙ), urgency, frequency, incomplete emptying, urinary hesitancy, dysuria, hematuria, nocturia. She reports hematuria which her MD is aware of and she has had throughout her lifespan. Pt uses pads for protection; 2 pads per day (PPD). Denies stress incomplete emptying, urinary hesitancy, dysuria. Fluid intake: 70-80 oz water/day; bladder irritants include: orange juice, apple juice, occasional caffeine (all only occasional). Bowel: Frequency daily 2-3. Negative for pain with bowel movement (BM), pushing/straining with BM, incomplete emptying, fecal incontinence, constipation. Use of stool softeners or laxatives? no  Sexual: Pt is sexually active. Male partners. Contraception/birth control: No - hysterectomy. Dysparunia: yes if he hits sling dyspareunia. Pelvic Organ Prolapse/Pelvic Pain: Location: none per MD and patient.     Past Medical History/Comorbidities:   Ms. Aneta Lerner has a past medical history of Adrenal disorder, other (2007), Allergic rhinitis, Bone spur of foot, Cancer ([address] (11/14/2017), Chest pain, Chronic pain, Cough, Dyspepsia, Elevated troponin, Environmental allergies, GERD (gastroesophageal reflux disease), History of positive PPD (1992), History of renal stone (4/1/2013), Insomnia, Lumbar degenerative disc disease (4/1/2013), Nausea & vomiting, Obesity, Snores, SOB (shortness of breath), Sore throat, Spastic bladder (2008), SVT (supraventricular tachycardia) ([address] Thromboembolus ([address] Urge incontinence, Urinary frequency, Vertigo, Vitamin D deficiency (2009), Wheezing, and Wheezing. She also has no past medical history of Aneurysm ([address] Asthma, Autoimmune disease ([address] CAD (coronary artery disease), Chronic kidney disease, Chronic obstructive pulmonary disease ([address] Coagulation disorder ([address] Diabetes ([address] Difficult intubation, Endocarditis, Heart failure ([address] Hypertension, Liver disease, Malignant hyperthermia due to anesthesia, Nicotine vapor product user, Non-nicotine vapor product user, Pseudocholinesterase deficiency, Psychiatric disorder, PUD (peptic ulcer disease), Rheumatic fever, Seizures ([address] Sleep apnea, Stroke ([address] or Thyroid disease. Ms. Deon Medrano  has a past surgical history that includes hx myringotomy; hx bladder suspension (2009); hx tonsil and adenoidectomy (1978); hx other surgical (2012); hx wisdom teeth extraction; hx mohs procedure (Right, 05/2015); hx partial hysterectomy (11/11/15); hx orthopaedic (Right, 05/01/2015); hx rhinoplasty (1985); hx urological (2009); hx malignant skin lesion excision (Right, 11/14/2017); hx breast biopsy (Right, 2000); pr cardiac surg procedure unlist (03/27/2019); hx colonoscopy (02/07/2019); hx gyn (2006, 2008); hx heart catheterization (03/26/2019); and hx breast reduction (Bilateral, 7/24/2019). Social History/Living Environment: ,   , works at an [place] and independent.      Have you ever had any pelvic trauma (orthopedic in nature, fall, MVA, etc.)? Ripped goode catheter during fibroidectomy in 2003. Have you ever experienced any unwanted physical or sexual contact? No  Have you ever experienced any form of medical trauma (GYN, urological, GI, etc)? Goode catheter ripped out. Prior Level of Function/Work/Activity:  Independent with ambulation, ADLs. Reports difficulty with donning shoes due to body habitus. Aware of need for weight loss. Ambulatory/Rehab Services H2 Model Falls Risk Assessment    Risk Factors:       No Risk Factors Identified Ability to Rise from Chair:       (0)  Ability to rise in a single movement    Falls Prevention Plan:       No modifications necessary   Total: (5 or greater = High Risk): 0   n ©2010 [facility] [address] [place] [address] Patent #[id number]. Federal Law prohibits the replication, distribution or use without written permission from [facility]     Current Medications:       Current Outpatient Medications:     ofloxacin (FLOXIN) 0.3 % ophthalmic solution, 1 Drop as needed. In bilateral ears if needed, Disp: , Rfl:     solifenacin (VESICARE) 5 mg tablet, Take 5 mg by mouth nightly. Indications: Frequent Urination, Disp: , Rfl:     raNITIdine (ZANTAC) 150 mg tablet, TAKE 1 TABLET BY MOUTH TWO  TIMES DAILY (Patient taking differently: Take 150 mg by mouth two (2) times a day. TAKE 1 TABLET BY MOUTH TWO  TIMES DAILY; Take / use AM day of surgery  per anesthesia protocols. Indications: gastroesophageal reflux disease), Disp: 180 Tab, Rfl: 3    mirabegron ER (MYRBETRIQ) 50 mg ER tablet, Take 1 Tab by mouth daily. (Patient taking differently: Take 50 mg by mouth daily. Take / use AM day of surgery  per anesthesia protocols. Indications: Frequent Urination), Disp: 30 Tab, Rfl: 11    fluticasone propionate (FLOVENT DISKUS) 100 mcg/actuation dsdv, Take 2 Puffs by inhalation two (2) times a day.  Take / use AM day of surgery  per anesthesia protocols. Indications: Controller Medication for Asthma, Disp: , Rfl:     OTHER,NON-FORMULARY,, Indications: Methyl Protect one cap daily, Disp: , Rfl:     OTHER,NON-FORMULARY,, daily. Iodine /potassium supplement  Indications: Iodoral 1/2 tab daily, Disp: , Rfl:     OTHER,NON-FORMULARY,, three (3) times daily as needed. Indications: Catecholacalm cap tid, Disp: , Rfl:     mv-min/iron/folic/calcium/vitK (WOMEN'S MULTIVITAMIN PO), Take  by mouth., Disp: , Rfl:     melatonin 1 mg tablet, Take 1 mg by mouth nightly., Disp: , Rfl:     beclomethasone dipropionate (QNASL) 80 mcg/actuation HFAA, 2 Sprays by Nasal route daily. (Patient taking differently: 2 Sprays by Nasal route daily. Take / use AM day of surgery  per anesthesia protocols. Indications: inflammation of the nose due to an allergy), Disp: 8.7 g, Rfl: 11    OTHER,NON-FORMULARY,, Take 2 Tabs by mouth daily (after lunch). Adrenal complex, Disp: , Rfl:     PRASTERONE, DHEA, (DHEA PO), Take 15 mg by mouth daily. DHEA/testosterone in the progesterone cream, Disp: , Rfl:     SELENIUM PO, Take 1 Tab by mouth daily. , Disp: , Rfl:     cholecalciferol, vitamin D3, 1,000 unit/drop drop, Take 5 Drops by mouth daily. , Disp: , Rfl:     allergy injection, 2 shots every 3 weeks, Disp: , Rfl:     LACTOBACILLUS ACIDOPHILUS (PROBIOTIC PO), Take 1 tablet by mouth daily. , Disp: , Rfl:     montelukast (SINGULAIR) 10 mg tablet, Take 10 mg by mouth nightly. Indications: inflammation of the nose due to an allergy, Controller Medication for Asthma, Disp: , Rfl:     Levocetirizine (XYZAL) 5 mg tablet, Take 5 mg by mouth daily. Take / use AM day of surgery  per anesthesia protocols.   Indications: inflammation of the nose due to an allergy, Disp: , Rfl:    Date Last Reviewed:  3/3/2020   Number of Personal Factors/Comorbidities that affect the Plan of Care: 3+: HIGH COMPLEXITY   EXAMINATION:   Patient gave consent for internal pelvic floor muscle exam. Chaperone offered, patient declined. OBSERVATION:   External Observation:   · Voluntary Contraction: present  · Voluntary Relaxation: present  · Involuntary Contraction: no contraction seen  · Involuntary Relaxation: n/a  · Perineal Body Assessment: normal  · Anal [place]: normal  · Skin Integrity: intact. · Vaginal Vault Size: within normal limits    PALPATION:  Superficial Pelvic Floor Musculature (PFM): Tender? Intermediate PFM Tender? Deep PFM Tender? External Musculature Tender? R Superficial Transverse Perineal n R Deep Transverse Perineal n R. Puborectalis n RUQ abdomen  RLQ abdomen y   L Superficial Transverse Perineal n L Deep Transverse Perineal n L. Puborectalis n LUQ abdomen y   R Ischiocavernosus n R Compressor Urethra n R. Pubococcygeus n RLQ abdomen y   L Ischiocavernosus n L Compressor Urethra n L. Pubococcygeus n iliopsoas y   R Bulbocavernosus n   R. Ileococcygeus n R adductors y   L Bulbocavernosus n   L. Ileococcygeus n L adductors y       R. Obturator Internus n         L. Obturator Internus n         R. Coccygeus n         L. Coccygeus n       RANGE OF MOTION:  Decreased - decreased contraction present, WFL bearing down    STRENGTH:  P: Power, E: Endurance, R: Repetitions, QF: Quick Flicks, TrA: Transverse Abdominus  P 4   E 10   R 5   QF 7   TrA present     COORDINATION:  Diaphragmatic breathing (DB): patient able to perform with cueing    SPECIAL TESTS:  Q-tip test: normal  Supine cough test: cough reflex not present. Able to locate and perform with verbal cues. POP-Q: (Pelvic Organ Prolapse - Quantification Exam) per MD note: none present     Body Structures Involved:  1. Nerves  2. Muscles Body Functions Affected:  1. Sensory/Pain  2. Genitourinary  3. Neuromusculoskeletal  4. Movement Related Activities and Participation Affected:  1. Self Care  2. Interpersonal Interactions and Relationships  3.  Community, Social and [place]   Number of elements (examined above) that affect the Plan of Care: 3: MODERATE COMPLEXITY   CLINICAL PRESENTATION:   Presentation: Stable and uncomplicated: LOW COMPLEXITY   CLINICAL DECISION MAKING:   Use of outcome tool(s) and clinical judgement create a POC that gives a: Clear prediction of patient's progress: LOW COMPLEXITY      PT Patient Time In/Time Out  Time In: 0755  Time Out: 0840    [place] PT, DPT

## 2020-03-03 NOTE — PROGRESS NOTES
Tanya Vieyra  : 1970  Primary: Solit[facility] Michelle [facility]*  Secondary:  [facility], [address], [place], [address] [facility]  [phone]   [phone]      OUTPATIENT PHYSICAL THERAPY: Progress Note 3/3/2020  Progress Report Due: 3456  Recertification:     MEDICAL/REFERRING DIAGNOS work. IS:  Overactive bladder [N32.81]   REFERRING PHYSICIAN: Martha Kasper MD  Pre-treatment Symptoms/Complaints:Didn't take Myrbetriq yesterday, went to the bathroom a lot but no leaks. Can think of 1 time in the past month she experienced leaking, however most days she has no leakage at all. Continues to wear pads as a backup. Finishing up her Myrbetriq on Thursday. Reports she has begun running with no increase in leakage. Pain: Initial: Pain Intensity 1: 0  Post Session:  0/10   Medications Last Reviewed:  20    Updated Objective Findings:  none   TREATMENT:   THERAPEUTIC ACTIVITY: (  minutes): Instruction on functional pelvic brace exercise including feedforward activation of pelvic floor muscles on exhale prior to activity that increases intra-abdominal pressure (IAP) such as cough, sneeze, laugh, sit to stand, going up stairs - especially when carrying objects, lifting object to decrease pressure on pelvic organs during exertional activities. THERAPEUTIC EXERCISE: (23 minutes):  Exercises per grid below to improve strength and coordination. Required moderate visual, verbal, manual and tactile cues to promote proper body alignment and promote proper body posture well as to locate correct muscles without substitutions of rectus abdominis or gluteal muscles. Progressed complexity of movement as indicated. Required cocontraction of transfer abdominal mm to improve pelvic floor muscle contraction. Good progress in recruitment noted throughout session and patient able to determine when she has lost contraction and correct.       Date:  20 Date:  3/3/20 Date:  11/26/19 Date:   12/10/19 Date:  12/17/19 Date:   1/7/20 Date:  1/21/20   Activity/Exercise Parameters         Supine endurance kegels          Supine quick flick Kegels          Diaphragmatic breathing     3' in supine, \"crocodile breathing\" with body as resistance 3' supine \"crocodile breathing\"    tranverse abdominal contractions    X10, 3' with manual cues and focus on breathing x10 during quadruped activitites     Supine SLR with trA and pelvic floor contraction    x10 BLE with manual and verbal cues      Seated quick flicks          Supine bridging with contraction per bridge          Supine bridging with endurance hold contraction PFM and trA          Supine clam shells with contraction per bridge          Standing heel raise with PFM and trA contraction          Sit to stand with PFM contraction          Standing marching with PFM contraction          Lifting weighted box with PFM and TrA contraction          Lifting weight box with PFM and TrA endurance hold          Chops (abdominal rotation exercise) with PFM and TrA contraction          Wall sits          Monster walk       x20' forward and backward with blue band around ankles and PFM contraction   Hip 3 way        x10 with blue band   Standing row          Modified prudencio stretch          Supine heel slides and PFM/TrA contraction          Sit to stand with PFM   x15       Seated marching with PFM hold   10 sets of 6 reps       deadlift (no weight)   x10 with PFM contraction       marcos pose x30 seconds   x30'      Multifidus exercise in quadruped with trA contraction with hip extension    x5 BLE x5 BLE with manual cues to increase support, L with more dificulty     Side stepping with resistance with PFM contraction      2 x 10    Forward diagonal steps with resistance and PFM contraction      2 x 15    Stairs with manual resistance of door with acting out key in door and carrying objects while performing PFM contraction       4 steps x10 with manual resistance and verbal cues for when to breathe, contract, relax, and hold   Trunk flexion with hands pumping with PFM and TrA contraction x3 rep hold, 5 reps x10        Clam shells with PFM and TrA contraction x3 rep hold, 5 reps x10 BLE        Bird dog with BLE movement with PFM and TrA contraction x10 x10 BLE        Sidelying hip abduction hold with TrA and PFM contraction 5 second x10 x10 BLE        Warrior 1 pose with glute set  30' BLE           Manual Therapy: ( 17 minutes): Soft tissue mobilization was utilized and necessary because of the patient's painful spasm and restricted motion of soft tissue as well as to optimize muscle length for proper support of spinal musculature and bladder support. Trigger point release and rolling performed to B adductor muscles and abdominal muscles with improvement noted in tenderness. Sustained pressure provided to B iliopsoas with decreased pain. (Used abbreviations: MET - muscle energy technique; SCS- Strain counter strain; CTM- Connective tissue mobilizations; CR- Contract/relax; SP- Sustained pressure, TrP- Trigger point release, IASTM- Instrument assisted soft tissue mobilizations, TDN- Trigger point dry needling). [facility]  The following educational topics were reviewed with patient: Utilizing contraction when climbing stairs. Discussed personal training to continue core/hip abductor strengthening. Treatment/Session Summary:    · Response to Treatment:  Patient verbalized understanding of all treatment. Noted more difficulty performing strengthening on RLE, muscular imbalance present on this side. · Communication/Consultation:  None today  · Equipment provided today:  None today  · Recommendations/Intent for next treatment session: Next visit will determine if patient is ready for discharge pending how OAB syndrome persists/eliminated with discontinuation of Myrbetriq.   Total Treatment Billable Duration:  40 minutes  PT Patient Time In/Time Out  Time In: 1549  Time Out: 0840  Mendoza Deleon PT, DPT    Future Appointments   Date Time Provider Tramaine Cathy   3/31/2020  8:00 AM Rakesh Caldwell PT, DPT [facility]   5/7/2020  9:00 AM Lorena Corarles MD [facility]

## 2020-03-17 ENCOUNTER — APPOINTMENT (OUTPATIENT)
Dept: PHYSICAL THERAPY | Age: 50
End: 2020-03-17
Payer: COMMERCIAL

## 2020-04-30 NOTE — THERAPY RECERTIFICATION
Mehrdad Bruce  : 1970  Primary: [facility]*  Secondary:  [facility] [facility], [address]  [phone]   [phone]        OUTPATIENT PHYSICAL THERAPY:Discontinuation Summary 2020    ICD-10: Treatment Diagnosis: Mixed incontinence (N39.46), Muscle weakness (generalized) (M62.81), Other lack of coordination (R27.8)  Precautions/Allergies:   Adhesive tape-silicones; Ciprofloxacin; and Sulfa (sulfonamide antibiotics)   MD Orders: Evaluate and treat MEDICAL/REFERRING DIAGNOSIS:  Overactive bladder [N32.81]   DATE OF ONSET:   REFERRING PHYSICIAN: Edwin Diego MD  RETURN PHYSICIAN APPOINTMENT:      Mehrdad Bruce has been seen in physical therapy from 20 to 3/3/20 for 20 visits. Treatment has been discontinued at this time due to discussion with patient, reports she is doing significantly better, goals have been met and no longer requires skilled physical therapy. The below goals were met prior to discontinuation. Thank you for this referral.           1.    TREATMENT PLAN:  Effective Dates: 2019 TO 2019 (90 days). Frequency/Duration: 1 time a week for 90 Days  GOALS: (Goals have been discussed and agreed upon with patient.)  Short term Functional Goals: Time  Frame: 6 weeks All Goals Met at this time. 1. Pelvic floor muscle strength will improve to at least 3/5 allowing for no more than 3 episodes of urinary incontinence per week. 2. Patient will present with normal pelvic floor muscle coordination to reduce risk of leakage. 3. Patient will verbalize the effects of bladder irritants and avoid as able to reduce abnormal bladder urgency. 4. Patient will use the pelvic brace exercise with all increases in intra-abdominal pressure to reduce or prevent stress urinary incontinence episodes.   5. Patient will decrease nocturia to 1x or less to minimize negative impact on her sleep health (currently nocturia 2-3 times per night with interrupted sleep). 6. Patient will be able to demonstrate effective pelvic floor exercises for home program, progressing to longer holds in upright, in order to improve muscle endurance and prevent UUI en route to the bathroom. Discharge Goals: Time Frame: 12 weeks  1. Pelvic floor muscle strength will improve to at least 4/5 allowing for no more than 1 episodes of urinary incontinence per month. 2. Patient's self-reported pad usage will decrease to 0 pads to reduce financial strain associated within incontinence. 3. Patient will be able to utilize deferral strategies to reach bathroom >75% of the time without UUI. 4. Patient will demonstrate improved pelvic floor muscle coordination for bladder control by ability to perform at least 10 quick contractions in 10 seconds. 5. Patient will improve pelvic floor musculature endurance to 10 repetitions to reduce episodes of urinary incontinence when walking to the bathroom (currently performs 4 repetitions).      Roz Quiñones, PT, DPT

## 2020-05-20 PROBLEM — G47.33 OSA (OBSTRUCTIVE SLEEP APNEA): Status: ACTIVE | Noted: 2020-05-20

## 2020-05-26 ENCOUNTER — HOSPITAL ENCOUNTER (OUTPATIENT)
Dept: MAMMOGRAPHY | Age: 50
Discharge: HOME | End: 2020-05-26
Attending: FAMILY MEDICINE
Payer: COMMERCIAL

## 2020-05-26 DIAGNOSIS — Z12.31 VISIT FOR SCREENING MAMMOGRAM: ICD-10-CM

## 2020-05-26 PROCEDURE — 77067 SCR MAMMO BI INCL CAD: CPT

## 2020-05-26 NOTE — PROGRESS NOTES
Call back mammogram results:   New changes in the bilateral breasts whose appearance is consistent with  interval breast reduction surgery. No evolving changes are seen of either breast  to suggest evolving breast malignancy. Therefore, routine followup is  recommended with screening mammogram in 1 year unless clinically indicated  sooner.

## 2020-06-21 ENCOUNTER — APPOINTMENT (OUTPATIENT)
Dept: CT IMAGING | Age: 50
DRG: 439 | End: 2020-06-21
Attending: EMERGENCY MEDICINE
Payer: COMMERCIAL

## 2020-06-21 ENCOUNTER — HOSPITAL ENCOUNTER (INPATIENT)
Age: 50
LOS: 5 days | Discharge: HOME | DRG: 439 | End: 2020-06-26
Attending: EMERGENCY MEDICINE | Admitting: HOSPITALIST
Payer: COMMERCIAL

## 2020-06-21 DIAGNOSIS — D72.829 LEUKOCYTOSIS, UNSPECIFIED TYPE: ICD-10-CM

## 2020-06-21 DIAGNOSIS — K85.90 ACUTE PANCREATITIS, UNSPECIFIED COMPLICATION STATUS, UNSPECIFIED PANCREATITIS TYPE: Primary | ICD-10-CM

## 2020-06-21 LAB
ALBUMIN SERPL-MCNC: 3.5 G/DL (ref 3.5–5)
ALBUMIN/GLOB SERPL: 0.8 {RATIO} (ref 1.2–3.5)
ALP SERPL-CCNC: 145 U/L (ref 50–136)
ALT SERPL-CCNC: 28 U/L (ref 12–65)
ANION GAP SERPL CALC-SCNC: 9 MMOL/L (ref 7–16)
AST SERPL-CCNC: 23 U/L (ref 15–37)
BASOPHILS # BLD: 0 K/UL (ref 0–0.2)
BASOPHILS NFR BLD: 0 % (ref 0–2)
BILIRUB SERPL-MCNC: 0.3 MG/DL (ref 0.2–1.1)
BUN SERPL-MCNC: 19 MG/DL (ref 6–23)
CALCIUM SERPL-MCNC: 9 MG/DL (ref 8.3–10.4)
CHLORIDE SERPL-SCNC: 101 MMOL/L (ref 98–107)
CHOLEST SERPL-MCNC: 97 MG/DL
CO2 SERPL-SCNC: 28 MMOL/L (ref 21–32)
CREAT SERPL-MCNC: 0.86 MG/DL (ref 0.6–1)
CRP SERPL-MCNC: 9.4 MG/DL (ref 0–0.9)
DIFFERENTIAL METHOD BLD: ABNORMAL
EOSINOPHIL # BLD: 0 K/UL (ref 0–0.8)
EOSINOPHIL NFR BLD: 0 % (ref 0.5–7.8)
ERYTHROCYTE [DISTWIDTH] IN BLOOD BY AUTOMATED COUNT: 14.5 % (ref 11.9–14.6)
GLOBULIN SER CALC-MCNC: 4.2 G/DL (ref 2.3–3.5)
GLUCOSE SERPL-MCNC: 89 MG/DL (ref 65–100)
HCT VFR BLD AUTO: 43.2 % (ref 35.8–46.3)
HDLC SERPL-MCNC: 63 MG/DL (ref 40–60)
HDLC SERPL: 1.5 {RATIO}
HGB BLD-MCNC: 13.8 G/DL (ref 11.7–15.4)
IMM GRANULOCYTES # BLD AUTO: 0.1 K/UL (ref 0–0.5)
IMM GRANULOCYTES NFR BLD AUTO: 1 % (ref 0–5)
LACTATE SERPL-SCNC: 0.6 MMOL/L (ref 0.4–2)
LDH SERPL L TO P-CCNC: 185 U/L (ref 100–190)
LDLC SERPL CALC-MCNC: 26 MG/DL
LIPASE SERPL-CCNC: 360 U/L (ref 73–393)
LIPID PROFILE,FLP: ABNORMAL
LYMPHOCYTES # BLD: 1.2 K/UL (ref 0.5–4.6)
LYMPHOCYTES NFR BLD: 8 % (ref 13–44)
MAGNESIUM SERPL-MCNC: 1.9 MG/DL (ref 1.8–2.4)
MCH RBC QN AUTO: 27.7 PG (ref 26.1–32.9)
MCHC RBC AUTO-ENTMCNC: 31.9 G/DL (ref 31.4–35)
MCV RBC AUTO: 86.7 FL (ref 79.6–97.8)
MONOCYTES # BLD: 0.9 K/UL (ref 0.1–1.3)
MONOCYTES NFR BLD: 6 % (ref 4–12)
NEUTS SEG # BLD: 13.5 K/UL (ref 1.7–8.2)
NEUTS SEG NFR BLD: 86 % (ref 43–78)
NRBC # BLD: 0 K/UL (ref 0–0.2)
PHOSPHATE SERPL-MCNC: 3.4 MG/DL (ref 2.5–4.5)
PLATELET # BLD AUTO: 266 K/UL (ref 150–450)
PMV BLD AUTO: 10.7 FL (ref 9.4–12.3)
POTASSIUM SERPL-SCNC: 3.9 MMOL/L (ref 3.5–5.1)
PROT SERPL-MCNC: 7.7 G/DL (ref 6.3–8.2)
RBC # BLD AUTO: 4.98 M/UL (ref 4.05–5.2)
SODIUM SERPL-SCNC: 138 MMOL/L (ref 136–145)
TRIGL SERPL-MCNC: 40 MG/DL (ref 35–150)
VLDLC SERPL CALC-MCNC: 8 MG/DL (ref 6–23)
WBC # BLD AUTO: 15.7 K/UL (ref 4.3–11.1)

## 2020-06-21 PROCEDURE — 83690 ASSAY OF LIPASE: CPT

## 2020-06-21 PROCEDURE — 99283 EMERGENCY DEPT VISIT LOW MDM: CPT

## 2020-06-21 PROCEDURE — 96376 TX/PRO/DX INJ SAME DRUG ADON: CPT

## 2020-06-21 PROCEDURE — 74177 CT ABD & PELVIS W/CONTRAST: CPT

## 2020-06-21 PROCEDURE — 85025 COMPLETE CBC W/AUTO DIFF WBC: CPT

## 2020-06-21 PROCEDURE — 74011250636 HC RX REV CODE- 250/636: Performed by: EMERGENCY MEDICINE

## 2020-06-21 PROCEDURE — 80061 LIPID PANEL: CPT

## 2020-06-21 PROCEDURE — 96374 THER/PROPH/DIAG INJ IV PUSH: CPT

## 2020-06-21 PROCEDURE — 65270000029 HC RM PRIVATE

## 2020-06-21 PROCEDURE — 74011000258 HC RX REV CODE- 258: Performed by: HOSPITALIST

## 2020-06-21 PROCEDURE — 86140 C-REACTIVE PROTEIN: CPT

## 2020-06-21 PROCEDURE — 74011250636 HC RX REV CODE- 250/636: Performed by: HOSPITALIST

## 2020-06-21 PROCEDURE — 83615 LACTATE (LD) (LDH) ENZYME: CPT

## 2020-06-21 PROCEDURE — 83605 ASSAY OF LACTIC ACID: CPT

## 2020-06-21 PROCEDURE — 36415 COLL VENOUS BLD VENIPUNCTURE: CPT

## 2020-06-21 PROCEDURE — 74011636320 HC RX REV CODE- 636/320: Performed by: EMERGENCY MEDICINE

## 2020-06-21 PROCEDURE — 84100 ASSAY OF PHOSPHORUS: CPT

## 2020-06-21 PROCEDURE — 83735 ASSAY OF MAGNESIUM: CPT

## 2020-06-21 PROCEDURE — 74011000258 HC RX REV CODE- 258: Performed by: EMERGENCY MEDICINE

## 2020-06-21 PROCEDURE — 96375 TX/PRO/DX INJ NEW DRUG ADDON: CPT

## 2020-06-21 PROCEDURE — 80053 COMPREHEN METABOLIC PANEL: CPT

## 2020-06-21 RX ORDER — MORPHINE SULFATE 4 MG/ML
4 INJECTION INTRAVENOUS
Status: COMPLETED | OUTPATIENT
Start: 2020-06-21 | End: 2020-06-21

## 2020-06-21 RX ORDER — SODIUM CHLORIDE, SODIUM LACTATE, POTASSIUM CHLORIDE, CALCIUM CHLORIDE 600; 310; 30; 20 MG/100ML; MG/100ML; MG/100ML; MG/100ML
50 INJECTION, SOLUTION INTRAVENOUS CONTINUOUS
Status: DISPENSED | OUTPATIENT
Start: 2020-06-21 | End: 2020-06-25

## 2020-06-21 RX ORDER — ONDANSETRON 2 MG/ML
4 INJECTION INTRAMUSCULAR; INTRAVENOUS
Status: DISCONTINUED | OUTPATIENT
Start: 2020-06-21 | End: 2020-06-26 | Stop reason: HOSPADM

## 2020-06-21 RX ORDER — SODIUM CHLORIDE 0.9 % (FLUSH) 0.9 %
5-10 SYRINGE (ML) INJECTION
Status: COMPLETED | OUTPATIENT
Start: 2020-06-21 | End: 2020-06-21

## 2020-06-21 RX ORDER — PANTOPRAZOLE SODIUM 40 MG/1
40 TABLET, DELAYED RELEASE ORAL
Status: DISCONTINUED | OUTPATIENT
Start: 2020-06-22 | End: 2020-06-23

## 2020-06-21 RX ORDER — SODIUM CHLORIDE 0.9 % (FLUSH) 0.9 %
5-40 SYRINGE (ML) INJECTION EVERY 8 HOURS
Status: DISCONTINUED | OUTPATIENT
Start: 2020-06-21 | End: 2020-06-26 | Stop reason: HOSPADM

## 2020-06-21 RX ORDER — ENOXAPARIN SODIUM 100 MG/ML
40 INJECTION SUBCUTANEOUS EVERY 24 HOURS
Status: DISCONTINUED | OUTPATIENT
Start: 2020-06-21 | End: 2020-06-26 | Stop reason: HOSPADM

## 2020-06-21 RX ORDER — NALOXONE HYDROCHLORIDE 0.4 MG/ML
0.4 INJECTION, SOLUTION INTRAMUSCULAR; INTRAVENOUS; SUBCUTANEOUS AS NEEDED
Status: DISCONTINUED | OUTPATIENT
Start: 2020-06-21 | End: 2020-06-26 | Stop reason: HOSPADM

## 2020-06-21 RX ORDER — ONDANSETRON 2 MG/ML
4 INJECTION INTRAMUSCULAR; INTRAVENOUS
Status: COMPLETED | OUTPATIENT
Start: 2020-06-21 | End: 2020-06-21

## 2020-06-21 RX ORDER — SODIUM CHLORIDE 0.9 % (FLUSH) 0.9 %
5-40 SYRINGE (ML) INJECTION AS NEEDED
Status: DISCONTINUED | OUTPATIENT
Start: 2020-06-21 | End: 2020-06-26 | Stop reason: HOSPADM

## 2020-06-21 RX ORDER — ACETAMINOPHEN 325 MG/1
650 TABLET ORAL
Status: DISCONTINUED | OUTPATIENT
Start: 2020-06-21 | End: 2020-06-26 | Stop reason: HOSPADM

## 2020-06-21 RX ORDER — HYDROMORPHONE HYDROCHLORIDE 1 MG/ML
0.5 INJECTION, SOLUTION INTRAMUSCULAR; INTRAVENOUS; SUBCUTANEOUS
Status: DISCONTINUED | OUTPATIENT
Start: 2020-06-21 | End: 2020-06-22

## 2020-06-21 RX ADMIN — MORPHINE SULFATE 4 MG: 4 INJECTION INTRAVENOUS at 16:46

## 2020-06-21 RX ADMIN — Medication 10 ML: at 18:39

## 2020-06-21 RX ADMIN — ENOXAPARIN SODIUM 40 MG: 40 INJECTION SUBCUTANEOUS at 21:11

## 2020-06-21 RX ADMIN — SODIUM CHLORIDE 1000 ML: 9 INJECTION, SOLUTION INTRAVENOUS at 16:47

## 2020-06-21 RX ADMIN — CEFTRIAXONE SODIUM 2 G: 2 INJECTION, POWDER, FOR SOLUTION INTRAMUSCULAR; INTRAVENOUS at 21:13

## 2020-06-21 RX ADMIN — MORPHINE SULFATE 4 MG: 4 INJECTION INTRAVENOUS at 17:43

## 2020-06-21 RX ADMIN — Medication 10 ML: at 21:42

## 2020-06-21 RX ADMIN — ONDANSETRON 4 MG: 2 INJECTION INTRAMUSCULAR; INTRAVENOUS at 16:46

## 2020-06-21 RX ADMIN — IOPAMIDOL 100 ML: 755 INJECTION, SOLUTION INTRAVENOUS at 18:39

## 2020-06-21 RX ADMIN — SODIUM CHLORIDE 100 ML: 900 INJECTION, SOLUTION INTRAVENOUS at 18:39

## 2020-06-21 RX ADMIN — SODIUM CHLORIDE, SODIUM LACTATE, POTASSIUM CHLORIDE, AND CALCIUM CHLORIDE 200 ML/HR: 600; 310; 30; 20 INJECTION, SOLUTION INTRAVENOUS at 21:13

## 2020-06-21 NOTE — ED PROVIDER NOTES
HPI:  59-year-old female is here with lower abdominal pain for the past 3 days. Initially bilateral lower abdomen. Now seems to radiate more upper. However when walking and will standing pain seems to be much more localizing toward the lower abdomen and toward the right. She denies any urinary symptoms. Recently came back from the beach. Denies any fever. Feels very bloated. Pain is more severe today. Unable to sleep. Woke up this morning with worsening symptoms. Went to [facility] who sent her here for evaluation. Prior hysterectomy however still has ovaries    ROS  Constitutional: No fever, no chills  Skin: no rash  Eye:   ENMT:   Respiratory: No shortness of breath, no cough  Cardiovascular: No chest pain, no palpitations  Gastrointestinal: No vomiting, + nausea, no diarrhea, + abdominal pain  : No dysuria  MSK: No back pain, no muscle pain, no joint pain  Neuro: No headache, no change in mental status, no numbness, no tingling, no weakness  Psych:   Endocrine:   All other review of systems positive per history of present illness and the above otherwise negative or noncontributory.     Visit Vitals  BP (!) 122/95 (BP 1 Location: Right arm, BP Patient Position: At rest)   Pulse 97   Temp 98.3 °F (36.8 °C)   Resp 18   Ht 5' 7\" (1.702 m)   Wt 107 kg (236 lb)   LMP 10/30/2015 (Approximate) Comment: 11/11/15   SpO2 96%   BMI 36.96 kg/m²     Past Medical History:   Diagnosis Date    Adrenal disorder, other 2007    adrenal fatigue    Allergic rhinitis     Bone spur of foot     right foot     Cancer ([facility] [address] 11/14/2017    Atypical skin    Chest pain     Chronic pain     lower back    Cough     Dyspepsia     Elevated troponin     Environmental allergies     GERD (gastroesophageal reflux disease)     History of positive PPD 1992    History of renal stone 4/1/2013    Insomnia     Lumbar degenerative disc disease 4/1/2013    Nausea & vomiting     Obesity     ÁNGEL (obstructive sleep apnea) 5/20/2020  Snores     SOB (shortness of breath)     Sore throat     Spastic bladder 2008    SVT (supraventricular tachycardia) (HCC)     Thromboembolus ([address]     \" possible DVT after IVF procedure and loss of pregnancy \"     Urge incontinence     Urinary frequency     Vertigo     Vitamin D deficiency 2009    Wheezing     Wheezing     uses inhalers as needed     Past Surgical History:   Procedure Laterality Date    CARDIAC SURG PROCEDURE UNLIST  03/27/2019    Dr. Werner Johnson; ablation    HX BLADDER SUSPENSION  2009    HX BREAST BIOPSY Right 2000     NOT A BIOPSY; excision infected spiderbite IMF    HX BREAST REDUCTION Bilateral 7/24/2019    BILATERAL BREAST REDUCTION performed by Damaris Wolff MD at [address] HX COLONOSCOPY  02/07/2019    Dr. Janet Olivas; diverticulosis    HX GYN  2006, 2008    removal of fibroid tumors from uterus x 2    HX HEART CATHETERIZATION  03/26/2019    HX MALIGNANT SKIN LESION EXCISION Right 11/14/2017    , [facility] [address] [facility]; jawline    HX MOHS PROCEDURES Right 05/2015    Dr. Alana Nuñez; precancerous abdomen    HX MYRINGOTOMY      1978, 2007, 2014, 2015; Dr. Cody Deleon Right 05/01/2015    Dr. Myesha Hilliard; rotater cuff    HX OTHER SURGICAL  2012    Fatty tumor removed from left shoulder    HX PARTIAL HYSTERECTOMY  11/11/15    robotic-assisted laparoscopic with salpingectomy bilateral; Dr. Rocky Callahan; secondary to fibroid uterus/menorrhagia    HX RHINOPLASTY  1985    HX TONSIL AND ADENOIDECTOMY  1978    HX UROLOGICAL  2009    bladder sling; Dr. Mariangel Casanova       Prior to Admission Medications   Prescriptions Last Dose Informant Patient Reported? Taking? LACTOBACILLUS ACIDOPHILUS (PROBIOTIC PO)   Yes No   Sig: Take 1 tablet by mouth daily. OTHER,NON-FORMULARY,   Yes No   Sig: Take 2 Tabs by mouth daily (after lunch).  Adrenal complex   OTHER,NON-FORMULARY,   Yes No   Sig: Indications: Methyl Protect one cap daily OTHER,NON-FORMULARY,   Yes No   Sig: daily. Iodine /potassium supplement  Indications: Iodoral 1/2 tab daily   OTHER,NON-FORMULARY,   Yes No   Sig: three (3) times daily as needed. Indications: Catecholacalm cap tid   PRASTERONE, DHEA, (DHEA PO)   Yes No   Sig: Take 15 mg by mouth daily. DHEA/testosterone in the progesterone cream   SELENIUM PO   Yes No   Sig: Take 1 Tab by mouth daily. allergy injection   Yes No   Si shots every 3 weeks   beclomethasone dipropionate (QNASL) 80 mcg/actuation HFAA   No No   Si Sprays by Nasal route daily. cholecalciferol, vitamin D3, 1,000 unit/drop drop   Yes No   Sig: Take 5 Drops by mouth daily. fluticasone propionate (Flovent Diskus) 100 mcg/actuation dsdv   No No   Sig: Take 2 Puffs by inhalation two (2) times a day. Indications: controller medication for asthma   levocetirizine (XYZAL) 5 mg tablet   No No   Sig: Take 1 Tab by mouth daily. Take / use AM day of surgery  per anesthesia protocols. Indications: inflammation of the nose due to an allergy   melatonin 1 mg tablet   Yes No   Sig: Take 1 mg by mouth nightly. mirabegron ER (MYRBETRIQ) 50 mg ER tablet   No No   Sig: Take 1 Tab by mouth daily. Patient taking differently: Take 50 mg by mouth daily. Take / use AM day of surgery  per anesthesia protocols. Indications: Frequent Urination   montelukast (Singulair) 10 mg tablet   No No   Sig: Take 1 Tab by mouth nightly. Indications: inflammation of the nose due to an allergy, controller medication for asthma   mv-min/iron/folic/calcium/vitK (WOMEN'S MULTIVITAMIN PO)   Yes No   Sig: Take  by mouth. ofloxacin (FLOXIN) 0.3 % ophthalmic solution   Yes No   Si Drop as needed. In bilateral ears if needed   raNITIdine (ZANTAC) 150 mg tablet   No No   Sig: TAKE 1 TABLET BY MOUTH TWO  TIMES DAILY   solifenacin (VESICARE) 5 mg tablet   Yes No   Sig: Take 5 mg by mouth nightly.  Indications: Frequent Urination      Facility-Administered Medications: None Adult Exam   General: alert, no acute distress  Head: normocephalic, atraumatic  ENT: moist mucous membranes  Neck: supple, non-tender; full range of motion  Cardiovascular: regular rate and rhythm, normal peripheral perfusion, no edema  Respiratory:  normal respirations; no wheezing, rales or rhonchi  Gastrointestinal: soft, tenderness palpation in the right lower quadrant, suprapubic region. Also some mild discomfort in the left lower quadrant. No rebound or guarding, no peritoneal signs, no distension. No CVA tenderness to percussion  Back: non-tender, full range of motion  Musculoskeletal: normal range of motion, normal strength, no gross deformities  Neurological: alert and oriented x 4, no gross focal deficits; normal speech  Psychiatric: cooperative; appropriate mood and affect    MDM:  Denies any urinary odor, frequency, UTI-like symptoms. Pain seems to localize to the right lower quadrant. Will obtain CT abdomen and pelvis with IV contrast for assessment of right lower quadrant abdominal pain. Had colonoscopy. Known history of diverticulosis with polyps. 4:54 PM  Signed out to on-coming physician pending CT A/P for evaluation of RLQ abdominal pain        Recent Results (from the past 12 hour(s))   CBC WITH AUTOMATED DIFF    Collection Time: 06/21/20  3:13 PM   Result Value Ref Range    WBC 15.7 (H) 4.3 - 11.1 K/uL    RBC 4.98 4.05 - 5.2 M/uL    HGB 13.8 11.7 - 15.4 g/dL    HCT 43.2 35.8 - 46.3 %    MCV 86.7 79.6 - 97.8 FL    MCH 27.7 26.1 - 32.9 PG    MCHC 31.9 31.4 - 35.0 g/dL    RDW 14.5 11.9 - 14.6 %    PLATELET 832 601 - 477 K/uL    MPV 10.7 9.4 - 12.3 FL    ABSOLUTE NRBC 0.00 0.0 - 0.2 K/uL    DF AUTOMATED      NEUTROPHILS 86 (H) 43 - 78 %    LYMPHOCYTES 8 (L) 13 - 44 %    MONOCYTES 6 4.0 - 12.0 %    EOSINOPHILS 0 (L) 0.5 - 7.8 %    BASOPHILS 0 0.0 - 2.0 %    IMMATURE GRANULOCYTES 1 0.0 - 5.0 %    ABS. NEUTROPHILS 13.5 (H) 1.7 - 8.2 K/UL    ABS. LYMPHOCYTES 1.2 0.5 - 4.6 K/UL    ABS. MONOCYTES 0.9 0.1 - 1.3 K/UL    ABS. EOSINOPHILS 0.0 0.0 - 0.8 K/UL    ABS. BASOPHILS 0.0 0.0 - 0.2 K/UL    ABS. IMM. GRANS. 0.1 0.0 - 0.5 K/UL   METABOLIC PANEL, COMPREHENSIVE    Collection Time: 06/21/20  3:13 PM   Result Value Ref Range    Sodium 138 136 - 145 mmol/L    Potassium 3.9 3.5 - 5.1 mmol/L    Chloride 101 98 - 107 mmol/L    CO2 28 21 - 32 mmol/L    Anion gap 9 7 - 16 mmol/L    Glucose 89 65 - 100 mg/dL    BUN 19 6 - 23 MG/DL    Creatinine 0.86 0.6 - 1.0 MG/DL    GFR est AA >60 >60 ml/min/1.73m2    GFR est non-AA >60 >60 ml/min/1.73m2    Calcium 9.0 8.3 - 10.4 MG/DL    Bilirubin, total 0.3 0.2 - 1.1 MG/DL    ALT (SGPT) 28 12 - 65 U/L    AST (SGOT) 23 15 - 37 U/L    Alk. phosphatase 145 (H) 50 - 136 U/L    Protein, total 7.7 6.3 - 8.2 g/dL    Albumin 3.5 3.5 - 5.0 g/dL    Globulin 4.2 (H) 2.3 - 3.5 g/dL    A-G Ratio 0.8 (L) 1.2 - 3.5     LIPASE    Collection Time: 06/21/20  3:13 PM   Result Value Ref Range    Lipase 360 73 - 393 U/L     7:20 PM discussed results with patient, CT findings consistent with acute pancreatitis. Her white count is elevated though her lipase is normal.  Repeat abdominal exam shows diffuse abdominal tenderness to palpation, worse in her right lower quadrant. She has had significant nausea without vomiting. She is requiring multiple doses of pain medication. This is a new diagnosis, I feel that she warrants inpatient admission for IV fluids and continued pain control. The hospitalist has been paged. Dragon voice recognition software was used to create this note. Although the note has been reviewed and corrected where necessary, additional errors may have been overlooked and remain in the text.

## 2020-06-21 NOTE — ED TRIAGE NOTES
Patient arrives ambulatory to triage with mask in place. Patient reports lower abdominal pain that began Friday. Patient reports intermittent cramping. Worked all day Saturday. Took norco that helped her sleep, but woke up in severe pain today. Attempted gas x without relief. Went to [id number], did urinalysis. Patient reports nausea, denies vomiting. Looser stools.

## 2020-06-22 ENCOUNTER — APPOINTMENT (OUTPATIENT)
Dept: ULTRASOUND IMAGING | Age: 50
DRG: 439 | End: 2020-06-22
Attending: HOSPITALIST
Payer: COMMERCIAL

## 2020-06-22 LAB
ALBUMIN SERPL-MCNC: 2.7 G/DL (ref 3.5–5)
ALBUMIN/GLOB SERPL: 0.8 {RATIO} (ref 1.2–3.5)
ALP SERPL-CCNC: 105 U/L (ref 50–136)
ALT SERPL-CCNC: 21 U/L (ref 12–65)
ANION GAP SERPL CALC-SCNC: 7 MMOL/L (ref 7–16)
APPEARANCE UR: CLEAR
AST SERPL-CCNC: 11 U/L (ref 15–37)
BASOPHILS # BLD: 0 K/UL (ref 0–0.2)
BASOPHILS NFR BLD: 0 % (ref 0–2)
BILIRUB SERPL-MCNC: 0.3 MG/DL (ref 0.2–1.1)
BILIRUB UR QL: NEGATIVE
BUN SERPL-MCNC: 15 MG/DL (ref 6–23)
CALCIUM SERPL-MCNC: 8.4 MG/DL (ref 8.3–10.4)
CHLORIDE SERPL-SCNC: 105 MMOL/L (ref 98–107)
CO2 SERPL-SCNC: 28 MMOL/L (ref 21–32)
COLOR UR: YELLOW
CREAT SERPL-MCNC: 0.83 MG/DL (ref 0.6–1)
DIFFERENTIAL METHOD BLD: ABNORMAL
EOSINOPHIL # BLD: 0 K/UL (ref 0–0.8)
EOSINOPHIL NFR BLD: 0 % (ref 0.5–7.8)
ERYTHROCYTE [DISTWIDTH] IN BLOOD BY AUTOMATED COUNT: 14.8 % (ref 11.9–14.6)
GLOBULIN SER CALC-MCNC: 3.5 G/DL (ref 2.3–3.5)
GLUCOSE SERPL-MCNC: 80 MG/DL (ref 65–100)
GLUCOSE UR STRIP.AUTO-MCNC: NEGATIVE MG/DL
HCT VFR BLD AUTO: 35.9 % (ref 35.8–46.3)
HGB BLD-MCNC: 11.5 G/DL (ref 11.7–15.4)
HGB UR QL STRIP: NEGATIVE
IMM GRANULOCYTES # BLD AUTO: 0.1 K/UL (ref 0–0.5)
IMM GRANULOCYTES NFR BLD AUTO: 0 % (ref 0–5)
KETONES UR QL STRIP.AUTO: 40 MG/DL
LEUKOCYTE ESTERASE UR QL STRIP.AUTO: NEGATIVE
LIPASE SERPL-CCNC: 211 U/L (ref 73–393)
LYMPHOCYTES # BLD: 0.9 K/UL (ref 0.5–4.6)
LYMPHOCYTES NFR BLD: 8 % (ref 13–44)
MCH RBC QN AUTO: 28.2 PG (ref 26.1–32.9)
MCHC RBC AUTO-ENTMCNC: 32 G/DL (ref 31.4–35)
MCV RBC AUTO: 88 FL (ref 79.6–97.8)
MONOCYTES # BLD: 0.8 K/UL (ref 0.1–1.3)
MONOCYTES NFR BLD: 7 % (ref 4–12)
NEUTS SEG # BLD: 10.3 K/UL (ref 1.7–8.2)
NEUTS SEG NFR BLD: 85 % (ref 43–78)
NITRITE UR QL STRIP.AUTO: NEGATIVE
NRBC # BLD: 0 K/UL (ref 0–0.2)
PH UR STRIP: 5.5 [PH] (ref 5–9)
PLATELET # BLD AUTO: 208 K/UL (ref 150–450)
PMV BLD AUTO: 11.1 FL (ref 9.4–12.3)
POTASSIUM SERPL-SCNC: 4 MMOL/L (ref 3.5–5.1)
PROT SERPL-MCNC: 6.2 G/DL (ref 6.3–8.2)
PROT UR STRIP-MCNC: NEGATIVE MG/DL
RBC # BLD AUTO: 4.08 M/UL (ref 4.05–5.2)
SODIUM SERPL-SCNC: 140 MMOL/L (ref 136–145)
SP GR UR REFRACTOMETRY: 1.04 (ref 1–1.02)
UROBILINOGEN UR QL STRIP.AUTO: 0.2 EU/DL (ref 0.2–1)
WBC # BLD AUTO: 12.1 K/UL (ref 4.3–11.1)

## 2020-06-22 PROCEDURE — 36415 COLL VENOUS BLD VENIPUNCTURE: CPT

## 2020-06-22 PROCEDURE — 80053 COMPREHEN METABOLIC PANEL: CPT

## 2020-06-22 PROCEDURE — 74011250637 HC RX REV CODE- 250/637: Performed by: INTERNAL MEDICINE

## 2020-06-22 PROCEDURE — 85025 COMPLETE CBC W/AUTO DIFF WBC: CPT

## 2020-06-22 PROCEDURE — 87086 URINE CULTURE/COLONY COUNT: CPT

## 2020-06-22 PROCEDURE — 74011250636 HC RX REV CODE- 250/636: Performed by: INTERNAL MEDICINE

## 2020-06-22 PROCEDURE — 65270000029 HC RM PRIVATE

## 2020-06-22 PROCEDURE — 74011000258 HC RX REV CODE- 258: Performed by: HOSPITALIST

## 2020-06-22 PROCEDURE — 76705 ECHO EXAM OF ABDOMEN: CPT

## 2020-06-22 PROCEDURE — 74011250637 HC RX REV CODE- 250/637: Performed by: HOSPITALIST

## 2020-06-22 PROCEDURE — 81003 URINALYSIS AUTO W/O SCOPE: CPT

## 2020-06-22 PROCEDURE — 83690 ASSAY OF LIPASE: CPT

## 2020-06-22 PROCEDURE — 74011250636 HC RX REV CODE- 250/636: Performed by: HOSPITALIST

## 2020-06-22 RX ORDER — HYDROMORPHONE HYDROCHLORIDE 1 MG/ML
1 INJECTION, SOLUTION INTRAMUSCULAR; INTRAVENOUS; SUBCUTANEOUS ONCE
Status: COMPLETED | OUTPATIENT
Start: 2020-06-22 | End: 2020-06-22

## 2020-06-22 RX ORDER — HYDROMORPHONE HYDROCHLORIDE 1 MG/ML
1 INJECTION, SOLUTION INTRAMUSCULAR; INTRAVENOUS; SUBCUTANEOUS
Status: DISCONTINUED | OUTPATIENT
Start: 2020-06-22 | End: 2020-06-26 | Stop reason: HOSPADM

## 2020-06-22 RX ORDER — HYDROCODONE BITARTRATE AND ACETAMINOPHEN 5; 325 MG/1; MG/1
1 TABLET ORAL
Status: DISCONTINUED | OUTPATIENT
Start: 2020-06-22 | End: 2020-06-24

## 2020-06-22 RX ADMIN — HYDROMORPHONE HYDROCHLORIDE 1 MG: 1 INJECTION, SOLUTION INTRAMUSCULAR; INTRAVENOUS; SUBCUTANEOUS at 19:17

## 2020-06-22 RX ADMIN — SODIUM CHLORIDE, SODIUM LACTATE, POTASSIUM CHLORIDE, AND CALCIUM CHLORIDE 200 ML/HR: 600; 310; 30; 20 INJECTION, SOLUTION INTRAVENOUS at 07:55

## 2020-06-22 RX ADMIN — SODIUM CHLORIDE, SODIUM LACTATE, POTASSIUM CHLORIDE, AND CALCIUM CHLORIDE 200 ML/HR: 600; 310; 30; 20 INJECTION, SOLUTION INTRAVENOUS at 02:25

## 2020-06-22 RX ADMIN — HYDROMORPHONE HYDROCHLORIDE 0.5 MG: 1 INJECTION, SOLUTION INTRAMUSCULAR; INTRAVENOUS; SUBCUTANEOUS at 02:16

## 2020-06-22 RX ADMIN — Medication 10 ML: at 22:10

## 2020-06-22 RX ADMIN — Medication 10 ML: at 06:44

## 2020-06-22 RX ADMIN — SODIUM CHLORIDE, SODIUM LACTATE, POTASSIUM CHLORIDE, AND CALCIUM CHLORIDE 125 ML/HR: 600; 310; 30; 20 INJECTION, SOLUTION INTRAVENOUS at 16:03

## 2020-06-22 RX ADMIN — HYDROMORPHONE HYDROCHLORIDE 1 MG: 1 INJECTION, SOLUTION INTRAMUSCULAR; INTRAVENOUS; SUBCUTANEOUS at 13:59

## 2020-06-22 RX ADMIN — ACETAMINOPHEN 650 MG: 325 TABLET, FILM COATED ORAL at 06:29

## 2020-06-22 RX ADMIN — ENOXAPARIN SODIUM 40 MG: 40 INJECTION SUBCUTANEOUS at 22:00

## 2020-06-22 RX ADMIN — PANTOPRAZOLE SODIUM 40 MG: 40 TABLET, DELAYED RELEASE ORAL at 16:02

## 2020-06-22 RX ADMIN — PANTOPRAZOLE SODIUM 40 MG: 40 TABLET, DELAYED RELEASE ORAL at 06:26

## 2020-06-22 RX ADMIN — Medication 10 ML: at 14:00

## 2020-06-22 RX ADMIN — ONDANSETRON 4 MG: 2 INJECTION INTRAMUSCULAR; INTRAVENOUS at 19:17

## 2020-06-22 RX ADMIN — ONDANSETRON 4 MG: 2 INJECTION INTRAMUSCULAR; INTRAVENOUS at 12:21

## 2020-06-22 RX ADMIN — SODIUM CHLORIDE, SODIUM LACTATE, POTASSIUM CHLORIDE, AND CALCIUM CHLORIDE 200 ML/HR: 600; 310; 30; 20 INJECTION, SOLUTION INTRAVENOUS at 12:06

## 2020-06-22 RX ADMIN — HYDROMORPHONE HYDROCHLORIDE 0.5 MG: 1 INJECTION, SOLUTION INTRAMUSCULAR; INTRAVENOUS; SUBCUTANEOUS at 10:32

## 2020-06-22 RX ADMIN — HYDROCODONE BITARTRATE AND ACETAMINOPHEN 1 TABLET: 5; 325 TABLET ORAL at 16:06

## 2020-06-22 RX ADMIN — CEFTRIAXONE SODIUM 2 G: 2 INJECTION, POWDER, FOR SOLUTION INTRAMUSCULAR; INTRAVENOUS at 22:00

## 2020-06-22 RX ADMIN — SODIUM CHLORIDE, SODIUM LACTATE, POTASSIUM CHLORIDE, AND CALCIUM CHLORIDE 125 ML/HR: 600; 310; 30; 20 INJECTION, SOLUTION INTRAVENOUS at 23:39

## 2020-06-22 NOTE — PROGRESS NOTES
Care Management Interventions  Mode of Transport at Discharge: Self  Transition of Care Consult (CM Consult): Discharge Planning  Discharge Durable Medical Equipment: No  Physical Therapy Consult: No  Occupational Therapy Consult: No  Confirm Follow Up Transport: Self  Discharge Location  Discharge Placement: Home    Chart screened by  for discharge planning. Patient will likely return home at discharge. No needs identified at this time. CM will continue to follow patient during hospitalization for discharge planning and needs. Please consult  if any new issues arise.

## 2020-06-22 NOTE — PROGRESS NOTES
Up ad lulú ambulating in plata. IVF infusing. Tolerating cl liq. Medicated for abd pain and nausea today. No BM.

## 2020-06-22 NOTE — ED NOTES
Hospitalist at bedside. Vital signs updated. Patient denies having any needs at this time. Appears in no acute distress. Awaiting transport to room assignment.

## 2020-06-22 NOTE — PROGRESS NOTES
Hospitalist Progress Note     Admit Date:  2020  3:32 PM   Name:  Teja Welch   Age:  45 y.o.  :  1970   MRN:  [id number]   PCP:  Melecio Mueller MD  Treatment Team: Attending Provider: Harjit Saha MD; Care Manager: Justo Vance RN    Subjective:     2020patient seen and evaluated. New patient for me today. Admitted overnight secondary to acute pancreatitis. Complains of significant abdominal pain and nausea associated with pain medications. Denies any prior history of the same; asked if this could have happened secondary to 2 drinks. Objective:     Patient Vitals for the past 24 hrs:   Temp Pulse Resp BP SpO2   20 1304 98.1 °F (36.7 °C) 85 18 112/67 92 %   20 0824 98 °F (36.7 °C) 83 18 114/70 95 %   20 0401 99.4 °F (37.4 °C) 94 17 109/65 90 %   20 0111 99.1 °F (37.3 °C) (!) 101 18 104/62 92 %   20 0030    (!) 84/40 100 %   20 2031 98.3 °F (36.8 °C) 100 17 99/54 96 %   20 98.3 °F (36.8 °C) 84 16 99/56 98 %   20 1748 98.7 °F (37.1 °C) 85 18 138/69 97 %     Oxygen Therapy  O2 Sat (%): 92 % (20 1304)  O2 Device: Room air (20)    Intake/Output Summary (Last 24 hours) at 2020 1512  Last data filed at 2020  Gross per 24 hour   Intake 1100 ml   Output    Net 1100 ml         General:    Well nourished. Alert. Morbidly obese  CV:   RRR. No murmur, rub, or gallop. Lungs:   CTAB. No wheezing, rhonchi, or rales. Abdomen:   Soft, nontender, nondistended. Extremities: Warm and dry. No cyanosis or edema. Skin:     No rashes or jaundice. Data Review:  I have reviewed all labs, meds, telemetry events, and studies from the last 24 hours.     Recent Results (from the past 24 hour(s))   CBC WITH AUTOMATED DIFF    Collection Time: 20  3:13 PM   Result Value Ref Range    WBC 15.7 (H) 4.3 - 11.1 K/uL    RBC 4.98 4.05 - 5.2 M/uL    HGB 13.8 11.7 - 15.4 g/dL    HCT 43.2 35.8 - 46.3 %    MCV 86.7 79.6 - 97.8 FL    MCH 27.7 26.1 - 32.9 PG    MCHC 31.9 31.4 - 35.0 g/dL    RDW 14.5 11.9 - 14.6 %    PLATELET 232 146 - 362 K/uL    MPV 10.7 9.4 - 12.3 FL    ABSOLUTE NRBC 0.00 0.0 - 0.2 K/uL    DF AUTOMATED      NEUTROPHILS 86 (H) 43 - 78 %    LYMPHOCYTES 8 (L) 13 - 44 %    MONOCYTES 6 4.0 - 12.0 %    EOSINOPHILS 0 (L) 0.5 - 7.8 %    BASOPHILS 0 0.0 - 2.0 %    IMMATURE GRANULOCYTES 1 0.0 - 5.0 %    ABS. NEUTROPHILS 13.5 (H) 1.7 - 8.2 K/UL    ABS. LYMPHOCYTES 1.2 0.5 - 4.6 K/UL    ABS. MONOCYTES 0.9 0.1 - 1.3 K/UL    ABS. EOSINOPHILS 0.0 0.0 - 0.8 K/UL    ABS. BASOPHILS 0.0 0.0 - 0.2 K/UL    ABS. IMM. GRANS. 0.1 0.0 - 0.5 K/UL   METABOLIC PANEL, COMPREHENSIVE    Collection Time: 06/21/20  3:13 PM   Result Value Ref Range    Sodium 138 136 - 145 mmol/L    Potassium 3.9 3.5 - 5.1 mmol/L    Chloride 101 98 - 107 mmol/L    CO2 28 21 - 32 mmol/L    Anion gap 9 7 - 16 mmol/L    Glucose 89 65 - 100 mg/dL    BUN 19 6 - 23 MG/DL    Creatinine 0.86 0.6 - 1.0 MG/DL    GFR est AA >60 >60 ml/min/1.73m2    GFR est non-AA >60 >60 ml/min/1.73m2    Calcium 9.0 8.3 - 10.4 MG/DL    Bilirubin, total 0.3 0.2 - 1.1 MG/DL    ALT (SGPT) 28 12 - 65 U/L    AST (SGOT) 23 15 - 37 U/L    Alk.  phosphatase 145 (H) 50 - 136 U/L    Protein, total 7.7 6.3 - 8.2 g/dL    Albumin 3.5 3.5 - 5.0 g/dL    Globulin 4.2 (H) 2.3 - 3.5 g/dL    A-G Ratio 0.8 (L) 1.2 - 3.5     LIPASE    Collection Time: 06/21/20  3:13 PM   Result Value Ref Range    Lipase 360 73 - 393 U/L   C REACTIVE PROTEIN, QT    Collection Time: 06/21/20  8:49 PM   Result Value Ref Range    C-Reactive protein 9.4 (H) 0.0 - 0.9 mg/dL   LD    Collection Time: 06/21/20  8:49 PM   Result Value Ref Range     100 - 190 U/L   MAGNESIUM    Collection Time: 06/21/20  8:49 PM   Result Value Ref Range    Magnesium 1.9 1.8 - 2.4 mg/dL   PHOSPHORUS    Collection Time: 06/21/20  8:49 PM   Result Value Ref Range    Phosphorus 3.4 2.5 - 4.5 MG/DL   LACTIC ACID    Collection Time: 06/21/20  8:49 PM   Result Value Ref Range    Lactic acid 0.6 0.4 - 2.0 MMOL/L   LIPID PANEL    Collection Time: 06/21/20  8:49 PM   Result Value Ref Range    LIPID PROFILE          Cholesterol, total 97 <200 MG/DL    Triglyceride 40 35 - 150 MG/DL    HDL Cholesterol 63 (H) 40 - 60 MG/DL    LDL, calculated 26 <100 MG/DL    VLDL, calculated 8 6.0 - 23.0 MG/DL    CHOL/HDL Ratio 1.5     LIPASE    Collection Time: 06/22/20  5:32 AM   Result Value Ref Range    Lipase 211 73 - 881 U/L   METABOLIC PANEL, COMPREHENSIVE    Collection Time: 06/22/20  5:32 AM   Result Value Ref Range    Sodium 140 136 - 145 mmol/L    Potassium 4.0 3.5 - 5.1 mmol/L    Chloride 105 98 - 107 mmol/L    CO2 28 21 - 32 mmol/L    Anion gap 7 7 - 16 mmol/L    Glucose 80 65 - 100 mg/dL    BUN 15 6 - 23 MG/DL    Creatinine 0.83 0.6 - 1.0 MG/DL    GFR est AA >60 >60 ml/min/1.73m2    GFR est non-AA >60 >60 ml/min/1.73m2    Calcium 8.4 8.3 - 10.4 MG/DL    Bilirubin, total 0.3 0.2 - 1.1 MG/DL    ALT (SGPT) 21 12 - 65 U/L    AST (SGOT) 11 (L) 15 - 37 U/L    Alk. phosphatase 105 50 - 136 U/L    Protein, total 6.2 (L) 6.3 - 8.2 g/dL    Albumin 2.7 (L) 3.5 - 5.0 g/dL    Globulin 3.5 2.3 - 3.5 g/dL    A-G Ratio 0.8 (L) 1.2 - 3.5     CBC WITH AUTOMATED DIFF    Collection Time: 06/22/20  5:32 AM   Result Value Ref Range    WBC 12.1 (H) 4.3 - 11.1 K/uL    RBC 4.08 4.05 - 5.2 M/uL    HGB 11.5 (L) 11.7 - 15.4 g/dL    HCT 35.9 35.8 - 46.3 %    MCV 88.0 79.6 - 97.8 FL    MCH 28.2 26.1 - 32.9 PG    MCHC 32.0 31.4 - 35.0 g/dL    RDW 14.8 (H) 11.9 - 14.6 %    PLATELET 125 126 - 883 K/uL    MPV 11.1 9.4 - 12.3 FL    ABSOLUTE NRBC 0.00 0.0 - 0.2 K/uL    DF AUTOMATED      NEUTROPHILS 85 (H) 43 - 78 %    LYMPHOCYTES 8 (L) 13 - 44 %    MONOCYTES 7 4.0 - 12.0 %    EOSINOPHILS 0 (L) 0.5 - 7.8 %    BASOPHILS 0 0.0 - 2.0 %    IMMATURE GRANULOCYTES 0 0.0 - 5.0 %    ABS. NEUTROPHILS 10.3 (H) 1.7 - 8.2 K/UL    ABS. LYMPHOCYTES 0.9 0.5 - 4.6 K/UL    ABS.  MONOCYTES 0.8 0.1 - 1.3 K/UL ABS. EOSINOPHILS 0.0 0.0 - 0.8 K/UL    ABS. BASOPHILS 0.0 0.0 - 0.2 K/UL    ABS. IMM. GRANS. 0.1 0.0 - 0.5 K/UL   URINALYSIS W/ RFLX MICROSCOPIC    Collection Time: 06/22/20  7:00 AM   Result Value Ref Range    Color YELLOW      Appearance CLEAR      Specific gravity 1.040 (H) 1.001 - 1.023      pH (UA) 5.5 5.0 - 9.0      Protein Negative NEG mg/dL    Glucose Negative mg/dL    Ketone 40 (A) NEG mg/dL    Bilirubin Negative NEG      Blood Negative NEG      Urobilinogen 0.2 0.2 - 1.0 EU/dL    Nitrites Negative NEG      Leukocyte Esterase Negative NEG          All Micro Results     Procedure Component Value Units Date/Time    CULTURE, URINE [[id number]] Collected:  06/22/20 0705    Order Status:  Completed Specimen:  Urine from Clean catch Updated:  06/22/20 0936          Current Meds:  Current Facility-Administered Medications   Medication Dose Route Frequency    HYDROmorphone (PF) (DILAUDID) injection 1 mg  1 mg IntraVENous Q4H PRN    HYDROcodone-acetaminophen (NORCO) 5-325 mg per tablet 1 Tab  1 Tab Oral Q4H PRN    lactated Ringers infusion  200 mL/hr IntraVENous CONTINUOUS    pantoprazole (PROTONIX) tablet 40 mg  40 mg Oral ACB&D    promethazine (PHENERGAN) with saline injection 12.5 mg  12.5 mg IntraVENous Q6H PRN    cefTRIAXone (ROCEPHIN) 2 g in 0.9% sodium chloride (MBP/ADV) 50 mL  2 g IntraVENous Q24H    sodium chloride (NS) flush 5-40 mL  5-40 mL IntraVENous Q8H    sodium chloride (NS) flush 5-40 mL  5-40 mL IntraVENous PRN    acetaminophen (TYLENOL) tablet 650 mg  650 mg Oral Q4H PRN    naloxone (NARCAN) injection 0.4 mg  0.4 mg IntraVENous PRN    ondansetron (ZOFRAN) injection 4 mg  4 mg IntraVENous Q4H PRN    enoxaparin (LOVENOX) injection 40 mg  40 mg SubCUTAneous Q24H       Other Studies (last 24 hours):  Ct Abd Pelv W Cont    Result Date: 6/21/2020  CT ABDOMEN AND PELVIS WITH INTRAVENOUS CONTRAST DATED 6/21/2020. History: Lower abdominal pain beginning on Friday. Intermittent cramping. Comparison: None. Technique:   Multiple contiguous helical CT images reconstructed at 5 mm intervals were obtained from above the diaphragms through the ischial tuberosities following 100 cc Isovue-370 without acute complication. All CT scans performed at this facility use one or all of the following: Automated exposure control, adjustment of the mA and/or kVp according to patient's size, iterative reconstruction. Findings: CT ABDOMEN:  Limited evaluation of the lung bases and base of the mediastinum demonstrates no significant abnormalities. The Liver is homogeneous in attenuation. The spleen is homogeneous in attenuation. No contour deforming or enhancing mass lesions are seen of the pancreas or adrenal glands. However, there are inflammatory changes which appears centered about the pancreas which will be described later in this report. The gallbladder has an unremarkable CT appearance without radiopaque stones or pericholecystic fluid/inflammatory changes. The kidneys enhance symmetrically and no evidence of hydronephrosis is seen. The visualized loops of small bowel and colon are normal in caliber. The appendix is seen on image 79 without acute abnormality. Mild to moderate diverticulosis is seen of the left colon without associated acute inflammatory changes. No free air is seen. However, abnormal mesenteric stranding is seen which appears centered about the pancreatic head. The appearance suggest acute pancreatitis. Additional likely reactive small currently nonloculated appearing fluid is seen tracking along the bilateral anterior pararenal fascial planes and into the right paracolic gutter. No adenopathy is seen. The abdominal aorta is unremarkable in appearance. CT PELVIS: No abnormal pelvic fluid collections or inflammatory changes are present. No pelvic adenopathy is seen. The urinary bladder is unremarkable. IMPRESSION:  1. Findings consistent with acute pancreatitis as described above. [id number] [facility]    Result Date: 6/22/2020  HISTORY:Acute pancreatitis EXAM: Right upper quadrant ultrasound TECHNIQUE: Real-time grayscale and color Doppler imaging is performed in the longitudinal and transverse planes. No comparison. FINDINGS:There is normal hepatic echogenicity. .  There is no biliary ductal dilatation. The common bile duct measures 5 mm. The right kidney measures 11.2 cm. The gallbladder is unremarkable. No evidence of gallstones. No gallbladder wall thickening or pericholecystic fluid. The aorta is normal.  The IVC is patent. IMPRESSION: Unremarkable right upper quadrant ultrasound.       Assessment and Plan:     Hospital Problems as of 6/22/2020 Date Reviewed: 7/24/2019          Codes Class Noted - Resolved POA    * (Principal) Acute pancreatitis ICD-10-CM: K85.90  ICD-9-CM: 046.3  6/21/2020 - Present Yes        GERD (gastroesophageal reflux disease) ICD-10-CM: K21.9  ICD-9-CM: 530.81  Unknown - Present Yes        Leukocytosis ICD-10-CM: D72.829  ICD-9-CM: 288.60  6/21/2020 - Present Yes        ÁNGEL (obstructive sleep apnea) ICD-10-CM: G47.33  ICD-9-CM: 327.23  5/20/2020 - Present Yes        Obesity, morbid ([facility] [address] ICD-10-CM: E66.01  ICD-9-CM: 278.01  1/14/2019 - Present Yes              PLAN:    · Acute pancreatitis continue IV fluids; continue pain management and nausea management and supportive care; gallbladder ultrasound within normal limits; will decrease IV fluids from 200 mL's per hour to 125 mL's per hour  · Concern for possible UTIcontinue Rocephin for now; urine cultures no growth to date  · ÁNGEL patient's  bring in her home machine  · GERD-continue Protonix  ·  to bring in her home medications-patient would like permission to take her home medications and that is okay; will review once they have been brought and updated in her MAR;   · Further work-up and management based on her clinical course    DC planning/Dispo: Next 1 to 2 days depending on her clinical course as well as whether or not she has an actual urinary tract infection  DVT ppx: Lovenox    Signed:  Nasreen Trotter MD

## 2020-06-22 NOTE — ED NOTES
TRANSFER - OUT REPORT:    Verbal report given to Lurene Mohs (name) on Joe Fulton  being transferred to [address] [place] [address] (unit) for routine progression of care       Report consisted of patients Situation, Background, Assessment and   Recommendations(SBAR). Information from the following report(s) SBAR, ED Summary, [facility] and Recent Results was reviewed with the receiving nurse. Lines:   Peripheral IV 06/21/20 Left Antecubital (Active)   Site Assessment Clean, dry, & intact 6/21/2020  3:12 PM   Phlebitis Assessment 0 6/21/2020  3:12 PM   Infiltration Assessment 0 6/21/2020  3:12 PM   Dressing Status Clean, dry, & intact 6/21/2020  3:12 PM   Dressing Type 4 X 4;Transparent 6/21/2020  3:12 PM   Hub Color/Line Status Pink 6/21/2020  3:12 PM        Opportunity for questions and clarification was provided.       Patient transported with:   [place]

## 2020-06-22 NOTE — PROGRESS NOTES
Hourly rounds complete this shift, no new complaints at this time, patient c/o abd pain, prn pain medication given: bed in low, locked position, call light and bedside table within reach,  all needs met. Will continue to monitor Report to day shift nurse.

## 2020-06-22 NOTE — H&P
Hospitalist H&P/Consult Note     Admit Date:  2020  3:32 PM   Name:  Saira Retana   Age:  45 y.o.  :  1970   MRN:  [id number]   PCP:  Martha Solis MD  Treatment Team: Attending Provider: Hanane Delgado MD    HPI:   Patient is a 46 y/o female with hx GERD, obesity, ÁNGEL, diverticulosis who presents to ED with bilateral lower abdominal cramping pain that began Friday. She has been unable to sleep because of it. Has had nausea but no vomiting. Has loose stools and flatulence. Went to  and had UA but appears to be a contaminated specimen. She was directed to come to ED for further workup. Here she was noted to have abdominal tenderness more so in both lower quadrants. She had a WBC ct of 15.7. Lipase 360 but CT abdomen/pelvis has findings consistent with acute pancreatitis. The gallbladder has unremarkable appearance. She did just return from the beach but only had one alcohol drink and she does not regularly consume alcohol. She has no hx of hypertriglyceridemia. As she is still having significant nausea and pain requiring medication it was felt she would benefit from admission to hospital.     10 systems reviewed and negative except as noted in HPI.   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[address] 2017    Atypical skin    Chest pain     Chronic pain     lower back    Cough     Dyspepsia     Elevated troponin     Environmental allergies     GERD (gastroesophageal reflux disease)     History of positive PPD 1992    History of renal stone 2013    Insomnia     Lumbar degenerative disc disease 2013    Nausea & vomiting     Obesity     ÁNGEL (obstructive sleep apnea) 2020    Snores     SOB (shortness of breath)     Sore throat     Spastic bladder     SVT (supraventricular tachycardia) ([address]     Thromboembolus ([address]     \" possible DVT after IVF procedure and loss of pregnancy \"     Urge incontinence     Urinary frequency     Vertigo     Vitamin D deficiency 2009    Wheezing     Wheezing     uses inhalers as needed      Past Surgical History:   Procedure Laterality Date    CARDIAC SURG PROCEDURE UNLIST  03/27/2019    Dr. Vitaliy Hale; ablation    HX BLADDER SUSPENSION  2009    HX BREAST BIOPSY Right 2000     NOT A BIOPSY; excision infected spiderbite IMF    HX BREAST REDUCTION Bilateral 7/24/2019    BILATERAL BREAST REDUCTION performed by Vance Goldman MD at [address] HX COLONOSCOPY  02/07/2019    Dr. Soraya Taylor; diverticulosis    HX GYN  2006, 2008    removal of fibroid tumors from uterus x 2    HX HEART CATHETERIZATION  03/26/2019    HX MALIGNANT SKIN LESION EXCISION Right 11/14/2017    , [facility] [address] [place] [address] [address]; jawline    HX MOHS PROCEDURES Right 05/2015    Dr. Bari Epley; precancerous abdomen    HX MYRINGOTOMY      1978, 2007, 2014, 2015; Dr. Sepideh Barba Right 05/01/2015    Dr. Jacqueline Ritter; rotater cuff    HX OTHER SURGICAL  2012    Fatty tumor removed from left shoulder    HX PARTIAL HYSTERECTOMY  11/11/15    robotic-assisted laparoscopic with salpingectomy bilateral; Dr. Debra Hawkins; secondary to fibroid uterus/menorrhagia    HX RHINOPLASTY  1985    HX TONSIL AND ADENOIDECTOMY  1978    HX UROLOGICAL  2009    bladder sling; Dr. Bijal Cortes        Prior to Admission Medications   Prescriptions Last Dose Informant Patient Reported? Taking? LACTOBACILLUS ACIDOPHILUS (PROBIOTIC PO) 6/20/2020 at 0900  Yes Yes   Sig: Take 1 tablet by mouth daily. OTHER,NON-FORMULARY, Unknown at Unknown time  Yes No   Sig: Take 2 Tabs by mouth daily (after lunch). Adrenal complex   OTHER,NON-FORMULARY, 6/20/2020 at 0900  Yes Yes   Sig: Indications: Methyl Protect one cap daily   OTHER,NON-FORMULARY, 6/20/2020 at 0900  Yes Yes   Sig: daily.  Iodine /potassium supplement  Indications: Iodoral 1/2 tab daily   OTHER,NON-FORMULARY, 2020 at 2100  Yes Yes   Sig: three (3) times daily as needed. Indications: Catecholacalm cap tid   PRASTERONE, DHEA, (DHEA PO) 2020 at 2100  Yes Yes   Sig: Take 15 mg by mouth daily. DHEA/testosterone in the progesterone cream   SELENIUM PO 2020 at 0900  Yes Yes   Sig: Take 1 Tab by mouth daily. allergy injection 2020 at Unknown time  Yes Yes   Si shots every 3 weeks   beclomethasone dipropionate (QNASL) 80 mcg/actuation HFAA 2020 at Unknown time  No Yes   Si Sprays by Nasal route daily. cholecalciferol, vitamin D3, 1,000 unit/drop drop Not Taking at Unknown time  Yes No   Sig: Take 5 Drops by mouth daily. fluticasone propionate (Flovent Diskus) 100 mcg/actuation dsdv 2020 at 2100  No Yes   Sig: Take 2 Puffs by inhalation two (2) times a day. Indications: controller medication for asthma   levocetirizine (XYZAL) 5 mg tablet 2020 at 0900  No Yes   Sig: Take 1 Tab by mouth daily. Take / use AM day of surgery  per anesthesia protocols. Indications: inflammation of the nose due to an allergy   melatonin 1 mg tablet 2020 at 2100  Yes Yes   Sig: Take 1 mg by mouth nightly. mirabegron ER (MYRBETRIQ) 50 mg ER tablet Not Taking at Unknown time  No No   Sig: Take 1 Tab by mouth daily. Patient taking differently: Take 50 mg by mouth daily. Take / use AM day of surgery  per anesthesia protocols. Indications: Frequent Urination   montelukast (Singulair) 10 mg tablet 2020 at 2100  No Yes   Sig: Take 1 Tab by mouth nightly. Indications: inflammation of the nose due to an allergy, controller medication for asthma   mv-min/iron/folic/calcium/vitK (WOMEN'S MULTIVITAMIN PO) 2020 at 0900  Yes Yes   Sig: Take  by mouth. ofloxacin (FLOXIN) 0.3 % ophthalmic solution Unknown at Unknown time  Yes No   Si Drop as needed.  In bilateral ears if needed   raNITIdine (ZANTAC) 150 mg tablet Not Taking at Unknown time  No No   Sig: TAKE 1 TABLET BY MOUTH TWO  TIMES DAILY   solifenacin (VESICARE) 5 mg tablet Not Taking at Unknown time  Yes No   Sig: Take 5 mg by mouth nightly.  Indications: Frequent Urination      Facility-Administered Medications: None     Allergies   Allergen Reactions    Adhesive Tape-Silicones Contact Dermatitis     Causes blisters    Ciprofloxacin Rash    Sulfa (Sulfonamide Antibiotics) Rash      Social History     Tobacco Use    Smoking status: Never Smoker    Smokeless tobacco: Never Used   Substance Use Topics    Alcohol use: No      Family History   Problem Relation Age of Onset    Hypertension Father     Cancer Maternal Grandfather         bladder cancer    Psychiatric Disorder Maternal Grandfather         Alzheimers    Cancer Paternal Grandmother 80        pancreatic cancer    Psychiatric Disorder Paternal Grandmother         Alzheimer's    No Known Problems Mother     MS Sister     Psychiatric Disorder Sister         depression    Neuropathy Brother     Cancer Paternal Aunt         colon, thyroid    Cancer Paternal Uncle         brain tumor, pancreatic, lung, colon    Stroke Paternal [de-identified]     Psychiatric Disorder Maternal Aunt         vascular dementia    Cancer Other         grandfather unknown    MS Other         grandfather unknown    Breast Cancer Neg Hx       Immunization History   Administered Date(s) Administered    Influenza Vaccine 10/03/2016, 10/10/2018    Pneumococcal Polysaccharide (PPSV-23) 04/30/2019    Tdap 04/30/2019       Objective:     Patient Vitals for the past 24 hrs:   Temp Pulse Resp BP SpO2   06/22/20 0401 99.4 °F (37.4 °C) 94 17 109/65 90 %   06/22/20 0111 99.1 °F (37.3 °C) (!) 101 18 104/62 92 %   06/22/20 0030    (!) 84/40 100 %   06/21/20 2031 98.3 °F (36.8 °C) 100 17 99/54 96 %   06/21/20 2007 98.3 °F (36.8 °C) 84 16 99/56 98 %   06/21/20 1748 98.7 °F (37.1 °C) 85 18 138/69 97 %   06/21/20 1508 98.3 °F (36.8 °C) 97 18 (!) 122/95 96 %     Oxygen Therapy  O2 Sat (%): 90 % (06/22/20 0401)  O2 Device: Room air (06/21/20 2007)    Intake/Output Summary (Last 24 hours) at 6/22/2020 0452  Last data filed at 6/21/2020 2010  Gross per 24 hour   Intake 1100 ml   Output    Net 1100 ml       Physical Exam:  General:    Well nourished. Alert. Eyes:   Normal sclera. Extraocular movements intact. ENT:  Normocephalic, atraumatic. Moist mucous membranes  CV:   RRR. No murmur, rub, or gallop. Lungs:  CTAB. No wheezing, rhonchi, or rales. Abdomen: Soft, mostly tender in lower quadrants. Some epigastrium  Extremities: Warm and dry. No cyanosis or edema. Neurologic: CN II-XII grossly intact. Sensation intact. Skin:     No rashes or jaundice. No wounds. Psych:  Normal mood and affect. I reviewed the labs, imaging, EKGs, telemetry, and other studies done this admission. Data Review:   Recent Results (from the past 24 hour(s))   CBC WITH AUTOMATED DIFF    Collection Time: 06/21/20  3:13 PM   Result Value Ref Range    WBC 15.7 (H) 4.3 - 11.1 K/uL    RBC 4.98 4.05 - 5.2 M/uL    HGB 13.8 11.7 - 15.4 g/dL    HCT 43.2 35.8 - 46.3 %    MCV 86.7 79.6 - 97.8 FL    MCH 27.7 26.1 - 32.9 PG    MCHC 31.9 31.4 - 35.0 g/dL    RDW 14.5 11.9 - 14.6 %    PLATELET 584 522 - 477 K/uL    MPV 10.7 9.4 - 12.3 FL    ABSOLUTE NRBC 0.00 0.0 - 0.2 K/uL    DF AUTOMATED      NEUTROPHILS 86 (H) 43 - 78 %    LYMPHOCYTES 8 (L) 13 - 44 %    MONOCYTES 6 4.0 - 12.0 %    EOSINOPHILS 0 (L) 0.5 - 7.8 %    BASOPHILS 0 0.0 - 2.0 %    IMMATURE GRANULOCYTES 1 0.0 - 5.0 %    ABS. NEUTROPHILS 13.5 (H) 1.7 - 8.2 K/UL    ABS. LYMPHOCYTES 1.2 0.5 - 4.6 K/UL    ABS. MONOCYTES 0.9 0.1 - 1.3 K/UL    ABS. EOSINOPHILS 0.0 0.0 - 0.8 K/UL    ABS. BASOPHILS 0.0 0.0 - 0.2 K/UL    ABS. IMM.  GRANS. 0.1 0.0 - 0.5 K/UL   METABOLIC PANEL, COMPREHENSIVE    Collection Time: 06/21/20  3:13 PM   Result Value Ref Range    Sodium 138 136 - 145 mmol/L    Potassium 3.9 3.5 - 5.1 mmol/L    Chloride 101 98 - 107 mmol/L    CO2 28 21 - 32 mmol/L    Anion gap 9 7 - 16 mmol/L    Glucose 89 65 - 100 mg/dL    BUN 19 6 - 23 MG/DL    Creatinine 0.86 0.6 - 1.0 MG/DL    GFR est AA >60 >60 ml/min/1.73m2    GFR est non-AA >60 >60 ml/min/1.73m2    Calcium 9.0 8.3 - 10.4 MG/DL    Bilirubin, total 0.3 0.2 - 1.1 MG/DL    ALT (SGPT) 28 12 - 65 U/L    AST (SGOT) 23 15 - 37 U/L    Alk. phosphatase 145 (H) 50 - 136 U/L    Protein, total 7.7 6.3 - 8.2 g/dL    Albumin 3.5 3.5 - 5.0 g/dL    Globulin 4.2 (H) 2.3 - 3.5 g/dL    A-G Ratio 0.8 (L) 1.2 - 3.5     LIPASE    Collection Time: 06/21/20  3:13 PM   Result Value Ref Range    Lipase 360 73 - 393 U/L   C REACTIVE PROTEIN, QT    Collection Time: 06/21/20  8:49 PM   Result Value Ref Range    C-Reactive protein 9.4 (H) 0.0 - 0.9 mg/dL   LD    Collection Time: 06/21/20  8:49 PM   Result Value Ref Range     100 - 190 U/L   MAGNESIUM    Collection Time: 06/21/20  8:49 PM   Result Value Ref Range    Magnesium 1.9 1.8 - 2.4 mg/dL   PHOSPHORUS    Collection Time: 06/21/20  8:49 PM   Result Value Ref Range    Phosphorus 3.4 2.5 - 4.5 MG/DL   LACTIC ACID    Collection Time: 06/21/20  8:49 PM   Result Value Ref Range    Lactic acid 0.6 0.4 - 2.0 MMOL/L   LIPID PANEL    Collection Time: 06/21/20  8:49 PM   Result Value Ref Range    LIPID PROFILE          Cholesterol, total 97 <200 MG/DL    Triglyceride 40 35 - 150 MG/DL    HDL Cholesterol 63 (H) 40 - 60 MG/DL    LDL, calculated 26 <100 MG/DL    VLDL, calculated 8 6.0 - 23.0 MG/DL    CHOL/HDL Ratio 1.5         Imaging Studies:  CXR Results  (Last 48 hours)    None        CT Results  (Last 48 hours)               06/21/20 1841  CT ABD PELV W CONT Final result    Impression:  IMPRESSION:     1. Findings consistent with acute pancreatitis as described above. Narrative:  CT ABDOMEN AND PELVIS WITH INTRAVENOUS CONTRAST DATED 6/21/2020. History: Lower abdominal pain beginning on Friday. Intermittent cramping. Comparison: None.         Technique:   Multiple contiguous helical CT images reconstructed at 5 mm   intervals were obtained from above the diaphragms through the ischial   tuberosities following 100 cc Isovue-370 without acute complication. All CT   scans performed at this facility use one or all of the following: Automated   exposure control, adjustment of the mA and/or kVp according to patient's size,   iterative reconstruction. Findings:   CT ABDOMEN:     Limited evaluation of the lung bases and base of the mediastinum demonstrates no   significant abnormalities. The Liver is homogeneous in attenuation. The spleen is homogeneous in   attenuation. No contour deforming or enhancing mass lesions are seen of the   pancreas or adrenal glands. However, there are inflammatory changes which   appears centered about the pancreas which will be described later in this   report. The gallbladder has an unremarkable CT appearance without radiopaque   stones or pericholecystic fluid/inflammatory changes. The kidneys enhance   symmetrically and no evidence of hydronephrosis is seen. The visualized loops of small bowel and colon are normal in caliber. The   appendix is seen on image 79 without acute abnormality. Mild to moderate   diverticulosis is seen of the left colon without associated acute inflammatory   changes. No free air is seen. However, abnormal mesenteric stranding is seen   which appears centered about the pancreatic head. The appearance suggest acute   pancreatitis. Additional likely reactive small currently nonloculated appearing   fluid is seen tracking along the bilateral anterior pararenal fascial planes and   into the right paracolic gutter. No adenopathy is seen. The abdominal aorta is   unremarkable in appearance. CT PELVIS:   No abnormal pelvic fluid collections or inflammatory changes are present. No   pelvic adenopathy is seen. The urinary bladder is unremarkable.                  Assessment and Plan:     Hospital Problems as of 6/22/2020 Date Reviewed: 7/24/2019          Codes Class Noted - Resolved POA    * (Principal) Acute pancreatitis ICD-10-CM: K85.90  ICD-9-CM: 435.2  6/21/2020 - Present Yes        Leukocytosis ICD-10-CM: T77.240  ICD-9-CM: 288.60  6/21/2020 - Present Yes        GERD (gastroesophageal reflux disease) ICD-10-CM: K21.9  ICD-9-CM: 530.81  Unknown - Present Yes        ÁNGEL (obstructive sleep apnea) ICD-10-CM: G47.33  ICD-9-CM: 327.23  5/20/2020 - Present Yes        Obesity, morbid ([facility] ICD-10-CM: E66.01  ICD-9-CM: 278.01  1/14/2019 - Present Yes              PLAN:  · Admit inpatient to medical bed  · Will keep NPO except for meds and sips of water  · Supportive care with IVF LR at 200 cc hr  · Adequate pain management and antiemetics  · Patient's lipase normal, but CT shows pancreatitis  · Question etiology. Patient rarely has an alcoholic drink. Previous lipid panel did not reveal hypertriglyceridemia. CT does not show radiopaque stones or ductal dilation  · Check abdominal US in am for additional imaging  · Her UA at [facility] had 3+ bacteria but 6-15 Sq epis so likely contaminant,   · However, she does have elevated WBC ct so will send urine culture  · Cover with IV rocephin for now  · Trend labs. Check CRP  · Place on BID protonix  · SQ lovenox for DVT prophylaxis.  Incentive spirometry  · If no improvement consider GI consultation      Estimated LOS:  2 or more midnights    Signed:  Fredo Brice MD

## 2020-06-22 NOTE — PROGRESS NOTES
TRANSFER - IN REPORT:    Verbal report received from Joanne Shi Rn on [address]  being received from ER for routine progression of care      Report consisted of patients Situation, Background, Assessment and   Recommendations(SBAR). Information from the following report(s) Kardex was reviewed with the receiving nurse. Opportunity for questions and clarification was provided. Assessment completed upon patients arrival to unit and care assumed.

## 2020-06-22 NOTE — PROGRESS NOTES
Per Dr. Alma Lock pt  to bring in home meds. We needs to U/D home med list in computer then contact doctor to order ones wanted.  Have home meds ID by pharmacy, then give pt home meds ordered by doctor

## 2020-06-22 NOTE — PROGRESS NOTES
06/21/20 2100   Dual Skin Pressure Injury Assessment   Dual Skin Pressure Injury Assessment X  (Silicone patches on r and left breast, peeling sunburn )   Second Care Provider (Based on [address] [place] [address]) HERNÁN Arana  (abd lap sites )

## 2020-06-23 PROBLEM — K85.00 IDIOPATHIC ACUTE PANCREATITIS WITHOUT INFECTION OR NECROSIS: Status: ACTIVE | Noted: 2020-06-21

## 2020-06-23 PROBLEM — G47.33 OSA (OBSTRUCTIVE SLEEP APNEA): Chronic | Status: ACTIVE | Noted: 2020-05-20

## 2020-06-23 PROBLEM — D72.829 LEUKOCYTOSIS: Status: RESOLVED | Noted: 2020-06-21 | Resolved: 2020-06-23

## 2020-06-23 PROBLEM — E66.01 OBESITY, MORBID (HCC): Chronic | Status: ACTIVE | Noted: 2019-01-14

## 2020-06-23 PROCEDURE — 74011250636 HC RX REV CODE- 250/636: Performed by: INTERNAL MEDICINE

## 2020-06-23 PROCEDURE — 74011000250 HC RX REV CODE- 250: Performed by: HOSPITALIST

## 2020-06-23 PROCEDURE — 74011250636 HC RX REV CODE- 250/636: Performed by: HOSPITALIST

## 2020-06-23 PROCEDURE — 65270000029 HC RM PRIVATE

## 2020-06-23 PROCEDURE — 74011000258 HC RX REV CODE- 258: Performed by: INTERNAL MEDICINE

## 2020-06-23 PROCEDURE — 74011250637 HC RX REV CODE- 250/637: Performed by: INTERNAL MEDICINE

## 2020-06-23 RX ORDER — SOLIFENACIN SUCCINATE 5 MG/1
5 TABLET, FILM COATED ORAL
Status: DISCONTINUED | OUTPATIENT
Start: 2020-06-23 | End: 2020-06-26 | Stop reason: HOSPADM

## 2020-06-23 RX ORDER — FAMOTIDINE 20 MG/1
20 TABLET, FILM COATED ORAL 2 TIMES DAILY
Status: DISCONTINUED | OUTPATIENT
Start: 2020-06-23 | End: 2020-06-26 | Stop reason: HOSPADM

## 2020-06-23 RX ORDER — MONTELUKAST SODIUM 10 MG/1
10 TABLET ORAL
Status: DISCONTINUED | OUTPATIENT
Start: 2020-06-23 | End: 2020-06-26 | Stop reason: HOSPADM

## 2020-06-23 RX ORDER — FLUTICASONE PROPIONATE 110 UG/1
2 AEROSOL, METERED RESPIRATORY (INHALATION)
Status: DISCONTINUED | OUTPATIENT
Start: 2020-06-23 | End: 2020-06-26 | Stop reason: HOSPADM

## 2020-06-23 RX ADMIN — ONDANSETRON 4 MG: 2 INJECTION INTRAMUSCULAR; INTRAVENOUS at 14:44

## 2020-06-23 RX ADMIN — ONDANSETRON 4 MG: 2 INJECTION INTRAMUSCULAR; INTRAVENOUS at 23:50

## 2020-06-23 RX ADMIN — Medication 10 ML: at 13:20

## 2020-06-23 RX ADMIN — ENOXAPARIN SODIUM 40 MG: 40 INJECTION SUBCUTANEOUS at 21:24

## 2020-06-23 RX ADMIN — Medication 10 ML: at 21:35

## 2020-06-23 RX ADMIN — HYDROMORPHONE HYDROCHLORIDE 1 MG: 1 INJECTION, SOLUTION INTRAMUSCULAR; INTRAVENOUS; SUBCUTANEOUS at 23:50

## 2020-06-23 RX ADMIN — ONDANSETRON 4 MG: 2 INJECTION INTRAMUSCULAR; INTRAVENOUS at 09:49

## 2020-06-23 RX ADMIN — SODIUM CHLORIDE, SODIUM LACTATE, POTASSIUM CHLORIDE, AND CALCIUM CHLORIDE 125 ML/HR: 600; 310; 30; 20 INJECTION, SOLUTION INTRAVENOUS at 13:30

## 2020-06-23 RX ADMIN — FAMOTIDINE 20 MG: 20 TABLET, FILM COATED ORAL at 12:55

## 2020-06-23 RX ADMIN — SODIUM CHLORIDE, SODIUM LACTATE, POTASSIUM CHLORIDE, AND CALCIUM CHLORIDE 125 ML/HR: 600; 310; 30; 20 INJECTION, SOLUTION INTRAVENOUS at 07:00

## 2020-06-23 RX ADMIN — ONDANSETRON 4 MG: 2 INJECTION INTRAMUSCULAR; INTRAVENOUS at 04:15

## 2020-06-23 RX ADMIN — SODIUM CHLORIDE 12.5 MG: 9 INJECTION INTRAMUSCULAR; INTRAVENOUS; SUBCUTANEOUS at 16:58

## 2020-06-23 RX ADMIN — Medication 10 ML: at 07:08

## 2020-06-23 RX ADMIN — HYDROMORPHONE HYDROCHLORIDE 1 MG: 1 INJECTION, SOLUTION INTRAMUSCULAR; INTRAVENOUS; SUBCUTANEOUS at 04:07

## 2020-06-23 RX ADMIN — HYDROMORPHONE HYDROCHLORIDE 1 MG: 1 INJECTION, SOLUTION INTRAMUSCULAR; INTRAVENOUS; SUBCUTANEOUS at 17:01

## 2020-06-23 RX ADMIN — HYDROMORPHONE HYDROCHLORIDE 1 MG: 1 INJECTION, SOLUTION INTRAMUSCULAR; INTRAVENOUS; SUBCUTANEOUS at 11:16

## 2020-06-23 RX ADMIN — SODIUM CHLORIDE, SODIUM LACTATE, POTASSIUM CHLORIDE, AND CALCIUM CHLORIDE 125 ML/HR: 600; 310; 30; 20 INJECTION, SOLUTION INTRAVENOUS at 21:28

## 2020-06-23 RX ADMIN — FAMOTIDINE 20 MG: 20 TABLET, FILM COATED ORAL at 17:00

## 2020-06-23 RX ADMIN — CEFTRIAXONE SODIUM 1 G: 1 INJECTION, POWDER, FOR SOLUTION INTRAMUSCULAR; INTRAVENOUS at 21:24

## 2020-06-23 NOTE — PROGRESS NOTES
Hourly rounds completed this shift. All needs met. Bed low/locked. Pt medicated several times for pain/nausea. Call light in reach. Will continue to monitor pt and give report to oncoming RN. Peripheral IV 06/15/20 Left Antecubital (Active)   Site Assessment Clean, dry, & intact 6/23/2020  2:17 PM   Phlebitis Assessment 0 6/23/2020  2:17 PM   Infiltration Assessment 0 6/23/2020  2:17 PM   Dressing Status Clean, dry, & intact 6/23/2020  2:17 PM   Dressing Type Transparent;Tape 6/23/2020  2:17 PM   Hub Color/Line Status Pink; Infusing;Patent 6/23/2020  2:17 PM

## 2020-06-23 NOTE — PROGRESS NOTES
Hospitalist Note     Admit Date:  2020  3:32 PM   Name:  Jane Ingram   Age:  45 y.o.  :  1970   MRN:  [id number]   PCP:  Malathi Nixon MD  Treatment Team: Attending Provider: Chana Downey MD; Care Manager: Melodie Polk RN    HPI/Subjective:   Pt is a 46 y/o F admitted with idiopathic pancreatitis.  - some nausea and vomiting after clears this morning; did ok with clears yesterday. abd pain about the same. No fevers. Does feel hot and cold at times. No CP, SOB    No other complaints  Objective:     Patient Vitals for the past 24 hrs:   Temp Pulse Resp BP SpO2   20 0817 98.4 °F (36.9 °C) 82 20 123/68 94 %   20 0345 99.2 °F (37.3 °C) 84 18 122/74 97 %   20 0025 98.1 °F (36.7 °C) 85 18 120/70 96 %   20 2044 97.5 °F (36.4 °C) 87 18 113/63 94 %   20 1701 98.9 °F (37.2 °C) 85 18 119/65 92 %   20 1304 98.1 °F (36.7 °C) 85 18 112/67 92 %     Oxygen Therapy  O2 Sat (%): 94 % (20 08)  O2 Device: Room air (20)    Estimated body mass index is 36.96 kg/m² as calculated from the following:    Height as of this encounter: 5' 7\" (1.702 m). Weight as of this encounter: 107 kg (236 lb). Intake/Output Summary (Last 24 hours) at 2020 1152  Last data filed at 2020 1304  Gross per 24 hour   Intake 450 ml   Output 2 ml   Net 448 ml       *Note that automatically entered I/Os may not be accurate; dependent on patient compliance with collection and accurate  by [employer]. General:    Well nourished. Alert. Abdomen:   Soft, TTP epigastric, LUQ, RUQ, nondistended. No rebound/guarding  Extremities: Warm and dry. No cyanosis or edema. Skin:     No rashes or jaundice. Neuro:  No gross focal deficits    Data Review:  I have reviewed all labs, meds, and studies from the last 24 hours:  No results found for this or any previous visit (from the past 24 hour(s)).      Current Meds:  Current Facility-Administered Medications   Medication Dose Route Frequency    fluticasone (FLOVENT HFA) 110 mcg inhaler  2 Puff Inhalation BID RT    montelukast (SINGULAIR) tablet 10 mg  10 mg Oral QHS    famotidine (PEPCID) tablet 20 mg  20 mg Oral BID    . PHARMACY TO SUBSTITUTE PER PROTOCOL (Reordered from: solifenacin (VESICARE) 5 mg tablet)    Per Protocol    cefTRIAXone (ROCEPHIN) 1 g in 0.9% sodium chloride (MBP/ADV) 50 mL  1 g IntraVENous Q24H    HYDROmorphone (PF) (DILAUDID) injection 1 mg  1 mg IntraVENous Q4H PRN    HYDROcodone-acetaminophen (NORCO) 5-325 mg per tablet 1 Tab  1 Tab Oral Q4H PRN    lactated Ringers infusion  125 mL/hr IntraVENous CONTINUOUS    promethazine (PHENERGAN) with saline injection 12.5 mg  12.5 mg IntraVENous Q6H PRN    sodium chloride (NS) flush 5-40 mL  5-40 mL IntraVENous Q8H    sodium chloride (NS) flush 5-40 mL  5-40 mL IntraVENous PRN    acetaminophen (TYLENOL) tablet 650 mg  650 mg Oral Q4H PRN    naloxone (NARCAN) injection 0.4 mg  0.4 mg IntraVENous PRN    ondansetron (ZOFRAN) injection 4 mg  4 mg IntraVENous Q4H PRN    enoxaparin (LOVENOX) injection 40 mg  40 mg SubCUTAneous Q24H       Other Studies:  No results found for this visit on 06/21/20. No results found.     All Micro Results     Procedure Component Value Units Date/Time    CULTURE, URINE [[id number]] Collected:  06/22/20 0705    Order Status:  Completed Specimen:  Urine from Clean catch Updated:  06/23/20 0719     Special Requests: NO SPECIAL REQUESTS        Culture result: NO GROWTH 1 DAY             SARS-CoV-2 Lab Results  \"Novel Coronavirus\" Test: No results found for: COV2NT   \"Emergent Disease\" Test: No results found for: EDPR  \"SARS-COV-2\" Test: No results found for: XGCOVT  As of: 11:50 AM on 6/23/2020        Assessment and Plan:     Hospital Problems as of 6/23/2020 Date Reviewed: 7/24/2019          Codes Class Noted - Resolved POA    * (Principal) Idiopathic acute pancreatitis without infection or necrosis ICD-10-CM: K85.00  ICD-9-CM: 431.1  6/21/2020 - Present Yes        GERD (gastroesophageal reflux disease) (Chronic) ICD-10-CM: K21.9  ICD-9-CM: 530.81  Unknown - Present Yes        ÁNGEL (obstructive sleep apnea) (Chronic) ICD-10-CM: G47.33  ICD-9-CM: 327.23  5/20/2020 - Present Yes        Obesity, morbid (HCC) (Chronic) ICD-10-CM: E66.01  ICD-9-CM: 278.01  1/14/2019 - Present Yes        RESOLVED: Leukocytosis ICD-10-CM: S82.723  ICD-9-CM: 288.60  6/21/2020 - 6/23/2020 Yes              Plan:  · CLD  · IVF  · Nausea and pain meds PRN  · Rocephin for possible UTI  · Cont home asthma meds    DC planning/Dispo:    · Home 1-2 days    Diet:  DIET CLEAR LIQUID  DVT ppx:  lovenox    Signed:  Brendan Dyson MD

## 2020-06-24 ENCOUNTER — APPOINTMENT (OUTPATIENT)
Dept: GENERAL RADIOLOGY | Age: 50
DRG: 439 | End: 2020-06-24
Attending: NURSE PRACTITIONER
Payer: COMMERCIAL

## 2020-06-24 LAB
BACTERIA SPEC CULT: NORMAL
SERVICE CMNT-IMP: NORMAL

## 2020-06-24 PROCEDURE — 65270000029 HC RM PRIVATE

## 2020-06-24 PROCEDURE — 94640 AIRWAY INHALATION TREATMENT: CPT

## 2020-06-24 PROCEDURE — 74011250636 HC RX REV CODE- 250/636: Performed by: INTERNAL MEDICINE

## 2020-06-24 PROCEDURE — 74011000250 HC RX REV CODE- 250: Performed by: HOSPITALIST

## 2020-06-24 PROCEDURE — 74022 RADEX COMPL AQT ABD SERIES: CPT

## 2020-06-24 PROCEDURE — 77030037759

## 2020-06-24 PROCEDURE — 74011250637 HC RX REV CODE- 250/637: Performed by: INTERNAL MEDICINE

## 2020-06-24 PROCEDURE — 74011250636 HC RX REV CODE- 250/636: Performed by: HOSPITALIST

## 2020-06-24 PROCEDURE — 74011000258 HC RX REV CODE- 258: Performed by: INTERNAL MEDICINE

## 2020-06-24 PROCEDURE — 76937 US GUIDE VASCULAR ACCESS: CPT

## 2020-06-24 RX ORDER — HYDROCODONE BITARTRATE AND ACETAMINOPHEN 10; 325 MG/1; MG/1
1 TABLET ORAL
Status: DISCONTINUED | OUTPATIENT
Start: 2020-06-24 | End: 2020-06-26 | Stop reason: HOSPADM

## 2020-06-24 RX ADMIN — FAMOTIDINE 20 MG: 20 TABLET, FILM COATED ORAL at 17:00

## 2020-06-24 RX ADMIN — SODIUM CHLORIDE 12.5 MG: 9 INJECTION INTRAMUSCULAR; INTRAVENOUS; SUBCUTANEOUS at 07:50

## 2020-06-24 RX ADMIN — CEFTRIAXONE SODIUM 1 G: 1 INJECTION, POWDER, FOR SOLUTION INTRAMUSCULAR; INTRAVENOUS at 21:17

## 2020-06-24 RX ADMIN — FLUTICASONE PROPIONATE 2 PUFF: 110 AEROSOL, METERED RESPIRATORY (INHALATION) at 19:26

## 2020-06-24 RX ADMIN — HYDROMORPHONE HYDROCHLORIDE 1 MG: 1 INJECTION, SOLUTION INTRAMUSCULAR; INTRAVENOUS; SUBCUTANEOUS at 21:31

## 2020-06-24 RX ADMIN — SODIUM CHLORIDE, SODIUM LACTATE, POTASSIUM CHLORIDE, AND CALCIUM CHLORIDE 125 ML/HR: 600; 310; 30; 20 INJECTION, SOLUTION INTRAVENOUS at 07:55

## 2020-06-24 RX ADMIN — Medication 10 ML: at 21:18

## 2020-06-24 RX ADMIN — SODIUM CHLORIDE, SODIUM LACTATE, POTASSIUM CHLORIDE, AND CALCIUM CHLORIDE 125 ML/HR: 600; 310; 30; 20 INJECTION, SOLUTION INTRAVENOUS at 14:09

## 2020-06-24 RX ADMIN — Medication 10 ML: at 05:23

## 2020-06-24 RX ADMIN — FLUTICASONE PROPIONATE 2 PUFF: 110 AEROSOL, METERED RESPIRATORY (INHALATION) at 08:00

## 2020-06-24 RX ADMIN — SODIUM CHLORIDE 12.5 MG: 9 INJECTION INTRAMUSCULAR; INTRAVENOUS; SUBCUTANEOUS at 14:09

## 2020-06-24 RX ADMIN — ENOXAPARIN SODIUM 40 MG: 40 INJECTION SUBCUTANEOUS at 21:17

## 2020-06-24 RX ADMIN — SODIUM CHLORIDE 12.5 MG: 9 INJECTION INTRAMUSCULAR; INTRAVENOUS; SUBCUTANEOUS at 21:30

## 2020-06-24 RX ADMIN — HYDROMORPHONE HYDROCHLORIDE 1 MG: 1 INJECTION, SOLUTION INTRAMUSCULAR; INTRAVENOUS; SUBCUTANEOUS at 14:08

## 2020-06-24 RX ADMIN — ONDANSETRON 4 MG: 2 INJECTION INTRAMUSCULAR; INTRAVENOUS at 07:21

## 2020-06-24 RX ADMIN — MONTELUKAST 10 MG: 10 TABLET, FILM COATED ORAL at 21:17

## 2020-06-24 RX ADMIN — HYDROMORPHONE HYDROCHLORIDE 1 MG: 1 INJECTION, SOLUTION INTRAMUSCULAR; INTRAVENOUS; SUBCUTANEOUS at 07:21

## 2020-06-24 RX ADMIN — FAMOTIDINE 20 MG: 20 TABLET, FILM COATED ORAL at 07:21

## 2020-06-24 NOTE — PROGRESS NOTES
.Hourly rounds complete this shift, no new complaints at this time, : bed in low, locked position, call light and bedside table within reach,  all needs met. Will continue to monitor Report to day shift nurse.

## 2020-06-24 NOTE — PROGRESS NOTES
Hospitalist Note     Admit Date:  2020  3:32 PM   Name:  Kirstie Lombard   Age:  45 y.o.  :  1970   MRN:  [id number]   PCP:  Maggie Ponce MD  Treatment Team: Attending Provider: Susan Rhoades MD; Care Manager: Catia Cates RN    HPI/Subjective:   Pt is a 44 y/o F admitted with idiopathic pancreatitis.  - some nausea and vomiting after clears this morning; did ok with clears yesterday. abd pain about the same. No fevers. Does feel hot and cold at times. No CP, SOB     - pt feels same as yesterday. Frustrated about lack of progress. GI consult per pt request.  Low grade temp 100 overnight. Still on rocephin    No other complaints  Objective:     Patient Vitals for the past 24 hrs:   Temp Pulse Resp BP SpO2   20 1437 98.4 °F (36.9 °C) 63 18 126/74 93 %   20 1047 98.4 °F (36.9 °C) 72 18 125/81 91 %   20 0800     93 %   20 0703 99.4 °F (37.4 °C) 84 18 121/68 92 %   20 0425 99.8 °F (37.7 °C) 80 18 125/75 92 %   20 0050 100 °F (37.8 °C) 85 18 109/65 93 %   20 2048 98 °F (36.7 °C) 70 18 134/71 95 %   20 1547 98.4 °F (36.9 °C) 77 16 140/74 92 %     Oxygen Therapy  O2 Sat (%): 93 % (20 1437)  O2 Device: Room air (20 0800)    Estimated body mass index is 36.96 kg/m² as calculated from the following:    Height as of this encounter: 5' 7\" (1.702 m). Weight as of this encounter: 107 kg (236 lb). Intake/Output Summary (Last 24 hours) at 2020 1514  Last data filed at 2020 1300  Gross per 24 hour   Intake 360 ml   Output    Net 360 ml       *Note that automatically entered I/Os may not be accurate; dependent on patient compliance with collection and accurate  by techs. General:    Well nourished. Alert. Abdomen:   Soft, TTP epigastric, LUQ, RUQ, nondistended. No rebound/guarding  Extremities: Warm and dry. No cyanosis or edema. Skin:     No rashes or jaundice.    Neuro:  No gross focal deficits    Data Review:  I have reviewed all labs, meds, and studies from the last 24 hours:  No results found for this or any previous visit (from the past 24 hour(s)). Current Meds:  Current Facility-Administered Medications   Medication Dose Route Frequency    HYDROcodone-acetaminophen (NORCO)  mg tablet 1 Tab  1 Tab Oral Q4H PRN    fluticasone (FLOVENT HFA) 110 mcg inhaler  2 Puff Inhalation BID RT    montelukast (SINGULAIR) tablet 10 mg  10 mg Oral QHS    famotidine (PEPCID) tablet 20 mg  20 mg Oral BID    solifenacin (VESICARE) tablet 5 mg  5 mg Oral QHS    cefTRIAXone (ROCEPHIN) 1 g in 0.9% sodium chloride (MBP/ADV) 50 mL  1 g IntraVENous Q24H    HYDROmorphone (PF) (DILAUDID) injection 1 mg  1 mg IntraVENous Q4H PRN    lactated Ringers infusion  125 mL/hr IntraVENous CONTINUOUS    promethazine (PHENERGAN) with saline injection 12.5 mg  12.5 mg IntraVENous Q6H PRN    sodium chloride (NS) flush 5-40 mL  5-40 mL IntraVENous Q8H    sodium chloride (NS) flush 5-40 mL  5-40 mL IntraVENous PRN    acetaminophen (TYLENOL) tablet 650 mg  650 mg Oral Q4H PRN    naloxone (NARCAN) injection 0.4 mg  0.4 mg IntraVENous PRN    ondansetron (ZOFRAN) injection 4 mg  4 mg IntraVENous Q4H PRN    enoxaparin (LOVENOX) injection 40 mg  40 mg SubCUTAneous Q24H       Other Studies:  No results found for this visit on 06/21/20. No results found.     All Micro Results     Procedure Component Value Units Date/Time    CULTURE, URINE [[id number]] Collected:  06/22/20 0705    Order Status:  Completed Specimen:  Urine from Clean catch Updated:  06/24/20 0802     Special Requests: NO SPECIAL REQUESTS        Culture result:       10,000 to 50,000 COLONIES/mL MIXED SKIN JERZY ISOLATED                SARS-CoV-2 Lab Results  \"Novel Coronavirus\" Test: No results found for: COV2NT   \"Emergent Disease\" Test: No results found for: EDPR  \"SARS-COV-2\" Test: No results found for: XGCOVT  As of: 11:50 AM on 6/24/2020 Assessment and Plan:     Hospital Problems as of 6/24/2020 Date Reviewed: 7/24/2019          Codes Class Noted - Resolved POA    * (Principal) Idiopathic acute pancreatitis without infection or necrosis ICD-10-CM: K85.00  ICD-9-CM: 577.0  6/21/2020 - Present Yes        GERD (gastroesophageal reflux disease) (Chronic) ICD-10-CM: K21.9  ICD-9-CM: 530.81  Unknown - Present Yes        ÁNGEL (obstructive sleep apnea) (Chronic) ICD-10-CM: G47.33  ICD-9-CM: 327.23  5/20/2020 - Present Yes        Obesity, morbid (HCC) (Chronic) ICD-10-CM: E66.01  ICD-9-CM: 278.01  1/14/2019 - Present Yes        RESOLVED: Leukocytosis ICD-10-CM: M93.479  ICD-9-CM: 288.60  6/21/2020 - 6/23/2020 Yes              Plan:  · CLD as tolerated  · GI consulted per pt request.  I'm a little baffled as to why she still has symptoms given normalization of labs days ago. · Recheck labs in AM  · Reduce IVF; has gotten for days and is keeping some liquids down. Arm swelling noted in IV arm  · Nausea and pain meds PRN  · Rocephin for possible UTI. Would cover most enteritis also but regardless this was not seen on CT  · Cont home asthma meds    DC planning/Dispo:    · Home 1-2 days?     Diet:  DIET CLEAR LIQUID  DVT ppx:  lovenox    Signed:  Beth Cuevas MD

## 2020-06-24 NOTE — CONSULTS
[facility] Consult Note       Primary GI Physician:     Referring Provider:  Dr Luz Marina Mart    Consult Date:  6/24/2020    Admit Date:  6/21/2020    Chief Complaint:  Abdominal pain, pancreatitis per CT    Subjective:     History of Present Illness:  Patient is a 45 y.o. female is seen in consultation at the request of Dr. Luz Marina Mart for evaluation of abdominal pain and pancreatitis. She was admitted 6/21 by the hospitalist service after presenting to the ER with abdominal pain. Labs on admission revealed WBC 15.7 with normal liver panel excepting alk phos 145. Lipase was normal at 360. Lipid panel with triglyceride of 40. CRP 9.4. CT A/P was obtained on admission with inflammatory changes centered around the pancreas with mesenteric stranding. Ultrasound was then obtained and was unremarkable with a CBD 5mm. She was placed on antibx for concern about possible UTI; urine culture with only mixed skin sheryl. Patient reports a trip to the beach last week, eating fattier/greasier foods than her normal, especially after being on a weight loss diet over the last year with a 50 lb loss reported. She had lower abdominal cramping which began on Friday and persisted. She had 4 moderate sized stools on Saturday but none since, noting worsening pain which began to radiate upwards on Sunday. She was seen at the urgent care center Sunday and sent to the ER for evaluation. Patient was started on clear liquids 6/23 with some nausea and vomiting afterwards which has continued today. She denies new medications in the last few months and has had no family hx of pancreatitis. She reports normal colonoscopy last year excepting findings of diverticulosis. Right upper quadrant ultrasound 6/21/2020   FINDINGS:There is normal hepatic echogenicity. .  There is no biliary ductal  dilatation. The common bile duct measures 5 mm. The right kidney measures 11.2  cm.    The gallbladder is unremarkable.   No evidence of gallstones. No gallbladder  wall thickening or pericholecystic fluid.   The aorta is normal.  The IVC is patent.   IMPRESSION  IMPRESSION: Unremarkable right upper quadrant ultrasound. CT A/P W IV Contrast 6/21/2020  Findings:  CT ABDOMEN:    Limited evaluation of the lung bases and base of the mediastinum demonstrates no  significant abnormalities.    The Liver is homogeneous in attenuation. The spleen is homogeneous in  attenuation. No contour deforming or enhancing mass lesions are seen of the  pancreas or adrenal glands. However, there are inflammatory changes which  appears centered about the pancreas which will be described later in this  report. The gallbladder has an unremarkable CT appearance without radiopaque  stones or pericholecystic fluid/inflammatory changes. The kidneys enhance  symmetrically and no evidence of hydronephrosis is seen.     The visualized loops of small bowel and colon are normal in caliber. The  appendix is seen on image 79 without acute abnormality. Mild to moderate  diverticulosis is seen of the left colon without associated acute inflammatory  changes. No free air is seen. However, abnormal mesenteric stranding is seen  which appears centered about the pancreatic head. The appearance suggest acute  pancreatitis. Additional likely reactive small currently nonloculated appearing  fluid is seen tracking along the bilateral anterior pararenal fascial planes and  into the right paracolic gutter. No adenopathy is seen. The abdominal aorta is  unremarkable in appearance.   CT PELVIS:  No abnormal pelvic fluid collections or inflammatory changes are present. No  pelvic adenopathy is seen. The urinary bladder is unremarkable.   IMPRESSION  IMPRESSION:    1. Findings consistent with acute pancreatitis as described above.        PMH:  Past Medical History:   Diagnosis Date    Adrenal disorder, other 2007    adrenal fatigue    Allergic rhinitis     Bone spur of foot right foot     Cancer ([address] 11/14/2017    Atypical skin    Chest pain     Chronic pain     lower back    Cough     Dyspepsia     Elevated troponin     Environmental allergies     GERD (gastroesophageal reflux disease)     History of positive PPD 1992    History of renal stone 4/1/2013    Insomnia     Lumbar degenerative disc disease 4/1/2013    Nausea & vomiting     Obesity     ÁNGEL (obstructive sleep apnea) 5/20/2020    Snores     SOB (shortness of breath)     Sore throat     Spastic bladder 2008    SVT (supraventricular tachycardia) ([address]     Thromboembolus ([address]     \" possible DVT after IVF procedure and loss of pregnancy \"     Urge incontinence     Urinary frequency     Vertigo     Vitamin D deficiency 2009    Wheezing     Wheezing     uses inhalers as needed       PSH:  Past Surgical History:   Procedure Laterality Date    CARDIAC SURG PROCEDURE UNLIST  03/27/2019    Dr. Yue Mcwilliams; ablation    HX BLADDER SUSPENSION  2009    HX BREAST BIOPSY Right 2000     NOT A BIOPSY; excision infected spiderbite IMF    HX BREAST REDUCTION Bilateral 7/24/2019    BILATERAL BREAST REDUCTION performed by Srinivasan Trejo [facility] at [facility] [address] HX COLONOSCOPY  02/07/2019    Dr. Stefania Pereira; diverticulosis    HX GYN  2006, 2008    removal of fibroid tumors from uterus x 2    HX HEART CATHETERIZATION  03/26/2019    HX MALIGNANT SKIN LESION EXCISION Right 11/14/2017    , [facility] [address]; jawline    HX MOHS PROCEDURES Right 05/2015    Dr. Siobhan Orourke; precancerous abdomen    HX MYRINGOTOMY      1978, 2007, 2014, 2015; Dr. Amrik Alejandre Right 05/01/2015    Dr. Savannah Barraza; rotater cuff    HX OTHER SURGICAL  2012    Fatty tumor removed from left shoulder    HX PARTIAL HYSTERECTOMY  11/11/15    robotic-assisted laparoscopic with salpingectomy bilateral; Dr. Eamon Madrid; secondary to fibroid uterus/menorrhagia    HX RHINOPLASTY  1985    HX TONSIL AND ADENOIDECTOMY  1978    HX UROLOGICAL  2009    bladder sling; Dr. Vance Vallecillo EXTRACTION         Allergies: Allergies   Allergen Reactions    Adhesive Tape-Silicones Contact Dermatitis     Causes blisters    Ciprofloxacin Rash    Sulfa (Sulfonamide Antibiotics) Rash       Home Medications:  Prior to Admission medications    Medication Sig Start Date End Date Taking? Authorizing Provider   levocetirizine (XYZAL) 5 mg tablet Take 1 Tab by mouth daily. Take / use AM day of surgery  per anesthesia protocols. Indications: inflammation of the nose due to an allergy 3/31/20  Yes Melecio Mueller MD   fluticasone propionate (Flovent Diskus) 100 mcg/actuation dsdv Take 2 Puffs by inhalation two (2) times a day. Indications: controller medication for asthma 3/31/20  Yes Melecio Mueller MD   montelukast (Singulair) 10 mg tablet Take 1 Tab by mouth nightly. Indications: inflammation of the nose due to an allergy, controller medication for asthma 3/31/20  Yes Melecio Mueller MD   beclomethasone dipropionate (QNASL) 80 mcg/actuation HFAA 2 Sprays by Nasal route daily. 3/31/20  Yes Melecio Mueller MD   Sunday Mock, Indications: Methyl Protect one cap daily   Yes Provider, Historical   OTHER,NON-FORMULARY, daily. Iodine /potassium supplement  Indications: Iodoral 1/2 tab daily   Yes Provider, Historical   OTHER,NON-FORMULARY, three (3) times daily as needed. Indications: Catecholacalm cap tid   Yes Provider, Historical   mv-min/iron/folic/calcium/vitK (WOMEN'S MULTIVITAMIN PO) Take  by mouth. Yes Provider, Historical   melatonin 1 mg tablet Take 1 mg by mouth nightly. Yes Provider, Historical   PRASTERONE, DHEA, (DHEA PO) Take 15 mg by mouth daily. DHEA/testosterone in the progesterone cream   Yes Provider, Historical   SELENIUM PO Take 1 Tab by mouth daily.    Yes Provider, Historical   allergy injection 2 shots every 3 weeks   Yes Provider, Historical   LACTOBACILLUS ACIDOPHILUS (PROBIOTIC PO) Take 1 tablet by mouth daily. Yes Provider, Historical   raNITIdine (ZANTAC) 150 mg tablet TAKE 1 TABLET BY MOUTH TWO  TIMES DAILY 3/31/20   Octavio Nieves MD   ofloxacin (FLOXIN) 0.3 % ophthalmic solution 1 Drop as needed. In bilateral ears if needed    Provider, Historical   solifenacin (VESICARE) 5 mg tablet Take 5 mg by mouth nightly. Indications: Frequent Urination 4/26/19   Provider, Historical   mirabegron ER (MYRBETRIQ) 50 mg ER tablet Take 1 Tab by mouth daily. Patient taking differently: Take 50 mg by mouth daily. Take / use AM day of surgery  per anesthesia protocols. Indications: Frequent Urination 4/30/19   Tiff Barrett MD   OTHER,NON-FORMULARY, Take 2 Tabs by mouth daily (after lunch). Adrenal complex    Provider, Historical   cholecalciferol, vitamin D3, 1,000 unit/drop drop Take 5 Drops by mouth daily.     Provider, Historical       Hospital Medications:  Current Facility-Administered Medications   Medication Dose Route Frequency    HYDROcodone-acetaminophen (NORCO)  mg tablet 1 Tab  1 Tab Oral Q4H PRN    fluticasone (FLOVENT HFA) 110 mcg inhaler  2 Puff Inhalation BID RT    montelukast (SINGULAIR) tablet 10 mg  10 mg Oral QHS    famotidine (PEPCID) tablet 20 mg  20 mg Oral BID    solifenacin (VESICARE) tablet 5 mg  5 mg Oral QHS    cefTRIAXone (ROCEPHIN) 1 g in 0.9% sodium chloride (MBP/ADV) 50 mL  1 g IntraVENous Q24H    HYDROmorphone (PF) (DILAUDID) injection 1 mg  1 mg IntraVENous Q4H PRN    lactated Ringers infusion  50 mL/hr IntraVENous CONTINUOUS    promethazine (PHENERGAN) with saline injection 12.5 mg  12.5 mg IntraVENous Q6H PRN    sodium chloride (NS) flush 5-40 mL  5-40 mL IntraVENous Q8H    sodium chloride (NS) flush 5-40 mL  5-40 mL IntraVENous PRN    acetaminophen (TYLENOL) tablet 650 mg  650 mg Oral Q4H PRN    naloxone (NARCAN) injection 0.4 mg  0.4 mg IntraVENous PRN    ondansetron (ZOFRAN) injection 4 mg  4 mg IntraVENous Q4H PRN    enoxaparin (LOVENOX) injection 40 mg  40 mg SubCUTAneous Q24H       Social History:  Social History     Tobacco Use    Smoking status: Never Smoker    Smokeless tobacco: Never Used   Substance Use Topics    Alcohol use: No       Pt denies any history of drug use, blood transfusions, or tattoos. Family History:  Family History   Problem Relation Age of Onset    Hypertension Father     Cancer Maternal Grandfather         bladder cancer    Psychiatric Disorder Maternal Grandfather         Alzheimers    Cancer Paternal Grandmother 80        pancreatic cancer    Psychiatric Disorder Paternal Grandmother         Alzheimer's    No Known Problems Mother     MS Sister     Psychiatric Disorder Sister         depression    Neuropathy Brother     Cancer Paternal Aunt         colon, thyroid    Cancer Paternal Uncle         brain tumor, pancreatic, lung, colon    Stroke Paternal Aunt     Psychiatric Disorder Maternal Aunt         vascular dementia    Cancer Other         grandfather unknown    MS Other         grandfather unknown    Breast Cancer Neg Hx        Review of Systems:  A detailed 10 system ROS is obtained, with pertinent positives as listed above. All others are negative. Diet:  clears    Objective:     Physical Exam:  Vitals:  Visit Vitals  /74 (BP 1 Location: Right arm, BP Patient Position: At rest)   Pulse 63   Temp 98.4 °F (36.9 °C)   Resp 18   Ht 5' 7\" (1.702 m)   Wt 107 kg (236 lb)   LMP 10/30/2015 (Approximate) Comment: 11/11/15   SpO2 93%   BMI 36.96 kg/m²     Gen:  Pt is alert, cooperative, appears uncomfortable  Skin:  Extremities and face reveal no rashes. HEENT: Sclerae anicteric. Extra-occular muscles are intact. No oral ulcers. No abnormal pigmentation of the lips. The neck is supple. Cardiovascular: Regular rate and rhythm. No murmurs, gallops, or rubs. Respiratory:  Comfortable breathing with no accessory muscle use. Clear breath sounds anteriorly with no wheezes, rales, or rhonchi.   GI:  Abdomen full/distended, diffusely tender along the line of the colon and in the epigastrium, hypoactive bowel sounds, no enlargement of the liver or spleen. No masses palpable. Rectal:  Deferred  Musculoskeletal:  No pitting edema of the lower legs. Neurological:  Gross memory appears intact. Patient is alert and oriented. Psychiatric:  Mood appears appropriate with judgement intact. Lymphatic:  No cervical or supraclavicular adenopathy. Laboratory:    Recent Labs     06/22/20  0532 06/21/20 2049   WBC 12.1*  --    HGB 11.5*  --    HCT 35.9  --      --    MCV 88.0  --      --    K 4.0  --      --    CO2 28  --    BUN 15  --    CREA 0.83  --    CA 8.4  --    MG  --  1.9   GLU 80  --      --    ALT 21  --    TBILI 0.3  --    ALB 2.7*  --    TP 6.2*  --    LPSE 211  --           Assessment:     Principal Problem:    Idiopathic acute pancreatitis without infection or necrosis (6/21/2020)    Active Problems:    Obesity, morbid ([address] (1/14/2019)      ÁNGEL (obstructive sleep apnea) (5/20/2020)      GERD (gastroesophageal reflux disease) ()        Plan:     46 yo female is seen in consultation for evaluation of abdominal pain. She was admitted Sunday with a 2-3 day hx of lower abdominal pain, followed by bloating and nausea, now with emesis. WBC 15.7, lipase 360 on admission with CT scan with some stranding along the pancreatic head. RUQ ultrasound was unremarkable. She reports no prior hx of pancreatitis and has not been placed on any new medications in the last 6 months. She has had increased belching with n/v and inability to tolerate clear liquids since admission. Abdomen is distended and full. Etiology of pancreatitis unclear; suspect she additionally has developed an ileus. 1.  Acute abd series today  2. Supportive care with hydration and pain control  3. May need EUS of pancreas as outpatient to evaluate etiology of pancreatitis  4. Check IGG subclass 4  5.   Further recommendations to be based on findings of above    Patient is seen and examined in collaboration with Dr. Orly Gaona.  Assessment and plan as per Dr. Tal Peace NP

## 2020-06-24 NOTE — PROGRESS NOTES
visited patient and spouse in room. Patient reports feeling somewhat discouraged, doctors still trying to determine what exactly is causing her symptoms. Offered support, prayer.     Roxanne Farnsworth   KONRADN

## 2020-06-24 NOTE — PROGRESS NOTES
abd pain persists. Pain and nausea med given today. IVF infusing. Eating some cl liq. Voiding without problems, no BM since Sunday. States is passing some flatus. Encouraged to walk.

## 2020-06-24 NOTE — PROGRESS NOTES
Patient frustrated over not seeing Dr. Kathryn Abarca; reassured. Emilio Easley Charge nurse aware.   Medicated for abd pain and nausea per request.

## 2020-06-25 LAB
ALBUMIN SERPL-MCNC: 2.2 G/DL (ref 3.5–5)
ALBUMIN/GLOB SERPL: 0.6 {RATIO} (ref 1.2–3.5)
ALP SERPL-CCNC: 95 U/L (ref 50–136)
ALT SERPL-CCNC: 17 U/L (ref 12–65)
ANION GAP SERPL CALC-SCNC: 7 MMOL/L (ref 7–16)
AST SERPL-CCNC: 14 U/L (ref 15–37)
BASOPHILS # BLD: 0 K/UL (ref 0–0.2)
BASOPHILS NFR BLD: 0 % (ref 0–2)
BILIRUB SERPL-MCNC: 0.3 MG/DL (ref 0.2–1.1)
BUN SERPL-MCNC: 12 MG/DL (ref 6–23)
CALCIUM SERPL-MCNC: 8.5 MG/DL (ref 8.3–10.4)
CHLORIDE SERPL-SCNC: 105 MMOL/L (ref 98–107)
CO2 SERPL-SCNC: 28 MMOL/L (ref 21–32)
CREAT SERPL-MCNC: 0.66 MG/DL (ref 0.6–1)
DIFFERENTIAL METHOD BLD: ABNORMAL
EOSINOPHIL # BLD: 0 K/UL (ref 0–0.8)
EOSINOPHIL NFR BLD: 0 % (ref 0.5–7.8)
ERYTHROCYTE [DISTWIDTH] IN BLOOD BY AUTOMATED COUNT: 14 % (ref 11.9–14.6)
GLOBULIN SER CALC-MCNC: 3.9 G/DL (ref 2.3–3.5)
GLUCOSE SERPL-MCNC: 83 MG/DL (ref 65–100)
HCT VFR BLD AUTO: 33.3 % (ref 35.8–46.3)
HGB BLD-MCNC: 10.2 G/DL (ref 11.7–15.4)
IMM GRANULOCYTES # BLD AUTO: 0.1 K/UL (ref 0–0.5)
IMM GRANULOCYTES NFR BLD AUTO: 1 % (ref 0–5)
LIPASE SERPL-CCNC: 163 U/L (ref 73–393)
LYMPHOCYTES # BLD: 1 K/UL (ref 0.5–4.6)
LYMPHOCYTES NFR BLD: 10 % (ref 13–44)
MCH RBC QN AUTO: 26.8 PG (ref 26.1–32.9)
MCHC RBC AUTO-ENTMCNC: 30.6 G/DL (ref 31.4–35)
MCV RBC AUTO: 87.4 FL (ref 79.6–97.8)
MONOCYTES # BLD: 0.8 K/UL (ref 0.1–1.3)
MONOCYTES NFR BLD: 8 % (ref 4–12)
NEUTS SEG # BLD: 8.1 K/UL (ref 1.7–8.2)
NEUTS SEG NFR BLD: 81 % (ref 43–78)
NRBC # BLD: 0 K/UL (ref 0–0.2)
PLATELET # BLD AUTO: 228 K/UL (ref 150–450)
PMV BLD AUTO: 10.9 FL (ref 9.4–12.3)
POTASSIUM SERPL-SCNC: 3.6 MMOL/L (ref 3.5–5.1)
PROT SERPL-MCNC: 6.1 G/DL (ref 6.3–8.2)
RBC # BLD AUTO: 3.81 M/UL (ref 4.05–5.2)
SODIUM SERPL-SCNC: 140 MMOL/L (ref 136–145)
WBC # BLD AUTO: 10 K/UL (ref 4.3–11.1)

## 2020-06-25 PROCEDURE — 74011250636 HC RX REV CODE- 250/636: Performed by: HOSPITALIST

## 2020-06-25 PROCEDURE — 74011250636 HC RX REV CODE- 250/636: Performed by: INTERNAL MEDICINE

## 2020-06-25 PROCEDURE — 82787 IGG 1 2 3 OR 4 EACH: CPT

## 2020-06-25 PROCEDURE — 65270000029 HC RM PRIVATE

## 2020-06-25 PROCEDURE — 94760 N-INVAS EAR/PLS OXIMETRY 1: CPT

## 2020-06-25 PROCEDURE — 74011250637 HC RX REV CODE- 250/637: Performed by: INTERNAL MEDICINE

## 2020-06-25 PROCEDURE — 94640 AIRWAY INHALATION TREATMENT: CPT

## 2020-06-25 PROCEDURE — 74011250637 HC RX REV CODE- 250/637: Performed by: NURSE PRACTITIONER

## 2020-06-25 PROCEDURE — 74011000258 HC RX REV CODE- 258: Performed by: INTERNAL MEDICINE

## 2020-06-25 PROCEDURE — 80053 COMPREHEN METABOLIC PANEL: CPT

## 2020-06-25 PROCEDURE — 36415 COLL VENOUS BLD VENIPUNCTURE: CPT

## 2020-06-25 PROCEDURE — 85025 COMPLETE CBC W/AUTO DIFF WBC: CPT

## 2020-06-25 PROCEDURE — 83690 ASSAY OF LIPASE: CPT

## 2020-06-25 PROCEDURE — 74011250637 HC RX REV CODE- 250/637: Performed by: HOSPITALIST

## 2020-06-25 PROCEDURE — 74011000250 HC RX REV CODE- 250: Performed by: HOSPITALIST

## 2020-06-25 RX ORDER — FACIAL-BODY WIPES
10 EACH TOPICAL DAILY
Status: DISCONTINUED | OUTPATIENT
Start: 2020-06-25 | End: 2020-06-25

## 2020-06-25 RX ORDER — FACIAL-BODY WIPES
10 EACH TOPICAL DAILY
Status: DISCONTINUED | OUTPATIENT
Start: 2020-06-26 | End: 2020-06-26 | Stop reason: HOSPADM

## 2020-06-25 RX ORDER — BISACODYL 5 MG
5 TABLET, DELAYED RELEASE (ENTERIC COATED) ORAL
Status: COMPLETED | OUTPATIENT
Start: 2020-06-25 | End: 2020-06-25

## 2020-06-25 RX ADMIN — FAMOTIDINE 20 MG: 20 TABLET, FILM COATED ORAL at 17:49

## 2020-06-25 RX ADMIN — Medication 10 ML: at 22:22

## 2020-06-25 RX ADMIN — HYDROMORPHONE HYDROCHLORIDE 1 MG: 1 INJECTION, SOLUTION INTRAMUSCULAR; INTRAVENOUS; SUBCUTANEOUS at 22:21

## 2020-06-25 RX ADMIN — HYDROMORPHONE HYDROCHLORIDE 1 MG: 1 INJECTION, SOLUTION INTRAMUSCULAR; INTRAVENOUS; SUBCUTANEOUS at 10:06

## 2020-06-25 RX ADMIN — SODIUM CHLORIDE 12.5 MG: 9 INJECTION INTRAMUSCULAR; INTRAVENOUS; SUBCUTANEOUS at 22:20

## 2020-06-25 RX ADMIN — ACETAMINOPHEN 650 MG: 325 TABLET, FILM COATED ORAL at 14:42

## 2020-06-25 RX ADMIN — FAMOTIDINE 20 MG: 20 TABLET, FILM COATED ORAL at 10:00

## 2020-06-25 RX ADMIN — SODIUM CHLORIDE, SODIUM LACTATE, POTASSIUM CHLORIDE, AND CALCIUM CHLORIDE 50 ML/HR: 600; 310; 30; 20 INJECTION, SOLUTION INTRAVENOUS at 08:23

## 2020-06-25 RX ADMIN — BISACODYL 5 MG: 5 TABLET, COATED ORAL at 10:00

## 2020-06-25 RX ADMIN — FLUTICASONE PROPIONATE 2 PUFF: 110 AEROSOL, METERED RESPIRATORY (INHALATION) at 20:00

## 2020-06-25 RX ADMIN — ENOXAPARIN SODIUM 40 MG: 40 INJECTION SUBCUTANEOUS at 22:20

## 2020-06-25 RX ADMIN — CEFTRIAXONE SODIUM 1 G: 1 INJECTION, POWDER, FOR SOLUTION INTRAMUSCULAR; INTRAVENOUS at 22:20

## 2020-06-25 RX ADMIN — Medication 10 ML: at 05:29

## 2020-06-25 RX ADMIN — Medication 10 ML: at 13:59

## 2020-06-25 RX ADMIN — ONDANSETRON 4 MG: 2 INJECTION INTRAMUSCULAR; INTRAVENOUS at 10:11

## 2020-06-25 NOTE — PROGRESS NOTES
Hospitalist Note     Admit Date:  2020  3:32 PM   Name:  Forrest Markham   Age:  45 y.o.  :  1970   MRN:  [id number]   PCP:  Elana Guillen MD  Treatment Team: Attending Provider: Tariq Calles MD; Care Manager: José Paul RN; Consulting Provider: Sophy Howard MD    HPI/Subjective:   Pt is a 46 y/o F admitted with idiopathic pancreatitis.  - some nausea and vomiting after clears this morning; did ok with clears yesterday. abd pain about the same. No fevers. Does feel hot and cold at times. No CP, SOB     - pt feels same as yesterday. Frustrated about lack of progress. GI consult per pt request.  Low grade temp 100 overnight. Still on rocephin      - feeling much better. abd pain improved. Slept through the night. Tolerating liquids with minimal nausea.  +flatus no recent BM    No other complaints  Objective:     Patient Vitals for the past 24 hrs:   Temp Pulse Resp BP SpO2   20 1131 98.3 °F (36.8 °C) 65 17 119/74 93 %   20 0823 98.7 °F (37.1 °C) 73 20 131/89 92 %   20 0749     92 %   20 0450 99.3 °F (37.4 °C) 74 18 134/75 92 %   20 0022 98.4 °F (36.9 °C) 71 18 124/57 93 %   20 98.9 °F (37.2 °C) 77 18 128/72 95 %   20 1926     94 %   20 1437 98.4 °F (36.9 °C) 63 18 126/74 93 %     Oxygen Therapy  O2 Sat (%): 93 % (20 1131)  Pulse via Oximetry: 92 beats per minute (20 0749)  O2 Device: Room air (20 0749)    Estimated body mass index is 36.96 kg/m² as calculated from the following:    Height as of this encounter: 5' 7\" (1.702 m). Weight as of this encounter: 107 kg (236 lb).       Intake/Output Summary (Last 24 hours) at 2020 1205  Last data filed at 2020 5401  Gross per 24 hour   Intake 1060 ml   Output    Net 1060 ml       *Note that automatically entered I/Os may not be accurate; dependent on patient compliance with collection and accurate  by techs.    General:    Well nourished. Alert. Abdomen:   Soft, TTP epigastric, LUQ, RUQ, nondistended. No rebound/guarding  Extremities: Warm and dry. No cyanosis or edema. Skin:     No rashes or jaundice. Neuro:  No gross focal deficits    Data Review:  I have reviewed all labs, meds, and studies from the last 24 hours:  Recent Results (from the past 24 hour(s))   LIPASE    Collection Time: 06/25/20  6:23 AM   Result Value Ref Range    Lipase 163 73 - 009 U/L   METABOLIC PANEL, COMPREHENSIVE    Collection Time: 06/25/20  6:23 AM   Result Value Ref Range    Sodium 140 136 - 145 mmol/L    Potassium 3.6 3.5 - 5.1 mmol/L    Chloride 105 98 - 107 mmol/L    CO2 28 21 - 32 mmol/L    Anion gap 7 7 - 16 mmol/L    Glucose 83 65 - 100 mg/dL    BUN 12 6 - 23 MG/DL    Creatinine 0.66 0.6 - 1.0 MG/DL    GFR est AA >60 >60 ml/min/1.73m2    GFR est non-AA >60 >60 ml/min/1.73m2    Calcium 8.5 8.3 - 10.4 MG/DL    Bilirubin, total 0.3 0.2 - 1.1 MG/DL    ALT (SGPT) 17 12 - 65 U/L    AST (SGOT) 14 (L) 15 - 37 U/L    Alk. phosphatase 95 50 - 136 U/L    Protein, total 6.1 (L) 6.3 - 8.2 g/dL    Albumin 2.2 (L) 3.5 - 5.0 g/dL    Globulin 3.9 (H) 2.3 - 3.5 g/dL    A-G Ratio 0.6 (L) 1.2 - 3.5     CBC WITH AUTOMATED DIFF    Collection Time: 06/25/20  6:23 AM   Result Value Ref Range    WBC 10.0 4.3 - 11.1 K/uL    RBC 3.81 (L) 4.05 - 5.2 M/uL    HGB 10.2 (L) 11.7 - 15.4 g/dL    HCT 33.3 (L) 35.8 - 46.3 %    MCV 87.4 79.6 - 97.8 FL    MCH 26.8 26.1 - 32.9 PG    MCHC 30.6 (L) 31.4 - 35.0 g/dL    RDW 14.0 11.9 - 14.6 %    PLATELET 576 415 - 312 K/uL    MPV 10.9 9.4 - 12.3 FL    ABSOLUTE NRBC 0.00 0.0 - 0.2 K/uL    DF AUTOMATED      NEUTROPHILS 81 (H) 43 - 78 %    LYMPHOCYTES 10 (L) 13 - 44 %    MONOCYTES 8 4.0 - 12.0 %    EOSINOPHILS 0 (L) 0.5 - 7.8 %    BASOPHILS 0 0.0 - 2.0 %    IMMATURE GRANULOCYTES 1 0.0 - 5.0 %    ABS. NEUTROPHILS 8.1 1.7 - 8.2 K/UL    ABS. LYMPHOCYTES 1.0 0.5 - 4.6 K/UL    ABS. MONOCYTES 0.8 0.1 - 1.3 K/UL    ABS. EOSINOPHILS 0.0 0.0 - 0.8 K/UL    ABS. BASOPHILS 0.0 0.0 - 0.2 K/UL    ABS. IMM. GRANS. 0.1 0.0 - 0.5 K/UL        Current Meds:  Current Facility-Administered Medications   Medication Dose Route Frequency    [START ON 6/26/2020] bisacodyL (DULCOLAX) suppository 10 mg  10 mg Rectal DAILY    HYDROcodone-acetaminophen (NORCO)  mg tablet 1 Tab  1 Tab Oral Q4H PRN    fluticasone (FLOVENT HFA) 110 mcg inhaler  2 Puff Inhalation BID RT    montelukast (SINGULAIR) tablet 10 mg  10 mg Oral QHS    famotidine (PEPCID) tablet 20 mg  20 mg Oral BID    solifenacin (VESICARE) tablet 5 mg  5 mg Oral QHS    cefTRIAXone (ROCEPHIN) 1 g in 0.9% sodium chloride (MBP/ADV) 50 mL  1 g IntraVENous Q24H    HYDROmorphone (PF) (DILAUDID) injection 1 mg  1 mg IntraVENous Q4H PRN    lactated Ringers infusion  50 mL/hr IntraVENous CONTINUOUS    promethazine (PHENERGAN) with saline injection 12.5 mg  12.5 mg IntraVENous Q6H PRN    sodium chloride (NS) flush 5-40 mL  5-40 mL IntraVENous Q8H    sodium chloride (NS) flush 5-40 mL  5-40 mL IntraVENous PRN    acetaminophen (TYLENOL) tablet 650 mg  650 mg Oral Q4H PRN    naloxone (NARCAN) injection 0.4 mg  0.4 mg IntraVENous PRN    ondansetron (ZOFRAN) injection 4 mg  4 mg IntraVENous Q4H PRN    enoxaparin (LOVENOX) injection 40 mg  40 mg SubCUTAneous Q24H       Other Studies:  No results found for this visit on 06/21/20. Xr Abd Acute W 1 V Chest    Result Date: 6/24/2020  History: Nausea, vomiting, abdominal pain, 5 days duration. History of pancreatitis. EXAM: Abdominal series FINDINGS: The lungs are clear. The bowel gas pattern is nonspecific without evidence of free air or obstruction. IMPRESSION: No findings to suggest free air or obstruction.       All Micro Results     Procedure Component Value Units Date/Time    CULTURE, URINE [[id number]] Collected:  06/22/20 0705    Order Status:  Completed Specimen:  Urine from Clean catch Updated:  06/24/20 0802     Special Requests: NO SPECIAL REQUESTS        Culture result:       10,000 to 50,000 COLONIES/mL MIXED SKIN JERZY ISOLATED                SARS-CoV-2 Lab Results  \"Novel Coronavirus\" Test: No results found for: COV2NT   \"Emergent Disease\" Test: No results found for: EDPR  \"SARS-COV-2\" Test: No results found for: XGCOVT  As of: 11:50 AM on 6/25/2020        Assessment and Plan:     Hospital Problems as of 6/25/2020 Date Reviewed: 7/24/2019          Codes Class Noted - Resolved POA    * (Principal) Idiopathic acute pancreatitis without infection or necrosis ICD-10-CM: K85.00  ICD-9-CM: 577.0  6/21/2020 - Present Yes        GERD (gastroesophageal reflux disease) (Chronic) ICD-10-CM: K21.9  ICD-9-CM: 530.81  Unknown - Present Yes        ÁNGEL (obstructive sleep apnea) (Chronic) ICD-10-CM: G47.33  ICD-9-CM: 327.23  5/20/2020 - Present Yes        Obesity, morbid ([address] (Chronic) ICD-10-CM: E66.01  ICD-9-CM: 278.01  1/14/2019 - Present Yes        RESOLVED: Leukocytosis ICD-10-CM: E93.483  ICD-9-CM: 288.60  6/21/2020 - 6/23/2020 Yes              Plan:  · CLD   · Dulcolax daily  · ambulate  · Recheck labs in AM  · Probably doesn't need IVF much longer. Stop after dinner  · Nausea and pain meds PRN  · Rocephin for possible UTI, last dose tonight.     · Cont home asthma meds    DC planning/Dispo:    · Plan for home tomorrow    Diet:  DIET CLEAR LIQUID  DVT ppx:  lovenox    Signed:  Oz Vazquez MD

## 2020-06-25 NOTE — PROGRESS NOTES
Hourly rounds completed. Had a shower last night. Denies needs at this time. Slept well, CPAP on. Will give report to oncoming RN. Patient rescheduled for an appointment. Patient verbalized understanding.

## 2020-06-25 NOTE — PROGRESS NOTES
[facility] Progress Note         Admit Date:  6/21/2020    Today's Date:  6/25/2020    CC:  Nausea, vomiting,  pancreatitis    Subjective:     Patient feeling better today. Nausea and abd pain improved. + flatus but feels constipated; no stool since Sunday. Autistic daughter ran away from home yesterday and was found but this is big source of stress to her. Medications:   Current Facility-Administered Medications   Medication Dose Route Frequency    HYDROcodone-acetaminophen (NORCO)  mg tablet 1 Tab  1 Tab Oral Q4H PRN    fluticasone (FLOVENT HFA) 110 mcg inhaler  2 Puff Inhalation BID RT    montelukast (SINGULAIR) tablet 10 mg  10 mg Oral QHS    famotidine (PEPCID) tablet 20 mg  20 mg Oral BID    solifenacin (VESICARE) tablet 5 mg  5 mg Oral QHS    cefTRIAXone (ROCEPHIN) 1 g in 0.9% sodium chloride (MBP/ADV) 50 mL  1 g IntraVENous Q24H    HYDROmorphone (PF) (DILAUDID) injection 1 mg  1 mg IntraVENous Q4H PRN    lactated Ringers infusion  50 mL/hr IntraVENous CONTINUOUS    promethazine (PHENERGAN) with saline injection 12.5 mg  12.5 mg IntraVENous Q6H PRN    sodium chloride (NS) flush 5-40 mL  5-40 mL IntraVENous Q8H    sodium chloride (NS) flush 5-40 mL  5-40 mL IntraVENous PRN    acetaminophen (TYLENOL) tablet 650 mg  650 mg Oral Q4H PRN    naloxone (NARCAN) injection 0.4 mg  0.4 mg IntraVENous PRN    ondansetron (ZOFRAN) injection 4 mg  4 mg IntraVENous Q4H PRN    enoxaparin (LOVENOX) injection 40 mg  40 mg SubCUTAneous Q24H       Review of Systems:  ROS was obtained, with pertinent positives as listed above. No chest pain or SOB.     Diet:  clear    Objective:   Vitals:  Visit Vitals  /89 (BP 1 Location: Left arm, BP Patient Position: Sitting)   Pulse 73   Temp 98.7 °F (37.1 °C)   Resp 20   Ht 5' 7\" (1.702 m)   Wt 107 kg (236 lb)   LMP 10/30/2015 (Approximate) Comment: 11/11/15   SpO2 92%   BMI 36.96 kg/m²     Intake/Output:  06/25 0701 - 06/25 1900  In: 480 [P.O.:480]  Out: -   06/23 1901 - 06/25 0700  In: 700 [P.O.:700]  Out: -   Exam:  General appearance: alert, cooperative, no distress  Lungs: clear to auscultation bilaterally anteriorly  Heart: regular rate and rhythm  Abdomen: soft, full, improved to exam and less tender. Bowel sounds normal. No masses, no organomegaly  Extremities: extremities normal, atraumatic, no cyanosis or edema  Neuro:  alert and oriented    Data Review (Labs):    Recent Labs     06/25/20  0623   WBC 10.0   HGB 10.2*   HCT 33.3*      MCV 87.4      K 3.6      CO2 28   BUN 12   CREA 0.66   CA 8.5   GLU 83   AP 95   ALT 17   TBILI 0.3   ALB 2.2*   TP 6.1*   LPSE 163     Abdominal series 6/24/2020   FINDINGS: The lungs are clear. The bowel gas pattern is nonspecific without  evidence of free air or obstruction.   IMPRESSION  IMPRESSION: No findings to suggest free air or obstruction. Right upper quadrant ultrasound 6/21/2020   FINDINGS:There is normal hepatic echogenicity. .  There is no biliary ductal  dilatation. The common bile duct measures 5 mm. The right kidney measures 11.2  cm.    The gallbladder is unremarkable. No evidence of gallstones. No gallbladder  wall thickening or pericholecystic fluid.   The aorta is normal.  The IVC is patent.   IMPRESSION  IMPRESSION: Unremarkable right upper quadrant ultrasound. CT A/P W IV Contrast 6/21/2020  Findings:  CT ABDOMEN:    Limited evaluation of the lung bases and base of the mediastinum demonstrates no  significant abnormalities.    The Liver is homogeneous in attenuation. The spleen is homogeneous in  attenuation. No contour deforming or enhancing mass lesions are seen of the  pancreas or adrenal glands. However, there are inflammatory changes which  appears centered about the pancreas which will be described later in this  report. The gallbladder has an unremarkable CT appearance without radiopaque  stones or pericholecystic fluid/inflammatory changes.   The kidneys enhance  symmetrically and no evidence of hydronephrosis is seen.      The visualized loops of small bowel and colon are normal in caliber. The  appendix is seen on image 79 without acute abnormality. Mild to moderate  diverticulosis is seen of the left colon without associated acute inflammatory  changes. No free air is seen. However, abnormal mesenteric stranding is seen  which appears centered about the pancreatic head. The appearance suggest acute  pancreatitis. Additional likely reactive small currently nonloculated appearing  fluid is seen tracking along the bilateral anterior pararenal fascial planes and  into the right paracolic gutter. No adenopathy is seen. The abdominal aorta is  unremarkable in appearance.   CT PELVIS:  No abnormal pelvic fluid collections or inflammatory changes are present. No  pelvic adenopathy is seen. The urinary bladder is unremarkable.   IMPRESSION  IMPRESSION:    1. Findings consistent with acute pancreatitis as described above. Assessment:     Principal Problem:    Idiopathic acute pancreatitis without infection or necrosis (6/21/2020)    Active Problems:    Obesity, morbid ([facility] [address] (1/14/2019)      ÁNGEL (obstructive sleep apnea) (5/20/2020)      GERD (gastroesophageal reflux disease) ()        Plan:     44 yo female is seen in consultation for evaluation of abdominal pain. She was admitted Sunday with a 2-3 day hx of lower abdominal pain, followed by bloating and nausea, now with emesis. WBC 15.7, lipase 360 on admission with CT scan with some stranding along the pancreatic head. RUQ ultrasound was unremarkable. She reports no prior hx of pancreatitis and has not been placed on any new medications in the last 6 months. Triglyceride level normal.  She has had increased belching with n/v and inability to tolerate clear liquids since admission. Abdomen is distended and full. AAS not consistent with ileus. Etiology of pancreatitis unclear.       1. Supportive care with hydration, anti-emetics  2. IGG subclass 4 pending  3.   Dulcolax prn constipation    Patient is seen and examined in collaboration with Dr. Melissa Thomason.  Assessment and plan as per Dr. Boogie Brown NP

## 2020-06-25 NOTE — PROGRESS NOTES
Noted red/pink rashes on pt's upper back. Pt says rash is itching a little bit. Will put lotion on the rashes and continue to monitor.

## 2020-06-25 NOTE — PROGRESS NOTES
Hourly rounds completed throughout this shift. Pt had bm one time this shift. Pain and nausea med given one time. Pt reports decreased pain. Ambulated in the hallway severally this shift. Pt resting in bed; denies needs at this time. Will continue to monitor and report to oncoming night shift nurse.

## 2020-06-25 NOTE — PROGRESS NOTES
#22g 1in PIV inserted into [place] using aseptic technique. Pt tolerated well.        Tammy Levy RN VAT

## 2020-06-25 NOTE — PROGRESS NOTES
Asked to start another PIV. Using U/S assistance, attempted to insert a 20g 2 1/4\" accucath in right forearm, without success. Then attempted a 20g PIV in right forearm without U/S, without success. Primary RN notified.   Ghislaine Vinson RN, VAT

## 2020-06-26 VITALS
BODY MASS INDEX: 37.04 KG/M2 | OXYGEN SATURATION: 93 % | DIASTOLIC BLOOD PRESSURE: 67 MMHG | TEMPERATURE: 98.1 F | SYSTOLIC BLOOD PRESSURE: 139 MMHG | RESPIRATION RATE: 18 BRPM | HEART RATE: 75 BPM | HEIGHT: 67 IN | WEIGHT: 236 LBS

## 2020-06-26 PROCEDURE — 74011250637 HC RX REV CODE- 250/637: Performed by: INTERNAL MEDICINE

## 2020-06-26 PROCEDURE — 94760 N-INVAS EAR/PLS OXIMETRY 1: CPT

## 2020-06-26 RX ADMIN — Medication 10 ML: at 05:22

## 2020-06-26 RX ADMIN — FAMOTIDINE 20 MG: 20 TABLET, FILM COATED ORAL at 08:16

## 2020-06-26 RX ADMIN — FLUTICASONE PROPIONATE 2 PUFF: 110 AEROSOL, METERED RESPIRATORY (INHALATION) at 08:00

## 2020-06-26 RX ADMIN — BISACODYL 10 MG: 10 SUPPOSITORY RECTAL at 08:16

## 2020-06-26 NOTE — PROGRESS NOTES
3340 Report received from Agusto Gresham, [address] AM meds given. Pt swallows well. Ready to eat and go home. No BM at this time. Pt eating full liquids for breakfast. Pt will ambulate after breakfast then administer suppository and rest.    0930 Pt has ambulated in the hallway several times. Feeling well with no nausea. 1100 Pt has had medium BM. Solid and brown. States she feels she could have another. Have asked MD to advance diet prior to discharge per Pt request.   Pt states she will not have transportation until 1400 today. 1330 Discharge instructions reviewed with Pt by Charge HERNÁN Pina. PIV x 2 removed. GI soft, bland diet explained to Pt.  to take Pt home.

## 2020-06-26 NOTE — DISCHARGE SUMMARY
Hospitalist Discharge Summary     Admit Date:  2020  3:32 PM   DC date:    Name:  Royer Scales   Age:  45 y.o.  :  1970   MRN:  [id number]   PCP:  Jasper Trimble MD  Treatment Team: Attending Provider: González Maynard MD; Care Manager: Anika Macias RN; Utilization Review: Mary Ann Crisostomo RN    Problem List for this Hospitalization:  Hospital Problems as of 2020 Date Reviewed: 2019          Codes Class Noted - Resolved POA    * (Principal) Idiopathic acute pancreatitis without infection or necrosis ICD-10-CM: K85.00  ICD-9-CM: 577.0  2020 - Present Yes        GERD (gastroesophageal reflux disease) (Chronic) ICD-10-CM: K21.9  ICD-9-CM: 530.81  Unknown - Present Yes        ÁNGEL (obstructive sleep apnea) (Chronic) ICD-10-CM: G47.33  ICD-9-CM: 327.23  2020 - Present Yes        Obesity, morbid ([facility] [address] (Chronic) ICD-10-CM: E66.01  ICD-9-CM: 278.01  2019 - Present Yes        RESOLVED: Leukocytosis ICD-10-CM: N10.899  ICD-9-CM: 288.60  2020 - 2020 Yes                Admission HPI from 2020:    \" Patient is a 46 y/o female with hx GERD, obesity, ÁNGEL, diverticulosis who presents to ED with bilateral lower abdominal cramping pain that began Friday. She has been unable to sleep because of it. Has had nausea but no vomiting. Has loose stools and flatulence. Went to  and had UA but appears to be a contaminated specimen. She was directed to come to ED for further workup.      Here she was noted to have abdominal tenderness more so in both lower quadrants. She had a WBC ct of 15.7. Lipase 360 but CT abdomen/pelvis has findings consistent with acute pancreatitis. The gallbladder has unremarkable appearance. She did just return from the beach but only had one alcohol drink and she does not regularly consume alcohol. She has no hx of hypertriglyceridemia.  As she is still having significant nausea and pain requiring medication it was felt she would benefit from admission to hospital.  [facility] Course:  Admitted with idiopathic pancreatitis. Was slow to improve so GI consulted. Suspicion of post-pancreatitis ileus or narcotic induced partial SBO but no findings on KUB. She has improved the last few days and feels ready to go home. GI planning outpatient follow up. They plan to repeat the  as they suspect elevation due to cholestasis. Advance diet as tolerated    Disposition: Home or Self Care  Activity: Activity as tolerated  Diet: DIET FULL LIQUID  Code Status: Full Code    Follow Up Orders:  No orders of the defined types were placed in this encounter. Follow-up Information     Follow up With Specialties Details Why Contact Info    Sarah Chaney MD [facility] Practice Call As needed [address]  [phone]            Discharge meds at bottom of this note. Plan was discussed with pt. All questions answered. Patient was stable at time of discharge. Given instructions to call a physician or return if any concerns. Discharge summary and encounter summary was sent to PCP electronically via \"Comm Mgt\" link in [place], if possible. Diagnostic Imaging/Tests:   Xr Abd Acute W 1 V Chest    Result Date: 6/24/2020  History: Nausea, vomiting, abdominal pain, 5 days duration. History of pancreatitis. EXAM: Abdominal series FINDINGS: The lungs are clear. The bowel gas pattern is nonspecific without evidence of free air or obstruction. IMPRESSION: No findings to suggest free air or obstruction. Ct Abd Pelv W Cont    Result Date: 6/21/2020  CT ABDOMEN AND PELVIS WITH INTRAVENOUS CONTRAST DATED 6/21/2020. History: Lower abdominal pain beginning on Friday. Intermittent cramping. Comparison: None. Technique:   Multiple contiguous helical CT images reconstructed at 5 mm intervals were obtained from above the diaphragms through the ischial tuberosities following 100 cc Isovue-370 without acute complication.   All CT scans performed at this facility use one or all of the following: Automated exposure control, adjustment of the mA and/or kVp according to patient's size, iterative reconstruction. Findings: CT ABDOMEN:  Limited evaluation of the lung bases and base of the mediastinum demonstrates no significant abnormalities. The Liver is homogeneous in attenuation. The spleen is homogeneous in attenuation. No contour deforming or enhancing mass lesions are seen of the pancreas or adrenal glands. However, there are inflammatory changes which appears centered about the pancreas which will be described later in this report. The gallbladder has an unremarkable CT appearance without radiopaque stones or pericholecystic fluid/inflammatory changes. The kidneys enhance symmetrically and no evidence of hydronephrosis is seen. The visualized loops of small bowel and colon are normal in caliber. The appendix is seen on image 79 without acute abnormality. Mild to moderate diverticulosis is seen of the left colon without associated acute inflammatory changes. No free air is seen. However, abnormal mesenteric stranding is seen which appears centered about the pancreatic head. The appearance suggest acute pancreatitis. Additional likely reactive small currently nonloculated appearing fluid is seen tracking along the bilateral anterior pararenal fascial planes and into the right paracolic gutter. No adenopathy is seen. The abdominal aorta is unremarkable in appearance. CT PELVIS: No abnormal pelvic fluid collections or inflammatory changes are present. No pelvic adenopathy is seen. The urinary bladder is unremarkable. IMPRESSION:  1. Findings consistent with acute pancreatitis as described above. [id number] [facility]    Result Date: 6/22/2020  HISTORY:Acute pancreatitis EXAM: Right upper quadrant ultrasound TECHNIQUE: Real-time grayscale and color Doppler imaging is performed in the longitudinal and transverse planes. No comparison. FINDINGS:There is normal hepatic echogenicity. .  There is no biliary ductal dilatation. The common bile duct measures 5 mm. The right kidney measures 11.2 cm. The gallbladder is unremarkable. No evidence of gallstones. No gallbladder wall thickening or pericholecystic fluid. The aorta is normal.  The IVC is patent. IMPRESSION: Unremarkable right upper quadrant ultrasound. Echocardiogram results:  No results found for this visit on 06/21/20.     Procedures done this admission:  * No surgery found *    All Micro Results     Procedure Component Value Units Date/Time    CULTURE, URINE [[id number]] Collected:  06/22/20 0705    Order Status:  Completed Specimen:  Urine from Clean catch Updated:  06/24/20 0802     Special Requests: NO SPECIAL REQUESTS        Culture result:       10,000 to 50,000 COLONIES/mL MIXED SKIN JERZY ISOLATED                SARS-CoV-2 Lab Results  \"Novel Coronavirus\" Test: No results found for: COV2NT   \"Emergent Disease\" Test: No results found for: EDPR  \"SARS-COV-2\" Test: No results found for: XGCOVT  As of: 1:25 PM on 6/26/2020        Labs: Results:       BMP, Mg, Phos Recent Labs     06/25/20  0623      K 3.6      CO2 28   AGAP 7   BUN 12   CREA 0.66   CA 8.5   GLU 83      CBC Recent Labs     06/25/20  0623   WBC 10.0   RBC 3.81*   HGB 10.2*   HCT 33.3*      GRANS 81*   LYMPH 10*   EOS 0*   MONOS 8   BASOS 0   IG 1   ANEU 8.1   ABL 1.0   KEYANA 0.0   ABM 0.8   ABB 0.0   AIG 0.1      LFT Recent Labs     06/25/20  0623   ALT 17   AP 95   TP 6.1*   ALB 2.2*   GLOB 3.9*   AGRAT 0.6*      Cardiac Testing Lab Results   Component Value Date/Time    Troponin-I, Qt. 1.23 () 03/27/2019 12:03 AM    Troponin-I, Qt. 1.34 () 03/26/2019 06:30 PM    Troponin-I, Qt. <0.02 (L) 03/26/2019 08:34 AM      Coagulation Tests Lab Results   Component Value Date/Time    Prothrombin time 10.4 12/04/2014 09:42 AM    INR 1.0 12/04/2014 09:42 AM    aPTT 33.6 (H) 12/04/2014 09:42 AM      A1c No results found for: HBA1C, HGBE8, JAS3XBIQ   Lipid Panel Lab Results   Component Value Date/Time    Cholesterol, total 97 06/21/2020 08:49 PM    HDL Cholesterol 63 (H) 06/21/2020 08:49 PM    LDL, calculated 26 06/21/2020 08:49 PM    VLDL, calculated 8 06/21/2020 08:49 PM    Triglyceride 40 06/21/2020 08:49 PM    CHOL/HDL Ratio 1.5 06/21/2020 08:49 PM      Thyroid Panel Lab Results   Component Value Date/Time    TSH 2.380 04/30/2019 10:06 AM    TSH 2.120 04/26/2018 11:37 AM        Most Recent UA Lab Results   Component Value Date/Time    Color YELLOW 06/22/2020 07:00 AM    Appearance CLEAR 06/22/2020 07:00 AM    Specific gravity 1.040 (H) 06/22/2020 07:00 AM    pH (UA) 5.5 06/22/2020 07:00 AM    Protein Negative 06/22/2020 07:00 AM    Glucose Negative 06/22/2020 07:00 AM    Ketone 40 (A) 06/22/2020 07:00 AM    Bilirubin Negative 06/22/2020 07:00 AM    Blood Negative 06/22/2020 07:00 AM    Urobilinogen 0.2 06/22/2020 07:00 AM    Nitrites Negative 06/22/2020 07:00 AM    Leukocyte Esterase Negative 06/22/2020 07:00 AM    WBC 0 01/14/2019 11:35 AM    RBC 0 01/14/2019 11:35 AM    Bacteria 0 01/14/2019 11:35 AM        Allergies   Allergen Reactions    Adhesive Tape-Silicones Contact Dermatitis     Causes blisters    Ciprofloxacin Rash    Sulfa (Sulfonamide Antibiotics) Rash     Immunization History   Administered Date(s) Administered    Influenza Vaccine 10/03/2016, 10/10/2018    Pneumococcal Polysaccharide (PPSV-23) 04/30/2019    Tdap 04/30/2019       All Labs from Last 24 Hrs:  No results found for this or any previous visit (from the past 24 hour(s)).     Discharge Exam:  Patient Vitals for the past 24 hrs:   Temp Pulse Resp BP SpO2   06/26/20 1122 98.1 °F (36.7 °C) 75 18 139/67 93 %   06/26/20 0819 98.4 °F (36.9 °C) 78 16 137/87 97 %   06/26/20 0545 98 °F (36.7 °C) 65 18 127/63 97 %   06/26/20 0110 98.3 °F (36.8 °C) 66 18 126/64 95 %   06/25/20 2055 99.2 °F (37.3 °C) 72 18 148/89 96 %   06/25/20 2000     96 % 06/25/20 1617 98.7 °F (37.1 °C) 68 18 129/80 95 %     Oxygen Therapy  O2 Sat (%): 93 % (06/26/20 1122)  Pulse via Oximetry: 80 beats per minute (06/25/20 2000)  O2 Device: Room air (06/26/20 0825)    Estimated body mass index is 36.96 kg/m² as calculated from the following:    Height as of this encounter: 5' 7\" (1.702 m). Weight as of this encounter: 107 kg (236 lb). Intake/Output Summary (Last 24 hours) at 6/26/2020 1325  Last data filed at 6/25/2020 1823  Gross per 24 hour   Intake 760 ml   Output    Net 760 ml       *Note that automatically entered I/Os may not be accurate; dependent on patient compliance with collection and accurate  by assistants. General:    Well nourished. Alert. Eyes:   Normal sclerae. Extraocular movements intact. ENT:  Normocephalic, atraumatic. Moist mucous membranes  CV:   Regular rate and rhythm. No murmur, rub, or gallop. Lungs:  Clear to auscultation bilaterally. No wheezing, rhonchi, or rales. Abdomen: Soft, nontender, nondistended. Extremities: Warm and dry. No cyanosis or edema. Neurologic: CN II-XII grossly intact. No gross focal deficits   Skin:     No rashes or jaundice. Psych:  Normal mood and affect.     Current Med List in Hospital:   Current Facility-Administered Medications   Medication Dose Route Frequency    bisacodyL (DULCOLAX) suppository 10 mg  10 mg Rectal DAILY    HYDROcodone-acetaminophen (NORCO)  mg tablet 1 Tab  1 Tab Oral Q4H PRN    fluticasone (FLOVENT HFA) 110 mcg inhaler  2 Puff Inhalation BID RT    montelukast (SINGULAIR) tablet 10 mg  10 mg Oral QHS    famotidine (PEPCID) tablet 20 mg  20 mg Oral BID    solifenacin (VESICARE) tablet 5 mg  5 mg Oral QHS    HYDROmorphone (PF) (DILAUDID) injection 1 mg  1 mg IntraVENous Q4H PRN    promethazine (PHENERGAN) with saline injection 12.5 mg  12.5 mg IntraVENous Q6H PRN    sodium chloride (NS) flush 5-40 mL  5-40 mL IntraVENous Q8H    sodium chloride (NS) flush 5-40 mL  5-40 mL IntraVENous PRN    acetaminophen (TYLENOL) tablet 650 mg  650 mg Oral Q4H PRN    naloxone (NARCAN) injection 0.4 mg  0.4 mg IntraVENous PRN    ondansetron (ZOFRAN) injection 4 mg  4 mg IntraVENous Q4H PRN    enoxaparin (LOVENOX) injection 40 mg  40 mg SubCUTAneous Q24H       Discharge Info:   Current Discharge Medication List      CONTINUE these medications which have NOT CHANGED    Details   levocetirizine (XYZAL) 5 mg tablet Take 1 Tab by mouth daily. Take / use AM day of surgery  per anesthesia protocols. Indications: inflammation of the nose due to an allergy  Qty: 90 Tab, Refills: 0    Associated Diagnoses: Non-seasonal allergic rhinitis, unspecified trigger      fluticasone propionate (Flovent Diskus) 100 mcg/actuation dsdv Take 2 Puffs by inhalation two (2) times a day. Indications: controller medication for asthma  Qty: 3 Inhaler, Refills: 0    Associated Diagnoses: Wheeze      montelukast (Singulair) 10 mg tablet Take 1 Tab by mouth nightly. Indications: inflammation of the nose due to an allergy, controller medication for asthma  Qty: 90 Tab, Refills: 0    Associated Diagnoses: Wheeze      beclomethasone dipropionate (QNASL) 80 mcg/actuation HFAA 2 Sprays by Nasal route daily. Qty: 8.7 g, Refills: 2    Associated Diagnoses: Non-seasonal allergic rhinitis, unspecified trigger      !! OTHER,NON-FORMULARY, Indications: Methyl Protect one cap daily      !! OTHER,NON-FORMULARY, daily. Iodine /potassium supplement  Indications: Iodoral 1/2 tab daily      !! OTHER,NON-FORMULARY, three (3) times daily as needed. Indications: Catecholacalm cap tid      mv-min/iron/folic/calcium/vitK (WOMEN'S MULTIVITAMIN PO) Take  by mouth.      melatonin 1 mg tablet Take 1 mg by mouth nightly. PRASTERONE, DHEA, (DHEA PO) Take 15 mg by mouth daily. DHEA/testosterone in the progesterone cream      SELENIUM PO Take 1 Tab by mouth daily.       allergy injection 2 shots every 3 weeks      LACTOBACILLUS ACIDOPHILUS (PROBIOTIC PO) Take 1 tablet by mouth daily. raNITIdine (ZANTAC) 150 mg tablet TAKE 1 TABLET BY MOUTH TWO  TIMES DAILY  Qty: 180 Tab, Refills: 0    Associated Diagnoses: Dyspepsia      ofloxacin (FLOXIN) 0.3 % ophthalmic solution 1 Drop as needed. In bilateral ears if needed      solifenacin (VESICARE) 5 mg tablet Take 5 mg by mouth nightly. Indications: Frequent Urination      mirabegron ER (MYRBETRIQ) 50 mg ER tablet Take 1 Tab by mouth daily. Qty: 30 Tab, Refills: 11    Associated Diagnoses: Urge incontinence      !! OTHER,NON-FORMULARY, Take 2 Tabs by mouth daily (after lunch). Adrenal complex      cholecalciferol, vitamin D3, 1,000 unit/drop drop Take 5 Drops by mouth daily. !! - Potential duplicate medications found. Please discuss with provider. Time spent in patient discharge planning and coordination 35 minutes.     Signed:  Dejon Lee MD

## 2020-06-26 NOTE — PROGRESS NOTES
[facility] Progress Note         Admit Date:  6/21/2020    Today's Date:  6/26/2020    CC:  Nausea, vomiting,  pancreatitis    Subjective:     Patient feeling better today. Nausea and abd pain improved. Small stool yesterday. Hoping for discharge home today. Medications:   Current Facility-Administered Medications   Medication Dose Route Frequency    bisacodyL (DULCOLAX) suppository 10 mg  10 mg Rectal DAILY    HYDROcodone-acetaminophen (NORCO)  mg tablet 1 Tab  1 Tab Oral Q4H PRN    fluticasone (FLOVENT HFA) 110 mcg inhaler  2 Puff Inhalation BID RT    montelukast (SINGULAIR) tablet 10 mg  10 mg Oral QHS    famotidine (PEPCID) tablet 20 mg  20 mg Oral BID    solifenacin (VESICARE) tablet 5 mg  5 mg Oral QHS    HYDROmorphone (PF) (DILAUDID) injection 1 mg  1 mg IntraVENous Q4H PRN    promethazine (PHENERGAN) with saline injection 12.5 mg  12.5 mg IntraVENous Q6H PRN    sodium chloride (NS) flush 5-40 mL  5-40 mL IntraVENous Q8H    sodium chloride (NS) flush 5-40 mL  5-40 mL IntraVENous PRN    acetaminophen (TYLENOL) tablet 650 mg  650 mg Oral Q4H PRN    naloxone (NARCAN) injection 0.4 mg  0.4 mg IntraVENous PRN    ondansetron (ZOFRAN) injection 4 mg  4 mg IntraVENous Q4H PRN    enoxaparin (LOVENOX) injection 40 mg  40 mg SubCUTAneous Q24H       Review of Systems:  ROS was obtained, with pertinent positives as listed above. No chest pain or SOB. Diet:  Full liquids    Objective:   Vitals:  Visit Vitals  /87 (BP 1 Location: Left arm, BP Patient Position: Sitting)   Pulse 78   Temp 98.4 °F (36.9 °C)   Resp 16   Ht 5' 7\" (1.702 m)   Wt 107 kg (236 lb)   LMP 10/30/2015 (Approximate) Comment: 11/11/15   SpO2 97%   BMI 36.96 kg/m²     Intake/Output:  No intake/output data recorded.   06/24 1901 - 06/26 0700  In: 3412 [P.O.:960; I.V.:760]  Out: -   Exam:  General appearance: alert, cooperative, no distress; sitting in bedside chair  Lungs: clear to auscultation bilaterally anteriorly  Heart: regular rate and rhythm  Abdomen: soft, improved to exam and less tender. Bowel sounds normal. No masses, no organomegaly  Extremities: extremities normal, atraumatic, no cyanosis or edema  Neuro:  alert and oriented    Data Review (Labs):    Recent Labs     06/25/20  0623   WBC 10.0   HGB 10.2*   HCT 33.3*      MCV 87.4      K 3.6      CO2 28   BUN 12   CREA 0.66   CA 8.5   GLU 83   AP 95   ALT 17   TBILI 0.3   ALB 2.2*   TP 6.1*   LPSE 163     Abdominal series 6/24/2020   FINDINGS: The lungs are clear. The bowel gas pattern is nonspecific without  evidence of free air or obstruction.   IMPRESSION  IMPRESSION: No findings to suggest free air or obstruction. Right upper quadrant ultrasound 6/21/2020   FINDINGS:There is normal hepatic echogenicity. .  There is no biliary ductal  dilatation. The common bile duct measures 5 mm. The right kidney measures 11.2  cm.    The gallbladder is unremarkable. No evidence of gallstones. No gallbladder  wall thickening or pericholecystic fluid.   The aorta is normal.  The IVC is patent.   IMPRESSION  IMPRESSION: Unremarkable right upper quadrant ultrasound. CT A/P W IV Contrast 6/21/2020  Findings:  CT ABDOMEN:    Limited evaluation of the lung bases and base of the mediastinum demonstrates no  significant abnormalities.    The Liver is homogeneous in attenuation. The spleen is homogeneous in  attenuation. No contour deforming or enhancing mass lesions are seen of the  pancreas or adrenal glands. However, there are inflammatory changes which  appears centered about the pancreas which will be described later in this  report. The gallbladder has an unremarkable CT appearance without radiopaque  stones or pericholecystic fluid/inflammatory changes. The kidneys enhance  symmetrically and no evidence of hydronephrosis is seen.      The visualized loops of small bowel and colon are normal in caliber.   The  appendix is seen on image 79 without acute abnormality. Mild to moderate  diverticulosis is seen of the left colon without associated acute inflammatory  changes. No free air is seen. However, abnormal mesenteric stranding is seen  which appears centered about the pancreatic head. The appearance suggest acute  pancreatitis. Additional likely reactive small currently nonloculated appearing  fluid is seen tracking along the bilateral anterior pararenal fascial planes and  into the right paracolic gutter. No adenopathy is seen. The abdominal aorta is  unremarkable in appearance.   CT PELVIS:  No abnormal pelvic fluid collections or inflammatory changes are present. No  pelvic adenopathy is seen. The urinary bladder is unremarkable.   IMPRESSION  IMPRESSION:    1. Findings consistent with acute pancreatitis as described above. Assessment:     Principal Problem:    Idiopathic acute pancreatitis without infection or necrosis (6/21/2020)    Active Problems:    Obesity, morbid ([address] (1/14/2019)      ÁNGEL (obstructive sleep apnea) (5/20/2020)      GERD (gastroesophageal reflux disease) ()        Plan:     44 yo female is seen in consultation for evaluation of abdominal pain. She was admitted Sunday with a 2-3 day hx of lower abdominal pain, followed by bloating and nausea, now with emesis. WBC 15.7, lipase 360 on admission with CT scan with some stranding along the pancreatic head. RUQ ultrasound was unremarkable. She reports no prior hx of pancreatitis and has not been placed on any new medications in the last 6 months. Triglyceride level normal.  She has had increased belching with n/v and inability to tolerate clear liquids since admission. Abdomen is distended and full. AAS not consistent with ileus. Etiology of pancreatitis unclear. 1.  Tolerating full liquids  2. IGG subclass 4 pending  3.   [id number] Renee Salazar for discharge from gi standpoint; we will have our office call patient to arrange for office followup    Patient is seen and examined in collaboration with Dr. Bill Rucker.  Assessment and plan as per Dr. Aleyda Negrete NP

## 2020-06-26 NOTE — PROGRESS NOTES
Care Management Interventions  Mode of Transport at Discharge: Self  Transition of Care Consult (CM Consult): Discharge Planning  Discharge Durable Medical Equipment: No  Physical Therapy Consult: No  Occupational Therapy Consult: No  Confirm Follow Up Transport: Self  Discharge Location  Discharge Placement: Home    Patient to discharge home. No CM needs have been identified. All milestones for discharge have been met. Patient will transport home with family.

## 2020-06-26 NOTE — PROGRESS NOTES
Hourly rounds completed. Ambulated in the hallway once this shift. Pain is controlled. Denies needs at this time. Will give report to oncoming RN.

## 2020-06-27 LAB — IGG4 SER-MCNC: 2 MG/DL (ref 2–96)

## 2020-07-07 PROBLEM — R06.02 SOB (SHORTNESS OF BREATH): Status: RESOLVED | Noted: 2019-03-26 | Resolved: 2020-07-07

## 2020-07-07 PROBLEM — R10.13 DYSPEPSIA: Status: RESOLVED | Noted: 2018-04-26 | Resolved: 2020-07-07

## 2020-07-07 PROBLEM — E66.9 OBESITY (BMI 35.0-39.9 WITHOUT COMORBIDITY): Status: ACTIVE | Noted: 2020-07-07

## 2020-07-07 PROBLEM — Z99.89 OSA ON CPAP: Chronic | Status: ACTIVE | Noted: 2020-05-20

## 2020-08-10 ENCOUNTER — HOSPITAL ENCOUNTER (OUTPATIENT)
Dept: NUCLEAR MEDICINE | Age: 50
Discharge: HOME | End: 2020-08-10
Attending: INTERNAL MEDICINE
Payer: COMMERCIAL

## 2020-08-10 VITALS — WEIGHT: 235 LBS | BODY MASS INDEX: 36.81 KG/M2

## 2020-08-10 DIAGNOSIS — R11.0 NAUSEA: ICD-10-CM

## 2020-08-10 DIAGNOSIS — R10.11 RUQ ABDOMINAL PAIN: ICD-10-CM

## 2020-08-10 PROCEDURE — 78227 HEPATOBIL SYST IMAGE W/DRUG: CPT

## 2020-08-10 PROCEDURE — 74011250636 HC RX REV CODE- 250/636: Performed by: INTERNAL MEDICINE

## 2020-08-10 RX ORDER — SODIUM CHLORIDE 0.9 % (FLUSH) 0.9 %
10 SYRINGE (ML) INJECTION
Status: COMPLETED | OUTPATIENT
Start: 2020-08-10 | End: 2020-08-10

## 2020-08-10 RX ADMIN — Medication 10 ML: at 10:53

## 2020-08-10 RX ADMIN — SINCALIDE 2.13 MCG: 5 INJECTION, POWDER, LYOPHILIZED, FOR SOLUTION INTRAVENOUS at 11:30

## 2021-01-01 ENCOUNTER — RX ONLY (OUTPATIENT)
Age: 51
Setting detail: RX ONLY
End: 2021-01-01

## 2021-01-11 ENCOUNTER — APPOINTMENT (RX ONLY)
Dept: URBAN - METROPOLITAN AREA CLINIC 23 | Facility: CLINIC | Age: 51
Setting detail: DERMATOLOGY
End: 2021-01-11

## 2021-01-11 DIAGNOSIS — L738 OTHER SPECIFIED DISEASES OF HAIR AND HAIR FOLLICLES: ICD-10-CM

## 2021-01-11 DIAGNOSIS — Z71.89 OTHER SPECIFIED COUNSELING: ICD-10-CM

## 2021-01-11 DIAGNOSIS — L663 OTHER SPECIFIED DISEASES OF HAIR AND HAIR FOLLICLES: ICD-10-CM

## 2021-01-11 DIAGNOSIS — D485 NEOPLASM OF UNCERTAIN BEHAVIOR OF SKIN: ICD-10-CM

## 2021-01-11 DIAGNOSIS — D18.0 HEMANGIOMA: ICD-10-CM

## 2021-01-11 DIAGNOSIS — L73.9 FOLLICULAR DISORDER, UNSPECIFIED: ICD-10-CM

## 2021-01-11 DIAGNOSIS — L82.1 OTHER SEBORRHEIC KERATOSIS: ICD-10-CM

## 2021-01-11 DIAGNOSIS — D22 MELANOCYTIC NEVI: ICD-10-CM | Status: STABLE

## 2021-01-11 DIAGNOSIS — Z87.2 PERSONAL HISTORY OF DISEASES OF THE SKIN AND SUBCUTANEOUS TISSUE: ICD-10-CM

## 2021-01-11 PROBLEM — K21.9 GASTRO-ESOPHAGEAL REFLUX DISEASE WITHOUT ESOPHAGITIS: Status: ACTIVE | Noted: 2021-01-11

## 2021-01-11 PROBLEM — D22.5 MELANOCYTIC NEVI OF TRUNK: Status: ACTIVE | Noted: 2021-01-11

## 2021-01-11 PROBLEM — L85.3 XEROSIS CUTIS: Status: ACTIVE | Noted: 2021-01-11

## 2021-01-11 PROBLEM — D18.01 HEMANGIOMA OF SKIN AND SUBCUTANEOUS TISSUE: Status: ACTIVE | Noted: 2021-01-11

## 2021-01-11 PROBLEM — D48.5 NEOPLASM OF UNCERTAIN BEHAVIOR OF SKIN: Status: ACTIVE | Noted: 2021-01-11

## 2021-01-11 PROBLEM — D22.61 MELANOCYTIC NEVI OF RIGHT UPPER LIMB, INCLUDING SHOULDER: Status: ACTIVE | Noted: 2021-01-11

## 2021-01-11 PROBLEM — L02.02 FURUNCLE OF FACE: Status: ACTIVE | Noted: 2021-01-11

## 2021-01-11 PROBLEM — J30.1 ALLERGIC RHINITIS DUE TO POLLEN: Status: ACTIVE | Noted: 2021-01-11

## 2021-01-11 PROBLEM — D22.62 MELANOCYTIC NEVI OF LEFT UPPER LIMB, INCLUDING SHOULDER: Status: ACTIVE | Noted: 2021-01-11

## 2021-01-11 PROBLEM — D22.71 MELANOCYTIC NEVI OF RIGHT LOWER LIMB, INCLUDING HIP: Status: ACTIVE | Noted: 2021-01-11

## 2021-01-11 PROBLEM — D22.72 MELANOCYTIC NEVI OF LEFT LOWER LIMB, INCLUDING HIP: Status: ACTIVE | Noted: 2021-01-11

## 2021-01-11 PROCEDURE — ? OBSERVATION

## 2021-01-11 PROCEDURE — ? BIOPSY BY SHAVE METHOD

## 2021-01-11 PROCEDURE — ? COUNSELING

## 2021-01-11 PROCEDURE — 99213 OFFICE O/P EST LOW 20 MIN: CPT | Mod: 25

## 2021-01-11 PROCEDURE — 11102 TANGNTL BX SKIN SINGLE LES: CPT

## 2021-01-11 PROCEDURE — ? PATIENT SPECIFIC COUNSELING

## 2021-01-11 ASSESSMENT — LOCATION SIMPLE DESCRIPTION DERM
LOCATION SIMPLE: LEFT POSTERIOR UPPER ARM
LOCATION SIMPLE: LEFT THIGH
LOCATION SIMPLE: RIGHT LOWER BACK
LOCATION SIMPLE: RIGHT POSTERIOR UPPER ARM
LOCATION SIMPLE: LEFT UPPER BACK
LOCATION SIMPLE: LEFT UPPER ARM
LOCATION SIMPLE: LEFT CHEEK
LOCATION SIMPLE: RIGHT PRETIBIAL REGION
LOCATION SIMPLE: RIGHT FOOT
LOCATION SIMPLE: RIGHT POPLITEAL SKIN
LOCATION SIMPLE: CHIN
LOCATION SIMPLE: RIGHT UPPER BACK
LOCATION SIMPLE: ABDOMEN
LOCATION SIMPLE: RIGHT CHEEK
LOCATION SIMPLE: CHEST

## 2021-01-11 ASSESSMENT — LOCATION ZONE DERM
LOCATION ZONE: FACE
LOCATION ZONE: ARM
LOCATION ZONE: FEET
LOCATION ZONE: TRUNK
LOCATION ZONE: LEG

## 2021-01-11 ASSESSMENT — LOCATION DETAILED DESCRIPTION DERM
LOCATION DETAILED: LEFT ANTERIOR DISTAL UPPER ARM
LOCATION DETAILED: RIGHT LATERAL DORSAL FOOT
LOCATION DETAILED: LEFT DISTAL POSTERIOR UPPER ARM
LOCATION DETAILED: LEFT ANTERIOR DISTAL THIGH
LOCATION DETAILED: RIGHT POPLITEAL SKIN
LOCATION DETAILED: RIGHT PROXIMAL PRETIBIAL REGION
LOCATION DETAILED: MIDDLE STERNUM
LOCATION DETAILED: RIGHT INFERIOR LATERAL MALAR CHEEK
LOCATION DETAILED: RIGHT DISTAL POSTERIOR UPPER ARM
LOCATION DETAILED: PERIUMBILICAL SKIN
LOCATION DETAILED: LEFT CHIN
LOCATION DETAILED: LEFT SUPERIOR UPPER BACK
LOCATION DETAILED: RIGHT MEDIAL UPPER BACK
LOCATION DETAILED: RIGHT INFERIOR LATERAL MIDBACK
LOCATION DETAILED: LEFT PROXIMAL POSTERIOR UPPER ARM
LOCATION DETAILED: RIGHT INFERIOR CENTRAL MALAR CHEEK
LOCATION DETAILED: RIGHT LATERAL ABDOMEN
LOCATION DETAILED: LEFT SUPERIOR CENTRAL BUCCAL CHEEK

## 2021-01-11 NOTE — PROCEDURE: BIOPSY BY SHAVE METHOD
Anesthesia Type: 1% lidocaine with epinephrine
Validate Note Data (See Information Below): No
Path Notes (To The Dermatopathologist): .
Information: Selecting Yes will display possible errors in your note based on the variables you have selected. This validation is only offered as a suggestion for you. PLEASE NOTE THAT THE VALIDATION TEXT WILL BE REMOVED WHEN YOU FINALIZE YOUR NOTE. IF YOU WANT TO FAX A PRELIMINARY NOTE YOU WILL NEED TO TOGGLE THIS TO 'NO' IF YOU DO NOT WANT IT IN YOUR FAXED NOTE.
Notification Instructions: Patient will be notified of biopsy results. However, patient instructed to call the office if not contacted within 2 weeks.
Wound Care: Vaseline
Biopsy Method: Dermablade
Electrodesiccation And Curettage Text: The wound bed was treated with electrodesiccation and curettage after the biopsy was performed.
Depth Of Biopsy: dermis
Billing Type: Third-Party Bill
Accession #: [id number]
Curettage Text: The wound bed was treated with curettage after the biopsy was performed.
Anesthesia Volume In Cc: 0.5
Size Of Lesion In Cm: 0
Biopsy Type: H and E
Consent: Written consent was obtained and risks were reviewed including but not limited to scarring, infection, bleeding, scabbing, incomplete removal, nerve damage and allergy to anesthesia.
Dressing: bandage
Cryotherapy Text: The wound bed was treated with cryotherapy after the biopsy was performed.
Post-Care Instructions: I reviewed with the patient in detail post-care instructions. Patient is to keep the biopsy site dry overnight, and then apply bacitracin twice daily until healed. Patient may apply hydrogen peroxide soaks to remove any crusting.
Hemostasis: Aluminum Chloride
Silver Nitrate Text: The wound bed was treated with silver nitrate after the biopsy was performed.
Type Of Destruction Used: Curettage
Electrodesiccation Text: The wound bed was treated with electrodesiccation after the biopsy was performed.
Was A Bandage Applied: Yes
Detail Level: Detailed

## 2021-02-04 ENCOUNTER — APPOINTMENT (RX ONLY)
Dept: URBAN - METROPOLITAN AREA CLINIC 23 | Facility: CLINIC | Age: 51
Setting detail: DERMATOLOGY
End: 2021-02-04

## 2021-02-04 PROBLEM — C44.519 BASAL CELL CARCINOMA OF SKIN OF OTHER PART OF TRUNK: Status: ACTIVE | Noted: 2021-02-04

## 2021-02-04 PROCEDURE — 17262 DSTRJ MAL LES T/A/L 1.1-2.0: CPT

## 2021-02-04 PROCEDURE — ? CURETTAGE AND DESTRUCTION

## 2021-02-04 NOTE — PROCEDURE: CURETTAGE AND DESTRUCTION
Render Post-Care Instructions In Note?: no
What Was Performed First?: Curettage
Size Of Lesion After Curettage: 1.4
Biopsy Photograph Reviewed: Yes
Anesthesia Type: 1% lidocaine with epinephrine
Number Of Curettages: 2
Total Volume (Ccs): 1
Cautery Type: electrodesiccation
Bill As A Line Item Or As Units: Line Item
Consent was obtained from the patient. The risks, benefits and alternatives to therapy were discussed in detail. Specifically, the risks of infection, scarring, bleeding, prolonged wound healing, nerve injury, incomplete removal, allergy to anesthesia and recurrence were addressed. Alternatives to ED&C, such as: surgical removal and XRT were also discussed.  Prior to the procedure, the treatment site was clearly identified and confirmed by the patient. All components of Universal Protocol/PAUSE Rule completed.
Detail Level: Detailed
Post-Care Instructions: I reviewed with the patient in detail post-care instructions. Patient is to keep the area dry for 48 hours, and not to engage in any swimming until the area is healed. Should the patient develop any fevers, chills, bleeding, severe pain patient will contact the office immediately.
Additional Information: (Optional): The wound was cleaned, and a pressure dressing was applied.  The patient received detailed post-op instructions.

## 2021-05-03 ENCOUNTER — TRANSCRIBE ORDER (OUTPATIENT)
Dept: SCHEDULING | Age: 51
End: 2021-05-03

## 2021-05-03 DIAGNOSIS — Z12.31 SCREENING MAMMOGRAM FOR HIGH-RISK PATIENT: Primary | ICD-10-CM

## 2021-06-11 ENCOUNTER — HOSPITAL ENCOUNTER (OUTPATIENT)
Dept: MAMMOGRAPHY | Age: 51
Discharge: HOME | End: 2021-06-11
Attending: FAMILY MEDICINE
Payer: COMMERCIAL

## 2021-06-11 DIAGNOSIS — Z12.31 SCREENING MAMMOGRAM FOR HIGH-RISK PATIENT: ICD-10-CM

## 2021-06-11 PROCEDURE — 77067 SCR MAMMO BI INCL CAD: CPT

## 2021-07-08 NOTE — DISCHARGE INSTRUCTIONS
Instructions Following Ambulatory Surgery    Activity  · As tolerated and directed by your doctor  · Bathe or shower as directed by your doctor    Diet  · Clear liquids until no nausea or vomiting; then light diet for the first day  · Advance to regular diet on second day, unless your doctor orders otherwise  · If nausea and vomiting continues, call your doctor    Pain  · Take pain medication as directed by your doctor  ·  Call your doctor if pain is NOT relieved by medication  · DO NOT take aspirin or blood thinners until directed by your doctor    Follow-Up Phone Calls  · Will be made nursing staff  · If you have any problems, call your doctor as needed    Call your doctor if  · Excessive bleeding that does not stop after holding mild pressure over the area  · Temperature of 101 degrees F or above  · Redness,excessive swelling or bruising, and pus from incision. After Anesthesia  · For the first 24 hours: DO NOT Drive, Drink alcoholic beverages, or Make important decisions  · Be aware of dizziness following anesthesia and while taking pain medication    FOLLOW DR ESCALERA'S INSTRUCTIONS GIVEN TO YOU PRIOR TO SURGERY. 9.89 by DASI score same name as above

## 2021-07-12 PROBLEM — R06.2 WHEEZE: Status: ACTIVE | Noted: 2021-07-12

## 2021-07-12 PROBLEM — K85.00 IDIOPATHIC ACUTE PANCREATITIS WITHOUT INFECTION OR NECROSIS: Status: RESOLVED | Noted: 2020-06-21 | Resolved: 2021-07-12

## 2022-01-10 ENCOUNTER — APPOINTMENT (RX ONLY)
Dept: URBAN - METROPOLITAN AREA CLINIC 23 | Facility: CLINIC | Age: 52
Setting detail: DERMATOLOGY
End: 2022-01-10

## 2022-01-10 DIAGNOSIS — L84 CORNS AND CALLOSITIES: ICD-10-CM

## 2022-01-10 DIAGNOSIS — Z87.2 PERSONAL HISTORY OF DISEASES OF THE SKIN AND SUBCUTANEOUS TISSUE: ICD-10-CM

## 2022-01-10 DIAGNOSIS — Z85.828 PERSONAL HISTORY OF OTHER MALIGNANT NEOPLASM OF SKIN: ICD-10-CM

## 2022-01-10 DIAGNOSIS — D22 MELANOCYTIC NEVI: ICD-10-CM

## 2022-01-10 DIAGNOSIS — D18.0 HEMANGIOMA: ICD-10-CM

## 2022-01-10 DIAGNOSIS — L72.8 OTHER FOLLICULAR CYSTS OF THE SKIN AND SUBCUTANEOUS TISSUE: ICD-10-CM

## 2022-01-10 DIAGNOSIS — L91.8 OTHER HYPERTROPHIC DISORDERS OF THE SKIN: ICD-10-CM

## 2022-01-10 PROBLEM — D22.72 MELANOCYTIC NEVI OF LEFT LOWER LIMB, INCLUDING HIP: Status: ACTIVE | Noted: 2022-01-10

## 2022-01-10 PROBLEM — D22.71 MELANOCYTIC NEVI OF RIGHT LOWER LIMB, INCLUDING HIP: Status: ACTIVE | Noted: 2022-01-10

## 2022-01-10 PROBLEM — D22.61 MELANOCYTIC NEVI OF RIGHT UPPER LIMB, INCLUDING SHOULDER: Status: ACTIVE | Noted: 2022-01-10

## 2022-01-10 PROBLEM — D22.5 MELANOCYTIC NEVI OF TRUNK: Status: ACTIVE | Noted: 2022-01-10

## 2022-01-10 PROBLEM — D18.01 HEMANGIOMA OF SKIN AND SUBCUTANEOUS TISSUE: Status: ACTIVE | Noted: 2022-01-10

## 2022-01-10 PROBLEM — D22.62 MELANOCYTIC NEVI OF LEFT UPPER LIMB, INCLUDING SHOULDER: Status: ACTIVE | Noted: 2022-01-10

## 2022-01-10 PROCEDURE — ? PARING HYPERKERATOTIC LESION

## 2022-01-10 PROCEDURE — 99213 OFFICE O/P EST LOW 20 MIN: CPT | Mod: 25

## 2022-01-10 PROCEDURE — ? OBSERVATION

## 2022-01-10 PROCEDURE — 11056 PARNG/CUTG B9 HYPRKR LES 2-4: CPT

## 2022-01-10 PROCEDURE — ? DEFER

## 2022-01-10 PROCEDURE — ? COUNSELING

## 2022-01-10 ASSESSMENT — LOCATION ZONE DERM
LOCATION ZONE: LEG
LOCATION ZONE: NECK
LOCATION ZONE: FACE
LOCATION ZONE: FEET
LOCATION ZONE: TRUNK
LOCATION ZONE: ARM

## 2022-01-10 ASSESSMENT — LOCATION SIMPLE DESCRIPTION DERM
LOCATION SIMPLE: RIGHT POSTERIOR UPPER ARM
LOCATION SIMPLE: LEFT POSTERIOR UPPER ARM
LOCATION SIMPLE: RIGHT PRETIBIAL REGION
LOCATION SIMPLE: POSTERIOR NECK
LOCATION SIMPLE: RIGHT CHEEK
LOCATION SIMPLE: LEFT ANTERIOR NECK
LOCATION SIMPLE: RIGHT FOOT
LOCATION SIMPLE: RIGHT POPLITEAL SKIN
LOCATION SIMPLE: CHEST
LOCATION SIMPLE: LEFT UPPER ARM
LOCATION SIMPLE: LEFT THIGH
LOCATION SIMPLE: RIGHT PLANTAR SURFACE
LOCATION SIMPLE: RIGHT ANTERIOR NECK
LOCATION SIMPLE: LEFT LOWER BACK
LOCATION SIMPLE: RIGHT UPPER BACK
LOCATION SIMPLE: LEFT UPPER BACK
LOCATION SIMPLE: LEFT PLANTAR SURFACE
LOCATION SIMPLE: ABDOMEN

## 2022-01-10 ASSESSMENT — LOCATION DETAILED DESCRIPTION DERM
LOCATION DETAILED: RIGHT PLANTAR FOREFOOT OVERLYING 5TH METATARSAL
LOCATION DETAILED: PERIUMBILICAL SKIN
LOCATION DETAILED: RIGHT DISTAL POSTERIOR UPPER ARM
LOCATION DETAILED: LEFT PROXIMAL POSTERIOR UPPER ARM
LOCATION DETAILED: RIGHT LATERAL ABDOMEN
LOCATION DETAILED: RIGHT MEDIAL UPPER BACK
LOCATION DETAILED: RIGHT LATERAL DORSAL FOOT
LOCATION DETAILED: LEFT SUPERIOR UPPER BACK
LOCATION DETAILED: LEFT INFERIOR LATERAL NECK
LOCATION DETAILED: RIGHT INFERIOR LATERAL MALAR CHEEK
LOCATION DETAILED: LEFT DISTAL POSTERIOR UPPER ARM
LOCATION DETAILED: RIGHT INFERIOR LATERAL NECK
LOCATION DETAILED: RIGHT POPLITEAL SKIN
LOCATION DETAILED: LEFT ANTERIOR DISTAL THIGH
LOCATION DETAILED: LEFT ANTERIOR DISTAL UPPER ARM
LOCATION DETAILED: MID POSTERIOR NECK
LOCATION DETAILED: LEFT INFERIOR MEDIAL MIDBACK
LOCATION DETAILED: RIGHT PROXIMAL PRETIBIAL REGION
LOCATION DETAILED: MIDDLE STERNUM
LOCATION DETAILED: LEFT PLANTAR FOREFOOT OVERLYING 5TH METATARSAL

## 2022-01-10 NOTE — PROCEDURE: PARING HYPERKERATOTIC LESION
Paring Method: 15 blade scalpel
Medical Necessity Clause: This procedure was medically necessary because the patient has pain
Medical Necessity Information: LCD Guidelines vary from payer to payer. Please check with your payer's policy to determine medical necessity. Many payers require at least 1 Class A indication, 2 Class B indications or 1 Class B and 2 Class C to qualify for insurance payment.

## 2022-03-18 PROBLEM — Z99.89 OSA ON CPAP: Status: ACTIVE | Noted: 2020-05-20

## 2022-03-18 PROBLEM — R06.2 WHEEZE: Status: ACTIVE | Noted: 2021-07-12

## 2022-03-18 PROBLEM — G47.33 OSA ON CPAP: Status: ACTIVE | Noted: 2020-05-20

## 2022-03-18 PROBLEM — E66.01 OBESITY, MORBID (HCC): Status: ACTIVE | Noted: 2019-01-14

## 2022-03-19 PROBLEM — I47.1 SVT (SUPRAVENTRICULAR TACHYCARDIA) (HCC): Status: ACTIVE | Noted: 2019-03-26

## 2022-03-19 PROBLEM — I47.10 SVT (SUPRAVENTRICULAR TACHYCARDIA): Status: ACTIVE | Noted: 2019-03-26

## 2022-07-12 NOTE — PROGRESS NOTES
[address] [place] [address] Court, [address] [facility]  [phone]    Patient Name:  Julian Larose  YOB: 1970      Office Visit 7/13/2022    Chief Complaint   Patient presents with    CPAP/BiPAP    Follow-up       HISTORY OF PRESENT ILLNESS:    This patient came in today for a follow-up visit. Patient is currently on CPAP at 13 cm H2O with C-Flex of 3 and currently using a fullface mask. Patient has excellent compliance and use with regimen 65 to 365 days all 365 days more than 4 hours average hours 8 hours and 33 minutes AHI is down to 2.2 from initially being 102.4. Mean airleak is 4 L/min 90th percentile air leak is 20.9 L/min. Patient has no problems with the airway pressure or humidity. She does indicate the mask leaks for when she turns side to side. Patient is having some issues with her right knee and as a result has not been as active as she would like. Her diet is good though. [place] score 0/24                    DIAGNOSTIC TESTS:    No flowsheet data found. No flowsheet data found.     Past Medical History:   Diagnosis Date    Adrenal disorder, other 2007    adrenal fatigue    Allergic rhinitis     Bone spur of foot     right foot     Cancer ([address] 11/14/2017    Atypical skin    Chest pain     Chronic pain     lower back    Cough     Dyspepsia     Elevated troponin     Environmental allergies     GERD (gastroesophageal reflux disease)     History of positive PPD 1992    History of renal stone 4/1/2013    Insomnia     Lumbar degenerative disc disease 4/1/2013    Nausea & vomiting     Obesity     MARKO (obstructive sleep apnea) 5/20/2020    Pancreatitis 06/21/2020    Snores     SOB (shortness of breath)     Sore throat     Spastic bladder 2008    SVT (supraventricular tachycardia) ([address]     Thromboembolus ([address]     \" possible DVT after IVF procedure and loss of pregnancy \"     Urge incontinence     Urinary frequency  Vertigo     Vitamin D deficiency 2009    Wheezing     uses inhalers as needed    Wheezing          Patient Active Problem List   Diagnosis    Obesity, Class III, BMI 40-49.9 (morbid obesity) ([address]    Wheeze    MARKO (obstructive sleep apnea)    Urge incontinence    SVT (supraventricular tachycardia) ([facility]    History of renal stone    History of positive PPD    Lumbar degenerative disc disease    Allergic rhinitis    Difficulty using continuous positive airway pressure (CPAP) full face mask           Past Surgical History:   Procedure Laterality Date    BLADDER SUSPENSION  2009    BREAST BIOPSY Right 2000     NOT A BIOPSY; excision infected spiderbite IMF    BREAST REDUCTION SURGERY Bilateral 7/24/2019    BILATERAL BREAST REDUCTION performed by Marlane Kussmaul, MD at [address] [address]  03/26/2019    COLONOSCOPY  02/07/2019    Dr. Kimberley Mckee; diverticulosis    GYN  2006, 2008    removal of fibroid tumors from uterus x 2    MALIGNANT SKIN LESION EXCISION Right 11/14/2017    , then [facility]; jawline    MOHS SURGERY Right 05/2015    Dr. Anjali Montero; precancerous abdomen    MYRINGOTOMY      1978, 2007, 2014, 2015; Dr. Yeison Ridley Right 05/01/2015    Dr. Harriett Gonzáles; rotater cuff    OTHER SURGICAL HISTORY  2012    Fatty tumor removed from left shoulder    PARTIAL HYSTERECTOMY (CERVIX NOT REMOVED)  11/11/15    robotic-assisted laparoscopic with salpingectomy bilateral; Dr. Ludin Chandra; secondary to fibroid uterus/menorrhagia    NC CARDIAC SURG PROCEDURE UNLIST  03/27/2019    Dr. Roma Guo; ablation   [facility]    SEPTOPLASTY  12/19/2019    Dr. Mango Mcdonough; turbinate reduction    TONSILLECTOMY AND ADENOIDECTOMY  1978    UROLOGICAL SURGERY  2009    bladder sling; Dr. Rocky Bearden History     Socioeconomic History    Marital status:      Spouse name: Not on file    Number of children: Not on file    Years of education: Not on file    Highest education level: Not on file   Occupational History    Not on file   Tobacco Use    Smoking status: Never Smoker    Smokeless tobacco: Never Used   Substance and Sexual Activity    Alcohol use: No    Drug use: No    Sexual activity: Not on file   Other Topics Concern    Not on file   Social History Narrative    Not on file     Social Determinants of Health     Financial Resource Strain:     Difficulty of Paying Living Expenses: Not on file   Food Insecurity:     Worried About Running Out of Food in the Last Year: Not on file    Jasiel of Food in the Last Year: Not on file   Transportation Needs:     Lack of Transportation (Medical): Not on file    Lack of Transportation (Non-Medical):  Not on file   Physical Activity:     Days of Exercise per Week: Not on file    Minutes of Exercise per Session: Not on file   Stress:     Feeling of Stress : Not on file   Social Connections:     Frequency of Communication with Friends and Family: Not on file    Frequency of Social Gatherings with Friends and Family: Not on file    Attends Orthodoxy Services: Not on file    Active Member of [address] [place] [address] or Organizations: Not on file    Attends Club or Organization Meetings: Not on file    Marital Status: Not on file   Intimate Partner Violence:     Fear of Current or Ex-Partner: Not on file    Emotionally Abused: Not on file    Physically Abused: Not on file    Sexually Abused: Not on file   Housing Stability:     Unable to Pay for Housing in the Last Year: Not on file    Number of [place] in the Last Year: Not on file    Unstable Housing in the Last Year: Not on file         Family History   Problem Relation Age of Onset    Cancer Paternal Grandmother 80        pancreatic cancer    Neuropathy Brother     Psychiatric Disorder Sister         depression    Mult Sclerosis Sister     No Known Problems Mother     Psychiatric Disorder Paternal Grandmother Alzheimer's    Cancer Paternal Uncle         brain tumor, pancreatic, lung, colon    Stroke Paternal Aunt     Psychiatric Disorder Maternal Aunt         vascular dementia    Cancer Other         grandfather unknown    Mult Sclerosis Other         grandfather unknown    Breast Cancer Neg Hx     Hypertension Father     Cancer Maternal Grandfather         bladder cancer    Psychiatric Disorder Maternal Grandfather         Alzheimers    Cancer Paternal Aunt         colon, thyroid         Allergies   Allergen Reactions    Ciprofloxacin Rash    Sulfa Antibiotics Rash         Current Outpatient Medications   Medication Sig    SELENIUM PO Take 1 tablet by mouth daily    beclomethasone (QNASL) 80 MCG/ACT AERS nasal spray 2 sprays by Nasal route daily    estradiol (ESTRACE) 0.1 MG/GM vaginal cream Place vaginally    fluticasone (FLOVENT HFA) 110 MCG/ACT inhaler Inhale 2 puffs into the lungs every 12 hours    levocetirizine (XYZAL) 5 MG tablet Take 5 mg by mouth daily    melatonin 1 MG tablet Take 1 mg by mouth    montelukast (SINGULAIR) 10 MG tablet Take 10 mg by mouth    ofloxacin (OCUFLOX) 0.3 % solution 1 drop as needed     No current facility-administered medications for this visit. REVIEW OF SYSTEMS:     CONSTITUTIONAL:   There is Negative history of fever, chills, night sweats. Patient is  Negativefor weight loss, they are  Positive for  weight gain. Patient is  Negative  for fatigue and hypersomnia and is  on their CPAP. Insomnia is   under control. CARDIAC:   No chest pain, pressure, discomfort, palpitations, orthopnea, murmurs, or edema. GI:   No dysphagia, heartburn reflux, nausea/vomiting, diarrhea, abdominal pain, or bleeding. NEURO:    There is no history of AMS, persistent headache, decreased level of consciousness, seizures, or motor or sensory deficits.             PHYSICAL EXAM:    Vitals:    07/13/22 1408   BP: (!) 142/82   Pulse: 84   Resp: 14   Temp: 97.2 °F (36.2 °C)   SpO2: 99%       Constitutional:  the patient is well developed and in no acute distress  EENMT:  Sclera clear, pupils equal, oral mucosa moist, narrow oral airway Modified Kyle Stage 4, Nares are patent bilateral  Respiratory: CTA b/l. No Wheezing or Rhonchi. Cardiovascular:  RRR without M,G,R  Gastrointestinal: soft and non-tender; with positive bowel sounds. Musculoskeletal: warm without cyanosis. There is No lower leg edema. Skin:  no jaundice or rashes, no visible wounds   Neurologic: no gross neuro deficits     Psychiatric:  alert and oriented x 3        ASSESSMENT/PLAN:  (Medical Decision Making)       ICD-10-CM    1. MARKO (obstructive sleep apnea)  G47.33  Excellent compliance with CPAP and benefiting. Renew supplies. 2. Obesity, Class III, BMI 40-49.9 (morbid obesity) ([facility]  E66.01 - recommendations given for the patient to help lose weight. Prefers told her to follow with PMD to see what she can do, given her knee limitations. She is aware and understands. 3. Non-seasonal allergic rhinitis, unspecified trigger  J30.89 -told the patient use nasal saline to help with sinuses which appear rather dry. She can still use nasal steroids but recommend to use later in the day. Patient is also on Singulair       4. Difficulty using continuous positive airway pressure (CPAP) full face mask  Z78.9 -current mask does not seem ideal.  I did look over a series of mask in the 1 that I thought it was best for her would be a Vitera we did size it and size small seems to be more appropriate for her. Patient is in agreement with this option          SUMMARY:    Continue CPAP at 13 cm H2O with C-Flex 3    Supplies Renewed. Patient is compliant and benefit benefiting from CPAP. Change mask to a Vitera size small mask. I sized the patient and that seems to be appropriate for her.   Recommendations given to help mitigate air leaks when turning side to side    Nasal saline 2 sprays/nostril tid/qid. Continue nasal steroids later in the day    Weight loss was encouraged through the reduction of caloric intake as well as an increase in aerobic physical activity. Continue proper sleep hygiene. Did review [place] score, Compliance data and looked over her polysomnogram report today       All questions are answered to the patient's satisfaction. The patient will call for further questions and concerns. Follow up at the [address] [place] [address] will be in 1 year with me  Follow up will be with the patient's referring physician as had been previously scheduled. Yara Sanford MD    Over 50% of today's office visit was spent in face to face time reviewing test results, prognosis, importance of compliance, education about disease process, benefits of medications, instructions for management of acute flare-ups, and follow up plans. Electronically signed and dictated. Please note if there are errors this is  likely a result of the dictation software. No orders of the defined types were placed in this encounter. No orders of the defined types were placed in this encounter.

## 2022-07-13 ENCOUNTER — OFFICE VISIT (OUTPATIENT)
Dept: SLEEP MEDICINE | Age: 52
End: 2022-07-13
Payer: COMMERCIAL

## 2022-07-13 VITALS
BODY MASS INDEX: 40.16 KG/M2 | HEIGHT: 68 IN | DIASTOLIC BLOOD PRESSURE: 82 MMHG | RESPIRATION RATE: 14 BRPM | HEART RATE: 84 BPM | WEIGHT: 265 LBS | OXYGEN SATURATION: 99 % | TEMPERATURE: 97.2 F | SYSTOLIC BLOOD PRESSURE: 142 MMHG

## 2022-07-13 DIAGNOSIS — G47.33 OSA (OBSTRUCTIVE SLEEP APNEA): Primary | ICD-10-CM

## 2022-07-13 DIAGNOSIS — Z78.9 DIFFICULTY USING CONTINUOUS POSITIVE AIRWAY PRESSURE (CPAP) FULL FACE MASK: ICD-10-CM

## 2022-07-13 DIAGNOSIS — E66.01 OBESITY, CLASS III, BMI 40-49.9 (MORBID OBESITY) (HCC): ICD-10-CM

## 2022-07-13 DIAGNOSIS — J30.89 NON-SEASONAL ALLERGIC RHINITIS, UNSPECIFIED TRIGGER: ICD-10-CM

## 2022-07-13 PROBLEM — E66.813 OBESITY, CLASS III, BMI 40-49.9 (MORBID OBESITY) (HCC): Status: ACTIVE | Noted: 2019-01-14

## 2022-07-13 PROCEDURE — 3017F COLORECTAL CA SCREEN DOC REV: CPT | Performed by: INTERNAL MEDICINE

## 2022-07-13 PROCEDURE — 99214 OFFICE O/P EST MOD 30 MIN: CPT | Performed by: INTERNAL MEDICINE

## 2022-07-13 PROCEDURE — 1036F TOBACCO NON-USER: CPT | Performed by: INTERNAL MEDICINE

## 2022-07-13 PROCEDURE — G8417 CALC BMI ABV UP PARAM F/U: HCPCS | Performed by: INTERNAL MEDICINE

## 2022-07-13 PROCEDURE — G8427 DOCREV CUR MEDS BY ELIG CLIN: HCPCS | Performed by: INTERNAL MEDICINE

## 2022-07-13 ASSESSMENT — SLEEP AND FATIGUE QUESTIONNAIRES
HOW LIKELY ARE YOU TO NOD OFF OR FALL ASLEEP WHILE SITTING INACTIVE IN A PUBLIC PLACE: 0
HOW LIKELY ARE YOU TO NOD OFF OR FALL ASLEEP IN A CAR, WHILE STOPPED FOR A FEW MINUTES IN TRAFFIC: 0
HOW LIKELY ARE YOU TO NOD OFF OR FALL ASLEEP WHILE LYING DOWN TO REST IN THE AFTERNOON WHEN CIRCUMSTANCES PERMIT: 0
HOW LIKELY ARE YOU TO NOD OFF OR FALL ASLEEP WHILE SITTING QUIETLY AFTER LUNCH WITHOUT ALCOHOL: 0
ESS TOTAL SCORE: 0
HOW LIKELY ARE YOU TO NOD OFF OR FALL ASLEEP WHILE SITTING AND TALKING TO SOMEONE: 0
HOW LIKELY ARE YOU TO NOD OFF OR FALL ASLEEP WHEN YOU ARE A PASSENGER IN A CAR FOR AN HOUR WITHOUT A BREAK: 0
HOW LIKELY ARE YOU TO NOD OFF OR FALL ASLEEP WHILE WATCHING TV: 0
HOW LIKELY ARE YOU TO NOD OFF OR FALL ASLEEP WHILE SITTING AND READING: 0

## 2022-07-14 ENCOUNTER — NURSE ONLY (OUTPATIENT)
Dept: FAMILY MEDICINE CLINIC | Facility: CLINIC | Age: 52
End: 2022-07-14

## 2022-07-14 ENCOUNTER — OFFICE VISIT (OUTPATIENT)
Dept: FAMILY MEDICINE CLINIC | Facility: CLINIC | Age: 52
End: 2022-07-14
Payer: COMMERCIAL

## 2022-07-14 VITALS
OXYGEN SATURATION: 96 % | DIASTOLIC BLOOD PRESSURE: 68 MMHG | HEART RATE: 73 BPM | HEIGHT: 68 IN | SYSTOLIC BLOOD PRESSURE: 111 MMHG | TEMPERATURE: 97.9 F | BODY MASS INDEX: 40.22 KG/M2 | WEIGHT: 265.4 LBS | RESPIRATION RATE: 18 BRPM

## 2022-07-14 DIAGNOSIS — J30.89 NON-SEASONAL ALLERGIC RHINITIS, UNSPECIFIED TRIGGER: ICD-10-CM

## 2022-07-14 DIAGNOSIS — R73.03 PREDIABETES: ICD-10-CM

## 2022-07-14 DIAGNOSIS — Z11.4 ENCOUNTER FOR SCREENING FOR HIV: ICD-10-CM

## 2022-07-14 DIAGNOSIS — E78.00 ELEVATED LDL CHOLESTEROL LEVEL: ICD-10-CM

## 2022-07-14 DIAGNOSIS — R06.2 WHEEZE: ICD-10-CM

## 2022-07-14 DIAGNOSIS — N89.8 VAGINAL DISCHARGE: ICD-10-CM

## 2022-07-14 DIAGNOSIS — G89.29 CHRONIC PAIN OF RIGHT KNEE: ICD-10-CM

## 2022-07-14 DIAGNOSIS — M25.561 CHRONIC PAIN OF RIGHT KNEE: ICD-10-CM

## 2022-07-14 DIAGNOSIS — M54.16 LUMBAR BACK PAIN WITH RADICULOPATHY AFFECTING RIGHT LOWER EXTREMITY: ICD-10-CM

## 2022-07-14 DIAGNOSIS — Z99.89 OSA ON CPAP: ICD-10-CM

## 2022-07-14 DIAGNOSIS — Z12.31 VISIT FOR SCREENING MAMMOGRAM: ICD-10-CM

## 2022-07-14 DIAGNOSIS — G47.33 OSA ON CPAP: ICD-10-CM

## 2022-07-14 DIAGNOSIS — Z00.00 WELL ADULT HEALTH CHECK: Primary | ICD-10-CM

## 2022-07-14 DIAGNOSIS — N39.41 URGE INCONTINENCE: ICD-10-CM

## 2022-07-14 DIAGNOSIS — M51.36 LUMBAR DEGENERATIVE DISC DISEASE: ICD-10-CM

## 2022-07-14 DIAGNOSIS — Z00.00 WELL ADULT HEALTH CHECK: ICD-10-CM

## 2022-07-14 DIAGNOSIS — E66.01 OBESITY, CLASS III, BMI 40-49.9 (MORBID OBESITY) (HCC): ICD-10-CM

## 2022-07-14 PROBLEM — I47.1 SVT (SUPRAVENTRICULAR TACHYCARDIA) (HCC): Status: RESOLVED | Noted: 2019-03-26 | Resolved: 2022-07-14

## 2022-07-14 PROBLEM — I47.10 SVT (SUPRAVENTRICULAR TACHYCARDIA): Status: RESOLVED | Noted: 2019-03-26 | Resolved: 2022-07-14

## 2022-07-14 LAB
ALBUMIN SERPL-MCNC: 3.8 G/DL (ref 3.5–5)
ALBUMIN/GLOB SERPL: 1.2 {RATIO} (ref 1.2–3.5)
ALP SERPL-CCNC: 127 U/L (ref 50–136)
ALT SERPL-CCNC: 25 U/L (ref 12–65)
ANION GAP SERPL CALC-SCNC: 8 MMOL/L (ref 7–16)
AST SERPL-CCNC: 16 U/L (ref 15–37)
BASOPHILS # BLD: 0 K/UL (ref 0–0.2)
BASOPHILS NFR BLD: 1 % (ref 0–2)
BILIRUB SERPL-MCNC: 0.2 MG/DL (ref 0.2–1.1)
BUN SERPL-MCNC: 18 MG/DL (ref 6–23)
CALCIUM SERPL-MCNC: 9.4 MG/DL (ref 8.3–10.4)
CHLORIDE SERPL-SCNC: 108 MMOL/L (ref 98–107)
CHOLEST SERPL-MCNC: 202 MG/DL
CO2 SERPL-SCNC: 27 MMOL/L (ref 21–32)
CREAT SERPL-MCNC: 0.9 MG/DL (ref 0.6–1)
DIFFERENTIAL METHOD BLD: ABNORMAL
EOSINOPHIL # BLD: 0.1 K/UL (ref 0–0.8)
EOSINOPHIL NFR BLD: 1 % (ref 0.5–7.8)
ERYTHROCYTE [DISTWIDTH] IN BLOOD BY AUTOMATED COUNT: 13.3 % (ref 11.9–14.6)
GLOBULIN SER CALC-MCNC: 3.2 G/DL (ref 2.3–3.5)
GLUCOSE SERPL-MCNC: 87 MG/DL (ref 65–100)
HCT VFR BLD AUTO: 44.5 % (ref 35.8–46.3)
HDLC SERPL-MCNC: 57 MG/DL (ref 40–60)
HDLC SERPL: 3.5 {RATIO}
HGB BLD-MCNC: 13.8 G/DL (ref 11.7–15.4)
IMM GRANULOCYTES # BLD AUTO: 0 K/UL (ref 0–0.5)
IMM GRANULOCYTES NFR BLD AUTO: 0 % (ref 0–5)
LDLC SERPL CALC-MCNC: 124.2 MG/DL
LYMPHOCYTES # BLD: 1.5 K/UL (ref 0.5–4.6)
LYMPHOCYTES NFR BLD: 24 % (ref 13–44)
MCH RBC QN AUTO: 28.6 PG (ref 26.1–32.9)
MCHC RBC AUTO-ENTMCNC: 31 G/DL (ref 31.4–35)
MCV RBC AUTO: 92.1 FL (ref 79.6–97.8)
MONOCYTES # BLD: 0.5 K/UL (ref 0.1–1.3)
MONOCYTES NFR BLD: 7 % (ref 4–12)
NEUTS SEG # BLD: 4.2 K/UL (ref 1.7–8.2)
NEUTS SEG NFR BLD: 67 % (ref 43–78)
NRBC # BLD: 0 K/UL (ref 0–0.2)
PLATELET # BLD AUTO: 229 K/UL (ref 150–450)
PMV BLD AUTO: 11.4 FL (ref 9.4–12.3)
POTASSIUM SERPL-SCNC: 4.5 MMOL/L (ref 3.5–5.1)
PROT SERPL-MCNC: 7 G/DL (ref 6.3–8.2)
RBC # BLD AUTO: 4.83 M/UL (ref 4.05–5.2)
SODIUM SERPL-SCNC: 143 MMOL/L (ref 136–145)
TRIGL SERPL-MCNC: 104 MG/DL (ref 35–150)
TSH, 3RD GENERATION: 1.42 UIU/ML (ref 0.36–3.74)
VLDLC SERPL CALC-MCNC: 20.8 MG/DL (ref 6–23)
WBC # BLD AUTO: 6.3 K/UL (ref 4.3–11.1)

## 2022-07-14 PROCEDURE — 99396 PREV VISIT EST AGE 40-64: CPT | Performed by: FAMILY MEDICINE

## 2022-07-14 RX ORDER — BECLOMETHASONE DIPROPIONATE 80 UG/1
2 AEROSOL, METERED NASAL DAILY
Qty: 3 EACH | Refills: 3 | Status: SHIPPED | OUTPATIENT
Start: 2022-07-14

## 2022-07-14 RX ORDER — LEVOCETIRIZINE DIHYDROCHLORIDE 5 MG/1
5 TABLET, FILM COATED ORAL DAILY
Qty: 90 TABLET | Refills: 3 | Status: SHIPPED | OUTPATIENT
Start: 2022-07-14

## 2022-07-14 RX ORDER — ALBUTEROL SULFATE 90 UG/1
AEROSOL, METERED RESPIRATORY (INHALATION) EVERY 4 HOURS PRN
COMMUNITY

## 2022-07-14 RX ORDER — METRONIDAZOLE 500 MG/1
500 TABLET ORAL 2 TIMES DAILY
Qty: 14 TABLET | Refills: 0 | Status: SHIPPED | OUTPATIENT
Start: 2022-07-14 | End: 2022-07-21

## 2022-07-14 RX ORDER — FLUTICASONE PROPIONATE 110 UG/1
2 AEROSOL, METERED RESPIRATORY (INHALATION) EVERY 12 HOURS
Qty: 3 EACH | Refills: 3 | Status: SHIPPED | OUTPATIENT
Start: 2022-07-14

## 2022-07-14 RX ORDER — MONTELUKAST SODIUM 10 MG/1
10 TABLET ORAL NIGHTLY
Qty: 90 TABLET | Refills: 3 | Status: SHIPPED | OUTPATIENT
Start: 2022-07-14

## 2022-07-14 NOTE — PATIENT INSTRUCTIONS
Patient Education        Learning About How to Have a Healthy Back  What causes back pain? Back pain is often caused by overuse, strain, or injury. For example, people often hurt their backs playing sports or working in the yard, being jolted in acar accident, or lifting something too heavy. Aging plays a part too. Your bones and muscles tend to lose strength as you age, which makes injury more likely. The spongy discs between the bones of the spine (vertebrae) may suffer from wear and tear and no longer provide enough cushion between the bones. A disc that bulges or breaks open (herniated disc)can press on nerves, causing back pain. In some people, back pain is the result of arthritis, broken vertebrae causedby bone loss (osteoporosis), illness, or a spine problem. Although most people have back pain at one time or another, there are steps youcan take to make it less likely. How can you have a healthy back? Reduce stress on your back through good posture   Slumping or slouching alone may not cause low back pain. But after the back hasbeen strained or injured, bad posture can make pain worse.  Sleep in a position that maintains your back's normal curves and on a mattress that feels comfortable. Sleep on your side with a pillow between your knees, or sleep on your back with a pillow under your knees. These positions can reduce strain on your back.  Stand and sit up straight. \"Good posture\" generally means your ears, shoulders, and hips are in a straight line.  If you must stand for a long time, put one foot on a stool, ledge, or box. Switch feet every now and then.  Sit in a chair that is low enough to let you place both feet flat on the floor with both knees nearly level with your hips. If your chair or desk is too high, use a footrest to raise your knees. Place a small pillow, a rolled-up towel, or a lumbar roll in the curve of your back if you need extra support.    Try a kneeling chair, which helps tilt your hips forward. This takes pressure off your lower back.  Try sitting on an exercise ball. It can rock from side to side, which helps keep your back loose.  When driving, keep your knees nearly level with your hips. Sit straight, and drive with both hands on the steering wheel. Your arms should be in a slightly bent position. Reduce stress on your back through careful lifting    Squat down, bending at the hips and knees only. If you need to, put one knee to the floor and extend your other knee in front of you, bent at a right angle (half kneeling).  Press your chest straight forward. This helps keep your upper back straight while keeping a slight arch in your low back.  Hold the load as close to your body as possible, at the level of your belly button (navel).  Use your feet to change direction, taking small steps.  Lead with your hips as you change direction. Keep your shoulders in line with your hips as you move.  Set down your load carefully, squatting with your knees and hips only. Exercise and stretch your back    Do some exercise on most days of the week, if your doctor says it is okay. You can walk, run, swim, or cycle.  Stretch your back muscles. Here are a few exercises to try:  ? Lie on your back, and gently pull one bent knee to your chest. Put that foot back on the floor, and then pull the other knee to your chest.  ? Do pelvic tilts. Lie on your back with your knees bent. Tighten your stomach muscles. Pull your belly button (navel) in and up toward your ribs. You should feel like your back is pressing to the floor and your hips and pelvis are slightly lifting off the floor. Hold for 6 seconds while breathing smoothly. ? Sit with your back flat against a wall.  Keep your core muscles strong. The muscles of your back, belly (abdomen), and buttocks support your spine. ? Pull in your belly and imagine pulling your navel toward your spine.  Hold this for 6 seconds, then relax. Remember to keep breathing normally as you tense your muscles. ? Do curl-ups. Always do them with your knees bent. Keep your low back on the floor, and curl your shoulders toward your knees using a smooth, slow motion. Keep your arms folded across your chest. If this bothers your neck, try putting your hands behind your neck (not your head), with your elbows spread apart. ? Lie on your back with your knees bent and your feet flat on the floor. Tighten your belly muscles, and then push with your feet and raise your buttocks up a few inches. Hold this position 6 seconds as you continue to breathe normally, then lower yourself slowly to the floor. Repeat 8 to 12 times. ? If you like group exercise, try Pilates or yoga. These classes have poses that strengthen the core muscles. Lead a healthy lifestyle    Stay at a healthy weight to avoid strain on your back.  Do not smoke. Smoking increases the risk of osteoporosis, which weakens the spine. If you need help quitting, talk to your doctor about stop-smoking programs and medicines. These can increase your chances of quitting for good. Where can you learn more? Go to https://Adaptive TechnologiespeNoveko Internationaleb.Pipeline Micro. org and sign in to your [employer] account. Enter L315 in the atVenu box to learn more about \"Learning About How to Have a Healthy Back. \"     If you do not have an account, please click on the \"Sign Up Now\" link. Current as of: March 9, 2022               Content Version: 13.3  © 2006-2022 Healthwise, Incorporated. Care instructions adapted under license by [facility] ([facility]). If you have questions about a medical condition or this instruction, always ask your healthcare professional. [place] [address] any warranty or liability for your use of this information. Patient Education        Back Stretches: Exercises  Introduction  Here are some examples of exercises for stretching your back. Start eachexercise slowly.  Ease off please click on the \"Sign Up Now\" link. Current as of: March 9, 2022               Content Version: 13.3  © 2006-2022 TrackBill. Care instructions adapted under license by [facility] ([facility]). If you have questions about a medical condition or this instruction, always ask your healthcare professional. [address] any warranty or liability for your use of this information. Patient Education        Learning About Breast Cancer Screening  What is breast cancer screening? Breast cancer occurs when cells that are not normal grow in one or both of your breasts. Screening tests can help find breast cancer early. Cancer is easier totreat when it's found early. Having concerns about breast cancer is common. That's why it's important to talk with your doctor about when to start and how often to get screened forbreast cancer. How is breast cancer screening done? Several screening tests can be used to check for breast cancer. Mammograms. These tests check for signs of cancer using X-rays. They can show tumors that are too small for you or your doctor to feel. During a mammogram, a machine squeezes your breasts to make them flatter and easier to X-ray. At least two pictures are taken of each breast. One is taken from the top and one from theside. 3-D mammograms. These tests are also called digital breast tomosynthesis. Your breast is positioned on a flat plate. A top plate is pressed against your breast to keep it in position. The X-ray arm then moves in an arc above the breast and [place]. A computer uses these X-rays to create a three-dimensional image. Clinical breast exam.  In this exam, your doctor carefully feels your breasts and under your arms tocheck for lumps or other changes. Who should be screened for breast cancer? Experts agree that mammograms are the best screening test for people at average risk of breast cancer.  But they don't all agree on the age at which screening should start. And they don't agree on whether it's better to be screened everyyear or every two years. Here are some of the recommendations from experts:   Start by age 36 and have a mammogram each year.  Start at age 39 and have a mammogram each year.  Start at age 48 and have a mammogram every 2 years. When to stop having mammograms is another decision. You and your doctor can decide on the right age to start and stop screening based on your personalpreferences and overall health. What is your risk for breast cancer? If you don't already know your risk of breast cancer, you can ask your doctorabout it. You can also look it up at www.cancer.gov/bcrisktool/. If your doctor says that you have a high or very high risk, ask about ways to reduce your risk. These could include getting extra screening, taking medicine, or having surgery. If you have a strong family history of breast cancer, askyour doctor about genetic testing. What steps can you take to stay healthy? Some things that increase your risk of breast cancer, such as your age and being female, cannot be controlled. But you can do some things to stay ashealthy as you can.  Learn what your breasts normally look and feel like. If you notice any changes, tell your doctor.  If you drink alcohol, limit how much you drink. Any amount of alcohol may increase your risk for some types of cancer.  If you smoke, quit. When you quit smoking, you lower your chances of getting many types of cancer. You can also do your best to eat well, be active, and stay at a healthy weight. Eating healthy foods and being active every day, as well as staying at ahealthy weight, may help prevent cancer. Where can you learn more? Go to https://meliza.healthSTWA. org and sign in to your Exogenesis account. Enter L745 in the Saguna Networks box to learn more about \"Learning About Breast Cancer Screening. \"     If you do not have an account, please click on the \"Sign Up Now\" link. Current as of: September 8, 2021               Content Version: 13.3  © 2006-2022 JewelStreet. Care instructions adapted under license by [facility] ([facility]). If you have questions about a medical condition or this instruction, always ask your healthcare professional. [address] any warranty or liability for your use of this information. Patient Education        Kegel Exercises: Care Instructions  Overview     Kegel exercises strengthen muscles around the bladder. These muscles control the flow of urine. Kegel exercises are sometime called \"pelvic floor\" exercises. They can help prevent urine leakage and keep the pelvic organs inplace. Kegel exercises can strengthen pelvic muscles that have been weakened by age, pregnancy, childbirth, and surgery. They may help prevent or treat urineleakage. You do Kegel exercises by tightening the muscles you use when you urinate. Сергей Jauregui likely need to do these exercises for several weeks to get better. Follow-up care is a key part of your treatment and safety. Be sure to make and go to all appointments, and call your doctor if you are having problems. It's also a good idea to know your test results and keep alist of the medicines you take. How can you care for yourself at home?  Do Kegel exercises. ? Find the muscles you need to strengthen. To do this, tighten the muscles that stop your urine while you are going to the bathroom. These are the same muscles you squeeze during Kegel exercises. ? Squeeze the muscles as hard as you can. Your belly and thighs should not move. ? Hold the squeeze for 3 seconds. Then relax for 3 seconds. ? Start with 3 seconds, and then add 1 second each week until you are able to squeeze for 10 seconds. ? Repeat the exercise 10 to 15 times for each session. Do three or more sessions each day.    You can check to see if you are using the right muscles. ? Tighten the muscles that help you stop passing gas or keep you from urinating. Make sure you aren't using your stomach, leg, or buttock muscles. ? Place a finger in your vagina and squeeze around it. You are doing them right when you feel pressure around your finger. Your doctor may also suggest that you put special weights in your vagina while you do the exercises.  Check with your doctor if you don't notice a difference after trying these exercises for several weeks. Your doctor may suggest getting help from a physical therapist or recommend other treatment. Where can you learn more? Go to https://CoreXchangepeJijindou.comeb.Prizm Payment Services. org and sign in to your Cangrade account. Enter Y641 in the FSLogix box to learn more about \"Kegel Exercises: Care Instructions. \"     If you do not have an account, please click on the \"Sign Up Now\" link. Current as of: October 18, 2021               Content Version: 13.3  © 2006-2022 ColibrÃ­. Care instructions adapted under license by [facility] ([facility]). If you have questions about a medical condition or this instruction, always ask your healthcare professional. [place] [address] any warranty or liability for your use of this information. Patient Education        Starting a Weight Loss Plan: Care Instructions  Overview     If you're thinking about losing weight, it can be hard to know where to start. Your doctor can help you set up a weight loss plan that best meets your needs. You may want to take a class on nutrition or exercise, or you could join a weight loss support group. If you have questions about how to make changes to your eating or exercise habits, ask your doctor about seeing a registereddietitian or an exercise specialist.  It can be a big challenge to lose weight. But you don't have to make huge changes at once. Make small changes, and stick with them.  When those changesbecome habit, add a few more changes. If you don't think you're ready to make changes right now, try to pick a date in the future. Make an appointment to see your doctor to discuss whether thetime is right for you to start a plan. Follow-up care is a key part of your treatment and safety. Be sure to make and go to all appointments, and call your doctor if you are having problems. It's also a good idea to know your test results and keep alist of the medicines you take. How can you care for yourself at home?  Set realistic goals. Many people expect to lose much more weight than is likely. A weight loss of 5% to 10% of your body weight may be enough to improve your health.  Get family and friends involved to provide support. Talk to them about why you are trying to lose weight, and ask them to help. They can help by participating in exercise and having meals with you, even if they may be eating something different.  Find what works best for you. If you do not have time or do not like to cook, a program that offers meal replacement bars or shakes may be better for you. Or if you like to prepare meals, finding a plan that includes daily menus and recipes may be best.   Ask your doctor about other health professionals who can help you achieve your weight loss goals. ? A dietitian can help you make healthy changes in your diet. ? An exercise specialist or  can help you develop a safe and effective exercise program.  ? A counselor or psychiatrist can help you cope with issues such as depression, anxiety, or family problems that can make it hard to focus on weight loss.  Consider joining a support group for people who are trying to lose weight. Your doctor can suggest groups in your area. Where can you learn more? Go to https://meliza.Airbiquity. org and sign in to your "Roku[employer] Inc." account. Enter L520 in the [facility] box to learn more about \"Starting a Weight Loss Plan: Care Instructions. \"     If you do not have an account, please click on the \"Sign Up Now\" link. Current as of: December 27, 2021               Content Version: 13.3  © 2006-2022 Healthwise, [place] [employer]. Care instructions adapted under license by [facility] ([facility]). If you have questions about a medical condition or this instruction, always ask your healthcare professional. [address] any warranty or liability for your use of this information. Patient Education        Well Visit, Women 48 to 72: Care Instructions  Overview     Well visits can help you stay healthy. Your doctor has checked your overall health and may have suggested ways to take good care of yourself. Your doctor also may have recommended tests. At home, you can help prevent illness withhealthy eating, regular exercise, and other steps. Follow-up care is a key part of your treatment and safety. Be sure to make and go to all appointments, and call your doctor if you are having problems. It's also a good idea to know your test results and keep alist of the medicines you take. How can you care for yourself at home?  Get screening tests that you and your doctor decide on. Screening helps find diseases before any symptoms appear.  Eat healthy foods. Choose fruits, vegetables, whole grains, protein, and low-fat dairy foods. Limit fat, especially saturated fat. Reduce salt in your diet.  Limit alcohol. Have no more than 1 drink a day or 7 drinks a week.  Get at least 30 minutes of exercise on most days of the week. Walking is a good choice. You also may want to do other activities, such as running, swimming, cycling, or playing tennis or team sports.  Reach and stay at a healthy weight. This will lower your risk for many problems, such as obesity, diabetes, heart disease, and high blood pressure.  Do not smoke. Smoking can make health problems worse. If you need help quitting, talk to your doctor about stop-smoking programs and medicines.  These 2021               Content Version: 13.3  © 2006-2022 Healthwise, Mandy & Pandy. Care instructions adapted under license by [facility] ([facility]). If you have questions about a medical condition or this instruction, always ask your healthcare professional. [address] any warranty or liability for your use of this information. Patient Education        Iliotibial Band Syndrome: Exercises  Introduction  Here are some examples of exercises for you to try. The exercises may be suggested for a condition or for rehabilitation. Start each exercise slowly. Ease off the exercises if you start to have pain. You will be told when to start these exercises and which ones will work bestfor you. How to do the exercises  Iliotibial band stretch    1. Lean sideways against a wall. If you are not steady on your feet, hold on to a chair or counter. 2. Stand on the leg with the affected hip, with that leg close to the wall. Then cross your other leg in front of it. 3. Let your affected hip drop out to the side of your body and against the wall. Then lean away from your affected hip until you feel a stretch. 4. Hold the stretch for 15 to 30 seconds. 5. Repeat 2 to 4 times. Piriformis stretch    1. Lie on your back with your legs straight. 2. Lift your affected leg and bend your knee. With your opposite hand, reach across your body, and then gently pull your knee toward your opposite shoulder. 3. Hold the stretch for 15 to 30 seconds. 4. Repeat 2 to 4 times. Hamstring wall stretch    1. Lie on your back in a doorway, with your good leg through the open door. 2. Slide your affected leg up the wall to straighten your knee. You should feel a gentle stretch down the back of your leg. 3. Hold the stretch for at least 1 minute to begin. Then try to lengthen the time you hold the stretch to as long as 6 minutes. 4. Repeat 2 to 4 times.   5. If you do not have a place to do this exercise in a doorway, there is another way to do it:  6. Lie on your back, and bend the knee of your affected leg. 7. Loop a towel under the ball and toes of that foot, and hold the ends of the towel in your hands. 8. Straighten your knee, and slowly pull back on the towel. You should feel a gentle stretch down the back of your leg. 9. Hold the stretch for 15 to 30 seconds. Or even better, hold the stretch for 1 minute if you can. 10. Repeat 2 to 4 times. 1. Do not arch your back. 2. Do not bend either knee. 3. Keep one heel touching the floor and the other heel touching the wall. Do not point your toes. Follow-up care is a key part of your treatment and safety. Be sure to make and go to all appointments, and call your doctor if you are having problems. It's also a good idea to know your test results and keep alist of the medicines you take. Where can you learn more? Go to https://Estoreify.Known. org and sign in to your DermaMedics account. Enter P252 in the Biletu box to learn more about \"Iliotibial Band Syndrome: Exercises. \"     If you do not have an account, please click on the \"Sign Up Now\" link. Current as of: March 9, 2022               Content Version: 13.3  © 7990-0607 Healthwise, Incorporated. Care instructions adapted under license by [facility] ([facility]). If you have questions about a medical condition or this instruction, always ask your healthcare professional. [place] [address] any warranty or liability for your use of this information. Patient Education        Bacterial Vaginosis: Care Instructions  Overview     Bacterial vaginosis is a type of vaginal infection. It is caused by excess growth of certain bacteria that are normally found in the vagina. Symptoms can include itching, swelling, pain when you urinate or have sex, and a gray or yellow discharge with a \"fishy\" odor. It is not considered an infection that isspread through sexual contact.   Symptoms can be annoying and uncomfortable. But bacterial vaginosis does not usually cause other health problems. However, if you have it while you arepregnant, it can cause complications. While the infection may go away on its own, most doctors use antibiotics to treat it. You may have been prescribed pills or vaginal cream. With treatment,bacterial vaginosis usually clears up in 5 to 7 days. Follow-up care is a key part of your treatment and safety. Be sure to make and go to all appointments, and call your doctor if you are having problems. It's also a good idea to know your test results and keep alist of the medicines you take. How can you care for yourself at home?  Take your antibiotics as directed. Do not stop taking them just because you feel better. You need to take the full course of antibiotics.  Do not eat or drink anything that contains alcohol if you are taking metronidazole or tinidazole.  Keep using your medicine if you start your period. Use pads instead of tampons while using a vaginal cream or suppository. Tampons can absorb the medicine.  Wear loose cotton clothing. Do not wear nylon and other materials that hold body heat and moisture close to the skin.  Do not scratch. Relieve itching with a cold pack or a cool bath.  Do not wash your vaginal area more than once a day. Use plain water or a mild, unscented soap. Do not douche. When should you call for help? Watch closely for changes in your health, and be sure to contact your doctor if:     You have unexpected vaginal bleeding.      You have a fever.      You have new or increased pain in your vagina or pelvis.      You are not getting better after 1 week.      Your symptoms return after you finish the course of your medicine. Where can you learn more? Go to https://meliza.healthCarista App. org and sign in to your TripsByTips account. Enter U035 in the Wiztango box to learn more about \"Bacterial Vaginosis: Care Instructions. \"     If you do not have an account, please click on the \"Sign Up Now\" link. Current as of: November 22, 2021               Content Version: 13.3  © 8139-8611 Healthwise, Incorporated. Care instructions adapted under license by [facility] ([facility]). If you have questions about a medical condition or this instruction, always ask your healthcare professional. [address] any warranty or liability for your use of this information.

## 2022-07-14 NOTE — PROGRESS NOTES
[address] [facility] [address]illo  Phone: [phone] Fax [phone]  Lakshmi Garrison. Marylen Mortimer M.D.  7/14/2022        Lemuel Hutchins is a 47 y.o. female     HPI:  Patient is seen for Annual Exam (1 yr, fasting)  Patient comes in today fasting reporting that she continues to see Dr. Samina Hightower for allergy shots. Her medications orally and inhaled have been working fairly well. She also saw Jyoti Sandoval, her pulmonologist, yesterday and her CPAP events have declined greatly but she is still having a little bit of an air leak and a new CPAP mask has been ordered. She describes seeing Dr. Sammi Macias with recent joint injection of the right knee and about 6 weeks of improvement. When she wears her brace she has more knee swelling than when she does not. She feels like her knee is not \"tracking\" right. She knows she has lumbar DDD and feels that this may have worsened as she has some right lateral thigh pain that does radiate just past the knee sometimes to the mid lateral lower leg. She plans on seeing her chiropractor to help her with her gait. Wonders if she needs a lumbar MRI. She does have urge incontinence still. Does not desire medical treatment for this at present. Has noticed a vaginal odiferous discharge that is somewhat thin and not itching or clumpy. Does not suspect STD. She had last Pap smear July 7, 2020. Her last mammogram was June 2021. She has not felt any abnormalities on self breast exam.    Does have some left upper quadrant abdominal mild discomfort when eating. Has been told she may have a hiatal hernia. She has had prior noted mild A1c elevation at 5.7% and also elevated LDL. No current routine exercise which has been limited by her back and knee pain.        ROS:  Constitutional: denies significant weight changes or excessive fatigue; no fever or chills; no polydipsia polyphagia or polyuria  HEENT: no otalgia or tinnitus   Pulmonary: no dyspnea cough or significant breakthrough wheeze  Cardiac: no chest pain or palpitations, no tachycardia, no PND or orthopnea.   GI: no recurrent dyspepsia or reflux; normal bowel movements, no nausea or vomiting or diarrhea, no hematochezia or melena  : normal urination without dysuria or hematuria, nocturia or urinary hesitancy  Neurological: no memory loss or trouble with balance or falls, no numbness or tingling or weakness  Psychiatric: no depression or anxiety, no insomnia, no suicidal or homicidal ideation  Skin: no rash or itching or new lesions      Allergies   Allergen Reactions    Ciprofloxacin Rash    Sulfa Antibiotics Rash       Active Ambulatory Problems     Diagnosis Date Noted    Obesity, Class III, BMI 40-49.9 (morbid obesity) ([facility] 01/14/2019    Wheeze 07/12/2021    MARKO (obstructive sleep apnea) 05/20/2020    Urge incontinence 05/10/2016    History of renal stone 04/01/2013    History of positive PPD 04/01/2013    Lumbar degenerative disc disease 04/01/2013    Allergic rhinitis 03/12/2015    Difficulty using continuous positive airway pressure (CPAP) full face mask 07/13/2022    Prediabetes 07/14/2022    Elevated LDL cholesterol level 07/14/2022    Chronic pain of right knee 07/14/2022    Lumbar back pain with radiculopathy affecting right lower extremity 07/14/2022     Resolved Ambulatory Problems     Diagnosis Date Noted    SVT (supraventricular tachycardia) ([facility] [address] 03/26/2019     Past Medical History:   Diagnosis Date    Adrenal disorder, other 2007    Bone spur of foot     Cancer ([facility] 11/14/2017    Chest pain     Chronic pain     Cough     Dyspepsia     Elevated troponin     Environmental allergies     GERD (gastroesophageal reflux disease)     Insomnia     Nausea & vomiting     Obesity     Pancreatitis 06/21/2020    Snores     SOB (shortness of breath)     Sore throat     Spastic bladder 2008    Thromboembolus (HCC)     Urinary frequency     Vertigo     Vitamin D deficiency 2009    Wheezing     Wheezing         Past Surgical History:   Procedure Laterality Date    BLADDER SUSPENSION  2009    BREAST BIOPSY Right 2000     NOT A BIOPSY; excision infected spiderbite IMF    BREAST REDUCTION SURGERY Bilateral 7/24/2019    BILATERAL BREAST REDUCTION performed by Dinh Gordillo MD at [address] [facility] [address]  03/26/2019    COLONOSCOPY  02/07/2019    Dr. Isidro Weinberg; diverticulosis    GYN  2006, 2008    removal of fibroid tumors from uterus x 2    MALIGNANT SKIN LESION EXCISION Right 11/14/2017    , then [facility]; jawline    MOHS SURGERY Right 05/2015    Dr. Estela Mai; precancerous abdomen    MYRINGOTOMY      1978, 2007, 2014, 2015; Dr. Kylah Trotter Right 05/01/2015    Dr. Andres Bravo; Bridger Lager cuff    OTHER SURGICAL HISTORY  2012    Fatty tumor removed from left shoulder    PARTIAL HYSTERECTOMY (CERVIX NOT REMOVED)  11/11/15    robotic-assisted laparoscopic with salpingectomy bilateral; Dr. Alyssia Trejo; secondary to fibroid uterus/menorrhagia   Clydene Silversmith  03/27/2019    Dr. Jovany Campbell; ablation   [facility]    SEPTOPLASTY  12/19/2019    Dr. Mary Pereira; turbinate reduction   [facility] [address] SURGERY  2009    bladder sling; Dr. Jose Wheat History   Problem Relation Age of Onset    Osteoarthritis Mother     Hypertension Father     Psychiatric Disorder Sister         depression    Mult Sclerosis Sister     Neuropathy Brother     Cancer Maternal Grandfather         bladder cancer    Psychiatric Disorder Maternal Grandfather         Alzheimers    Cancer Paternal Grandmother 80        pancreatic cancer    Psychiatric Disorder Paternal Grandmother         Alzheimer's    Psychiatric Disorder Maternal Aunt         vascular dementia    Stroke Paternal Aunt     Cancer Paternal Aunt         colon, thyroid    Cancer Paternal Uncle         brain tumor, pancreatic, lung, colon    Cancer Other         grandfather unknown    Mult Sclerosis Other         grandfather unknown    Breast Cancer Neg Hx        Social History     Tobacco Use    Smoking status: Never Smoker    Smokeless tobacco: Never Used   Vaping Use    Vaping Use: Never used   Substance Use Topics    Alcohol use: No    Drug use: No     Immunization History   Administered Date(s) Administered    COVID-19, PFIZER PURPLE top, DILUTE for use, (age 15 y+), 30mcg/0.3mL 12/29/2020, 01/20/2021, 10/06/2021    Influenza Virus Vaccine 10/03/2016, 10/10/2018, 10/06/2021    Pneumococcal Polysaccharide ([id number]) 04/30/2019    Tdap (Boostrix, Adacel) 04/30/2019    Zoster Recombinant (Shingrix) 07/19/2021, 09/28/2021       Physical Exam:  Blood pressure 111/68, pulse 73, temperature 97.9 °F (36.6 °C), temperature source Temporal, resp. rate 18, height 5' 8\" (1.727 m), weight 265 lb 6.4 oz (120.4 kg), SpO2 96 %. HEENT: TMs and external canals clear; right TM with scarring and left TM with tube in place. EOMI. Nasal mucosa and oropharynx moist and intact moderate erythema and edema and posterior oropharynx similar. Neck: supple, no cervical adenopathy; no appreciable thyromegaly or thyroid nodules; appropriate carotid upstroke. Lungs: normal inspiratory movement, clear with good breath sounds and no rales, wheezes, or rhonchi  CV: No JVD or hepatojugular reflux. Normal S1 and S2 with regular rate and rhythm, no murmurs or rubs or gallops; radial and femoral pulses palpable          Abdomen: soft, grossly obese, and nontender except for mild tenderness of the left upper quadrant without mass felt and no rebound or guarding; positive bowel sounds.   Extremities: no pitting peripheral edema or cyanosis; moves all extremities fairly well with mild edema of the right knee and palpable crepitus  Neurological: alert and oriented x 3; normal gait and 2+ patellar deep tendon reflexes; no specific midline spinal tenderness or sacroiliac tenderness today  Dermatological: skin warm dry and intact without significant rashes or concerning lesions. No inguinal or axillary lymphadenopathy. Affect is appropriate. Assessment and Plan:   Diagnosis Orders   1. Well adult health check  Hemoglobin A1C    HIV 1/2 Ag/Ab, 4TH Generation,W Rflx Confirm    CBC with Auto Differential    Comprehensive Metabolic Panel    Lipid Panel    TSH   2. Lumbar back pain with radiculopathy affecting right lower extremity  XR LUMBAR SPINE (2-3 VIEWS)   3. Lumbar degenerative disc disease  XR LUMBAR SPINE (2-3 VIEWS)   4. Chronic pain of right knee     5. Vaginal discharge  metroNIDAZOLE (FLAGYL) 500 MG tablet   6. Urge incontinence     7. Wheeze  fluticasone (FLOVENT HFA) 110 MCG/ACT inhaler   8. Prediabetes  Hemoglobin A1C   9. Elevated LDL cholesterol level  Comprehensive Metabolic Panel    Lipid Panel   10. Non-seasonal allergic rhinitis, unspecified trigger  levocetirizine (XYZAL) 5 MG tablet    beclomethasone (QNASL) 80 MCG/ACT AERS nasal spray    montelukast (SINGULAIR) 10 MG tablet   11. MARKO on CPAP  CBC with Auto Differential   12. Obesity, Class III, BMI 40-49.9 (morbid obesity) ([facility])  TSH   13. Visit for screening mammogram  WANDY DIGITAL SCREEN W OR WO CAD BILATERAL   14. Encounter for screening for HIV  HIV 1/2 Ag/Ab, 4TH Generation,W Rflx Confirm     Wellness/anticipatory guidance information provided as well as information on back care basics, back stretching exercises, iliotibial band syndrome exercises, prediabetes, Kegel exercises, bacterial vaginosis, breast cancer screening, and on starting a weight loss plan. Await fasting labs as above. With worsening low back pain radiating into the right lower extremity, ordered lumbar spine films to be done at the outpatient center. If significant change, may need to consider lumbar MRI.   With the \"tracking\" concern concentrated at the right knee chronic pain and swelling of the right knee, patient should follow back up with Dr. Corry Lei but also provided patient with some iliotibial band exercises. With patient's vaginal odiferous discharge suspected to be bacterial vaginosis, treat patient with Flagyl 500 twice daily for 7 days. Refilled her baseline medications. Schedule patient for screening mammogram here with mobile mammography. Follow-up with  for allergy shots and care. Also follow-up with Dr. Yg Scott for hormonal therapy. She will not need another Pap smear until 2025. As noted, patient does not desire medical treatment of the urge incontinence at present. Suspect her reported left upper quadrant intermittent discomfort noted with eating, almost certainly is a hiatal hernia. Reviewed with patient her recent pulmonary visit with change of CPAP masks to help with noted leak but gross improvement of events per hour noted. Encouraged 150 minutes of low impact aerobic activity weekly. Also encouraged resistance training every other day with 24-48 hours of muscle glucose uptake subsequently. Encouraged weight loss of course. Immunizations up-to-date. Follow-up here in 1 year for physical or more quickly as needed. Discharge Meds:  Current Outpatient Medications   Medication Sig Dispense Refill    levocetirizine (XYZAL) 5 MG tablet Take 1 tablet by mouth daily 90 tablet 3    fluticasone (FLOVENT HFA) 110 MCG/ACT inhaler Inhale 2 puffs into the lungs every 12 hours 3 each 3    beclomethasone (QNASL) 80 MCG/ACT AERS nasal spray 2 sprays by Each Nostril route daily 3 each 3    montelukast (SINGULAIR) 10 MG tablet Take 1 tablet by mouth nightly 90 tablet 3    NONFORMULARY Indications:  Allergery  Injection       VITAMIN D PO Take by mouth Indications: 10,000 untis      Multiple Vitamin (MULTIVITAMIN ADULT PO) Take by mouth      albuterol sulfate HFA (PROVENTIL;VENTOLIN;PROAIR) 108 (90 Base) MCG/ACT inhaler Inhale into the lungs every 4 hours as needed      Estrad-Estriol-Testost-Progest (BI-EST PROGEST-TESTOSTERONE) CREA Place onto the skin Indications: with DHEA, Test, Prog, 0.5/35/.75/30 mg/mL nighlty      UNABLE TO FIND Indications: catecholecalm       NONFORMULARY Indications: Iodine /potassium supplement  Indications: Iodoral 1/2 tab daily       NONFORMULARY Indications: Methyl Protect one cap daily       NONFORMULARY Indications: Take 2 Tabs by mouth daily (after lunch). Adrenal complex       metroNIDAZOLE (FLAGYL) 500 MG tablet Take 1 tablet by mouth 2 times daily for 7 days 14 tablet 0    SELENIUM PO Take 1 tablet by mouth daily      estradiol (ESTRACE) 0.1 MG/GM vaginal cream Place vaginally      melatonin 1 MG tablet Take 1 mg by mouth      ofloxacin (OCUFLOX) 0.3 % solution 1 drop as needed       No current facility-administered medications for this visit. Return in about 1 year (around 7/14/2023) for CPX. Any new medications were explained today including any potential drug interactions or significant side effects. The patient's questions in regards to this were answered today. Dictated using voice recognition software.   Proofread, but unrecognized errors may exist.

## 2022-07-15 ENCOUNTER — HOSPITAL ENCOUNTER (OUTPATIENT)
Dept: MAMMOGRAPHY | Age: 52
Discharge: HOME | End: 2022-07-18
Payer: COMMERCIAL

## 2022-07-15 ENCOUNTER — NURSE ONLY (OUTPATIENT)
Dept: FAMILY MEDICINE CLINIC | Facility: CLINIC | Age: 52
End: 2022-07-15
Payer: COMMERCIAL

## 2022-07-15 DIAGNOSIS — M51.36 LUMBAR DEGENERATIVE DISC DISEASE: ICD-10-CM

## 2022-07-15 DIAGNOSIS — M54.16 LUMBAR BACK PAIN WITH RADICULOPATHY AFFECTING RIGHT LOWER EXTREMITY: ICD-10-CM

## 2022-07-15 DIAGNOSIS — Z12.31 VISIT FOR SCREENING MAMMOGRAM: ICD-10-CM

## 2022-07-15 LAB
EST. AVERAGE GLUCOSE BLD GHB EST-MCNC: 120 MG/DL
HBA1C MFR BLD: 5.8 % (ref 4.8–5.6)
HIV 1+2 AB+HIV1 P24 AG SERPL QL IA: NONREACTIVE
HIV 1/2 RESULT COMMENT: NORMAL

## 2022-07-15 PROCEDURE — 72100 X-RAY EXAM L-S SPINE 2/3 VWS: CPT | Performed by: FAMILY MEDICINE

## 2022-07-15 PROCEDURE — 77067 SCR MAMMO BI INCL CAD: CPT

## 2022-07-18 NOTE — PROGRESS NOTES
----- Message from Christian Lubin, DO sent at 7/17/2022  2:09 PM EDT -----  Please let patient know echo looks fine  Khadijah Campos was this ordered for any other reasons then chronic pacing? Stephanie Muniz  : 1970  Primary: [facility]  Secondary:  [facility] [address], [place], [facility]  [phone]   [phone]        OUTPATIENT PHYSICAL THERAPY: Daily Treatment Note 2019  Visit Count:  Visit 4    MEDICAL/REFERRING DIAGNOS work. IS:  Overactive bladder [N32.81]   REFERRING PHYSICIAN: Everette Veras MD  Pre-treatment Symptoms/Complaints: Patient reports minor improvement this week, with no increase in leakage over the last 2 weeks, which 1-2 cases of incontinence. .however continues to experience significant urge when driving and when coming into her home. Reports she stopped using one of her anticholinergic medications on Saturday with no change noted. Pain: Initial: Pain Intensity 1: 0 0/10 Post Session:  0/10   Medications Last Reviewed:  19    Updated Objective Findings:  none   TREATMENT:   THERAPEUTIC ACTIVITY: ( 15 minutes): Instruction on functional pelvic brace exercise including feedforward activation of pelvic floor muscles on exhale prior to activity that increases intra-abdominal pressure (IAP) such as cough, sneeze, laugh, sit to stand, lifting object to decrease pressure on pelvic organs during exertional activities. Instruction on coordinated pelvic floor and diaphragmatic breathing to improve kinesthetic awareness of pelvic muscle mobility and restore proper motor recruitment patterns with breathing, posture, and functional movement (e.g. appropriate lift/contraction with increased IAP such as a cough, laugh, sneeze to prevent incontinence). Functional training on progressive relaxation training to deregulate nervous system and improve urgency when experiencing stressful activities. THERAPEUTIC EXERCISE: (10 minutes):  Exercises per grid below to improve strength and coordination.   Required moderate visual, verbal, manual and tactile cues to promote proper body alignment and promote proper body posture well as to locate correct muscles without substitutions of rectus abdominis or gluteal muscles. Progressed complexity of movement as indicated. Required cocontraction of transfer abdominal mm to improve pelvic floor muscle contraction. Good progress in recruitment noted throughout session and patient able to determine when she has lost contraction and correct. Date:  9/26/19         Activity/Exercise Parameters         Supine endurance kegels 4x10 with 10 sec hold with focus on breathing         Supine quick flick Kegels [id number] without substitution of TrA contraction         Diaphragmatic breathing          tranverse abdominal contractions          Seated endurance kegels          Seated quick flicks          Supine bridging with contraction per bridge          Supine bridging with endurance hold contraction PFM and trA          Supine clam shells with contraction per bridge          Standing heel raise with PFM and trA contraction          Sit to stand with PFM contraction          Standing marching with PFM contraction          Lifting weighted box with PFM and TrA contraction          Lifting weight box with PFM and TrA endurance hold          Chops (abdominal rotation exercise) with PFM and TrA contraction          Wall sits          Monster walk          Hip 3 way           Standing row          Modified prudencio stretch            Manual Therapy: ( 43 minutes): Soft tissue mobilization was utilized and necessary because of the patient's painful spasm and restricted motion of soft tissue. Trigger point release and rolling performed to B adductor muscles with improvement noted in tenderness. Trigger point release in B hip flexors. Connective tissue mobilization/scar mobilization performed in abdominal region as well as rolling to improve abdominal muscle contractions and facilitate PFM contraction.  Instrument assisted soft tissue mobilization with improvement noted in muscle flexibility and motion. (Used abbreviations: MET - muscle energy technique; SCS- Strain counter strain; CTM- Connective tissue mobilizations; CR- Contract/relax; SP- Sustained pressure, TrP- Trigger point release, IASTM- Instrument assisted soft tissue mobilizations, TDN- Trigger point dry needling). [employer]  The following educational topics were reviewed with patient:  Urge suppression techniques reiterated as patient with difficulty following them with increments of 5-10 minutes of suppression initially, increasing to 2-3 hours eventually. Distraction techniques including podcasts to improve urgency while in the car. Treatment/Session Summary:    · Response to Treatment:  Patient verbalized understanding of all treatment. · Communication/Consultation:  None today  · Equipment provided today:  None today  · Recommendations/Intent for next treatment session: Next visit will focus on biofeedback, continued education on urge deferrment, progression of Kegel exercises to more functional activities and abdominal bracing as well as relaxation techniques to improve down regulation of nervous system.   Total Treatment Billable Duration:  68 minutes  PT Patient Time In/Time Out  Time In: 8422  Time Out: AvLucila Dia, PT, DPT    Future Appointments   Date Time Provider Tramaine Morgan   10/24/2019  2:00 PM Ruddy Hudson PT, DPT [facility]   5/7/2020  9:00 AM Rickie Butterfield MD [facility] FPA FPA

## 2022-07-19 ENCOUNTER — TELEPHONE (OUTPATIENT)
Dept: FAMILY MEDICINE CLINIC | Facility: CLINIC | Age: 52
End: 2022-07-19

## 2022-08-30 ENCOUNTER — RX ONLY (OUTPATIENT)
Age: 52
Setting detail: RX ONLY
End: 2022-08-30

## 2022-08-30 RX ORDER — DOXYCYCLINE 100 MG/1
CAPSULE ORAL
Qty: 28 | Refills: 0 | Status: ERX | COMMUNITY
Start: 2022-08-30 | End: 2022-09-10

## 2022-09-08 ENCOUNTER — TELEPHONE (OUTPATIENT)
Dept: FAMILY MEDICINE CLINIC | Facility: CLINIC | Age: 52
End: 2022-09-08

## 2022-09-08 DIAGNOSIS — M54.16 LUMBAR BACK PAIN WITH RADICULOPATHY AFFECTING RIGHT LOWER EXTREMITY: ICD-10-CM

## 2022-09-08 DIAGNOSIS — M25.561 CHRONIC PAIN OF RIGHT KNEE: ICD-10-CM

## 2022-09-08 DIAGNOSIS — M51.36 LUMBAR DEGENERATIVE DISC DISEASE: Primary | ICD-10-CM

## 2022-09-08 DIAGNOSIS — G89.29 CHRONIC PAIN OF RIGHT KNEE: ICD-10-CM

## 2022-09-08 NOTE — TELEPHONE ENCOUNTER
----- Message from [facility] sent at 9/8/2022  3:05 PM EDT -----  Subject: Referral Request    Reason for referral request? pt needs a referral for knee and back pain to   [facility]. Dr. Rod Archuleta is aware of pts condition And med [facility]   orthopedics health and sports medicine fax number - [phone]  Provider patient wants to be referred to(if known):     Provider Phone Number(if known):     Additional Information for Provider?   ---------------------------------------------------------------------------  --------------  [address]    [phone]; OK to leave message on voicemail  ---------------------------------------------------------------------------  --------------

## 2022-09-19 ENCOUNTER — APPOINTMENT (RX ONLY)
Dept: URBAN - METROPOLITAN AREA CLINIC 23 | Facility: CLINIC | Age: 52
Setting detail: DERMATOLOGY
End: 2022-09-19

## 2022-09-19 DIAGNOSIS — L72.8 OTHER FOLLICULAR CYSTS OF THE SKIN AND SUBCUTANEOUS TISSUE: ICD-10-CM

## 2022-09-19 PROCEDURE — 11402 EXC TR-EXT B9+MARG 1.1-2 CM: CPT

## 2022-09-19 PROCEDURE — 12032 INTMD RPR S/A/T/EXT 2.6-7.5: CPT

## 2022-09-19 PROCEDURE — ? EXCISION

## 2022-09-19 ASSESSMENT — LOCATION SIMPLE DESCRIPTION DERM: LOCATION SIMPLE: LEFT LOWER BACK

## 2022-09-19 ASSESSMENT — LOCATION DETAILED DESCRIPTION DERM: LOCATION DETAILED: LEFT INFERIOR MEDIAL MIDBACK

## 2022-09-19 ASSESSMENT — LOCATION ZONE DERM: LOCATION ZONE: TRUNK

## 2022-09-19 NOTE — PROCEDURE: EXCISION
Patient Will Remove Sutures At Home?: No
Positioning (Leave Blank If You Do Not Want): The patient was placed in a comfortable position exposing the surgical site.
Suturegard Body: The suture ends were repeatedly re-tightened and re-clamped to achieve the desired tissue expansion.
Anesthesia Volume In Cc: 9
Crescentic Intermediate Repair Preamble Text (Leave Blank If You Do Not Want): Undermining was performed with blunt dissection.
Dorsal Nasal Flap Text: The defect edges were debeveled with a #15 scalpel blade.  Given the location of the defect and the proximity to free margins a dorsal nasal flap was deemed most appropriate.  Using a sterile surgical marker, an appropriate dorsal nasal flap was drawn around the defect.    The area thus outlined was incised deep to adipose tissue with a #15 scalpel blade.  The skin margins were undermined to an appropriate distance in all directions utilizing iris scissors.
Tissue Cultured Epidermal Autograft Text: The defect edges were debeveled with a #15 scalpel blade.  Given the location of the defect, shape of the defect and the proximity to free margins a tissue cultured epidermal autograft was deemed most appropriate.  The graft was then trimmed to fit the size of the defect.  The graft was then placed in the primary defect and oriented appropriately.
Home Suture Removal Text: Patient was provided a home suture removal kit and will remove their sutures at home.  If they have any questions or difficulties they will call the office.
Complex Repair And A-T Advancement Flap Text: The defect edges were debeveled with a #15 scalpel blade.  The primary defect was closed partially with a complex linear closure.  Given the location of the remaining defect, shape of the defect and the proximity to free margins an A-T advancement flap was deemed most appropriate for complete closure of the defect.  Using a sterile surgical marker, an appropriate advancement flap was drawn incorporating the defect and placing the expected incisions within the relaxed skin tension lines where possible.    The area thus outlined was incised deep to adipose tissue with a #15 scalpel blade.  The skin margins were undermined to an appropriate distance in all directions utilizing iris scissors.
Complex Repair And Transposition Flap Text: The defect edges were debeveled with a #15 scalpel blade.  The primary defect was closed partially with a complex linear closure.  Given the location of the remaining defect, shape of the defect and the proximity to free margins a transposition flap was deemed most appropriate for complete closure of the defect.  Using a sterile surgical marker, an appropriate advancement flap was drawn incorporating the defect and placing the expected incisions within the relaxed skin tension lines where possible.    The area thus outlined was incised deep to adipose tissue with a #15 scalpel blade.  The skin margins were undermined to an appropriate distance in all directions utilizing iris scissors.
O-T Plasty Text: The defect edges were debeveled with a #15 scalpel blade.  Given the location of the defect, shape of the defect and the proximity to free margins an O-T plasty was deemed most appropriate.  Using a sterile surgical marker, an appropriate O-T plasty was drawn incorporating the defect and placing the expected incisions within the relaxed skin tension lines where possible.    The area thus outlined was incised deep to adipose tissue with a #15 scalpel blade.  The skin margins were undermined to an appropriate distance in all directions utilizing iris scissors.
V-Y Flap Text: The defect edges were debeveled with a #15 scalpel blade.  Given the location of the defect, shape of the defect and the proximity to free margins a V-Y flap was deemed most appropriate.  Using a sterile surgical marker, an appropriate advancement flap was drawn incorporating the defect and placing the expected incisions within the relaxed skin tension lines where possible.    The area thus outlined was incised deep to adipose tissue with a #15 scalpel blade.  The skin margins were undermined to an appropriate distance in all directions utilizing iris scissors.
Rotation Flap Text: The defect edges were debeveled with a #15 scalpel blade.  Given the location of the defect, shape of the defect and the proximity to free margins a rotation flap was deemed most appropriate.  Using a sterile surgical marker, an appropriate rotation flap was drawn incorporating the defect and placing the expected incisions within the relaxed skin tension lines where possible.    The area thus outlined was incised deep to adipose tissue with a #15 scalpel blade.  The skin margins were undermined to an appropriate distance in all directions utilizing iris scissors.
Zygomaticofacial Flap Text: Given the location of the defect, shape of the defect and the proximity to free margins a zygomaticofacial flap was deemed most appropriate for repair.  Using a sterile surgical marker, the appropriate flap was drawn incorporating the defect and placing the expected incisions within the relaxed skin tension lines where possible. The area thus outlined was incised deep to adipose tissue with a #15 scalpel blade with preservation of a vascular pedicle.  The skin margins were undermined to an appropriate distance in all directions utilizing iris scissors.  The flap was then placed into the defect and anchored with interrupted buried subcutaneous sutures.
Hemostasis: Electrocautery
Nasal Turnover Hinge Flap Text: The defect edges were debeveled with a #15 scalpel blade.  Given the size, depth, location of the defect and the defect being full thickness a nasal turnover hinge flap was deemed most appropriate.  Using a sterile surgical marker, an appropriate hinge flap was drawn incorporating the defect. The area thus outlined was incised with a #15 scalpel blade. The flap was designed to recreate the nasal mucosal lining and the alar rim. The skin margins were undermined to an appropriate distance in all directions utilizing iris scissors.
Split-Thickness Skin Graft Text: The defect edges were debeveled with a #15 scalpel blade.  Given the location of the defect, shape of the defect and the proximity to free margins a split thickness skin graft was deemed most appropriate.  Using a sterile surgical marker, the primary defect shape was transferred to the donor site. The split thickness graft was then harvested.  The skin graft was then placed in the primary defect and oriented appropriately.
Vermilion Border Text: The closure involved the vermilion border.
Helical Rim Advancement Flap Text: The defect edges were debeveled with a #15 blade scalpel.  Given the location of the defect and the proximity to free margins (helical rim) a double helical rim advancement flap was deemed most appropriate.  Using a sterile surgical marker, the appropriate advancement flaps were drawn incorporating the defect and placing the expected incisions between the helical rim and antihelix where possible.  The area thus outlined was incised through and through with a #15 scalpel blade.  With a skin hook and iris scissors, the flaps were gently and sharply undermined and freed up.
Paramedian Forehead Flap Text: A decision was made to reconstruct the defect utilizing an interpolation axial flap and a staged reconstruction.  A telfa template was made of the defect.  This telfa template was then used to outline the paramedian forehead pedicle flap.  The donor area for the pedicle flap was then injected with anesthesia.  The flap was excised through the skin and subcutaneous tissue down to the layer of the underlying musculature.  The pedicle flap was carefully excised within this deep plane to maintain its blood supply.  The edges of the donor site were undermined.   The donor site was closed in a primary fashion.  The pedicle was then rotated into position and sutured.  Once the tube was sutured into place, adequate blood supply was confirmed with blanching and refill.  The pedicle was then wrapped with xeroform gauze and dressed appropriately with a telfa and gauze bandage to ensure continued blood supply and protect the attached pedicle.
Anesthesia Volume In Cc: 0
Complex Repair And Burow's Graft Text: The defect edges were debeveled with a #15 scalpel blade.  The primary defect was closed partially with a complex linear closure.  Given the location of the defect, shape of the defect, the proximity to free margins and the presence of a standing cone deformity a Burow's graft was deemed most appropriate to repair the remaining defect.  The graft was trimmed to fit the size of the remaining defect.  The graft was then placed in the primary defect, oriented appropriately, and sutured into place.
Complex Repair And Double M Plasty Text: The defect edges were debeveled with a #15 scalpel blade.  The primary defect was closed partially with a complex linear closure.  Given the location of the remaining defect, shape of the defect and the proximity to free margins a double M plasty was deemed most appropriate for complete closure of the defect.  Using a sterile surgical marker, an appropriate advancement flap was drawn incorporating the defect and placing the expected incisions within the relaxed skin tension lines where possible.    The area thus outlined was incised deep to adipose tissue with a #15 scalpel blade.  The skin margins were undermined to an appropriate distance in all directions utilizing iris scissors.
Show Additional Anesthesia Variables: Yes
Eliptical Excision Additional Text (Leave Blank If You Do Not Want): The margin was drawn around the clinically apparent lesion.  An elliptical shape was then drawn on the skin incorporating the lesion and margins.  Incisions were then made along these lines to the appropriate tissue plane and the lesion was extirpated.
No Repair - Repaired With Adjacent Surgical Defect Text (Leave Blank If You Do Not Want): After the excision the defect was repaired concurrently with another surgical defect which was in close approximation.
Wound Care: Vaseline
Suturegard Intro: Intraoperative tissue expansion was performed, utilizing the SUTUREGARD device, in order to reduce wound tension.
Complex Repair And Rotation Flap Text: The defect edges were debeveled with a #15 scalpel blade.  The primary defect was closed partially with a complex linear closure.  Given the location of the remaining defect, shape of the defect and the proximity to free margins a rotation flap was deemed most appropriate for complete closure of the defect.  Using a sterile surgical marker, an appropriate advancement flap was drawn incorporating the defect and placing the expected incisions within the relaxed skin tension lines where possible.    The area thus outlined was incised deep to adipose tissue with a #15 scalpel blade.  The skin margins were undermined to an appropriate distance in all directions utilizing iris scissors.
Excision Depth: adipose tissue
M-Plasty Complex Repair Preamble Text (Leave Blank If You Do Not Want): Extensive wide undermining was performed.
Deep Sutures: 3-0 PDO
Lip Wedge Excision Repair Text: Given the location of the defect and the proximity to free margins a full thickness wedge repair was deemed most appropriate.  Using a sterile surgical marker, the appropriate repair was drawn incorporating the defect and placing the expected incisions perpendicular to the vermilion border.  The vermilion border was also meticulously outlined to ensure appropriate reapproximation during the repair.  The area thus outlined was incised through and through with a #15 scalpel blade.  The muscularis and dermis were reaproximated with deep sutures following hemostasis. Care was taken to realign the vermilion border before proceeding with the superficial closure.  Once the vermilion was realigned the superfical and mucosal closure was finished.
Muscle Hinge Flap Text: The defect edges were debeveled with a #15 scalpel blade.  Given the size, depth and location of the defect and the proximity to free margins a muscle hinge flap was deemed most appropriate.  Using a sterile surgical marker, an appropriate hinge flap was drawn incorporating the defect. The area thus outlined was incised with a #15 scalpel blade.  The skin margins were undermined to an appropriate distance in all directions utilizing iris scissors.
Repair Type: Intermediate
Rhomboid Transposition Flap Text: The defect edges were debeveled with a #15 scalpel blade.  Given the location of the defect and the proximity to free margins a rhomboid transposition flap was deemed most appropriate.  Using a sterile surgical marker, an appropriate rhomboid flap was drawn incorporating the defect.    The area thus outlined was incised deep to adipose tissue with a #15 scalpel blade.  The skin margins were undermined to an appropriate distance in all directions utilizing iris scissors.
Complex Repair And Double Advancement Flap Text: The defect edges were debeveled with a #15 scalpel blade.  The primary defect was closed partially with a complex linear closure.  Given the location of the remaining defect, shape of the defect and the proximity to free margins a double advancement flap was deemed most appropriate for complete closure of the defect.  Using a sterile surgical marker, an appropriate advancement flap was drawn incorporating the defect and placing the expected incisions within the relaxed skin tension lines where possible.    The area thus outlined was incised deep to adipose tissue with a #15 scalpel blade.  The skin margins were undermined to an appropriate distance in all directions utilizing iris scissors.
Bilobed Transposition Flap Text: The defect edges were debeveled with a #15 scalpel blade.  Given the location of the defect and the proximity to free margins a bilobed transposition flap was deemed most appropriate.  Using a sterile surgical marker, an appropriate bilobe flap drawn around the defect.    The area thus outlined was incised deep to adipose tissue with a #15 scalpel blade.  The skin margins were undermined to an appropriate distance in all directions utilizing iris scissors.
Dermal Autograft Text: The defect edges were debeveled with a #15 scalpel blade.  Given the location of the defect, shape of the defect and the proximity to free margins a dermal autograft was deemed most appropriate.  Using a sterile surgical marker, the primary defect shape was transferred to the donor site. The area thus outlined was incised deep to adipose tissue with a #15 scalpel blade.  The harvested graft was then trimmed of adipose and epidermal tissue until only dermis was left.  The skin graft was then placed in the primary defect and oriented appropriately.
Island Pedicle Flap-Requiring Vessel Identification Text: The defect edges were debeveled with a #15 scalpel blade.  Given the location of the defect, shape of the defect and the proximity to free margins an island pedicle advancement flap was deemed most appropriate.  Using a sterile surgical marker, an appropriate advancement flap was drawn, based on the axial vessel mentioned above, incorporating the defect, outlining the appropriate donor tissue and placing the expected incisions within the relaxed skin tension lines where possible.    The area thus outlined was incised deep to adipose tissue with a #15 scalpel blade.  The skin margins were undermined to an appropriate distance in all directions around the primary defect and laterally outward around the island pedicle utilizing iris scissors.  There was minimal undermining beneath the pedicle flap.
O-Z Flap Text: The defect edges were debeveled with a #15 scalpel blade.  Given the location of the defect, shape of the defect and the proximity to free margins an O-Z flap was deemed most appropriate.  Using a sterile surgical marker, an appropriate transposition flap was drawn incorporating the defect and placing the expected incisions within the relaxed skin tension lines where possible. The area thus outlined was incised deep to adipose tissue with a #15 scalpel blade.  The skin margins were undermined to an appropriate distance in all directions utilizing iris scissors.
Complex Repair And Dorsal Nasal Flap Text: The defect edges were debeveled with a #15 scalpel blade.  The primary defect was closed partially with a complex linear closure.  Given the location of the remaining defect, shape of the defect and the proximity to free margins a dorsal nasal flap was deemed most appropriate for complete closure of the defect.  Using a sterile surgical marker, an appropriate flap was drawn incorporating the defect and placing the expected incisions within the relaxed skin tension lines where possible.    The area thus outlined was incised deep to adipose tissue with a #15 scalpel blade.  The skin margins were undermined to an appropriate distance in all directions utilizing iris scissors.
W Plasty Text: The lesion was extirpated to the level of the fat with a #15 scalpel blade.  Given the location of the defect, shape of the defect and the proximity to free margins a W-plasty was deemed most appropriate for repair.  Using a sterile surgical marker, the appropriate transposition arms of the W-plasty were drawn incorporating the defect and placing the expected incisions within the relaxed skin tension lines where possible.    The area thus outlined was incised deep to adipose tissue with a #15 scalpel blade.  The skin margins were undermined to an appropriate distance in all directions utilizing iris scissors.  The opposing transposition arms were then transposed into place in opposite direction and anchored with interrupted buried subcutaneous sutures.
Helical Rim Text: The closure involved the helical rim.
Complex Repair And Xenograft Text: The defect edges were debeveled with a #15 scalpel blade.  The primary defect was closed partially with a complex linear closure.  Given the location of the defect, shape of the defect and the proximity to free margins a xenograft was deemed most appropriate to repair the remaining defect.  The graft was trimmed to fit the size of the remaining defect.  The graft was then placed in the primary defect, oriented appropriately, and sutured into place.
Mastoid Interpolation Flap Text: A decision was made to reconstruct the defect utilizing an interpolation axial flap and a staged reconstruction.  A telfa template was made of the defect.  This telfa template was then used to outline the mastoid interpolation flap.  The donor area for the pedicle flap was then injected with anesthesia.  The flap was excised through the skin and subcutaneous tissue down to the layer of the underlying musculature.  The pedicle flap was carefully excised within this deep plane to maintain its blood supply.  The edges of the donor site were undermined.   The donor site was closed in a primary fashion.  The pedicle was then rotated into position and sutured.  Once the tube was sutured into place, adequate blood supply was confirmed with blanching and refill.  The pedicle was then wrapped with xeroform gauze and dressed appropriately with a telfa and gauze bandage to ensure continued blood supply and protect the attached pedicle.
Peng Advancement Flap Text: The defect edges were debeveled with a #15 scalpel blade.  Given the location of the defect, shape of the defect and the proximity to free margins a Peng advancement flap was deemed most appropriate.  Using a sterile surgical marker, an appropriate advancement flap was drawn incorporating the defect and placing the expected incisions within the relaxed skin tension lines where possible. The area thus outlined was incised deep to adipose tissue with a #15 scalpel blade.  The skin margins were undermined to an appropriate distance in all directions utilizing iris scissors.
Chonodrocutaneous Helical Advancement Flap Text: The defect edges were debeveled with a #15 scalpel blade.  Given the location of the defect and the proximity to free margins a chondrocutaneous helical advancement flap was deemed most appropriate.  Using a sterile surgical marker, the appropriate advancement flap was drawn incorporating the defect and placing the expected incisions within the relaxed skin tension lines where possible.    The area thus outlined was incised deep to adipose tissue with a #15 scalpel blade.  The skin margins were undermined to an appropriate distance in all directions utilizing iris scissors.
Debridement Text: The wound edges were debrided prior to proceeding with the closure to facilitate wound healing.
Path Notes (To The Dermatopathologist): Please check margins.
Alar Island Pedicle Flap Text: The defect edges were debeveled with a #15 scalpel blade.  Given the location of the defect, shape of the defect and the proximity to the alar rim an island pedicle advancement flap was deemed most appropriate.  Using a sterile surgical marker, an appropriate advancement flap was drawn incorporating the defect, outlining the appropriate donor tissue and placing the expected incisions within the nasal ala running parallel to the alar rim. The area thus outlined was incised with a #15 scalpel blade.  The skin margins were undermined minimally to an appropriate distance in all directions around the primary defect and laterally outward around the island pedicle utilizing iris scissors.  There was minimal undermining beneath the pedicle flap.
Complex Repair And Bilobe Flap Text: The defect edges were debeveled with a #15 scalpel blade.  The primary defect was closed partially with a complex linear closure.  Given the location of the remaining defect, shape of the defect and the proximity to free margins a bilobe flap was deemed most appropriate for complete closure of the defect.  Using a sterile surgical marker, an appropriate advancement flap was drawn incorporating the defect and placing the expected incisions within the relaxed skin tension lines where possible.    The area thus outlined was incised deep to adipose tissue with a #15 scalpel blade.  The skin margins were undermined to an appropriate distance in all directions utilizing iris scissors.
Purse String (Simple) Text: Given the location of the defect and the characteristics of the surrounding skin a purse string simple closure was deemed most appropriate.  Undermining was performed circumferentially around the surgical defect.  A purse string suture was then placed and tightened.
Modified Advancement Flap Text: The defect edges were debeveled with a #15 scalpel blade.  Given the location of the defect, shape of the defect and the proximity to free margins a modified advancement flap was deemed most appropriate.  Using a sterile surgical marker, an appropriate advancement flap was drawn incorporating the defect and placing the expected incisions within the relaxed skin tension lines where possible.    The area thus outlined was incised deep to adipose tissue with a #15 scalpel blade.  The skin margins were undermined to an appropriate distance in all directions utilizing iris scissors.
V-Y Plasty Text: The defect edges were debeveled with a #15 scalpel blade.  Given the location of the defect, shape of the defect and the proximity to free margins an V-Y advancement flap was deemed most appropriate.  Using a sterile surgical marker, an appropriate advancement flap was drawn incorporating the defect and placing the expected incisions within the relaxed skin tension lines where possible.    The area thus outlined was incised deep to adipose tissue with a #15 scalpel blade.  The skin margins were undermined to an appropriate distance in all directions utilizing iris scissors.
Suture Removal: 14 days
Star Wedge Flap Text: The defect edges were debeveled with a #15 scalpel blade.  Given the location of the defect, shape of the defect and the proximity to free margins a star wedge flap was deemed most appropriate.  Using a sterile surgical marker, an appropriate rotation flap was drawn incorporating the defect and placing the expected incisions within the relaxed skin tension lines where possible. The area thus outlined was incised deep to adipose tissue with a #15 scalpel blade.  The skin margins were undermined to an appropriate distance in all directions utilizing iris scissors.
Interpolation Flap Text: A decision was made to reconstruct the defect utilizing an interpolation axial flap and a staged reconstruction.  A telfa template was made of the defect.  This telfa template was then used to outline the interpolation flap.  The donor area for the pedicle flap was then injected with anesthesia.  The flap was excised through the skin and subcutaneous tissue down to the layer of the underlying musculature.  The interpolation flap was carefully excised within this deep plane to maintain its blood supply.  The edges of the donor site were undermined.   The donor site was closed in a primary fashion.  The pedicle was then rotated into position and sutured.  Once the tube was sutured into place, adequate blood supply was confirmed with blanching and refill.  The pedicle was then wrapped with xeroform gauze and dressed appropriately with a telfa and gauze bandage to ensure continued blood supply and protect the attached pedicle.
Melolabial Transposition Flap Text: The defect edges were debeveled with a #15 scalpel blade.  Given the location of the defect and the proximity to free margins a melolabial flap was deemed most appropriate.  Using a sterile surgical marker, an appropriate melolabial transposition flap was drawn incorporating the defect.    The area thus outlined was incised deep to adipose tissue with a #15 scalpel blade.  The skin margins were undermined to an appropriate distance in all directions utilizing iris scissors.
Composite Graft Text: The defect edges were debeveled with a #15 scalpel blade.  Given the location of the defect, shape of the defect, the proximity to free margins and the fact the defect was full thickness a composite graft was deemed most appropriate.  The defect was outline and then transferred to the donor site.  A full thickness graft was then excised from the donor site. The graft was then placed in the primary defect, oriented appropriately and then sutured into place.  The secondary defect was then repaired using a primary closure.
Banner Transposition Flap Text: The defect edges were debeveled with a #15 scalpel blade.  Given the location of the defect and the proximity to free margins a Banner transposition flap was deemed most appropriate.  Using a sterile surgical marker, an appropriate flap drawn around the defect. The area thus outlined was incised deep to adipose tissue with a #15 scalpel blade.  The skin margins were undermined to an appropriate distance in all directions utilizing iris scissors.
Estlander Flap (Upper To Lower Lip) Text: The defect of the lower lip was assessed and measured.  Given the location and size of the defect, an Estlander flap was deemed most appropriate.  Using a sterile surgical marker, an appropriate Estlander flap was measured and drawn on the upper lip. Local anesthesia was then infiltrated. A scalpel was then used to incise the lateral aspect of the flap, through skin, muscle and mucosa, leaving the flap pedicled medially.  The flap was then rotated and positioned to fill the lower lip defect.  The flap was then sutured into place with a three layer technique, closing the orbicularis oris muscle layer with subcutaneous buried sutures, followed by a mucosal layer and an epidermal layer.
Anesthesia Type: 1% lidocaine with epinephrine
Complex Repair And W Plasty Text: The defect edges were debeveled with a #15 scalpel blade.  The primary defect was closed partially with a complex linear closure.  Given the location of the remaining defect, shape of the defect and the proximity to free margins a W plasty was deemed most appropriate for complete closure of the defect.  Using a sterile surgical marker, an appropriate advancement flap was drawn incorporating the defect and placing the expected incisions within the relaxed skin tension lines where possible.    The area thus outlined was incised deep to adipose tissue with a #15 scalpel blade.  The skin margins were undermined to an appropriate distance in all directions utilizing iris scissors.
Complex Repair And Dermal Autograft Text: The defect edges were debeveled with a #15 scalpel blade.  The primary defect was closed partially with a complex linear closure.  Given the location of the defect, shape of the defect and the proximity to free margins an dermal autograft was deemed most appropriate to repair the remaining defect.  The graft was trimmed to fit the size of the remaining defect.  The graft was then placed in the primary defect, oriented appropriately, and sutured into place.
O-T Advancement Flap Text: The defect edges were debeveled with a #15 scalpel blade.  Given the location of the defect, shape of the defect and the proximity to free margins an O-T advancement flap was deemed most appropriate.  Using a sterile surgical marker, an appropriate advancement flap was drawn incorporating the defect and placing the expected incisions within the relaxed skin tension lines where possible.    The area thus outlined was incised deep to adipose tissue with a #15 scalpel blade.  The skin margins were undermined to an appropriate distance in all directions utilizing iris scissors.
Excisional Biopsy Additional Text (Leave Blank If You Do Not Want): The margin was drawn around the clinically apparent lesion. An elliptical shape was then drawn on the skin incorporating the lesion and margins.  Incisions were then made along these lines to the appropriate tissue plane and the lesion was extirpated.
Advancement Flap (Double) Text: The defect edges were debeveled with a #15 scalpel blade.  Given the location of the defect and the proximity to free margins a double advancement flap was deemed most appropriate.  Using a sterile surgical marker, the appropriate advancement flaps were drawn incorporating the defect and placing the expected incisions within the relaxed skin tension lines where possible.    The area thus outlined was incised deep to adipose tissue with a #15 scalpel blade.  The skin margins were undermined to an appropriate distance in all directions utilizing iris scissors.
Hemigard Retention Suture: 2-0 Nylon
Complex Repair And V-Y Plasty Text: The defect edges were debeveled with a #15 scalpel blade.  The primary defect was closed partially with a complex linear closure.  Given the location of the remaining defect, shape of the defect and the proximity to free margins a V-Y plasty was deemed most appropriate for complete closure of the defect.  Using a sterile surgical marker, an appropriate advancement flap was drawn incorporating the defect and placing the expected incisions within the relaxed skin tension lines where possible.    The area thus outlined was incised deep to adipose tissue with a #15 scalpel blade.  The skin margins were undermined to an appropriate distance in all directions utilizing iris scissors.
Saucerization Depth: dermis and superficial adipose tissue
Burow's Graft Text: The defect edges were debeveled with a #15 scalpel blade.  Given the location of the defect, shape of the defect, the proximity to free margins and the presence of a standing cone deformity a Burow's skin graft was deemed most appropriate. The standing cone was removed and this tissue was then trimmed to the shape of the primary defect. The adipose tissue was also removed until only dermis and epidermis were left.  The skin margins of the secondary defect were undermined to an appropriate distance in all directions utilizing iris scissors.  The secondary defect was closed with interrupted buried subcutaneous sutures.  The skin edges were then re-apposed with running  sutures.  The skin graft was then placed in the primary defect and oriented appropriately.
Nasalis-Muscle-Based Myocutaneous Island Pedicle Flap Text: Using a #15 blade, an incision was made around the donor flap to the level of the nasalis muscle. Wide lateral undermining was then performed in both the subcutaneous plane above the nasalis muscle, and in a submuscular plane just above periosteum. This allowed the formation of a free nasalis muscle axial pedicle (based on the angular artery) which was still attached to the actual cutaneous flap, increasing its mobility and vascular viability. Hemostasis was obtained with pinpoint electrocoagulation. The flap was mobilized into position and the pivotal anchor points positioned and stabilized with buried interrupted sutures. Subcutaneous and dermal tissues were closed in a multilayered fashion with sutures. Tissue redundancies were excised, and the epidermal edges were apposed without significant tension and sutured with sutures.
Bilateral Helical Rim Advancement Flap Text: The defect edges were debeveled with a #15 blade scalpel.  Given the location of the defect and the proximity to free margins (helical rim) a bilateral helical rim advancement flap was deemed most appropriate.  Using a sterile surgical marker, the appropriate advancement flaps were drawn incorporating the defect and placing the expected incisions between the helical rim and antihelix where possible.  The area thus outlined was incised through and through with a #15 scalpel blade.  With a skin hook and iris scissors, the flaps were gently and sharply undermined and freed up.
Complex Repair And O-T Advancement Flap Text: The defect edges were debeveled with a #15 scalpel blade.  The primary defect was closed partially with a complex linear closure.  Given the location of the remaining defect, shape of the defect and the proximity to free margins an O-T advancement flap was deemed most appropriate for complete closure of the defect.  Using a sterile surgical marker, an appropriate advancement flap was drawn incorporating the defect and placing the expected incisions within the relaxed skin tension lines where possible.    The area thus outlined was incised deep to adipose tissue with a #15 scalpel blade.  The skin margins were undermined to an appropriate distance in all directions utilizing iris scissors.
Island Pedicle Flap Text: The defect edges were debeveled with a #15 scalpel blade.  Given the location of the defect, shape of the defect and the proximity to free margins an island pedicle advancement flap was deemed most appropriate.  Using a sterile surgical marker, an appropriate advancement flap was drawn incorporating the defect, outlining the appropriate donor tissue and placing the expected incisions within the relaxed skin tension lines where possible.    The area thus outlined was incised deep to adipose tissue with a #15 scalpel blade.  The skin margins were undermined to an appropriate distance in all directions around the primary defect and laterally outward around the island pedicle utilizing iris scissors.  There was minimal undermining beneath the pedicle flap.
Xenograft Text: The defect edges were debeveled with a #15 scalpel blade.  Given the location of the defect, shape of the defect and the proximity to free margins a xenograft was deemed most appropriate.  The graft was then trimmed to fit the size of the defect.  The graft was then placed in the primary defect and oriented appropriately.
O-Z Plasty Text: The defect edges were debeveled with a #15 scalpel blade.  Given the location of the defect, shape of the defect and the proximity to free margins an O-Z plasty (double transposition flap) was deemed most appropriate.  Using a sterile surgical marker, the appropriate transposition flaps were drawn incorporating the defect and placing the expected incisions within the relaxed skin tension lines where possible.    The area thus outlined was incised deep to adipose tissue with a #15 scalpel blade.  The skin margins were undermined to an appropriate distance in all directions utilizing iris scissors.  Hemostasis was achieved with electrocautery.  The flaps were then transposed into place, one clockwise and the other counterclockwise, and anchored with interrupted buried subcutaneous sutures.
Crescentic Advancement Flap Text: The defect edges were debeveled with a #15 scalpel blade.  Given the location of the defect and the proximity to free margins a crescentic advancement flap was deemed most appropriate.  Using a sterile surgical marker, the appropriate advancement flap was drawn incorporating the defect and placing the expected incisions within the relaxed skin tension lines where possible.    The area thus outlined was incised deep to adipose tissue with a #15 scalpel blade.  The skin margins were undermined to an appropriate distance in all directions utilizing iris scissors.
Detail Level: Detailed
Advancement-Rotation Flap Text: The defect edges were debeveled with a #15 scalpel blade.  Given the location of the defect, shape of the defect and the proximity to free margins an advancement-rotation flap was deemed most appropriate.  Using a sterile surgical marker, an appropriate flap was drawn incorporating the defect and placing the expected incisions within the relaxed skin tension lines where possible. The area thus outlined was incised deep to adipose tissue with a #15 scalpel blade.  The skin margins were undermined to an appropriate distance in all directions utilizing iris scissors.
Complex Repair And Split-Thickness Skin Graft Text: The defect edges were debeveled with a #15 scalpel blade.  The primary defect was closed partially with a complex linear closure.  Given the location of the defect, shape of the defect and the proximity to free margins a split thickness skin graft was deemed most appropriate to repair the remaining defect.  The graft was trimmed to fit the size of the remaining defect.  The graft was then placed in the primary defect, oriented appropriately, and sutured into place.
Spiral Flap Text: The defect edges were debeveled with a #15 scalpel blade.  Given the location of the defect, shape of the defect and the proximity to free margins a spiral flap was deemed most appropriate.  Using a sterile surgical marker, an appropriate rotation flap was drawn incorporating the defect and placing the expected incisions within the relaxed skin tension lines where possible. The area thus outlined was incised deep to adipose tissue with a #15 scalpel blade.  The skin margins were undermined to an appropriate distance in all directions utilizing iris scissors.
Cheek Interpolation Flap Text: A decision was made to reconstruct the defect utilizing an interpolation axial flap and a staged reconstruction.  A telfa template was made of the defect.  This telfa template was then used to outline the Cheek Interpolation flap.  The donor area for the pedicle flap was then injected with anesthesia.  The flap was excised through the skin and subcutaneous tissue down to the layer of the underlying musculature.  The interpolation flap was carefully excised within this deep plane to maintain its blood supply.  The edges of the donor site were undermined.   The donor site was closed in a primary fashion.  The pedicle was then rotated into position and sutured.  Once the tube was sutured into place, adequate blood supply was confirmed with blanching and refill.  The pedicle was then wrapped with xeroform gauze and dressed appropriately with a telfa and gauze bandage to ensure continued blood supply and protect the attached pedicle.
Abbe Flap (Upper To Lower Lip) Text: The defect of the lower lip was assessed and measured.  Given the location and size of the defect, an Abbe flap was deemed most appropriate.  Using a sterile surgical marker, an appropriate Abbe flap was measured and drawn on the upper lip. Local anesthesia was then infiltrated.  A scalpel was then used to incise the upper lip through and through the skin, vermilion, muscle and mucosa, leaving the flap pedicled on the opposite side.  The flap was then rotated and transferred to the lower lip defect.  The flap was then sutured into place with a three layer technique, closing the orbicularis oris muscle layer with subcutaneous buried sutures, followed by a mucosal layer and an epidermal layer.
Adjacent Tissue Transfer Text: The defect edges were debeveled with a #15 scalpel blade.  Given the location of the defect and the proximity to free margins an adjacent tissue transfer was deemed most appropriate.  Using a sterile surgical marker, an appropriate flap was drawn incorporating the defect and placing the expected incisions within the relaxed skin tension lines where possible.    The area thus outlined was incised deep to adipose tissue with a #15 scalpel blade.  The skin margins were undermined to an appropriate distance in all directions utilizing iris scissors.
Number Of Hemigard Strips Per Side: 1
Medical Necessity Clause: This procedure was medically necessary because the lesion that was treated was:
Consent was obtained from the patient. The risks and benefits to therapy were discussed in detail. Specifically, the risks of infection, scarring, bleeding, prolonged wound healing, incomplete removal, allergy to anesthesia, nerve injury and recurrence were addressed. Prior to the procedure, the treatment site was clearly identified and confirmed by the patient. All components of Universal Protocol/PAUSE Rule completed.
Undermining Type: Entire Wound
Saucerization Excision Additional Text (Leave Blank If You Do Not Want): The margin was drawn around the clinically apparent lesion.  Incisions were then made along these lines, in a tangential fashion, to the appropriate tissue plane and the lesion was extirpated.
Keystone Flap Text: The defect edges were debeveled with a #15 scalpel blade.  Given the location of the defect, shape of the defect a keystone flap was deemed most appropriate.  Using a sterile surgical marker, an appropriate keystone flap was drawn incorporating the defect, outlining the appropriate donor tissue and placing the expected incisions within the relaxed skin tension lines where possible. The area thus outlined was incised deep to adipose tissue with a #15 scalpel blade.  The skin margins were undermined to an appropriate distance in all directions around the primary defect and laterally outward around the flap utilizing iris scissors.
Complex Repair And Rhombic Flap Text: The defect edges were debeveled with a #15 scalpel blade.  The primary defect was closed partially with a complex linear closure.  Given the location of the remaining defect, shape of the defect and the proximity to free margins a rhombic flap was deemed most appropriate for complete closure of the defect.  Using a sterile surgical marker, an appropriate advancement flap was drawn incorporating the defect and placing the expected incisions within the relaxed skin tension lines where possible.    The area thus outlined was incised deep to adipose tissue with a #15 scalpel blade.  The skin margins were undermined to an appropriate distance in all directions utilizing iris scissors.
Retention Suture Bite Size: 3 mm
Complex Repair And Modified Advancement Flap Text: The defect edges were debeveled with a #15 scalpel blade.  The primary defect was closed partially with a complex linear closure.  Given the location of the remaining defect, shape of the defect and the proximity to free margins a modified advancement flap was deemed most appropriate for complete closure of the defect.  Using a sterile surgical marker, an appropriate advancement flap was drawn incorporating the defect and placing the expected incisions within the relaxed skin tension lines where possible.    The area thus outlined was incised deep to adipose tissue with a #15 scalpel blade.  The skin margins were undermined to an appropriate distance in all directions utilizing iris scissors.
Posterior Auricular Interpolation Flap Text: A decision was made to reconstruct the defect utilizing an interpolation axial flap and a staged reconstruction.  A telfa template was made of the defect.  This telfa template was then used to outline the posterior auricular interpolation flap.  The donor area for the pedicle flap was then injected with anesthesia.  The flap was excised through the skin and subcutaneous tissue down to the layer of the underlying musculature.  The pedicle flap was carefully excised within this deep plane to maintain its blood supply.  The edges of the donor site were undermined.   The donor site was closed in a primary fashion.  The pedicle was then rotated into position and sutured.  Once the tube was sutured into place, adequate blood supply was confirmed with blanching and refill.  The pedicle was then wrapped with xeroform gauze and dressed appropriately with a telfa and gauze bandage to ensure continued blood supply and protect the attached pedicle.
Hatchet Flap Text: The defect edges were debeveled with a #15 scalpel blade.  Given the location of the defect, shape of the defect and the proximity to free margins a hatchet flap was deemed most appropriate.  Using a sterile surgical marker, an appropriate hatchet flap was drawn incorporating the defect and placing the expected incisions within the relaxed skin tension lines where possible.    The area thus outlined was incised deep to adipose tissue with a #15 scalpel blade.  The skin margins were undermined to an appropriate distance in all directions utilizing iris scissors.
Intermediate / Complex Repair - Final Wound Length In Cm: 3.3
Mustarde Flap Text: The defect edges were debeveled with a #15 scalpel blade.  Given the size, depth and location of the defect and the proximity to free margins a Mustarde flap was deemed most appropriate.  Using a sterile surgical marker, an appropriate flap was drawn incorporating the defect. The area thus outlined was incised with a #15 scalpel blade.  The skin margins were undermined to an appropriate distance in all directions utilizing iris scissors.
Bi-Rhombic Flap Text: The defect edges were debeveled with a #15 scalpel blade.  Given the location of the defect and the proximity to free margins a bi-rhombic flap was deemed most appropriate.  Using a sterile surgical marker, an appropriate rhombic flap was drawn incorporating the defect. The area thus outlined was incised deep to adipose tissue with a #15 scalpel blade.  The skin margins were undermined to an appropriate distance in all directions utilizing iris scissors.
Skin Substitute Text: The defect edges were debeveled with a #15 scalpel blade.  Given the location of the defect, shape of the defect and the proximity to free margins a skin substitute graft was deemed most appropriate.  The graft material was trimmed to fit the size of the defect. The graft was then placed in the primary defect and oriented appropriately.
Trilobed Flap Text: The defect edges were debeveled with a #15 scalpel blade.  Given the location of the defect and the proximity to free margins a trilobed flap was deemed most appropriate.  Using a sterile surgical marker, an appropriate trilobed flap drawn around the defect.    The area thus outlined was incised deep to adipose tissue with a #15 scalpel blade.  The skin margins were undermined to an appropriate distance in all directions utilizing iris scissors.
Post-Care Instructions: I reviewed with the patient in detail post-care instructions. Patient is not to engage in any heavy lifting, exercise, or swimming for the next 14 days. Should the patient develop any fevers, chills, bleeding, severe pain patient will contact the office immediately.
Ftsg Text: The defect edges were debeveled with a #15 scalpel blade.  Given the location of the defect, shape of the defect and the proximity to free margins a full thickness skin graft was deemed most appropriate.  Using a sterile surgical marker, the primary defect shape was transferred to the donor site. The area thus outlined was incised deep to adipose tissue with a #15 scalpel blade.  The harvested graft was then trimmed of adipose tissue until only dermis and epidermis was left.  The skin margins of the secondary defect were undermined to an appropriate distance in all directions utilizing iris scissors.  The secondary defect was closed with interrupted buried subcutaneous sutures.  The skin edges were then re-apposed with running  sutures.  The skin graft was then placed in the primary defect and oriented appropriately.
Complex Repair And Ftsg Text: The defect edges were debeveled with a #15 scalpel blade.  The primary defect was closed partially with a complex linear closure.  Given the location of the defect, shape of the defect and the proximity to free margins a full thickness skin graft was deemed most appropriate to repair the remaining defect.  The graft was trimmed to fit the size of the remaining defect.  The graft was then placed in the primary defect, oriented appropriately, and sutured into place.
Z Plasty Text: The lesion was extirpated to the level of the fat with a #15 scalpel blade.  Given the location of the defect, shape of the defect and the proximity to free margins a Z-plasty was deemed most appropriate for repair.  Using a sterile surgical marker, the appropriate transposition arms of the Z-plasty were drawn incorporating the defect and placing the expected incisions within the relaxed skin tension lines where possible.    The area thus outlined was incised deep to adipose tissue with a #15 scalpel blade.  The skin margins were undermined to an appropriate distance in all directions utilizing iris scissors.  The opposing transposition arms were then transposed into place in opposite direction and anchored with interrupted buried subcutaneous sutures.
Complex Repair And Skin Substitute Graft Text: The defect edges were debeveled with a #15 scalpel blade.  The primary defect was closed partially with a complex linear closure.  Given the location of the remaining defect, shape of the defect and the proximity to free margins a skin substitute graft was deemed most appropriate to repair the remaining defect.  The graft was trimmed to fit the size of the remaining defect.  The graft was then placed in the primary defect, oriented appropriately, and sutured into place.
Double O-Z Flap Text: The defect edges were debeveled with a #15 scalpel blade.  Given the location of the defect, shape of the defect and the proximity to free margins a Double O-Z flap was deemed most appropriate.  Using a sterile surgical marker, an appropriate transposition flap was drawn incorporating the defect and placing the expected incisions within the relaxed skin tension lines where possible. The area thus outlined was incised deep to adipose tissue with a #15 scalpel blade.  The skin margins were undermined to an appropriate distance in all directions utilizing iris scissors.
Repair Performed By Another Provider Text (Leave Blank If You Do Not Want): After the tissue was excised the defect was repaired by another provider.
Length To Time In Minutes Device Was In Place: 10
Fusiform Excision Additional Text (Leave Blank If You Do Not Want): The margin was drawn around the clinically apparent lesion.  A fusiform shape was then drawn on the skin incorporating the lesion and margins.  Incisions were then made along these lines to the appropriate tissue plane and the lesion was extirpated.
Hemigard Postcare Instructions: The HEMIGARD strips are to remain completely dry for at least 5-7 days.
Estimated Blood Loss (Cc): minimal
Complex Repair And M Plasty Text: The defect edges were debeveled with a #15 scalpel blade.  The primary defect was closed partially with a complex linear closure.  Given the location of the remaining defect, shape of the defect and the proximity to free margins an M plasty was deemed most appropriate for complete closure of the defect.  Using a sterile surgical marker, an appropriate advancement flap was drawn incorporating the defect and placing the expected incisions within the relaxed skin tension lines where possible.    The area thus outlined was incised deep to adipose tissue with a #15 scalpel blade.  The skin margins were undermined to an appropriate distance in all directions utilizing iris scissors.
Size Of Lesion In Cm: 1.3
Epidermal Autograft Text: The defect edges were debeveled with a #15 scalpel blade.  Given the location of the defect, shape of the defect and the proximity to free margins an epidermal autograft was deemed most appropriate.  Using a sterile surgical marker, the primary defect shape was transferred to the donor site. The epidermal graft was then harvested.  The skin graft was then placed in the primary defect and oriented appropriately.
Bilobed Flap Text: The defect edges were debeveled with a #15 scalpel blade.  Given the location of the defect and the proximity to free margins a bilobe flap was deemed most appropriate.  Using a sterile surgical marker, an appropriate bilobe flap drawn around the defect.    The area thus outlined was incised deep to adipose tissue with a #15 scalpel blade.  The skin margins were undermined to an appropriate distance in all directions utilizing iris scissors.
Complex Repair And Melolabial Flap Text: The defect edges were debeveled with a #15 scalpel blade.  The primary defect was closed partially with a complex linear closure.  Given the location of the remaining defect, shape of the defect and the proximity to free margins a melolabial flap was deemed most appropriate for complete closure of the defect.  Using a sterile surgical marker, an appropriate advancement flap was drawn incorporating the defect and placing the expected incisions within the relaxed skin tension lines where possible.    The area thus outlined was incised deep to adipose tissue with a #15 scalpel blade.  The skin margins were undermined to an appropriate distance in all directions utilizing iris scissors.
Double Island Pedicle Flap Text: The defect edges were debeveled with a #15 scalpel blade.  Given the location of the defect, shape of the defect and the proximity to free margins a double island pedicle advancement flap was deemed most appropriate.  Using a sterile surgical marker, an appropriate advancement flap was drawn incorporating the defect, outlining the appropriate donor tissue and placing the expected incisions within the relaxed skin tension lines where possible.    The area thus outlined was incised deep to adipose tissue with a #15 scalpel blade.  The skin margins were undermined to an appropriate distance in all directions around the primary defect and laterally outward around the island pedicle utilizing iris scissors.  There was minimal undermining beneath the pedicle flap.
Complex Repair And Single Advancement Flap Text: The defect edges were debeveled with a #15 scalpel blade.  The primary defect was closed partially with a complex linear closure.  Given the location of the remaining defect, shape of the defect and the proximity to free margins a single advancement flap was deemed most appropriate for complete closure of the defect.  Using a sterile surgical marker, an appropriate advancement flap was drawn incorporating the defect and placing the expected incisions within the relaxed skin tension lines where possible.    The area thus outlined was incised deep to adipose tissue with a #15 scalpel blade.  The skin margins were undermined to an appropriate distance in all directions utilizing iris scissors.
Complex Repair And Tissue Cultured Epidermal Autograft Text: The defect edges were debeveled with a #15 scalpel blade.  The primary defect was closed partially with a complex linear closure.  Given the location of the defect, shape of the defect and the proximity to free margins an tissue cultured epidermal autograft was deemed most appropriate to repair the remaining defect.  The graft was trimmed to fit the size of the remaining defect.  The graft was then placed in the primary defect, oriented appropriately, and sutured into place.
Epidermal Closure: simple interrupted
O-L Flap Text: The defect edges were debeveled with a #15 scalpel blade.  Given the location of the defect, shape of the defect and the proximity to free margins an O-L flap was deemed most appropriate.  Using a sterile surgical marker, an appropriate advancement flap was drawn incorporating the defect and placing the expected incisions within the relaxed skin tension lines where possible.    The area thus outlined was incised deep to adipose tissue with a #15 scalpel blade.  The skin margins were undermined to an appropriate distance in all directions utilizing iris scissors.
Mucosal Advancement Flap Text: Given the location of the defect, shape of the defect and the proximity to free margins a mucosal advancement flap was deemed most appropriate. Incisions were made with a 15 blade scalpel in the appropriate fashion along the cutaneous vermillion border and the mucosal lip. The remaining actinically damaged mucosal tissue was excised.  The mucosal advancement flap was then elevated to the gingival sulcus with care taken to preserve the neurovascular structures and advanced into the primary defect. Care was taken to ensure that precise realignment of the vermilion border was achieved.
H Plasty Text: Given the location of the defect, shape of the defect and the proximity to free margins a H-plasty was deemed most appropriate for repair.  Using a sterile surgical marker, the appropriate advancement arms of the H-plasty were drawn incorporating the defect and placing the expected incisions within the relaxed skin tension lines where possible. The area thus outlined was incised deep to adipose tissue with a #15 scalpel blade. The skin margins were undermined to an appropriate distance in all directions utilizing iris scissors.  The opposing advancement arms were then advanced into place in opposite direction and anchored with interrupted buried subcutaneous sutures.
Transposition Flap Text: The defect edges were debeveled with a #15 scalpel blade.  Given the location of the defect and the proximity to free margins a transposition flap was deemed most appropriate.  Using a sterile surgical marker, an appropriate transposition flap was drawn incorporating the defect.    The area thus outlined was incised deep to adipose tissue with a #15 scalpel blade.  The skin margins were undermined to an appropriate distance in all directions utilizing iris scissors.
Melolabial Interpolation Flap Text: A decision was made to reconstruct the defect utilizing an interpolation axial flap and a staged reconstruction.  A telfa template was made of the defect.  This telfa template was then used to outline the melolabial interpolation flap.  The donor area for the pedicle flap was then injected with anesthesia.  The flap was excised through the skin and subcutaneous tissue down to the layer of the underlying musculature.  The pedicle flap was carefully excised within this deep plane to maintain its blood supply.  The edges of the donor site were undermined.   The donor site was closed in a primary fashion.  The pedicle was then rotated into position and sutured.  Once the tube was sutured into place, adequate blood supply was confirmed with blanching and refill.  The pedicle was then wrapped with xeroform gauze and dressed appropriately with a telfa and gauze bandage to ensure continued blood supply and protect the attached pedicle.
Rhombic Flap Text: The defect edges were debeveled with a #15 scalpel blade.  Given the location of the defect and the proximity to free margins a rhombic flap was deemed most appropriate.  Using a sterile surgical marker, an appropriate rhombic flap was drawn incorporating the defect.    The area thus outlined was incised deep to adipose tissue with a #15 scalpel blade.  The skin margins were undermined to an appropriate distance in all directions utilizing iris scissors.
Excision Method: Elliptical
Scalpel Size: 15 blade
Complex Repair And Z Plasty Text: The defect edges were debeveled with a #15 scalpel blade.  The primary defect was closed partially with a complex linear closure.  Given the location of the remaining defect, shape of the defect and the proximity to free margins a Z plasty was deemed most appropriate for complete closure of the defect.  Using a sterile surgical marker, an appropriate advancement flap was drawn incorporating the defect and placing the expected incisions within the relaxed skin tension lines where possible.    The area thus outlined was incised deep to adipose tissue with a #15 scalpel blade.  The skin margins were undermined to an appropriate distance in all directions utilizing iris scissors.
Epidermal Sutures: 4-0 PDS
Burow's Advancement Flap Text: The defect edges were debeveled with a #15 scalpel blade.  Given the location of the defect and the proximity to free margins a Burow's advancement flap was deemed most appropriate.  Using a sterile surgical marker, the appropriate advancement flap was drawn incorporating the defect and placing the expected incisions within the relaxed skin tension lines where possible.    The area thus outlined was incised deep to adipose tissue with a #15 scalpel blade.  The skin margins were undermined to an appropriate distance in all directions utilizing iris scissors.
Dressing: pressure dressing
Perilesional Excision Additional Text (Leave Blank If You Do Not Want): The margin was drawn around the clinically apparent lesion. Incisions were then made along these lines to the appropriate tissue plane and the lesion was extirpated.
Complex Repair And O-L Flap Text: The defect edges were debeveled with a #15 scalpel blade.  The primary defect was closed partially with a complex linear closure.  Given the location of the remaining defect, shape of the defect and the proximity to free margins an O-L flap was deemed most appropriate for complete closure of the defect.  Using a sterile surgical marker, an appropriate flap was drawn incorporating the defect and placing the expected incisions within the relaxed skin tension lines where possible.    The area thus outlined was incised deep to adipose tissue with a #15 scalpel blade.  The skin margins were undermined to an appropriate distance in all directions utilizing iris scissors.
Billing Type: Third-Party Bill
Retention Suture Text: Retention sutures were placed to support the closure and prevent dehiscence.
Hemigard Intro: Due to skin fragility and wound tension, it was decided to use HEMIGARD adhesive retention suture devices to permit a linear closure. The skin was cleaned and dried for a 6cm distance away from the wound. Excessive hair, if present, was removed to allow for adhesion.
Eye Clamp Note Details: An eye clamp was used during the procedure.
Staged Advancement Flap Text: The defect edges were debeveled with a #15 scalpel blade.  Given the location of the defect, shape of the defect and the proximity to free margins a staged advancement flap was deemed most appropriate.  Using a sterile surgical marker, an appropriate advancement flap was drawn incorporating the defect and placing the expected incisions within the relaxed skin tension lines where possible. The area thus outlined was incised deep to adipose tissue with a #15 scalpel blade.  The skin margins were undermined to an appropriate distance in all directions utilizing iris scissors.
Orbicularis Oris Muscle Flap Text: The defect edges were debeveled with a #15 scalpel blade.  Given that the defect affected the competency of the oral sphincter an orbicularis oris muscle flap was deemed most appropriate to restore this competency and normal muscle function.  Using a sterile surgical marker, an appropriate flap was drawn incorporating the defect. The area thus outlined was incised with a #15 scalpel blade.
Nostril Rim Text: The closure involved the nostril rim.
Abbe Flap (Lower To Upper Lip) Text: The defect of the upper lip was assessed and measured.  Given the location and size of the defect, an Abbe flap was deemed most appropriate.  Using a sterile surgical marker, an appropriate Abbe flap was measured and drawn on the lower lip. Local anesthesia was then infiltrated. A scalpel was then used to incise the upper lip through and through the skin, vermilion, muscle and mucosa, leaving the flap pedicled on the opposite side.  The flap was then rotated and transferred to the lower lip defect.  The flap was then sutured into place with a three layer technique, closing the orbicularis oris muscle layer with subcutaneous buried sutures, followed by a mucosal layer and an epidermal layer.
Ear Star Wedge Flap Text: The defect edges were debeveled with a #15 blade scalpel.  Given the location of the defect and the proximity to free margins (helical rim) an ear star wedge flap was deemed most appropriate.  Using a sterile surgical marker, the appropriate flap was drawn incorporating the defect and placing the expected incisions between the helical rim and antihelix where possible.  The area thus outlined was incised through and through with a #15 scalpel blade.
Purse String (Intermediate) Text: Given the location of the defect and the characteristics of the surrounding skin a purse string intermediate closure was deemed most appropriate.  Undermining was performed circumferentially around the surgical defect.  A purse string suture was then placed and tightened.
Island Pedicle Flap With Canthal Suspension Text: The defect edges were debeveled with a #15 scalpel blade.  Given the location of the defect, shape of the defect and the proximity to free margins an island pedicle advancement flap was deemed most appropriate.  Using a sterile surgical marker, an appropriate advancement flap was drawn incorporating the defect, outlining the appropriate donor tissue and placing the expected incisions within the relaxed skin tension lines where possible. The area thus outlined was incised deep to adipose tissue with a #15 scalpel blade.  The skin margins were undermined to an appropriate distance in all directions around the primary defect and laterally outward around the island pedicle utilizing iris scissors.  There was minimal undermining beneath the pedicle flap. A suspension suture was placed in the canthal tendon to prevent tension and prevent ectropion.
Cartilage Graft Text: The defect edges were debeveled with a #15 scalpel blade.  Given the location of the defect, shape of the defect, the fact the defect involved a full thickness cartilage defect a cartilage graft was deemed most appropriate.  An appropriate donor site was identified, cleansed, and anesthetized. The cartilage graft was then harvested and transferred to the recipient site, oriented appropriately and then sutured into place.  The secondary defect was then repaired using a primary closure.
Information: Selecting Yes will display possible errors in your note based on the variables you have selected. This validation is only offered as a suggestion for you. PLEASE NOTE THAT THE VALIDATION TEXT WILL BE REMOVED WHEN YOU FINALIZE YOUR NOTE. IF YOU WANT TO FAX A PRELIMINARY NOTE YOU WILL NEED TO TOGGLE THIS TO 'NO' IF YOU DO NOT WANT IT IN YOUR FAXED NOTE.
Where Do You Want The Question To Include Opioid Counseling Located?: Case Summary Tab
A-T Advancement Flap Text: The defect edges were debeveled with a #15 scalpel blade.  Given the location of the defect, shape of the defect and the proximity to free margins an A-T advancement flap was deemed most appropriate.  Using a sterile surgical marker, an appropriate advancement flap was drawn incorporating the defect and placing the expected incisions within the relaxed skin tension lines where possible.    The area thus outlined was incised deep to adipose tissue with a #15 scalpel blade.  The skin margins were undermined to an appropriate distance in all directions utilizing iris scissors.
Medical Necessity Information: It is in your best interest to select a reason for this procedure from the list below. All of these items fulfill various CMS LCD requirements except lesion extends to a margin.
Double O-Z Plasty Text: The defect edges were debeveled with a #15 scalpel blade.  Given the location of the defect, shape of the defect and the proximity to free margins a Double O-Z plasty (double transposition flap) was deemed most appropriate.  Using a sterile surgical marker, the appropriate transposition flaps were drawn incorporating the defect and placing the expected incisions within the relaxed skin tension lines where possible. The area thus outlined was incised deep to adipose tissue with a #15 scalpel blade.  The skin margins were undermined to an appropriate distance in all directions utilizing iris scissors.  Hemostasis was achieved with electrocautery.  The flaps were then transposed into place, one clockwise and the other counterclockwise, and anchored with interrupted buried subcutaneous sutures.
Complex Repair And Epidermal Autograft Text: The defect edges were debeveled with a #15 scalpel blade.  The primary defect was closed partially with a complex linear closure.  Given the location of the defect, shape of the defect and the proximity to free margins an epidermal autograft was deemed most appropriate to repair the remaining defect.  The graft was trimmed to fit the size of the remaining defect.  The graft was then placed in the primary defect, oriented appropriately, and sutured into place.
Cheek-To-Nose Interpolation Flap Text: A decision was made to reconstruct the defect utilizing an interpolation axial flap and a staged reconstruction.  A telfa template was made of the defect.  This telfa template was then used to outline the Cheek-To-Nose Interpolation flap.  The donor area for the pedicle flap was then injected with anesthesia.  The flap was excised through the skin and subcutaneous tissue down to the layer of the underlying musculature.  The interpolation flap was carefully excised within this deep plane to maintain its blood supply.  The edges of the donor site were undermined.   The donor site was closed in a primary fashion.  The pedicle was then rotated into position and sutured.  Once the tube was sutured into place, adequate blood supply was confirmed with blanching and refill.  The pedicle was then wrapped with xeroform gauze and dressed appropriately with a telfa and gauze bandage to ensure continued blood supply and protect the attached pedicle.
Mercedes Flap Text: The defect edges were debeveled with a #15 scalpel blade.  Given the location of the defect, shape of the defect and the proximity to free margins a Mercedes flap was deemed most appropriate.  Using a sterile surgical marker, an appropriate advancement flap was drawn incorporating the defect and placing the expected incisions within the relaxed skin tension lines where possible. The area thus outlined was incised deep to adipose tissue with a #15 scalpel blade.  The skin margins were undermined to an appropriate distance in all directions utilizing iris scissors.
Additional Anesthesia Volume In Cc: 3
Advancement Flap (Single) Text: The defect edges were debeveled with a #15 scalpel blade.  Given the location of the defect and the proximity to free margins a single advancement flap was deemed most appropriate.  Using a sterile surgical marker, an appropriate advancement flap was drawn incorporating the defect and placing the expected incisions within the relaxed skin tension lines where possible.    The area thus outlined was incised deep to adipose tissue with a #15 scalpel blade.  The skin margins were undermined to an appropriate distance in all directions utilizing iris scissors.
Slit Excision Additional Text (Leave Blank If You Do Not Want): A linear line was drawn on the skin overlying the lesion. An incision was made slowly until the lesion was visualized.  Once visualized, the lesion was removed with blunt dissection.

## 2022-10-06 ENCOUNTER — APPOINTMENT (RX ONLY)
Dept: URBAN - METROPOLITAN AREA CLINIC 23 | Facility: CLINIC | Age: 52
Setting detail: DERMATOLOGY
End: 2022-10-06

## 2022-10-06 DIAGNOSIS — Z48.01 ENCOUNTER FOR CHANGE OR REMOVAL OF SURGICAL WOUND DRESSING: ICD-10-CM

## 2022-10-06 PROCEDURE — 99024 POSTOP FOLLOW-UP VISIT: CPT

## 2022-10-06 PROCEDURE — ? SUTURE REMOVAL (GLOBAL PERIOD)

## 2022-10-06 ASSESSMENT — LOCATION ZONE DERM: LOCATION ZONE: TRUNK

## 2022-10-06 ASSESSMENT — LOCATION DETAILED DESCRIPTION DERM: LOCATION DETAILED: LEFT INFERIOR MEDIAL MIDBACK

## 2022-10-06 ASSESSMENT — LOCATION SIMPLE DESCRIPTION DERM: LOCATION SIMPLE: LEFT LOWER BACK

## 2022-10-06 NOTE — PROCEDURE: SUTURE REMOVAL (GLOBAL PERIOD)
Add 16428 Cpt? (Important Note: In 2017 The Use Of 22491 Is Being Tracked By Cms To Determine Future Global Period Reimbursement For Global Periods): no
Detail Level: Detailed

## 2022-11-22 NOTE — PROGRESS NOTES
Eliana Bowles  : 1970  Primary: [facility]*  Secondary:  [facility], [address] \[facility]\"", [address]  [phone]   MARIA [phone]      OUTPATIENT PHYSICAL THERAPY: Progress Note 2019  Visit Count:  Visit 5    MEDICAL/REFERRING DIAGNOS work. IS:  Overactive bladder [N32.81]   REFERRING PHYSICIAN: Braeden Thrasher MD  Pre-treatment Symptoms/Complaints:Patient reports improvement in leakage overall, no leakage over the past week besides one instance when she waited approximately 5 hours to void after work. She was able to utilize suppression techniques with only minimal leakage (when she usually has full loss of bladder control. \" Overall significant improvement. Does admit less stress last week due to being on vacation. Pain: Initial: Pain Intensity 1: 0 0/10 Post Session:  0/10   Medications Last Reviewed:  19    Updated Objective Findings:  none   TREATMENT:   THERAPEUTIC ACTIVITY: (  minutes): Instruction on functional pelvic brace exercise including feedforward activation of pelvic floor muscles on exhale prior to activity that increases intra-abdominal pressure (IAP) such as cough, sneeze, laugh, sit to stand, lifting object to decrease pressure on pelvic organs during exertional activities. Instruction on coordinated pelvic floor and diaphragmatic breathing to improve kinesthetic awareness of pelvic muscle mobility and restore proper motor recruitment patterns with breathing, posture, and functional movement (e.g. appropriate lift/contraction with increased IAP such as a cough, laugh, sneeze to prevent incontinence). Functional training on progressive relaxation training to deregulate nervous system and improve urgency when experiencing stressful activities. THERAPEUTIC EXERCISE: (13 minutes):  Exercises per grid below to improve strength and coordination.   Required moderate visual, verbal, manual and tactile cues to promote proper body alignment and promote proper body posture well as to locate correct muscles without substitutions of rectus abdominis or gluteal muscles. Progressed complexity of movement as indicated. Required cocontraction of transfer abdominal mm to improve pelvic floor muscle contraction. Good progress in recruitment noted throughout session and patient able to determine when she has lost contraction and correct. Date:  9/26/19 Date:  11/7/19 Date:  11/26/19       Activity/Exercise Parameters         Supine endurance kegels 4x10 with 10 sec hold with focus on breathing 2 x 10 with 10 second hold with focus on breathing        Supine quick flick Kegels [id number] without substitution of TrA contraction         Diaphragmatic breathing          tranverse abdominal contractions  2 x 10 with focus on breathing        Seated endurance kegels          Seated quick flicks          Supine bridging with contraction per bridge          Supine bridging with endurance hold contraction PFM and trA  x10        Supine clam shells with contraction per bridge          Standing heel raise with PFM and trA contraction          Sit to stand with PFM contraction          Standing marching with PFM contraction          Lifting weighted box with PFM and TrA contraction          Lifting weight box with PFM and TrA endurance hold          Chops (abdominal rotation exercise) with PFM and TrA contraction          Wall sits          Monster walk          Hip 3 way           Standing row          Modified prudencio stretch          Supine heel slides and PFM/TrA contraction  x10        Sit to stand with PFM   x15       Seated marching with PFM hold   10 sets of 6 reps       deadlift (no weight)   x10 with PFM contraction                             Manual Therapy: ( 40 minutes): Soft tissue mobilization was utilized and necessary because of the patient's painful spasm and restricted motion of soft tissue.  Trigger point release and rolling performed to B adductor muscles and abdominal muscles with improvement noted in tenderness. Trigger point release in B hip flexors. Connective tissue mobilization/scar mobilization performed in abdominal region as well as rolling to improve abdominal muscle contractions and facilitate PFM contraction. Instrument assisted soft tissue mobilization with improvement noted in muscle flexibility and motion    (Used abbreviations: MET - muscle energy technique; SCS- Strain counter strain; CTM- Connective tissue mobilizations; CR- Contract/relax; SP- Sustained pressure, TrP- Trigger point release, IASTM- Instrument assisted soft tissue mobilizations, TDN- Trigger point dry needling). [employer]  The following educational topics were reviewed with patient:  Urge suppression techniques reiterated as patient with difficulty following them with increments of 5-10 minutes of suppression initially, increasing to 2-3 hours eventually. Distraction techniques including podcasts to improve urgency while in the car. Treatment/Session Summary:    · Response to Treatment:  Patient verbalized understanding of all treatment. No adverse response. · Communication/Consultation:  None today  · Equipment provided today:  None today  · Recommendations/Intent for next treatment session: Next visit will focus on continued education on urge deferrment, progression of Kegel exercises to more functional activities and abdominal bracing as well as relaxation techniques to improve down regulation of nervous system.   Total Treatment Billable Duration:  53 minutes  PT Patient Time In/Time Out  Time In: [id number]  Time Out: 300 [address] [place] [address], PT, DPT    Future Appointments   Date Time Provider Tramaine Morgan   12/10/2019  1:45 PM Natacha Desir, PT, DPT [facility]   12/17/2019  1:45 PM Radha Wu, PT, DPT [facility]   5/7/2020  9:00 AM Kendy Calvin MD [facility] show

## 2022-12-20 ENCOUNTER — TELEPHONE (OUTPATIENT)
Dept: FAMILY MEDICINE CLINIC | Facility: CLINIC | Age: 52
End: 2022-12-20

## 2022-12-20 NOTE — TELEPHONE ENCOUNTER
[employer] statement request. This company has been calling more several times a day and everyday asking about this form, please have this taken care of as soon as available Thanks

## 2023-01-12 ENCOUNTER — APPOINTMENT (RX ONLY)
Dept: URBAN - METROPOLITAN AREA CLINIC 23 | Facility: CLINIC | Age: 53
Setting detail: DERMATOLOGY
End: 2023-01-12

## 2023-01-12 DIAGNOSIS — Z87.2 PERSONAL HISTORY OF DISEASES OF THE SKIN AND SUBCUTANEOUS TISSUE: ICD-10-CM

## 2023-01-12 DIAGNOSIS — D22 MELANOCYTIC NEVI: ICD-10-CM

## 2023-01-12 DIAGNOSIS — L82.0 INFLAMED SEBORRHEIC KERATOSIS: ICD-10-CM

## 2023-01-12 DIAGNOSIS — Z71.89 OTHER SPECIFIED COUNSELING: ICD-10-CM

## 2023-01-12 DIAGNOSIS — L82.1 OTHER SEBORRHEIC KERATOSIS: ICD-10-CM

## 2023-01-12 DIAGNOSIS — D18.0 HEMANGIOMA: ICD-10-CM

## 2023-01-12 DIAGNOSIS — Z85.828 PERSONAL HISTORY OF OTHER MALIGNANT NEOPLASM OF SKIN: ICD-10-CM

## 2023-01-12 PROBLEM — D22.61 MELANOCYTIC NEVI OF RIGHT UPPER LIMB, INCLUDING SHOULDER: Status: ACTIVE | Noted: 2023-01-12

## 2023-01-12 PROBLEM — D22.62 MELANOCYTIC NEVI OF LEFT UPPER LIMB, INCLUDING SHOULDER: Status: ACTIVE | Noted: 2023-01-12

## 2023-01-12 PROBLEM — D22.5 MELANOCYTIC NEVI OF TRUNK: Status: ACTIVE | Noted: 2023-01-12

## 2023-01-12 PROBLEM — D22.71 MELANOCYTIC NEVI OF RIGHT LOWER LIMB, INCLUDING HIP: Status: ACTIVE | Noted: 2023-01-12

## 2023-01-12 PROBLEM — D18.01 HEMANGIOMA OF SKIN AND SUBCUTANEOUS TISSUE: Status: ACTIVE | Noted: 2023-01-12

## 2023-01-12 PROBLEM — D22.72 MELANOCYTIC NEVI OF LEFT LOWER LIMB, INCLUDING HIP: Status: ACTIVE | Noted: 2023-01-12

## 2023-01-12 PROCEDURE — ? COUNSELING

## 2023-01-12 PROCEDURE — 11300 SHAVE SKIN LESION 0.5 CM/<: CPT | Mod: 59

## 2023-01-12 PROCEDURE — ? LIQUID NITROGEN

## 2023-01-12 PROCEDURE — 17110 DESTRUCTION B9 LES UP TO 14: CPT

## 2023-01-12 PROCEDURE — 99213 OFFICE O/P EST LOW 20 MIN: CPT | Mod: 25

## 2023-01-12 PROCEDURE — ? SHAVE REMOVAL

## 2023-01-12 ASSESSMENT — LOCATION DETAILED DESCRIPTION DERM
LOCATION DETAILED: RIGHT DISTAL POSTERIOR UPPER ARM
LOCATION DETAILED: LEFT ANTERIOR DISTAL UPPER ARM
LOCATION DETAILED: RIGHT POPLITEAL SKIN
LOCATION DETAILED: RIGHT SUPERIOR UPPER BACK
LOCATION DETAILED: MIDDLE STERNUM
LOCATION DETAILED: LEFT DISTAL POSTERIOR UPPER ARM
LOCATION DETAILED: RIGHT DISTAL DORSAL FOREARM
LOCATION DETAILED: RIGHT INFERIOR LATERAL MALAR CHEEK
LOCATION DETAILED: LEFT ANTERIOR DISTAL THIGH
LOCATION DETAILED: LEFT PROXIMAL POSTERIOR UPPER ARM
LOCATION DETAILED: LEFT DISTAL DORSAL FOREARM
LOCATION DETAILED: RIGHT PROXIMAL PRETIBIAL REGION
LOCATION DETAILED: RIGHT LATERAL ABDOMEN
LOCATION DETAILED: PERIUMBILICAL SKIN
LOCATION DETAILED: LEFT PROXIMAL PRETIBIAL REGION
LOCATION DETAILED: RIGHT MEDIAL UPPER BACK
LOCATION DETAILED: RIGHT DISTAL PRETIBIAL REGION
LOCATION DETAILED: LEFT LATERAL PROXIMAL UPPER ARM
LOCATION DETAILED: LEFT INFERIOR MEDIAL UPPER BACK
LOCATION DETAILED: RIGHT ULNAR DORSAL HAND
LOCATION DETAILED: RIGHT PROXIMAL POSTERIOR THIGH
LOCATION DETAILED: LEFT RADIAL DORSAL HAND

## 2023-01-12 ASSESSMENT — LOCATION SIMPLE DESCRIPTION DERM
LOCATION SIMPLE: RIGHT FOREARM
LOCATION SIMPLE: RIGHT UPPER BACK
LOCATION SIMPLE: RIGHT POPLITEAL SKIN
LOCATION SIMPLE: RIGHT POSTERIOR THIGH
LOCATION SIMPLE: RIGHT PRETIBIAL REGION
LOCATION SIMPLE: LEFT UPPER ARM
LOCATION SIMPLE: RIGHT CHEEK
LOCATION SIMPLE: RIGHT POSTERIOR UPPER ARM
LOCATION SIMPLE: LEFT THIGH
LOCATION SIMPLE: LEFT POSTERIOR UPPER ARM
LOCATION SIMPLE: LEFT FOREARM
LOCATION SIMPLE: RIGHT HAND
LOCATION SIMPLE: LEFT PRETIBIAL REGION
LOCATION SIMPLE: LEFT UPPER BACK
LOCATION SIMPLE: LEFT HAND
LOCATION SIMPLE: CHEST
LOCATION SIMPLE: ABDOMEN

## 2023-01-12 ASSESSMENT — LOCATION ZONE DERM
LOCATION ZONE: ARM
LOCATION ZONE: TRUNK
LOCATION ZONE: LEG
LOCATION ZONE: FACE
LOCATION ZONE: HAND

## 2023-01-12 NOTE — PROCEDURE: SHAVE REMOVAL
Medical Necessity Information: It is in your best interest to select a reason for this procedure from the list below. All of these items fulfill various CMS LCD requirements except the new and changing color options.
Medical Necessity Clause: This procedure was medically necessary because the lesion that was treated was:
Lab: [id number]
Lab Facility: [id number]
Detail Level: Detailed
Was A Bandage Applied: Yes
Size Of Lesion In Cm (Required): 0.3
X Size Of Lesion In Cm (Optional): 0
Depth Of Shave: dermis
Biopsy Method: Dermablade
Anesthesia Type: 1% lidocaine without epinephrine
Hemostasis: Drysol
Wound Care: Petrolatum
Render Path Notes In Note?: No
Consent was obtained from the patient. The risks and benefits to therapy were discussed in detail. Specifically, the risks of infection, scarring, bleeding, prolonged wound healing, incomplete removal, allergy to anesthesia, nerve injury and recurrence were addressed. Prior to the procedure, the treatment site was clearly identified and confirmed by the patient. All components of Universal Protocol/PAUSE Rule completed.
Post-Care Instructions: I reviewed with the patient in detail post-care instructions. Patient is to keep the biopsy site dry overnight, and then apply petrolatum twice daily until healed.
Notification Instructions: Patient will be notified of pathology results. However, patient instructed to call the office if not contacted within 2 weeks.
Billing Type: Third-Party Bill

## 2023-01-12 NOTE — PROCEDURE: LIQUID NITROGEN
Spray Paint Technique: No
Consent: The patient's consent was obtained including but not limited to risks of crusting, scabbing, blistering, scarring, darker or lighter pigmentary change, recurrence, incomplete removal and infection.
Detail Level: Detailed
Medical Necessity Information: It is in your best interest to select a reason for this procedure from the list below. All of these items fulfill various CMS LCD requirements except the new and changing color options.
Post-Care Instructions: I reviewed with the patient in detail post-care instructions. Patient is to wear sunprotection, and avoid picking at any of the treated lesions. Pt may apply Vaseline to crusted or scabbing areas.
Show Topical Anesthesia Variable?: Yes
Medical Necessity Clause: This procedure was medically necessary because the lesions that were treated were:
Spray Paint Text: The liquid nitrogen was applied to the skin utilizing a spray paint frosting technique.

## 2023-02-02 DIAGNOSIS — J01.90 SUBACUTE SINUSITIS, UNSPECIFIED LOCATION: Primary | ICD-10-CM

## 2023-02-02 RX ORDER — AMOXICILLIN AND CLAVULANATE POTASSIUM 875; 125 MG/1; MG/1
1 TABLET, FILM COATED ORAL 2 TIMES DAILY
Qty: 20 TABLET | Refills: 0 | Status: SHIPPED | OUTPATIENT
Start: 2023-02-02 | End: 2023-02-12

## 2023-02-06 ENCOUNTER — APPOINTMENT (RX ONLY)
Dept: URBAN - METROPOLITAN AREA CLINIC 23 | Facility: CLINIC | Age: 53
Setting detail: DERMATOLOGY
End: 2023-02-06

## 2023-02-06 DIAGNOSIS — D22 MELANOCYTIC NEVI: ICD-10-CM

## 2023-02-06 PROBLEM — D22.62 MELANOCYTIC NEVI OF LEFT UPPER LIMB, INCLUDING SHOULDER: Status: ACTIVE | Noted: 2023-02-06

## 2023-02-06 PROCEDURE — ? EXCISION

## 2023-02-06 PROCEDURE — 11401 EXC TR-EXT B9+MARG 0.6-1 CM: CPT

## 2023-02-06 PROCEDURE — 12032 INTMD RPR S/A/T/EXT 2.6-7.5: CPT

## 2023-02-06 ASSESSMENT — LOCATION ZONE DERM: LOCATION ZONE: ARM

## 2023-02-06 ASSESSMENT — LOCATION DETAILED DESCRIPTION DERM: LOCATION DETAILED: LEFT LATERAL PROXIMAL UPPER ARM

## 2023-02-06 ASSESSMENT — LOCATION SIMPLE DESCRIPTION DERM: LOCATION SIMPLE: LEFT UPPER ARM

## 2023-02-06 NOTE — PROCEDURE: EXCISION
Medical Necessity Information: It is in your best interest to select a reason for this procedure from the list below. All of these items fulfill various CMS LCD requirements except lesion extends to a margin.
Include Z78.9 (Other Specified Conditions Influencing Health Status) As An Associated Diagnosis?: No
Medical Necessity Clause: This procedure was medically necessary because the lesion that was treated was:
Lab: [id number]
Lab Facility: [id number]
Size Of Lesion In Cm: 0.4
X Size Of Lesion In Cm (Optional): 0
Size Of Margin In Cm: 0.3
Eye Clamp Note Details: An eye clamp was used during the procedure.
Excision Method: Fusiform
Saucerization Depth: dermis and superficial adipose tissue
Repair Type: Intermediate
Intermediate / Complex Repair - Final Wound Length In Cm: 3.1
Suturegard Retention Suture: 2-0 Nylon
Retention Suture Bite Size: 3 mm
Length To Time In Minutes Device Was In Place: 10
Number Of Hemigard Strips Per Side: 1
Undermining Type: Entire Wound
Debridement Text: The wound edges were debrided prior to proceeding with the closure to facilitate wound healing.
Helical Rim Text: The closure involved the helical rim.
Vermilion Border Text: The closure involved the vermilion border.
Nostril Rim Text: The closure involved the nostril rim.
Retention Suture Text: Retention sutures were placed to support the closure and prevent dehiscence.
Suture Removal: 14 days
Epidermal Closure Graft Donor Site (Optional): simple interrupted
Graft Donor Site Bandage (Optional-Leave Blank If You Don't Want In Note): Steri-strips and a pressure bandage were applied to the donor site.
Detail Level: Detailed
Excision Depth: adipose tissue
Scalpel Size: 15 blade
Anesthesia Type: 1% lidocaine with epinephrine and a 1:10 solution of 8.4% sodium bicarbonate
Additional Anesthesia Volume In Cc: 6
Hemostasis: Electrocautery
Estimated Blood Loss (Cc): minimal
Repair Anesthesia Method: local infiltration
Deep Sutures: 4-0 Vicryl
Epidermal Sutures: 4-0 Prolene
Epidermal Closure: running
Wound Care: Petrolatum
Dressing: waterproof dressing
Suturegard Intro: Intraoperative tissue expansion was performed, utilizing the SUTUREGARD device, in order to reduce wound tension.
Suturegard Body: The suture ends were repeatedly re-tightened and re-clamped to achieve the desired tissue expansion.
Hemigard Intro: Due to skin fragility and wound tension, it was decided to use HEMIGARD adhesive retention suture devices to permit a linear closure. The skin was cleaned and dried for a 6cm distance away from the wound. Excessive hair, if present, was removed to allow for adhesion.
Hemigard Postcare Instructions: The HEMIGARD strips are to remain completely dry for at least 5-7 days.
Positioning (Leave Blank If You Do Not Want): The patient was placed in a comfortable position exposing the surgical site.
Complex Repair Preamble Text (Leave Blank If You Do Not Want): Extensive wide undermining was performed.
Intermediate Repair Preamble Text (Leave Blank If You Do Not Want): Undermining was performed with blunt dissection.
Fusiform Excision Additional Text (Leave Blank If You Do Not Want): The margin was drawn around the clinically apparent lesion.  A fusiform shape was then drawn on the skin incorporating the lesion and margins.  Incisions were then made along these lines to the appropriate tissue plane and the lesion was extirpated.
Eliptical Excision Additional Text (Leave Blank If You Do Not Want): The margin was drawn around the clinically apparent lesion.  An elliptical shape was then drawn on the skin incorporating the lesion and margins.  Incisions were then made along these lines to the appropriate tissue plane and the lesion was extirpated.
Saucerization Excision Additional Text (Leave Blank If You Do Not Want): The margin was drawn around the clinically apparent lesion.  Incisions were then made along these lines, in a tangential fashion, to the appropriate tissue plane and the lesion was extirpated.
Slit Excision Additional Text (Leave Blank If You Do Not Want): A linear line was drawn on the skin overlying the lesion. An incision was made slowly until the lesion was visualized.  Once visualized, the lesion was removed with blunt dissection.
Excisional Biopsy Additional Text (Leave Blank If You Do Not Want): The margin was drawn around the clinically apparent lesion. An elliptical shape was then drawn on the skin incorporating the lesion and margins.  Incisions were then made along these lines to the appropriate tissue plane and the lesion was extirpated.
Perilesional Excision Additional Text (Leave Blank If You Do Not Want): The margin was drawn around the clinically apparent lesion. Incisions were then made along these lines to the appropriate tissue plane and the lesion was extirpated.
Repair Performed By Another Provider Text (Leave Blank If You Do Not Want): After the tissue was excised the defect was repaired by another provider.
No Repair - Repaired With Adjacent Surgical Defect Text (Leave Blank If You Do Not Want): After the excision the defect was repaired concurrently with another surgical defect which was in close approximation.
Adjacent Tissue Transfer Text: The defect edges were debeveled with a #15 scalpel blade.  Given the location of the defect and the proximity to free margins an adjacent tissue transfer was deemed most appropriate.  Using a sterile surgical marker, an appropriate flap was drawn incorporating the defect and placing the expected incisions within the relaxed skin tension lines where possible.    The area thus outlined was incised deep to adipose tissue with a #15 scalpel blade.  The skin margins were undermined to an appropriate distance in all directions utilizing iris scissors.
Advancement Flap (Single) Text: The defect edges were debeveled with a #15 scalpel blade.  Given the location of the defect and the proximity to free margins a single advancement flap was deemed most appropriate.  Using a sterile surgical marker, an appropriate advancement flap was drawn incorporating the defect and placing the expected incisions within the relaxed skin tension lines where possible.    The area thus outlined was incised deep to adipose tissue with a #15 scalpel blade.  The skin margins were undermined to an appropriate distance in all directions utilizing iris scissors.
Advancement Flap (Double) Text: The defect edges were debeveled with a #15 scalpel blade.  Given the location of the defect and the proximity to free margins a double advancement flap was deemed most appropriate.  Using a sterile surgical marker, the appropriate advancement flaps were drawn incorporating the defect and placing the expected incisions within the relaxed skin tension lines where possible.    The area thus outlined was incised deep to adipose tissue with a #15 scalpel blade.  The skin margins were undermined to an appropriate distance in all directions utilizing iris scissors.
Burow's Advancement Flap Text: The defect edges were debeveled with a #15 scalpel blade.  Given the location of the defect and the proximity to free margins a Burow's advancement flap was deemed most appropriate.  Using a sterile surgical marker, the appropriate advancement flap was drawn incorporating the defect and placing the expected incisions within the relaxed skin tension lines where possible.    The area thus outlined was incised deep to adipose tissue with a #15 scalpel blade.  The skin margins were undermined to an appropriate distance in all directions utilizing iris scissors.
Chonodrocutaneous Helical Advancement Flap Text: The defect edges were debeveled with a #15 scalpel blade.  Given the location of the defect and the proximity to free margins a chondrocutaneous helical advancement flap was deemed most appropriate.  Using a sterile surgical marker, the appropriate advancement flap was drawn incorporating the defect and placing the expected incisions within the relaxed skin tension lines where possible.    The area thus outlined was incised deep to adipose tissue with a #15 scalpel blade.  The skin margins were undermined to an appropriate distance in all directions utilizing iris scissors.
Crescentic Advancement Flap Text: The defect edges were debeveled with a #15 scalpel blade.  Given the location of the defect and the proximity to free margins a crescentic advancement flap was deemed most appropriate.  Using a sterile surgical marker, the appropriate advancement flap was drawn incorporating the defect and placing the expected incisions within the relaxed skin tension lines where possible.    The area thus outlined was incised deep to adipose tissue with a #15 scalpel blade.  The skin margins were undermined to an appropriate distance in all directions utilizing iris scissors.
A-T Advancement Flap Text: The defect edges were debeveled with a #15 scalpel blade.  Given the location of the defect, shape of the defect and the proximity to free margins an A-T advancement flap was deemed most appropriate.  Using a sterile surgical marker, an appropriate advancement flap was drawn incorporating the defect and placing the expected incisions within the relaxed skin tension lines where possible.    The area thus outlined was incised deep to adipose tissue with a #15 scalpel blade.  The skin margins were undermined to an appropriate distance in all directions utilizing iris scissors.
O-T Advancement Flap Text: The defect edges were debeveled with a #15 scalpel blade.  Given the location of the defect, shape of the defect and the proximity to free margins an O-T advancement flap was deemed most appropriate.  Using a sterile surgical marker, an appropriate advancement flap was drawn incorporating the defect and placing the expected incisions within the relaxed skin tension lines where possible.    The area thus outlined was incised deep to adipose tissue with a #15 scalpel blade.  The skin margins were undermined to an appropriate distance in all directions utilizing iris scissors.
O-L Flap Text: The defect edges were debeveled with a #15 scalpel blade.  Given the location of the defect, shape of the defect and the proximity to free margins an O-L flap was deemed most appropriate.  Using a sterile surgical marker, an appropriate advancement flap was drawn incorporating the defect and placing the expected incisions within the relaxed skin tension lines where possible.    The area thus outlined was incised deep to adipose tissue with a #15 scalpel blade.  The skin margins were undermined to an appropriate distance in all directions utilizing iris scissors.
O-Z Flap Text: The defect edges were debeveled with a #15 scalpel blade.  Given the location of the defect, shape of the defect and the proximity to free margins an O-Z flap was deemed most appropriate.  Using a sterile surgical marker, an appropriate transposition flap was drawn incorporating the defect and placing the expected incisions within the relaxed skin tension lines where possible. The area thus outlined was incised deep to adipose tissue with a #15 scalpel blade.  The skin margins were undermined to an appropriate distance in all directions utilizing iris scissors.
Double O-Z Flap Text: The defect edges were debeveled with a #15 scalpel blade.  Given the location of the defect, shape of the defect and the proximity to free margins a Double O-Z flap was deemed most appropriate.  Using a sterile surgical marker, an appropriate transposition flap was drawn incorporating the defect and placing the expected incisions within the relaxed skin tension lines where possible. The area thus outlined was incised deep to adipose tissue with a #15 scalpel blade.  The skin margins were undermined to an appropriate distance in all directions utilizing iris scissors.
V-Y Flap Text: The defect edges were debeveled with a #15 scalpel blade.  Given the location of the defect, shape of the defect and the proximity to free margins a V-Y flap was deemed most appropriate.  Using a sterile surgical marker, an appropriate advancement flap was drawn incorporating the defect and placing the expected incisions within the relaxed skin tension lines where possible.    The area thus outlined was incised deep to adipose tissue with a #15 scalpel blade.  The skin margins were undermined to an appropriate distance in all directions utilizing iris scissors.
Advancement-Rotation Flap Text: The defect edges were debeveled with a #15 scalpel blade.  Given the location of the defect, shape of the defect and the proximity to free margins an advancement-rotation flap was deemed most appropriate.  Using a sterile surgical marker, an appropriate flap was drawn incorporating the defect and placing the expected incisions within the relaxed skin tension lines where possible. The area thus outlined was incised deep to adipose tissue with a #15 scalpel blade.  The skin margins were undermined to an appropriate distance in all directions utilizing iris scissors.
Mercedes Flap Text: The defect edges were debeveled with a #15 scalpel blade.  Given the location of the defect, shape of the defect and the proximity to free margins a Mercedes flap was deemed most appropriate.  Using a sterile surgical marker, an appropriate advancement flap was drawn incorporating the defect and placing the expected incisions within the relaxed skin tension lines where possible. The area thus outlined was incised deep to adipose tissue with a #15 scalpel blade.  The skin margins were undermined to an appropriate distance in all directions utilizing iris scissors.
Modified Advancement Flap Text: The defect edges were debeveled with a #15 scalpel blade.  Given the location of the defect, shape of the defect and the proximity to free margins a modified advancement flap was deemed most appropriate.  Using a sterile surgical marker, an appropriate advancement flap was drawn incorporating the defect and placing the expected incisions within the relaxed skin tension lines where possible.    The area thus outlined was incised deep to adipose tissue with a #15 scalpel blade.  The skin margins were undermined to an appropriate distance in all directions utilizing iris scissors.
Mucosal Advancement Flap Text: Given the location of the defect, shape of the defect and the proximity to free margins a mucosal advancement flap was deemed most appropriate. Incisions were made with a 15 blade scalpel in the appropriate fashion along the cutaneous vermilion border and the mucosal lip. The remaining actinically damaged mucosal tissue was excised.  The mucosal advancement flap was then elevated to the gingival sulcus with care taken to preserve the neurovascular structures and advanced into the primary defect. Care was taken to ensure that precise realignment of the vermilion border was achieved.
Peng Advancement Flap Text: The defect edges were debeveled with a #15 scalpel blade.  Given the location of the defect, shape of the defect and the proximity to free margins a Peng advancement flap was deemed most appropriate.  Using a sterile surgical marker, an appropriate advancement flap was drawn incorporating the defect and placing the expected incisions within the relaxed skin tension lines where possible. The area thus outlined was incised deep to adipose tissue with a #15 scalpel blade.  The skin margins were undermined to an appropriate distance in all directions utilizing iris scissors.
Hatchet Flap Text: The defect edges were debeveled with a #15 scalpel blade.  Given the location of the defect, shape of the defect and the proximity to free margins a hatchet flap was deemed most appropriate.  Using a sterile surgical marker, an appropriate hatchet flap was drawn incorporating the defect and placing the expected incisions within the relaxed skin tension lines where possible.    The area thus outlined was incised deep to adipose tissue with a #15 scalpel blade.  The skin margins were undermined to an appropriate distance in all directions utilizing iris scissors.
Rotation Flap Text: The defect edges were debeveled with a #15 scalpel blade.  Given the location of the defect, shape of the defect and the proximity to free margins a rotation flap was deemed most appropriate.  Using a sterile surgical marker, an appropriate rotation flap was drawn incorporating the defect and placing the expected incisions within the relaxed skin tension lines where possible.    The area thus outlined was incised deep to adipose tissue with a #15 scalpel blade.  The skin margins were undermined to an appropriate distance in all directions utilizing iris scissors.
Spiral Flap Text: The defect edges were debeveled with a #15 scalpel blade.  Given the location of the defect, shape of the defect and the proximity to free margins a spiral flap was deemed most appropriate.  Using a sterile surgical marker, an appropriate rotation flap was drawn incorporating the defect and placing the expected incisions within the relaxed skin tension lines where possible. The area thus outlined was incised deep to adipose tissue with a #15 scalpel blade.  The skin margins were undermined to an appropriate distance in all directions utilizing iris scissors.
Staged Advancement Flap Text: The defect edges were debeveled with a #15 scalpel blade.  Given the location of the defect, shape of the defect and the proximity to free margins a staged advancement flap was deemed most appropriate.  Using a sterile surgical marker, an appropriate advancement flap was drawn incorporating the defect and placing the expected incisions within the relaxed skin tension lines where possible. The area thus outlined was incised deep to adipose tissue with a #15 scalpel blade.  The skin margins were undermined to an appropriate distance in all directions utilizing iris scissors.
Star Wedge Flap Text: The defect edges were debeveled with a #15 scalpel blade.  Given the location of the defect, shape of the defect and the proximity to free margins a star wedge flap was deemed most appropriate.  Using a sterile surgical marker, an appropriate rotation flap was drawn incorporating the defect and placing the expected incisions within the relaxed skin tension lines where possible. The area thus outlined was incised deep to adipose tissue with a #15 scalpel blade.  The skin margins were undermined to an appropriate distance in all directions utilizing iris scissors.
Transposition Flap Text: The defect edges were debeveled with a #15 scalpel blade.  Given the location of the defect and the proximity to free margins a transposition flap was deemed most appropriate.  Using a sterile surgical marker, an appropriate transposition flap was drawn incorporating the defect.    The area thus outlined was incised deep to adipose tissue with a #15 scalpel blade.  The skin margins were undermined to an appropriate distance in all directions utilizing iris scissors.
Muscle Hinge Flap Text: The defect edges were debeveled with a #15 scalpel blade.  Given the size, depth and location of the defect and the proximity to free margins a muscle hinge flap was deemed most appropriate.  Using a sterile surgical marker, an appropriate hinge flap was drawn incorporating the defect. The area thus outlined was incised with a #15 scalpel blade.  The skin margins were undermined to an appropriate distance in all directions utilizing iris scissors.
Mustarde Flap Text: The defect edges were debeveled with a #15 scalpel blade.  Given the size, depth and location of the defect and the proximity to free margins a Mustarde flap was deemed most appropriate.  Using a sterile surgical marker, an appropriate flap was drawn incorporating the defect. The area thus outlined was incised with a #15 scalpel blade.  The skin margins were undermined to an appropriate distance in all directions utilizing iris scissors.
Nasal Turnover Hinge Flap Text: The defect edges were debeveled with a #15 scalpel blade.  Given the size, depth, location of the defect and the defect being full thickness a nasal turnover hinge flap was deemed most appropriate.  Using a sterile surgical marker, an appropriate hinge flap was drawn incorporating the defect. The area thus outlined was incised with a #15 scalpel blade. The flap was designed to recreate the nasal mucosal lining and the alar rim. The skin margins were undermined to an appropriate distance in all directions utilizing iris scissors.
Nasalis-Muscle-Based Myocutaneous Island Pedicle Flap Text: Using a #15 blade, an incision was made around the donor flap to the level of the nasalis muscle. Wide lateral undermining was then performed in both the subcutaneous plane above the nasalis muscle, and in a submuscular plane just above periosteum. This allowed the formation of a free nasalis muscle axial pedicle (based on the angular artery) which was still attached to the actual cutaneous flap, increasing its mobility and vascular viability. Hemostasis was obtained with pinpoint electrocoagulation. The flap was mobilized into position and the pivotal anchor points positioned and stabilized with buried interrupted sutures. Subcutaneous and dermal tissues were closed in a multilayered fashion with sutures. Tissue redundancies were excised, and the epidermal edges were apposed without significant tension and sutured with sutures.
Orbicularis Oris Muscle Flap Text: The defect edges were debeveled with a #15 scalpel blade.  Given that the defect affected the competency of the oral sphincter an orbicularis oris muscle flap was deemed most appropriate to restore this competency and normal muscle function.  Using a sterile surgical marker, an appropriate flap was drawn incorporating the defect. The area thus outlined was incised with a #15 scalpel blade.
Melolabial Transposition Flap Text: The defect edges were debeveled with a #15 scalpel blade.  Given the location of the defect and the proximity to free margins a melolabial flap was deemed most appropriate.  Using a sterile surgical marker, an appropriate melolabial transposition flap was drawn incorporating the defect.    The area thus outlined was incised deep to adipose tissue with a #15 scalpel blade.  The skin margins were undermined to an appropriate distance in all directions utilizing iris scissors.
Rhombic Flap Text: The defect edges were debeveled with a #15 scalpel blade.  Given the location of the defect and the proximity to free margins a rhombic flap was deemed most appropriate.  Using a sterile surgical marker, an appropriate rhombic flap was drawn incorporating the defect.    The area thus outlined was incised deep to adipose tissue with a #15 scalpel blade.  The skin margins were undermined to an appropriate distance in all directions utilizing iris scissors.
Rhomboid Transposition Flap Text: The defect edges were debeveled with a #15 scalpel blade.  Given the location of the defect and the proximity to free margins a rhomboid transposition flap was deemed most appropriate.  Using a sterile surgical marker, an appropriate rhomboid flap was drawn incorporating the defect.    The area thus outlined was incised deep to adipose tissue with a #15 scalpel blade.  The skin margins were undermined to an appropriate distance in all directions utilizing iris scissors.
Bi-Rhombic Flap Text: The defect edges were debeveled with a #15 scalpel blade.  Given the location of the defect and the proximity to free margins a bi-rhombic flap was deemed most appropriate.  Using a sterile surgical marker, an appropriate rhombic flap was drawn incorporating the defect. The area thus outlined was incised deep to adipose tissue with a #15 scalpel blade.  The skin margins were undermined to an appropriate distance in all directions utilizing iris scissors.
Helical Rim Advancement Flap Text: The defect edges were debeveled with a #15 blade scalpel.  Given the location of the defect and the proximity to free margins (helical rim) a double helical rim advancement flap was deemed most appropriate.  Using a sterile surgical marker, the appropriate advancement flaps were drawn incorporating the defect and placing the expected incisions between the helical rim and antihelix where possible.  The area thus outlined was incised through and through with a #15 scalpel blade.  With a skin hook and iris scissors, the flaps were gently and sharply undermined and freed up.
Bilateral Helical Rim Advancement Flap Text: The defect edges were debeveled with a #15 blade scalpel.  Given the location of the defect and the proximity to free margins (helical rim) a bilateral helical rim advancement flap was deemed most appropriate.  Using a sterile surgical marker, the appropriate advancement flaps were drawn incorporating the defect and placing the expected incisions between the helical rim and antihelix where possible.  The area thus outlined was incised through and through with a #15 scalpel blade.  With a skin hook and iris scissors, the flaps were gently and sharply undermined and freed up.
Ear Star Wedge Flap Text: The defect edges were debeveled with a #15 blade scalpel.  Given the location of the defect and the proximity to free margins (helical rim) an ear star wedge flap was deemed most appropriate.  Using a sterile surgical marker, the appropriate flap was drawn incorporating the defect and placing the expected incisions between the helical rim and antihelix where possible.  The area thus outlined was incised through and through with a #15 scalpel blade.
Banner Transposition Flap Text: The defect edges were debeveled with a #15 scalpel blade.  Given the location of the defect and the proximity to free margins a Banner transposition flap was deemed most appropriate.  Using a sterile surgical marker, an appropriate flap drawn around the defect. The area thus outlined was incised deep to adipose tissue with a #15 scalpel blade.  The skin margins were undermined to an appropriate distance in all directions utilizing iris scissors.
Bilobed Flap Text: The defect edges were debeveled with a #15 scalpel blade.  Given the location of the defect and the proximity to free margins a bilobe flap was deemed most appropriate.  Using a sterile surgical marker, an appropriate bilobe flap drawn around the defect.    The area thus outlined was incised deep to adipose tissue with a #15 scalpel blade.  The skin margins were undermined to an appropriate distance in all directions utilizing iris scissors.
Bilobed Transposition Flap Text: The defect edges were debeveled with a #15 scalpel blade.  Given the location of the defect and the proximity to free margins a bilobed transposition flap was deemed most appropriate.  Using a sterile surgical marker, an appropriate bilobe flap drawn around the defect.    The area thus outlined was incised deep to adipose tissue with a #15 scalpel blade.  The skin margins were undermined to an appropriate distance in all directions utilizing iris scissors.
Trilobed Flap Text: The defect edges were debeveled with a #15 scalpel blade.  Given the location of the defect and the proximity to free margins a trilobed flap was deemed most appropriate.  Using a sterile surgical marker, an appropriate trilobed flap drawn around the defect.    The area thus outlined was incised deep to adipose tissue with a #15 scalpel blade.  The skin margins were undermined to an appropriate distance in all directions utilizing iris scissors.
Dorsal Nasal Flap Text: The defect edges were debeveled with a #15 scalpel blade.  Given the location of the defect and the proximity to free margins a dorsal nasal flap was deemed most appropriate.  Using a sterile surgical marker, an appropriate dorsal nasal flap was drawn around the defect.    The area thus outlined was incised deep to adipose tissue with a #15 scalpel blade.  The skin margins were undermined to an appropriate distance in all directions utilizing iris scissors.
Island Pedicle Flap Text: The defect edges were debeveled with a #15 scalpel blade.  Given the location of the defect, shape of the defect and the proximity to free margins an island pedicle advancement flap was deemed most appropriate.  Using a sterile surgical marker, an appropriate advancement flap was drawn incorporating the defect, outlining the appropriate donor tissue and placing the expected incisions within the relaxed skin tension lines where possible.    The area thus outlined was incised deep to adipose tissue with a #15 scalpel blade.  The skin margins were undermined to an appropriate distance in all directions around the primary defect and laterally outward around the island pedicle utilizing iris scissors.  There was minimal undermining beneath the pedicle flap.
Island Pedicle Flap With Canthal Suspension Text: The defect edges were debeveled with a #15 scalpel blade.  Given the location of the defect, shape of the defect and the proximity to free margins an island pedicle advancement flap was deemed most appropriate.  Using a sterile surgical marker, an appropriate advancement flap was drawn incorporating the defect, outlining the appropriate donor tissue and placing the expected incisions within the relaxed skin tension lines where possible. The area thus outlined was incised deep to adipose tissue with a #15 scalpel blade.  The skin margins were undermined to an appropriate distance in all directions around the primary defect and laterally outward around the island pedicle utilizing iris scissors.  There was minimal undermining beneath the pedicle flap. A suspension suture was placed in the canthal tendon to prevent tension and prevent ectropion.
Alar Island Pedicle Flap Text: The defect edges were debeveled with a #15 scalpel blade.  Given the location of the defect, shape of the defect and the proximity to the alar rim an island pedicle advancement flap was deemed most appropriate.  Using a sterile surgical marker, an appropriate advancement flap was drawn incorporating the defect, outlining the appropriate donor tissue and placing the expected incisions within the nasal ala running parallel to the alar rim. The area thus outlined was incised with a #15 scalpel blade.  The skin margins were undermined minimally to an appropriate distance in all directions around the primary defect and laterally outward around the island pedicle utilizing iris scissors.  There was minimal undermining beneath the pedicle flap.
Double Island Pedicle Flap Text: The defect edges were debeveled with a #15 scalpel blade.  Given the location of the defect, shape of the defect and the proximity to free margins a double island pedicle advancement flap was deemed most appropriate.  Using a sterile surgical marker, an appropriate advancement flap was drawn incorporating the defect, outlining the appropriate donor tissue and placing the expected incisions within the relaxed skin tension lines where possible.    The area thus outlined was incised deep to adipose tissue with a #15 scalpel blade.  The skin margins were undermined to an appropriate distance in all directions around the primary defect and laterally outward around the island pedicle utilizing iris scissors.  There was minimal undermining beneath the pedicle flap.
Island Pedicle Flap-Requiring Vessel Identification Text: The defect edges were debeveled with a #15 scalpel blade.  Given the location of the defect, shape of the defect and the proximity to free margins an island pedicle advancement flap was deemed most appropriate.  Using a sterile surgical marker, an appropriate advancement flap was drawn, based on the axial vessel mentioned above, incorporating the defect, outlining the appropriate donor tissue and placing the expected incisions within the relaxed skin tension lines where possible.    The area thus outlined was incised deep to adipose tissue with a #15 scalpel blade.  The skin margins were undermined to an appropriate distance in all directions around the primary defect and laterally outward around the island pedicle utilizing iris scissors.  There was minimal undermining beneath the pedicle flap.
Keystone Flap Text: The defect edges were debeveled with a #15 scalpel blade.  Given the location of the defect, shape of the defect a keystone flap was deemed most appropriate.  Using a sterile surgical marker, an appropriate keystone flap was drawn incorporating the defect, outlining the appropriate donor tissue and placing the expected incisions within the relaxed skin tension lines where possible. The area thus outlined was incised deep to adipose tissue with a #15 scalpel blade.  The skin margins were undermined to an appropriate distance in all directions around the primary defect and laterally outward around the flap utilizing iris scissors.
O-T Plasty Text: The defect edges were debeveled with a #15 scalpel blade.  Given the location of the defect, shape of the defect and the proximity to free margins an O-T plasty was deemed most appropriate.  Using a sterile surgical marker, an appropriate O-T plasty was drawn incorporating the defect and placing the expected incisions within the relaxed skin tension lines where possible.    The area thus outlined was incised deep to adipose tissue with a #15 scalpel blade.  The skin margins were undermined to an appropriate distance in all directions utilizing iris scissors.
O-Z Plasty Text: The defect edges were debeveled with a #15 scalpel blade.  Given the location of the defect, shape of the defect and the proximity to free margins an O-Z plasty (double transposition flap) was deemed most appropriate.  Using a sterile surgical marker, the appropriate transposition flaps were drawn incorporating the defect and placing the expected incisions within the relaxed skin tension lines where possible.    The area thus outlined was incised deep to adipose tissue with a #15 scalpel blade.  The skin margins were undermined to an appropriate distance in all directions utilizing iris scissors.  Hemostasis was achieved with electrocautery.  The flaps were then transposed into place, one clockwise and the other counterclockwise, and anchored with interrupted buried subcutaneous sutures.
Double O-Z Plasty Text: The defect edges were debeveled with a #15 scalpel blade.  Given the location of the defect, shape of the defect and the proximity to free margins a Double O-Z plasty (double transposition flap) was deemed most appropriate.  Using a sterile surgical marker, the appropriate transposition flaps were drawn incorporating the defect and placing the expected incisions within the relaxed skin tension lines where possible. The area thus outlined was incised deep to adipose tissue with a #15 scalpel blade.  The skin margins were undermined to an appropriate distance in all directions utilizing iris scissors.  Hemostasis was achieved with electrocautery.  The flaps were then transposed into place, one clockwise and the other counterclockwise, and anchored with interrupted buried subcutaneous sutures.
V-Y Plasty Text: The defect edges were debeveled with a #15 scalpel blade.  Given the location of the defect, shape of the defect and the proximity to free margins an V-Y advancement flap was deemed most appropriate.  Using a sterile surgical marker, an appropriate advancement flap was drawn incorporating the defect and placing the expected incisions within the relaxed skin tension lines where possible.    The area thus outlined was incised deep to adipose tissue with a #15 scalpel blade.  The skin margins were undermined to an appropriate distance in all directions utilizing iris scissors.
H Plasty Text: Given the location of the defect, shape of the defect and the proximity to free margins a H-plasty was deemed most appropriate for repair.  Using a sterile surgical marker, the appropriate advancement arms of the H-plasty were drawn incorporating the defect and placing the expected incisions within the relaxed skin tension lines where possible. The area thus outlined was incised deep to adipose tissue with a #15 scalpel blade. The skin margins were undermined to an appropriate distance in all directions utilizing iris scissors.  The opposing advancement arms were then advanced into place in opposite direction and anchored with interrupted buried subcutaneous sutures.
W Plasty Text: The lesion was extirpated to the level of the fat with a #15 scalpel blade.  Given the location of the defect, shape of the defect and the proximity to free margins a W-plasty was deemed most appropriate for repair.  Using a sterile surgical marker, the appropriate transposition arms of the W-plasty were drawn incorporating the defect and placing the expected incisions within the relaxed skin tension lines where possible.    The area thus outlined was incised deep to adipose tissue with a #15 scalpel blade.  The skin margins were undermined to an appropriate distance in all directions utilizing iris scissors.  The opposing transposition arms were then transposed into place in opposite direction and anchored with interrupted buried subcutaneous sutures.
Z Plasty Text: The lesion was extirpated to the level of the fat with a #15 scalpel blade.  Given the location of the defect, shape of the defect and the proximity to free margins a Z-plasty was deemed most appropriate for repair.  Using a sterile surgical marker, the appropriate transposition arms of the Z-plasty were drawn incorporating the defect and placing the expected incisions within the relaxed skin tension lines where possible.    The area thus outlined was incised deep to adipose tissue with a #15 scalpel blade.  The skin margins were undermined to an appropriate distance in all directions utilizing iris scissors.  The opposing transposition arms were then transposed into place in opposite direction and anchored with interrupted buried subcutaneous sutures.
Zygomaticofacial Flap Text: Given the location of the defect, shape of the defect and the proximity to free margins a zygomaticofacial flap was deemed most appropriate for repair.  Using a sterile surgical marker, the appropriate flap was drawn incorporating the defect and placing the expected incisions within the relaxed skin tension lines where possible. The area thus outlined was incised deep to adipose tissue with a #15 scalpel blade with preservation of a vascular pedicle.  The skin margins were undermined to an appropriate distance in all directions utilizing iris scissors.  The flap was then placed into the defect and anchored with interrupted buried subcutaneous sutures.
Cheek Interpolation Flap Text: A decision was made to reconstruct the defect utilizing an interpolation axial flap and a staged reconstruction.  A telfa template was made of the defect.  This telfa template was then used to outline the Cheek Interpolation flap.  The donor area for the pedicle flap was then injected with anesthesia.  The flap was excised through the skin and subcutaneous tissue down to the layer of the underlying musculature.  The interpolation flap was carefully excised within this deep plane to maintain its blood supply.  The edges of the donor site were undermined.   The donor site was closed in a primary fashion.  The pedicle was then rotated into position and sutured.  Once the tube was sutured into place, adequate blood supply was confirmed with blanching and refill.  The pedicle was then wrapped with xeroform gauze and dressed appropriately with a telfa and gauze bandage to ensure continued blood supply and protect the attached pedicle.
Cheek-To-Nose Interpolation Flap Text: A decision was made to reconstruct the defect utilizing an interpolation axial flap and a staged reconstruction.  A telfa template was made of the defect.  This telfa template was then used to outline the Cheek-To-Nose Interpolation flap.  The donor area for the pedicle flap was then injected with anesthesia.  The flap was excised through the skin and subcutaneous tissue down to the layer of the underlying musculature.  The interpolation flap was carefully excised within this deep plane to maintain its blood supply.  The edges of the donor site were undermined.   The donor site was closed in a primary fashion.  The pedicle was then rotated into position and sutured.  Once the tube was sutured into place, adequate blood supply was confirmed with blanching and refill.  The pedicle was then wrapped with xeroform gauze and dressed appropriately with a telfa and gauze bandage to ensure continued blood supply and protect the attached pedicle.
Interpolation Flap Text: A decision was made to reconstruct the defect utilizing an interpolation axial flap and a staged reconstruction.  A telfa template was made of the defect.  This telfa template was then used to outline the interpolation flap.  The donor area for the pedicle flap was then injected with anesthesia.  The flap was excised through the skin and subcutaneous tissue down to the layer of the underlying musculature.  The interpolation flap was carefully excised within this deep plane to maintain its blood supply.  The edges of the donor site were undermined.   The donor site was closed in a primary fashion.  The pedicle was then rotated into position and sutured.  Once the tube was sutured into place, adequate blood supply was confirmed with blanching and refill.  The pedicle was then wrapped with xeroform gauze and dressed appropriately with a telfa and gauze bandage to ensure continued blood supply and protect the attached pedicle.
Melolabial Interpolation Flap Text: A decision was made to reconstruct the defect utilizing an interpolation axial flap and a staged reconstruction.  A telfa template was made of the defect.  This telfa template was then used to outline the melolabial interpolation flap.  The donor area for the pedicle flap was then injected with anesthesia.  The flap was excised through the skin and subcutaneous tissue down to the layer of the underlying musculature.  The pedicle flap was carefully excised within this deep plane to maintain its blood supply.  The edges of the donor site were undermined.   The donor site was closed in a primary fashion.  The pedicle was then rotated into position and sutured.  Once the tube was sutured into place, adequate blood supply was confirmed with blanching and refill.  The pedicle was then wrapped with xeroform gauze and dressed appropriately with a telfa and gauze bandage to ensure continued blood supply and protect the attached pedicle.
Mastoid Interpolation Flap Text: A decision was made to reconstruct the defect utilizing an interpolation axial flap and a staged reconstruction.  A telfa template was made of the defect.  This telfa template was then used to outline the mastoid interpolation flap.  The donor area for the pedicle flap was then injected with anesthesia.  The flap was excised through the skin and subcutaneous tissue down to the layer of the underlying musculature.  The pedicle flap was carefully excised within this deep plane to maintain its blood supply.  The edges of the donor site were undermined.   The donor site was closed in a primary fashion.  The pedicle was then rotated into position and sutured.  Once the tube was sutured into place, adequate blood supply was confirmed with blanching and refill.  The pedicle was then wrapped with xeroform gauze and dressed appropriately with a telfa and gauze bandage to ensure continued blood supply and protect the attached pedicle.
Posterior Auricular Interpolation Flap Text: A decision was made to reconstruct the defect utilizing an interpolation axial flap and a staged reconstruction.  A telfa template was made of the defect.  This telfa template was then used to outline the posterior auricular interpolation flap.  The donor area for the pedicle flap was then injected with anesthesia.  The flap was excised through the skin and subcutaneous tissue down to the layer of the underlying musculature.  The pedicle flap was carefully excised within this deep plane to maintain its blood supply.  The edges of the donor site were undermined.   The donor site was closed in a primary fashion.  The pedicle was then rotated into position and sutured.  Once the tube was sutured into place, adequate blood supply was confirmed with blanching and refill.  The pedicle was then wrapped with xeroform gauze and dressed appropriately with a telfa and gauze bandage to ensure continued blood supply and protect the attached pedicle.
Paramedian Forehead Flap Text: A decision was made to reconstruct the defect utilizing an interpolation axial flap and a staged reconstruction.  A telfa template was made of the defect.  This telfa template was then used to outline the paramedian forehead pedicle flap.  The donor area for the pedicle flap was then injected with anesthesia.  The flap was excised through the skin and subcutaneous tissue down to the layer of the underlying musculature.  The pedicle flap was carefully excised within this deep plane to maintain its blood supply.  The edges of the donor site were undermined.   The donor site was closed in a primary fashion.  The pedicle was then rotated into position and sutured.  Once the tube was sutured into place, adequate blood supply was confirmed with blanching and refill.  The pedicle was then wrapped with xeroform gauze and dressed appropriately with a telfa and gauze bandage to ensure continued blood supply and protect the attached pedicle.
Abbe Flap (Upper To Lower Lip) Text: The defect of the lower lip was assessed and measured.  Given the location and size of the defect, an Abbe flap was deemed most appropriate.  Using a sterile surgical marker, an appropriate Abbe flap was measured and drawn on the upper lip. Local anesthesia was then infiltrated.  A scalpel was then used to incise the upper lip through and through the skin, vermilion, muscle and mucosa, leaving the flap pedicled on the opposite side.  The flap was then rotated and transferred to the lower lip defect.  The flap was then sutured into place with a three layer technique, closing the orbicularis oris muscle layer with subcutaneous buried sutures, followed by a mucosal layer and an epidermal layer.
Abbe Flap (Lower To Upper Lip) Text: The defect of the upper lip was assessed and measured.  Given the location and size of the defect, an Abbe flap was deemed most appropriate.  Using a sterile surgical marker, an appropriate Abbe flap was measured and drawn on the lower lip. Local anesthesia was then infiltrated. A scalpel was then used to incise the upper lip through and through the skin, vermilion, muscle and mucosa, leaving the flap pedicled on the opposite side.  The flap was then rotated and transferred to the lower lip defect.  The flap was then sutured into place with a three layer technique, closing the orbicularis oris muscle layer with subcutaneous buried sutures, followed by a mucosal layer and an epidermal layer.
Estlander Flap (Upper To Lower Lip) Text: The defect of the lower lip was assessed and measured.  Given the location and size of the defect, an Estlander flap was deemed most appropriate.  Using a sterile surgical marker, an appropriate Estlander flap was measured and drawn on the upper lip. Local anesthesia was then infiltrated. A scalpel was then used to incise the lateral aspect of the flap, through skin, muscle and mucosa, leaving the flap pedicled medially.  The flap was then rotated and positioned to fill the lower lip defect.  The flap was then sutured into place with a three layer technique, closing the orbicularis oris muscle layer with subcutaneous buried sutures, followed by a mucosal layer and an epidermal layer.
Lip Wedge Excision Repair Text: Given the location of the defect and the proximity to free margins a full thickness wedge repair was deemed most appropriate.  Using a sterile surgical marker, the appropriate repair was drawn incorporating the defect and placing the expected incisions perpendicular to the vermilion border.  The vermilion border was also meticulously outlined to ensure appropriate reapproximation during the repair.  The area thus outlined was incised through and through with a #15 scalpel blade.  The muscularis and dermis were reaproximated with deep sutures following hemostasis. Care was taken to realign the vermilion border before proceeding with the superficial closure.  Once the vermilion was realigned the superfical and mucosal closure was finished.
Ftsg Text: The defect edges were debeveled with a #15 scalpel blade.  Given the location of the defect, shape of the defect and the proximity to free margins a full thickness skin graft was deemed most appropriate.  Using a sterile surgical marker, the primary defect shape was transferred to the donor site. The area thus outlined was incised deep to adipose tissue with a #15 scalpel blade.  The harvested graft was then trimmed of adipose tissue until only dermis and epidermis was left.  The skin margins of the secondary defect were undermined to an appropriate distance in all directions utilizing iris scissors.  The secondary defect was closed with interrupted buried subcutaneous sutures.  The skin edges were then re-apposed with running  sutures.  The skin graft was then placed in the primary defect and oriented appropriately.
Split-Thickness Skin Graft Text: The defect edges were debeveled with a #15 scalpel blade.  Given the location of the defect, shape of the defect and the proximity to free margins a split thickness skin graft was deemed most appropriate.  Using a sterile surgical marker, the primary defect shape was transferred to the donor site. The split thickness graft was then harvested.  The skin graft was then placed in the primary defect and oriented appropriately.
Burow's Graft Text: The defect edges were debeveled with a #15 scalpel blade.  Given the location of the defect, shape of the defect, the proximity to free margins and the presence of a standing cone deformity a Burow's skin graft was deemed most appropriate. The standing cone was removed and this tissue was then trimmed to the shape of the primary defect. The adipose tissue was also removed until only dermis and epidermis were left.  The skin margins of the secondary defect were undermined to an appropriate distance in all directions utilizing iris scissors.  The secondary defect was closed with interrupted buried subcutaneous sutures.  The skin edges were then re-apposed with running  sutures.  The skin graft was then placed in the primary defect and oriented appropriately.
Cartilage Graft Text: The defect edges were debeveled with a #15 scalpel blade.  Given the location of the defect, shape of the defect, the fact the defect involved a full thickness cartilage defect a cartilage graft was deemed most appropriate.  An appropriate donor site was identified, cleansed, and anesthetized. The cartilage graft was then harvested and transferred to the recipient site, oriented appropriately and then sutured into place.  The secondary defect was then repaired using a primary closure.
Composite Graft Text: The defect edges were debeveled with a #15 scalpel blade.  Given the location of the defect, shape of the defect, the proximity to free margins and the fact the defect was full thickness a composite graft was deemed most appropriate.  The defect was outline and then transferred to the donor site.  A full thickness graft was then excised from the donor site. The graft was then placed in the primary defect, oriented appropriately and then sutured into place.  The secondary defect was then repaired using a primary closure.
Epidermal Autograft Text: The defect edges were debeveled with a #15 scalpel blade.  Given the location of the defect, shape of the defect and the proximity to free margins an epidermal autograft was deemed most appropriate.  Using a sterile surgical marker, the primary defect shape was transferred to the donor site. The epidermal graft was then harvested.  The skin graft was then placed in the primary defect and oriented appropriately.
Dermal Autograft Text: The defect edges were debeveled with a #15 scalpel blade.  Given the location of the defect, shape of the defect and the proximity to free margins a dermal autograft was deemed most appropriate.  Using a sterile surgical marker, the primary defect shape was transferred to the donor site. The area thus outlined was incised deep to adipose tissue with a #15 scalpel blade.  The harvested graft was then trimmed of adipose and epidermal tissue until only dermis was left.  The skin graft was then placed in the primary defect and oriented appropriately.
Skin Substitute Text: The defect edges were debeveled with a #15 scalpel blade.  Given the location of the defect, shape of the defect and the proximity to free margins a skin substitute graft was deemed most appropriate.  The graft material was trimmed to fit the size of the defect. The graft was then placed in the primary defect and oriented appropriately.
Tissue Cultured Epidermal Autograft Text: The defect edges were debeveled with a #15 scalpel blade.  Given the location of the defect, shape of the defect and the proximity to free margins a tissue cultured epidermal autograft was deemed most appropriate.  The graft was then trimmed to fit the size of the defect.  The graft was then placed in the primary defect and oriented appropriately.
Xenograft Text: The defect edges were debeveled with a #15 scalpel blade.  Given the location of the defect, shape of the defect and the proximity to free margins a xenograft was deemed most appropriate.  The graft was then trimmed to fit the size of the defect.  The graft was then placed in the primary defect and oriented appropriately.
Purse String (Intermediate) Text: Given the location of the defect and the characteristics of the surrounding skin a purse string intermediate closure was deemed most appropriate.  Undermining was performed circumferentially around the surgical defect.  A purse string suture was then placed and tightened.
Purse String (Simple) Text: Given the location of the defect and the characteristics of the surrounding skin a purse string simple closure was deemed most appropriate.  Undermining was performed circumferentially around the surgical defect.  A purse string suture was then placed and tightened.
Complex Repair And Single Advancement Flap Text: The defect edges were debeveled with a #15 scalpel blade.  The primary defect was closed partially with a complex linear closure.  Given the location of the remaining defect, shape of the defect and the proximity to free margins a single advancement flap was deemed most appropriate for complete closure of the defect.  Using a sterile surgical marker, an appropriate advancement flap was drawn incorporating the defect and placing the expected incisions within the relaxed skin tension lines where possible.    The area thus outlined was incised deep to adipose tissue with a #15 scalpel blade.  The skin margins were undermined to an appropriate distance in all directions utilizing iris scissors.
Complex Repair And Double Advancement Flap Text: The defect edges were debeveled with a #15 scalpel blade.  The primary defect was closed partially with a complex linear closure.  Given the location of the remaining defect, shape of the defect and the proximity to free margins a double advancement flap was deemed most appropriate for complete closure of the defect.  Using a sterile surgical marker, an appropriate advancement flap was drawn incorporating the defect and placing the expected incisions within the relaxed skin tension lines where possible.    The area thus outlined was incised deep to adipose tissue with a #15 scalpel blade.  The skin margins were undermined to an appropriate distance in all directions utilizing iris scissors.
Complex Repair And Modified Advancement Flap Text: The defect edges were debeveled with a #15 scalpel blade.  The primary defect was closed partially with a complex linear closure.  Given the location of the remaining defect, shape of the defect and the proximity to free margins a modified advancement flap was deemed most appropriate for complete closure of the defect.  Using a sterile surgical marker, an appropriate advancement flap was drawn incorporating the defect and placing the expected incisions within the relaxed skin tension lines where possible.    The area thus outlined was incised deep to adipose tissue with a #15 scalpel blade.  The skin margins were undermined to an appropriate distance in all directions utilizing iris scissors.
Complex Repair And A-T Advancement Flap Text: The defect edges were debeveled with a #15 scalpel blade.  The primary defect was closed partially with a complex linear closure.  Given the location of the remaining defect, shape of the defect and the proximity to free margins an A-T advancement flap was deemed most appropriate for complete closure of the defect.  Using a sterile surgical marker, an appropriate advancement flap was drawn incorporating the defect and placing the expected incisions within the relaxed skin tension lines where possible.    The area thus outlined was incised deep to adipose tissue with a #15 scalpel blade.  The skin margins were undermined to an appropriate distance in all directions utilizing iris scissors.
Complex Repair And O-T Advancement Flap Text: The defect edges were debeveled with a #15 scalpel blade.  The primary defect was closed partially with a complex linear closure.  Given the location of the remaining defect, shape of the defect and the proximity to free margins an O-T advancement flap was deemed most appropriate for complete closure of the defect.  Using a sterile surgical marker, an appropriate advancement flap was drawn incorporating the defect and placing the expected incisions within the relaxed skin tension lines where possible.    The area thus outlined was incised deep to adipose tissue with a #15 scalpel blade.  The skin margins were undermined to an appropriate distance in all directions utilizing iris scissors.
Complex Repair And O-L Flap Text: The defect edges were debeveled with a #15 scalpel blade.  The primary defect was closed partially with a complex linear closure.  Given the location of the remaining defect, shape of the defect and the proximity to free margins an O-L flap was deemed most appropriate for complete closure of the defect.  Using a sterile surgical marker, an appropriate flap was drawn incorporating the defect and placing the expected incisions within the relaxed skin tension lines where possible.    The area thus outlined was incised deep to adipose tissue with a #15 scalpel blade.  The skin margins were undermined to an appropriate distance in all directions utilizing iris scissors.
Complex Repair And Bilobe Flap Text: The defect edges were debeveled with a #15 scalpel blade.  The primary defect was closed partially with a complex linear closure.  Given the location of the remaining defect, shape of the defect and the proximity to free margins a bilobe flap was deemed most appropriate for complete closure of the defect.  Using a sterile surgical marker, an appropriate advancement flap was drawn incorporating the defect and placing the expected incisions within the relaxed skin tension lines where possible.    The area thus outlined was incised deep to adipose tissue with a #15 scalpel blade.  The skin margins were undermined to an appropriate distance in all directions utilizing iris scissors.
Complex Repair And Melolabial Flap Text: The defect edges were debeveled with a #15 scalpel blade.  The primary defect was closed partially with a complex linear closure.  Given the location of the remaining defect, shape of the defect and the proximity to free margins a melolabial flap was deemed most appropriate for complete closure of the defect.  Using a sterile surgical marker, an appropriate advancement flap was drawn incorporating the defect and placing the expected incisions within the relaxed skin tension lines where possible.    The area thus outlined was incised deep to adipose tissue with a #15 scalpel blade.  The skin margins were undermined to an appropriate distance in all directions utilizing iris scissors.
Complex Repair And Rotation Flap Text: The defect edges were debeveled with a #15 scalpel blade.  The primary defect was closed partially with a complex linear closure.  Given the location of the remaining defect, shape of the defect and the proximity to free margins a rotation flap was deemed most appropriate for complete closure of the defect.  Using a sterile surgical marker, an appropriate advancement flap was drawn incorporating the defect and placing the expected incisions within the relaxed skin tension lines where possible.    The area thus outlined was incised deep to adipose tissue with a #15 scalpel blade.  The skin margins were undermined to an appropriate distance in all directions utilizing iris scissors.
Complex Repair And Rhombic Flap Text: The defect edges were debeveled with a #15 scalpel blade.  The primary defect was closed partially with a complex linear closure.  Given the location of the remaining defect, shape of the defect and the proximity to free margins a rhombic flap was deemed most appropriate for complete closure of the defect.  Using a sterile surgical marker, an appropriate advancement flap was drawn incorporating the defect and placing the expected incisions within the relaxed skin tension lines where possible.    The area thus outlined was incised deep to adipose tissue with a #15 scalpel blade.  The skin margins were undermined to an appropriate distance in all directions utilizing iris scissors.
Complex Repair And Transposition Flap Text: The defect edges were debeveled with a #15 scalpel blade.  The primary defect was closed partially with a complex linear closure.  Given the location of the remaining defect, shape of the defect and the proximity to free margins a transposition flap was deemed most appropriate for complete closure of the defect.  Using a sterile surgical marker, an appropriate advancement flap was drawn incorporating the defect and placing the expected incisions within the relaxed skin tension lines where possible.    The area thus outlined was incised deep to adipose tissue with a #15 scalpel blade.  The skin margins were undermined to an appropriate distance in all directions utilizing iris scissors.
Complex Repair And V-Y Plasty Text: The defect edges were debeveled with a #15 scalpel blade.  The primary defect was closed partially with a complex linear closure.  Given the location of the remaining defect, shape of the defect and the proximity to free margins a V-Y plasty was deemed most appropriate for complete closure of the defect.  Using a sterile surgical marker, an appropriate advancement flap was drawn incorporating the defect and placing the expected incisions within the relaxed skin tension lines where possible.    The area thus outlined was incised deep to adipose tissue with a #15 scalpel blade.  The skin margins were undermined to an appropriate distance in all directions utilizing iris scissors.
Complex Repair And M Plasty Text: The defect edges were debeveled with a #15 scalpel blade.  The primary defect was closed partially with a complex linear closure.  Given the location of the remaining defect, shape of the defect and the proximity to free margins an M plasty was deemed most appropriate for complete closure of the defect.  Using a sterile surgical marker, an appropriate advancement flap was drawn incorporating the defect and placing the expected incisions within the relaxed skin tension lines where possible.    The area thus outlined was incised deep to adipose tissue with a #15 scalpel blade.  The skin margins were undermined to an appropriate distance in all directions utilizing iris scissors.
Complex Repair And Double M Plasty Text: The defect edges were debeveled with a #15 scalpel blade.  The primary defect was closed partially with a complex linear closure.  Given the location of the remaining defect, shape of the defect and the proximity to free margins a double M plasty was deemed most appropriate for complete closure of the defect.  Using a sterile surgical marker, an appropriate advancement flap was drawn incorporating the defect and placing the expected incisions within the relaxed skin tension lines where possible.    The area thus outlined was incised deep to adipose tissue with a #15 scalpel blade.  The skin margins were undermined to an appropriate distance in all directions utilizing iris scissors.
Complex Repair And W Plasty Text: The defect edges were debeveled with a #15 scalpel blade.  The primary defect was closed partially with a complex linear closure.  Given the location of the remaining defect, shape of the defect and the proximity to free margins a W plasty was deemed most appropriate for complete closure of the defect.  Using a sterile surgical marker, an appropriate advancement flap was drawn incorporating the defect and placing the expected incisions within the relaxed skin tension lines where possible.    The area thus outlined was incised deep to adipose tissue with a #15 scalpel blade.  The skin margins were undermined to an appropriate distance in all directions utilizing iris scissors.
Complex Repair And Z Plasty Text: The defect edges were debeveled with a #15 scalpel blade.  The primary defect was closed partially with a complex linear closure.  Given the location of the remaining defect, shape of the defect and the proximity to free margins a Z plasty was deemed most appropriate for complete closure of the defect.  Using a sterile surgical marker, an appropriate advancement flap was drawn incorporating the defect and placing the expected incisions within the relaxed skin tension lines where possible.    The area thus outlined was incised deep to adipose tissue with a #15 scalpel blade.  The skin margins were undermined to an appropriate distance in all directions utilizing iris scissors.
Complex Repair And Dorsal Nasal Flap Text: The defect edges were debeveled with a #15 scalpel blade.  The primary defect was closed partially with a complex linear closure.  Given the location of the remaining defect, shape of the defect and the proximity to free margins a dorsal nasal flap was deemed most appropriate for complete closure of the defect.  Using a sterile surgical marker, an appropriate flap was drawn incorporating the defect and placing the expected incisions within the relaxed skin tension lines where possible.    The area thus outlined was incised deep to adipose tissue with a #15 scalpel blade.  The skin margins were undermined to an appropriate distance in all directions utilizing iris scissors.
Complex Repair And Ftsg Text: The defect edges were debeveled with a #15 scalpel blade.  The primary defect was closed partially with a complex linear closure.  Given the location of the defect, shape of the defect and the proximity to free margins a full thickness skin graft was deemed most appropriate to repair the remaining defect.  The graft was trimmed to fit the size of the remaining defect.  The graft was then placed in the primary defect, oriented appropriately, and sutured into place.
Complex Repair And Burow's Graft Text: The defect edges were debeveled with a #15 scalpel blade.  The primary defect was closed partially with a complex linear closure.  Given the location of the defect, shape of the defect, the proximity to free margins and the presence of a standing cone deformity a Burow's graft was deemed most appropriate to repair the remaining defect.  The graft was trimmed to fit the size of the remaining defect.  The graft was then placed in the primary defect, oriented appropriately, and sutured into place.
Complex Repair And Split-Thickness Skin Graft Text: The defect edges were debeveled with a #15 scalpel blade.  The primary defect was closed partially with a complex linear closure.  Given the location of the defect, shape of the defect and the proximity to free margins a split thickness skin graft was deemed most appropriate to repair the remaining defect.  The graft was trimmed to fit the size of the remaining defect.  The graft was then placed in the primary defect, oriented appropriately, and sutured into place.
Complex Repair And Epidermal Autograft Text: The defect edges were debeveled with a #15 scalpel blade.  The primary defect was closed partially with a complex linear closure.  Given the location of the defect, shape of the defect and the proximity to free margins an epidermal autograft was deemed most appropriate to repair the remaining defect.  The graft was trimmed to fit the size of the remaining defect.  The graft was then placed in the primary defect, oriented appropriately, and sutured into place.
Complex Repair And Dermal Autograft Text: The defect edges were debeveled with a #15 scalpel blade.  The primary defect was closed partially with a complex linear closure.  Given the location of the defect, shape of the defect and the proximity to free margins an dermal autograft was deemed most appropriate to repair the remaining defect.  The graft was trimmed to fit the size of the remaining defect.  The graft was then placed in the primary defect, oriented appropriately, and sutured into place.
Complex Repair And Tissue Cultured Epidermal Autograft Text: The defect edges were debeveled with a #15 scalpel blade.  The primary defect was closed partially with a complex linear closure.  Given the location of the defect, shape of the defect and the proximity to free margins an tissue cultured epidermal autograft was deemed most appropriate to repair the remaining defect.  The graft was trimmed to fit the size of the remaining defect.  The graft was then placed in the primary defect, oriented appropriately, and sutured into place.
Complex Repair And Xenograft Text: The defect edges were debeveled with a #15 scalpel blade.  The primary defect was closed partially with a complex linear closure.  Given the location of the defect, shape of the defect and the proximity to free margins a xenograft was deemed most appropriate to repair the remaining defect.  The graft was trimmed to fit the size of the remaining defect.  The graft was then placed in the primary defect, oriented appropriately, and sutured into place.
Complex Repair And Skin Substitute Graft Text: The defect edges were debeveled with a #15 scalpel blade.  The primary defect was closed partially with a complex linear closure.  Given the location of the remaining defect, shape of the defect and the proximity to free margins a skin substitute graft was deemed most appropriate to repair the remaining defect.  The graft was trimmed to fit the size of the remaining defect.  The graft was then placed in the primary defect, oriented appropriately, and sutured into place.
Path Notes (To The Dermatopathologist): Please check margins.
Consent was obtained from the patient. The risks and benefits to therapy were discussed in detail. Specifically, the risks of infection, scarring, bleeding, prolonged wound healing, incomplete removal, allergy to anesthesia, nerve injury and recurrence were addressed. Prior to the procedure, the treatment site was clearly identified and confirmed by the patient. All components of Universal Protocol/PAUSE Rule completed.
Render Post-Care Instructions In Note?: yes
Post-Care Instructions: I reviewed with the patient in detail post-care instructions. Patient is not to engage in any heavy lifting, exercise, or swimming for the next 14 days. Should the patient develop any fevers, chills, bleeding, severe pain patient will contact the office immediately.
Home Suture Removal Text: Patient was provided a home suture removal kit and will remove their sutures at home.  If they have any questions or difficulties they will call the office.
Where Do You Want The Question To Include Opioid Counseling Located?: Case Summary Tab
Billing Type: Third-Party Bill
Information: Selecting Yes will display possible errors in your note based on the variables you have selected. This validation is only offered as a suggestion for you. PLEASE NOTE THAT THE VALIDATION TEXT WILL BE REMOVED WHEN YOU FINALIZE YOUR NOTE. IF YOU WANT TO FAX A PRELIMINARY NOTE YOU WILL NEED TO TOGGLE THIS TO 'NO' IF YOU DO NOT WANT IT IN YOUR FAXED NOTE.

## 2023-02-20 ENCOUNTER — APPOINTMENT (RX ONLY)
Dept: URBAN - METROPOLITAN AREA CLINIC 23 | Facility: CLINIC | Age: 53
Setting detail: DERMATOLOGY
End: 2023-02-20

## 2023-02-20 DIAGNOSIS — Z48.01 ENCOUNTER FOR CHANGE OR REMOVAL OF SURGICAL WOUND DRESSING: ICD-10-CM

## 2023-02-20 PROCEDURE — 99024 POSTOP FOLLOW-UP VISIT: CPT

## 2023-02-20 PROCEDURE — ? SUTURE REMOVAL (GLOBAL PERIOD)

## 2023-02-20 ASSESSMENT — LOCATION DETAILED DESCRIPTION DERM: LOCATION DETAILED: LEFT LATERAL PROXIMAL UPPER ARM

## 2023-02-20 ASSESSMENT — LOCATION ZONE DERM: LOCATION ZONE: ARM

## 2023-02-20 ASSESSMENT — LOCATION SIMPLE DESCRIPTION DERM: LOCATION SIMPLE: LEFT UPPER ARM

## 2023-02-20 NOTE — PROCEDURE: SUTURE REMOVAL (GLOBAL PERIOD)
Detail Level: Detailed
Add 06801 Cpt? (Important Note: In 2017 The Use Of 05251 Is Being Tracked By Cms To Determine Future Global Period Reimbursement For Global Periods): yes

## 2023-05-15 ENCOUNTER — OFFICE VISIT (OUTPATIENT)
Dept: SURGERY | Age: 53
End: 2023-05-15
Payer: COMMERCIAL

## 2023-05-15 VITALS
DIASTOLIC BLOOD PRESSURE: 80 MMHG | SYSTOLIC BLOOD PRESSURE: 138 MMHG | HEIGHT: 68 IN | OXYGEN SATURATION: 97 % | BODY MASS INDEX: 41.37 KG/M2 | HEART RATE: 74 BPM | WEIGHT: 273 LBS

## 2023-05-15 DIAGNOSIS — E66.01 OBESITY, CLASS III, BMI 40-49.9 (MORBID OBESITY) (HCC): ICD-10-CM

## 2023-05-15 DIAGNOSIS — R10.12 LUQ PAIN: ICD-10-CM

## 2023-05-15 LAB
BASOPHILS # BLD: 0 K/UL (ref 0–0.2)
BASOPHILS NFR BLD: 0 % (ref 0–2)
DIFFERENTIAL METHOD BLD: ABNORMAL
EOSINOPHIL # BLD: 0.1 K/UL (ref 0–0.8)
EOSINOPHIL NFR BLD: 1 % (ref 0.5–7.8)
ERYTHROCYTE [DISTWIDTH] IN BLOOD BY AUTOMATED COUNT: 13.6 % (ref 11.9–14.6)
HCT VFR BLD AUTO: 46.5 % (ref 35.8–46.3)
HGB BLD-MCNC: 14.6 G/DL (ref 11.7–15.4)
IMM GRANULOCYTES # BLD AUTO: 0.1 K/UL (ref 0–0.5)
IMM GRANULOCYTES NFR BLD AUTO: 1 % (ref 0–5)
LYMPHOCYTES # BLD: 3 K/UL (ref 0.5–4.6)
LYMPHOCYTES NFR BLD: 24 % (ref 13–44)
MCH RBC QN AUTO: 28.9 PG (ref 26.1–32.9)
MCHC RBC AUTO-ENTMCNC: 31.4 G/DL (ref 31.4–35)
MCV RBC AUTO: 91.9 FL (ref 82–102)
MONOCYTES # BLD: 1 K/UL (ref 0.1–1.3)
MONOCYTES NFR BLD: 8 % (ref 4–12)
NEUTS SEG # BLD: 8.2 K/UL (ref 1.7–8.2)
NEUTS SEG NFR BLD: 66 % (ref 43–78)
NRBC # BLD: 0 K/UL (ref 0–0.2)
PLATELET # BLD AUTO: 243 K/UL (ref 150–450)
PMV BLD AUTO: 11.1 FL (ref 9.4–12.3)
RBC # BLD AUTO: 5.06 M/UL (ref 4.05–5.2)
WBC # BLD AUTO: 12.4 K/UL (ref 4.3–11.1)

## 2023-05-15 PROCEDURE — 3017F COLORECTAL CA SCREEN DOC REV: CPT | Performed by: SURGERY

## 2023-05-15 PROCEDURE — 1036F TOBACCO NON-USER: CPT | Performed by: SURGERY

## 2023-05-15 PROCEDURE — G8417 CALC BMI ABV UP PARAM F/U: HCPCS | Performed by: SURGERY

## 2023-05-15 PROCEDURE — 99204 OFFICE O/P NEW MOD 45 MIN: CPT | Performed by: SURGERY

## 2023-05-15 PROCEDURE — G8427 DOCREV CUR MEDS BY ELIG CLIN: HCPCS | Performed by: SURGERY

## 2023-05-15 NOTE — PROGRESS NOTES
H&P/Consult Note/Progress Note/Office Note:   Cristóbal Carr  MRN: [id number]  :1970  Age:53 y.o.    HPI: Cristóbal Carr is a 46 y.o. female who saw me on 5/15/2023 with left upper quadrant discomfort which has persisted since approximately  when she was treated for pancreatitis    She reports no gallbladder disease was noted at that time and the cause of her pancreatitis was felt to be idiopathic. She was concerned about the possibility of left upper quadrant hernia. He has had multiple robotic and laparoscopic surgeries but no incisions in the upper abdomen. She does not smoke or drink and is currently not on acid reduction.           Past Medical History:   Diagnosis Date    Adrenal disorder, other     adrenal fatigue    Allergic rhinitis     Bone spur of foot     right foot     Cancer ([address] 2017    Atypical skin    Chest pain     Chronic pain     lower back    Cough     Dyspepsia     Elevated troponin     Environmental allergies     GERD (gastroesophageal reflux disease)     History of positive PPD 1992    History of renal stone 2013    Insomnia     Lumbar degenerative disc disease 2013    Nausea & vomiting     Obesity     MARKO (obstructive sleep apnea) 2020    Pancreatitis 2020    Snores     SOB (shortness of breath)     Sore throat     Spastic bladder     SVT (supraventricular tachycardia) ([address]     Thromboembolus ([address]     \" possible DVT after IVF procedure and loss of pregnancy \"     Urge incontinence     Urinary frequency     Vertigo     Vitamin D deficiency 2009    Wheezing     uses inhalers as needed    Wheezing      Past Surgical History:   Procedure Laterality Date    BLADDER SUSPENSION  2009    BREAST BIOPSY Right 2000     NOT A BIOPSY; excision infected spiderbite IMF    BREAST REDUCTION SURGERY Bilateral 2019    BILATERAL BREAST REDUCTION performed by Fiona Bah MD at [facility]  2019    COLONOSCOPY

## 2023-05-16 LAB
ALBUMIN SERPL-MCNC: 3.9 G/DL (ref 3.5–5)
ALBUMIN/GLOB SERPL: 1.4 (ref 0.4–1.6)
ALP SERPL-CCNC: 98 U/L (ref 50–136)
ALT SERPL-CCNC: 40 U/L (ref 12–65)
ANION GAP SERPL CALC-SCNC: ABNORMAL MMOL/L (ref 2–11)
AST SERPL-CCNC: 15 U/L (ref 15–37)
BILIRUB SERPL-MCNC: 0.2 MG/DL (ref 0.2–1.1)
BUN SERPL-MCNC: 25 MG/DL (ref 6–23)
CALCIUM SERPL-MCNC: 9.4 MG/DL (ref 8.3–10.4)
CHLORIDE SERPL-SCNC: 105 MMOL/L (ref 101–110)
CO2 SERPL-SCNC: 32 MMOL/L (ref 21–32)
CREAT SERPL-MCNC: 1.1 MG/DL (ref 0.6–1)
GLOBULIN SER CALC-MCNC: 2.7 G/DL (ref 2.8–4.5)
GLUCOSE SERPL-MCNC: 80 MG/DL (ref 65–100)
LIPASE SERPL-CCNC: 293 U/L (ref 73–393)
POTASSIUM SERPL-SCNC: 4.5 MMOL/L (ref 3.5–5.1)
PROT SERPL-MCNC: 6.6 G/DL (ref 6.3–8.2)
SODIUM SERPL-SCNC: 134 MMOL/L (ref 133–143)

## 2023-05-26 ENCOUNTER — HOSPITAL ENCOUNTER (OUTPATIENT)
Dept: CT IMAGING | Age: 53
Discharge: HOME | End: 2023-05-26
Payer: COMMERCIAL

## 2023-05-26 DIAGNOSIS — R10.12 LUQ PAIN: ICD-10-CM

## 2023-05-26 PROCEDURE — 74177 CT ABD & PELVIS W/CONTRAST: CPT

## 2023-05-26 PROCEDURE — 6360000004 HC RX CONTRAST MEDICATION: Performed by: SURGERY

## 2023-05-26 RX ADMIN — IOPAMIDOL 100 ML: 755 INJECTION, SOLUTION INTRAVENOUS at 14:09

## 2023-06-14 PROBLEM — K57.90 DIVERTICULOSIS: Status: ACTIVE | Noted: 2023-06-14

## 2023-06-14 PROBLEM — N28.1 RENAL CYST: Status: ACTIVE | Noted: 2023-06-14

## 2023-06-14 PROBLEM — K76.0 HEPATIC STEATOSIS: Status: ACTIVE | Noted: 2023-06-14

## 2023-06-19 ENCOUNTER — TRANSCRIBE ORDERS (OUTPATIENT)
Dept: SCHEDULING | Age: 53
End: 2023-06-19

## 2023-06-19 DIAGNOSIS — Z12.31 SCREENING MAMMOGRAM FOR HIGH-RISK PATIENT: Primary | ICD-10-CM

## 2023-07-28 ENCOUNTER — HOSPITAL ENCOUNTER (OUTPATIENT)
Dept: MAMMOGRAPHY | Age: 53
Discharge: HOME | End: 2023-07-28
Attending: FAMILY MEDICINE
Payer: COMMERCIAL

## 2023-07-28 DIAGNOSIS — Z12.31 SCREENING MAMMOGRAM FOR HIGH-RISK PATIENT: ICD-10-CM

## 2023-07-28 PROCEDURE — 77067 SCR MAMMO BI INCL CAD: CPT

## 2023-07-31 ENCOUNTER — OFFICE VISIT (OUTPATIENT)
Dept: ORTHOPEDIC SURGERY | Age: 53
End: 2023-07-31
Payer: COMMERCIAL

## 2023-07-31 DIAGNOSIS — M75.101 TEAR OF RIGHT ROTATOR CUFF, UNSPECIFIED TEAR EXTENT, UNSPECIFIED WHETHER TRAUMATIC: Primary | ICD-10-CM

## 2023-07-31 PROCEDURE — G8417 CALC BMI ABV UP PARAM F/U: HCPCS | Performed by: PHYSICIAN ASSISTANT

## 2023-07-31 PROCEDURE — 99204 OFFICE O/P NEW MOD 45 MIN: CPT | Performed by: PHYSICIAN ASSISTANT

## 2023-07-31 PROCEDURE — 1036F TOBACCO NON-USER: CPT | Performed by: PHYSICIAN ASSISTANT

## 2023-07-31 PROCEDURE — 3017F COLORECTAL CA SCREEN DOC REV: CPT | Performed by: PHYSICIAN ASSISTANT

## 2023-07-31 PROCEDURE — G8427 DOCREV CUR MEDS BY ELIG CLIN: HCPCS | Performed by: PHYSICIAN ASSISTANT

## 2023-07-31 ASSESSMENT — PATIENT HEALTH QUESTIONNAIRE - PHQ9
SUM OF ALL RESPONSES TO PHQ QUESTIONS 1-9: 0
2. FEELING DOWN, DEPRESSED OR HOPELESS: 0
1. LITTLE INTEREST OR PLEASURE IN DOING THINGS: 0
2. FEELING DOWN, DEPRESSED OR HOPELESS: NOT AT ALL
SUM OF ALL RESPONSES TO PHQ9 QUESTIONS 1 & 2: 0
SUM OF ALL RESPONSES TO PHQ QUESTIONS 1-9: 0
SUM OF ALL RESPONSES TO PHQ9 QUESTIONS 1 & 2: 0
SUM OF ALL RESPONSES TO PHQ QUESTIONS 1-9: 0
SUM OF ALL RESPONSES TO PHQ QUESTIONS 1-9: 0
1. LITTLE INTEREST OR PLEASURE IN DOING THINGS: NOT AT ALL

## 2023-08-02 ENCOUNTER — OFFICE VISIT (OUTPATIENT)
Dept: SLEEP MEDICINE | Age: 53
End: 2023-08-02
Payer: COMMERCIAL

## 2023-08-02 VITALS
BODY MASS INDEX: 39.22 KG/M2 | DIASTOLIC BLOOD PRESSURE: 70 MMHG | SYSTOLIC BLOOD PRESSURE: 130 MMHG | HEART RATE: 61 BPM | RESPIRATION RATE: 16 BRPM | TEMPERATURE: 97.4 F | WEIGHT: 258.8 LBS | HEIGHT: 68 IN | OXYGEN SATURATION: 98 %

## 2023-08-02 DIAGNOSIS — G47.33 OSA (OBSTRUCTIVE SLEEP APNEA): Primary | ICD-10-CM

## 2023-08-02 PROCEDURE — G8427 DOCREV CUR MEDS BY ELIG CLIN: HCPCS | Performed by: INTERNAL MEDICINE

## 2023-08-02 PROCEDURE — G8417 CALC BMI ABV UP PARAM F/U: HCPCS | Performed by: INTERNAL MEDICINE

## 2023-08-02 PROCEDURE — 3017F COLORECTAL CA SCREEN DOC REV: CPT | Performed by: INTERNAL MEDICINE

## 2023-08-02 PROCEDURE — 99214 OFFICE O/P EST MOD 30 MIN: CPT | Performed by: INTERNAL MEDICINE

## 2023-08-02 PROCEDURE — 1036F TOBACCO NON-USER: CPT | Performed by: INTERNAL MEDICINE

## 2023-08-02 ASSESSMENT — SLEEP AND FATIGUE QUESTIONNAIRES
ESS TOTAL SCORE: 2
HOW LIKELY ARE YOU TO NOD OFF OR FALL ASLEEP WHILE SITTING QUIETLY AFTER LUNCH WITHOUT ALCOHOL: 0
HOW LIKELY ARE YOU TO NOD OFF OR FALL ASLEEP WHEN YOU ARE A PASSENGER IN A CAR FOR AN HOUR WITHOUT A BREAK: 0
HOW LIKELY ARE YOU TO NOD OFF OR FALL ASLEEP WHILE WATCHING TV: 0
HOW LIKELY ARE YOU TO NOD OFF OR FALL ASLEEP WHILE SITTING INACTIVE IN A PUBLIC PLACE: 0
HOW LIKELY ARE YOU TO NOD OFF OR FALL ASLEEP WHILE SITTING AND TALKING TO SOMEONE: 0
HOW LIKELY ARE YOU TO NOD OFF OR FALL ASLEEP WHILE LYING DOWN TO REST IN THE AFTERNOON WHEN CIRCUMSTANCES PERMIT: 2
HOW LIKELY ARE YOU TO NOD OFF OR FALL ASLEEP WHILE SITTING AND READING: 0
HOW LIKELY ARE YOU TO NOD OFF OR FALL ASLEEP IN A CAR, WHILE STOPPED FOR A FEW MINUTES IN TRAFFIC: 0

## 2023-08-02 NOTE — PATIENT INSTRUCTIONS
It was a pleasure meeting you today. Here are some items that we discussed:    1. Great job losing weight, when you get down to 230lbs, call me and we will repeat a home study. 2.   We are happy to see your , you can make an appt today. 3.   I sent order to [facility] for refills    She Palafox MD  [phone]     Sleep Apnea: Care Instructions  Overview     Sleep apnea means that you frequently stop breathing for 10 seconds or longer during sleep. It can be mild to severe, based on the number of times an hour that you stop breathing. Blocked or narrowed airways in your nose, mouth, or throat can cause sleep apnea. Your airway can become blocked when your throat muscles and tongue relax during sleep. You can help treat sleep apnea at home by making lifestyle changes. You also can use a CPAP breathing machine that keeps tissues in the throat from blocking your airway. Or your doctor may suggest that you use a breathing device while you sleep. It helps keep your airway open. This could be a device that you put in your mouth. In some cases, surgery may be needed to remove enlarged tissues in the throat. Follow-up care is a key part of your treatment and safety. Be sure to make and go to all appointments, and call your doctor if you are having problems. It's also a good idea to know your test results and keep a list of the medicines you take. How can you care for yourself at home? Lose weight, if needed. Sleep on your side. It may help mild apnea. Avoid alcohol and medicines such as sleeping pills, opioids, or sedatives before bed. Don't smoke. If you need help quitting, talk to your doctor. Prop up the head of your bed. Treat breathing problems, such as a stuffy nose, that are caused by a cold or allergies. Try a continuous positive airway pressure (CPAP) breathing machine if your doctor recommends it.   If CPAP doesn't work for you, ask your doctor if you can try other masks, settings, or

## 2023-08-02 NOTE — ASSESSMENT & PLAN NOTE
Patient is doing well on current settings of CPAP 13 cm H2O for severe sleep apnea. No change in settings today. Prescription for supplies sent to [facility]. We discussed ongoing weight loss efforts, she was congratulated on losing 13 pounds, should she is down to 265 we will do a repeat home test to see if her sleep apnea has improved.

## 2023-08-03 ENCOUNTER — OFFICE VISIT (OUTPATIENT)
Dept: FAMILY MEDICINE CLINIC | Facility: CLINIC | Age: 53
End: 2023-08-03
Payer: COMMERCIAL

## 2023-08-03 VITALS
WEIGHT: 258.1 LBS | OXYGEN SATURATION: 98 % | RESPIRATION RATE: 18 BRPM | BODY MASS INDEX: 39.12 KG/M2 | HEART RATE: 56 BPM | DIASTOLIC BLOOD PRESSURE: 77 MMHG | TEMPERATURE: 97.5 F | SYSTOLIC BLOOD PRESSURE: 134 MMHG | HEIGHT: 68 IN

## 2023-08-03 DIAGNOSIS — Z00.00 WELL ADULT HEALTH CHECK: Primary | ICD-10-CM

## 2023-08-03 DIAGNOSIS — E66.09 CLASS 2 OBESITY DUE TO EXCESS CALORIES WITHOUT SERIOUS COMORBIDITY WITH BODY MASS INDEX (BMI) OF 39.0 TO 39.9 IN ADULT: ICD-10-CM

## 2023-08-03 DIAGNOSIS — G47.33 OSA ON CPAP: ICD-10-CM

## 2023-08-03 DIAGNOSIS — J30.89 NON-SEASONAL ALLERGIC RHINITIS, UNSPECIFIED TRIGGER: ICD-10-CM

## 2023-08-03 DIAGNOSIS — G89.29 CHRONIC PAIN OF RIGHT KNEE: ICD-10-CM

## 2023-08-03 DIAGNOSIS — M51.36 LUMBAR DEGENERATIVE DISC DISEASE: ICD-10-CM

## 2023-08-03 DIAGNOSIS — M25.561 CHRONIC PAIN OF RIGHT KNEE: ICD-10-CM

## 2023-08-03 DIAGNOSIS — R73.03 PREDIABETES: ICD-10-CM

## 2023-08-03 DIAGNOSIS — R06.2 WHEEZE: ICD-10-CM

## 2023-08-03 DIAGNOSIS — Z99.89 OSA ON CPAP: ICD-10-CM

## 2023-08-03 DIAGNOSIS — K76.0 HEPATIC STEATOSIS: ICD-10-CM

## 2023-08-03 DIAGNOSIS — E78.00 ELEVATED LDL CHOLESTEROL LEVEL: ICD-10-CM

## 2023-08-03 DIAGNOSIS — N32.81 OAB (OVERACTIVE BLADDER): ICD-10-CM

## 2023-08-03 DIAGNOSIS — N28.1 RENAL CYST: ICD-10-CM

## 2023-08-03 PROBLEM — M54.16 LUMBAR BACK PAIN WITH RADICULOPATHY AFFECTING RIGHT LOWER EXTREMITY: Status: RESOLVED | Noted: 2022-07-14 | Resolved: 2023-08-03

## 2023-08-03 LAB
ALBUMIN SERPL-MCNC: 3.9 G/DL (ref 3.5–5)
ALBUMIN/GLOB SERPL: 1.1 (ref 0.4–1.6)
ALP SERPL-CCNC: 99 U/L (ref 50–136)
ALT SERPL-CCNC: 35 U/L (ref 12–65)
ANION GAP SERPL CALC-SCNC: 8 MMOL/L (ref 2–11)
AST SERPL-CCNC: 18 U/L (ref 15–37)
BASOPHILS # BLD: 0.1 K/UL (ref 0–0.2)
BASOPHILS NFR BLD: 1 % (ref 0–2)
BILIRUB SERPL-MCNC: 0.3 MG/DL (ref 0.2–1.1)
BUN SERPL-MCNC: 23 MG/DL (ref 6–23)
CALCIUM SERPL-MCNC: 9.9 MG/DL (ref 8.3–10.4)
CHLORIDE SERPL-SCNC: 108 MMOL/L (ref 101–110)
CHOLEST SERPL-MCNC: 164 MG/DL
CO2 SERPL-SCNC: 27 MMOL/L (ref 21–32)
CREAT SERPL-MCNC: 0.9 MG/DL (ref 0.6–1)
DIFFERENTIAL METHOD BLD: NORMAL
EOSINOPHIL # BLD: 0.1 K/UL (ref 0–0.8)
EOSINOPHIL NFR BLD: 1 % (ref 0.5–7.8)
ERYTHROCYTE [DISTWIDTH] IN BLOOD BY AUTOMATED COUNT: 13 % (ref 11.9–14.6)
EXPIRATORY TIME-POST: NORMAL
EXPIRATORY TIME: NORMAL
FEF 25-75% %CHNG: NORMAL
FEF 25-75% %PRED-POST: NORMAL
FEF 25-75% %PRED-PRE: NORMAL
FEF 25-75% PRED: NORMAL
FEF 25-75%-POST: NORMAL
FEF 25-75%-PRE: NORMAL
FEV1 %PRED-POST: NORMAL
FEV1 %PRED-PRE: NORMAL
FEV1 PRED: NORMAL
FEV1-POST: NORMAL
FEV1-PRE: NORMAL
FEV1/FVC %PRED-POST: NORMAL
FEV1/FVC %PRED-PRE: NORMAL
FEV1/FVC PRED: NORMAL
FEV1/FVC-POST: NORMAL
FEV1/FVC-PRE: NORMAL
FVC %PRED-POST: NORMAL
FVC %PRED-PRE: NORMAL
FVC PRED: NORMAL
FVC-POST: NORMAL
FVC-PRE: NORMAL
GLOBULIN SER CALC-MCNC: 3.5 G/DL (ref 2.8–4.5)
GLUCOSE SERPL-MCNC: 89 MG/DL (ref 65–100)
HCT VFR BLD AUTO: 45.9 % (ref 35.8–46.3)
HDLC SERPL-MCNC: 43 MG/DL (ref 40–60)
HDLC SERPL: 3.8
HGB BLD-MCNC: 14.5 G/DL (ref 11.7–15.4)
IMM GRANULOCYTES # BLD AUTO: 0 K/UL (ref 0–0.5)
IMM GRANULOCYTES NFR BLD AUTO: 0 % (ref 0–5)
LDLC SERPL CALC-MCNC: 89.6 MG/DL
LYMPHOCYTES # BLD: 1.5 K/UL (ref 0.5–4.6)
LYMPHOCYTES NFR BLD: 18 % (ref 13–44)
MCH RBC QN AUTO: 28.9 PG (ref 26.1–32.9)
MCHC RBC AUTO-ENTMCNC: 31.6 G/DL (ref 31.4–35)
MCV RBC AUTO: 91.4 FL (ref 82–102)
MONOCYTES # BLD: 0.7 K/UL (ref 0.1–1.3)
MONOCYTES NFR BLD: 8 % (ref 4–12)
NEUTS SEG # BLD: 6 K/UL (ref 1.7–8.2)
NEUTS SEG NFR BLD: 72 % (ref 43–78)
NRBC # BLD: 0 K/UL (ref 0–0.2)
PEF %PRED-POST: NORMAL
PEF %PRED-PRE: NORMAL
PEF PRED: NORMAL
PEF%CHNG: NORMAL
PEF-POST: NORMAL
PEF-PRE: NORMAL
PLATELET # BLD AUTO: 225 K/UL (ref 150–450)
PMV BLD AUTO: 11.8 FL (ref 9.4–12.3)
POTASSIUM SERPL-SCNC: 4.2 MMOL/L (ref 3.5–5.1)
PROT SERPL-MCNC: 7.4 G/DL (ref 6.3–8.2)
RBC # BLD AUTO: 5.02 M/UL (ref 4.05–5.2)
SODIUM SERPL-SCNC: 143 MMOL/L (ref 133–143)
TRIGL SERPL-MCNC: 157 MG/DL (ref 35–150)
VLDLC SERPL CALC-MCNC: 31.4 MG/DL (ref 6–23)
WBC # BLD AUTO: 8.3 K/UL (ref 4.3–11.1)

## 2023-08-03 PROCEDURE — 99396 PREV VISIT EST AGE 40-64: CPT | Performed by: FAMILY MEDICINE

## 2023-08-03 PROCEDURE — 94060 EVALUATION OF WHEEZING: CPT | Performed by: FAMILY MEDICINE

## 2023-08-03 RX ORDER — FLUTICASONE PROPIONATE 110 UG/1
2 AEROSOL, METERED RESPIRATORY (INHALATION) EVERY 12 HOURS
Qty: 3 EACH | Refills: 3 | Status: SHIPPED | OUTPATIENT
Start: 2023-08-03

## 2023-08-03 RX ORDER — BECLOMETHASONE DIPROPIONATE 80 UG/1
2 AEROSOL, METERED NASAL DAILY
Qty: 3 EACH | Refills: 3 | Status: SHIPPED | OUTPATIENT
Start: 2023-08-03

## 2023-08-03 RX ORDER — LEVOCETIRIZINE DIHYDROCHLORIDE 5 MG/1
5 TABLET, FILM COATED ORAL DAILY
Qty: 90 TABLET | Refills: 3 | Status: SHIPPED | OUTPATIENT
Start: 2023-08-03

## 2023-08-03 RX ORDER — MONTELUKAST SODIUM 10 MG/1
10 TABLET ORAL NIGHTLY
Qty: 90 TABLET | Refills: 3 | Status: SHIPPED | OUTPATIENT
Start: 2023-08-03

## 2023-08-03 SDOH — ECONOMIC STABILITY: FOOD INSECURITY: WITHIN THE PAST 12 MONTHS, THE FOOD YOU BOUGHT JUST DIDN'T LAST AND YOU DIDN'T HAVE MONEY TO GET MORE.: NEVER TRUE

## 2023-08-03 SDOH — ECONOMIC STABILITY: FOOD INSECURITY: WITHIN THE PAST 12 MONTHS, YOU WORRIED THAT YOUR FOOD WOULD RUN OUT BEFORE YOU GOT MONEY TO BUY MORE.: NEVER TRUE

## 2023-08-03 SDOH — ECONOMIC STABILITY: HOUSING INSECURITY
IN THE LAST 12 MONTHS, WAS THERE A TIME WHEN YOU DID NOT HAVE A STEADY PLACE TO SLEEP OR SLEPT IN A SHELTER (INCLUDING NOW)?: NO

## 2023-08-03 SDOH — ECONOMIC STABILITY: INCOME INSECURITY: HOW HARD IS IT FOR YOU TO PAY FOR THE VERY BASICS LIKE FOOD, HOUSING, MEDICAL CARE, AND HEATING?: NOT HARD AT ALL

## 2023-08-03 NOTE — PROGRESS NOTES
Name: Lajuanda Burkitt  YOB: 1970  Gender: female  MRN: [id number]    CC:   Chief Complaint   Patient presents with    Shoulder Pain     Right shoulder pain    Right shoulder pain    HPI:  This patient presents with a 1 month history of Right shoulder pain. The patient had a left pop while falling. She states that her daughter on her arm to try to help she is autistic and sometimes does not know when to stop. The patient notes pain located in the anterior, posterior and lateral aspect of the shoulder. Notes pain is worse with going overhead. She is a occupational therapist at [employer]. Therefore, this has interfered with her work. She does state some popping and some tingling that goes down into the hand. She does see another provider for her cervical spine. Does note interference with sleep. The patient previously had a shoulder scope with Dr. Nimesh Whipple. She has been doing rest ice and therapy.     Allergies   Allergen Reactions    Ciprofloxacin Rash    Sulfa Antibiotics Rash     Past Medical History:   Diagnosis Date    Adrenal disorder, other 2007    adrenal fatigue    Allergic rhinitis     Bilateral Renal cysts on CT 5/26/23 6/14/2023    Bone spur of foot     right foot     Cancer ([address]) 11/14/2017    Atypical skin    Chest pain     Chronic pain     lower back    Cough     Dyspepsia     Elevated troponin     Environmental allergies     GERD (gastroesophageal reflux disease)     Hepatic steatosis noted on CT 5/26/23 6/14/2023    History of positive PPD 1992    History of renal stone 4/1/2013    Insomnia     Lumbar degenerative disc disease 4/1/2013    Nausea & vomiting     Obesity     MARKO (obstructive sleep apnea) 5/20/2020    Pancreatitis 06/21/2020    Snores     SOB (shortness of breath)     Sore throat     Spastic bladder 2008    SVT (supraventricular tachycardia) ([address])     Thromboembolus ([address])     \" possible DVT after IVF procedure and loss of pregnancy \"     Urge incontinence

## 2023-08-03 NOTE — PROGRESS NOTES
tablet Take 1 tablet by mouth      ofloxacin (OCUFLOX) 0.3 % solution 1 drop as needed       No current facility-administered medications for this visit. Return in about 1 year (around 8/3/2024) for CPX. Any new medications were explained today including any potential drug interactions or significant side effects. The patient's questions in regards to this were answered today. Dictated using voice recognition software.   Proofread, but unrecognized errors may exist. 1 person assist/verbal cues/pt required increased physical assistance to help perform transfer/to safely lower to a sitting position; verbal cues re proper sequence + proper hand placement in prep to perform transfer

## 2023-08-04 LAB
EST. AVERAGE GLUCOSE BLD GHB EST-MCNC: 108 MG/DL
HBA1C MFR BLD: 5.4 % (ref 4.8–5.6)

## 2023-08-15 ENCOUNTER — OFFICE VISIT (OUTPATIENT)
Dept: ORTHOPEDIC SURGERY | Age: 53
End: 2023-08-15
Payer: COMMERCIAL

## 2023-08-15 DIAGNOSIS — M75.101 TEAR OF RIGHT ROTATOR CUFF, UNSPECIFIED TEAR EXTENT, UNSPECIFIED WHETHER TRAUMATIC: Primary | ICD-10-CM

## 2023-08-15 PROCEDURE — 3017F COLORECTAL CA SCREEN DOC REV: CPT | Performed by: ORTHOPAEDIC SURGERY

## 2023-08-15 PROCEDURE — 1036F TOBACCO NON-USER: CPT | Performed by: ORTHOPAEDIC SURGERY

## 2023-08-15 PROCEDURE — G8417 CALC BMI ABV UP PARAM F/U: HCPCS | Performed by: ORTHOPAEDIC SURGERY

## 2023-08-15 PROCEDURE — G8427 DOCREV CUR MEDS BY ELIG CLIN: HCPCS | Performed by: ORTHOPAEDIC SURGERY

## 2023-08-15 PROCEDURE — 20610 DRAIN/INJ JOINT/BURSA W/O US: CPT | Performed by: ORTHOPAEDIC SURGERY

## 2023-08-15 PROCEDURE — 99214 OFFICE O/P EST MOD 30 MIN: CPT | Performed by: ORTHOPAEDIC SURGERY

## 2023-08-15 RX ORDER — METHYLPREDNISOLONE ACETATE 40 MG/ML
80 INJECTION, SUSPENSION INTRA-ARTICULAR; INTRALESIONAL; INTRAMUSCULAR; SOFT TISSUE ONCE
Status: COMPLETED | OUTPATIENT
Start: 2023-08-15 | End: 2023-08-15

## 2023-08-15 RX ADMIN — METHYLPREDNISOLONE ACETATE 80 MG: 40 INJECTION, SUSPENSION INTRA-ARTICULAR; INTRALESIONAL; INTRAMUSCULAR; SOFT TISSUE at 09:16

## 2023-08-15 NOTE — PROGRESS NOTES
measuring approximately 8 mm in width  without tendon retraction. There is also infraspinatus tendinosis without  evidence of tear. The teres minor and subscapularis tendons are also intact. There is complete fatty infiltration of the teres minor muscle., Muscle bulk and  signal intensity are preserved. Biceps Tendon and Labrum: The long head biceps tendon is intact. There is  intrasubstance signal along the superior labral zones suggesting SLAP tear. Articular Cartilage/Bones: No focal chondral defect. No evidence of acute  fracture or aggressive osseous lesion. Other: No evidence of mass effect in the region of the quadrilateral space or  suprascapular nerve. IMPRESSION:  1. Supraspinatus and infraspinatus tendinosis with an 8 mm low-grade  intrasubstance supraspinatus footprint tear without retraction. 2. Severe AC joint arthrosis with hypertrophy. 3. Signal abnormality along the superior labrum suggesting SLAP tear. 4. Complete fatty infiltration of the teres minor muscle. Independent review of the right shoulder is performed today. There is a small less than a centimeter intrasubstance signal change that there pointing out on image 9 of the coronal series. AC arthritis is noted. Some widening of the intra-articular aspect of the biceps tendon. Agree the teres minor is not really well visualized    Assessment:     ICD-10-CM    1. Tear of right rotator cuff, unspecified tear extent, unspecified whether traumatic  M75.101 Ambulatory referral to Neurology     methylPREDNISolone acetate (DEPO-MEDROL) injection 80 mg     DRAIN/INJECT LARGE JOINT/BURSA     Ambulatory referral to Physical Therapy           Plan: I had a long discussion with the patient regarding the natural history of the problem, as well as treatment options. Discussed the MRI. Recommend conservative care at this point as I see nothing that is significantly fixable.   Would recommend trial of injection if willing and physical

## 2023-08-22 DIAGNOSIS — R06.2 WHEEZE: ICD-10-CM

## 2023-08-22 RX ORDER — FLUTICASONE PROPIONATE 44 UG/1
2 AEROSOL, METERED RESPIRATORY (INHALATION) 2 TIMES DAILY
Qty: 3 EACH | Refills: 3
Start: 2023-08-22

## 2023-08-28 ENCOUNTER — APPOINTMENT (RX ONLY)
Dept: URBAN - METROPOLITAN AREA CLINIC 23 | Facility: CLINIC | Age: 53
Setting detail: DERMATOLOGY
End: 2023-08-28

## 2023-08-28 DIAGNOSIS — L738 OTHER SPECIFIED DISEASES OF HAIR AND HAIR FOLLICLES: ICD-10-CM

## 2023-08-28 DIAGNOSIS — Z87.2 PERSONAL HISTORY OF DISEASES OF THE SKIN AND SUBCUTANEOUS TISSUE: ICD-10-CM

## 2023-08-28 DIAGNOSIS — Z85.828 PERSONAL HISTORY OF OTHER MALIGNANT NEOPLASM OF SKIN: ICD-10-CM

## 2023-08-28 DIAGNOSIS — L82.1 OTHER SEBORRHEIC KERATOSIS: ICD-10-CM

## 2023-08-28 DIAGNOSIS — L73.9 FOLLICULAR DISORDER, UNSPECIFIED: ICD-10-CM

## 2023-08-28 DIAGNOSIS — L72.8 OTHER FOLLICULAR CYSTS OF THE SKIN AND SUBCUTANEOUS TISSUE: ICD-10-CM

## 2023-08-28 DIAGNOSIS — D18.0 HEMANGIOMA: ICD-10-CM

## 2023-08-28 DIAGNOSIS — L91.8 OTHER HYPERTROPHIC DISORDERS OF THE SKIN: ICD-10-CM

## 2023-08-28 DIAGNOSIS — Z71.89 OTHER SPECIFIED COUNSELING: ICD-10-CM

## 2023-08-28 DIAGNOSIS — D22 MELANOCYTIC NEVI: ICD-10-CM

## 2023-08-28 DIAGNOSIS — L663 OTHER SPECIFIED DISEASES OF HAIR AND HAIR FOLLICLES: ICD-10-CM

## 2023-08-28 PROBLEM — D22.72 MELANOCYTIC NEVI OF LEFT LOWER LIMB, INCLUDING HIP: Status: ACTIVE | Noted: 2023-08-28

## 2023-08-28 PROBLEM — D22.71 MELANOCYTIC NEVI OF RIGHT LOWER LIMB, INCLUDING HIP: Status: ACTIVE | Noted: 2023-08-28

## 2023-08-28 PROBLEM — L02.02 FURUNCLE OF FACE: Status: ACTIVE | Noted: 2023-08-28

## 2023-08-28 PROBLEM — D22.5 MELANOCYTIC NEVI OF TRUNK: Status: ACTIVE | Noted: 2023-08-28

## 2023-08-28 PROBLEM — D22.62 MELANOCYTIC NEVI OF LEFT UPPER LIMB, INCLUDING SHOULDER: Status: ACTIVE | Noted: 2023-08-28

## 2023-08-28 PROBLEM — D22.61 MELANOCYTIC NEVI OF RIGHT UPPER LIMB, INCLUDING SHOULDER: Status: ACTIVE | Noted: 2023-08-28

## 2023-08-28 PROBLEM — D18.01 HEMANGIOMA OF SKIN AND SUBCUTANEOUS TISSUE: Status: ACTIVE | Noted: 2023-08-28

## 2023-08-28 PROCEDURE — 99213 OFFICE O/P EST LOW 20 MIN: CPT

## 2023-08-28 PROCEDURE — ? COUNSELING

## 2023-08-28 PROCEDURE — ? ADDITIONAL NOTES

## 2023-08-28 PROCEDURE — ? DEFER

## 2023-08-28 ASSESSMENT — LOCATION DETAILED DESCRIPTION DERM
LOCATION DETAILED: LEFT PROXIMAL POSTERIOR UPPER ARM
LOCATION DETAILED: RIGHT ULNAR DORSAL HAND
LOCATION DETAILED: LEFT PROXIMAL PRETIBIAL REGION
LOCATION DETAILED: RIGHT LATERAL ABDOMEN
LOCATION DETAILED: LEFT DISTAL POSTERIOR UPPER ARM
LOCATION DETAILED: LEFT INFERIOR MEDIAL UPPER BACK
LOCATION DETAILED: RIGHT POPLITEAL SKIN
LOCATION DETAILED: NASAL DORSUM
LOCATION DETAILED: RIGHT MEDIAL UPPER BACK
LOCATION DETAILED: LEFT MID-UPPER BACK
LOCATION DETAILED: RIGHT INFERIOR LATERAL MALAR CHEEK
LOCATION DETAILED: LEFT ANTERIOR DISTAL UPPER ARM
LOCATION DETAILED: RIGHT PROXIMAL PRETIBIAL REGION
LOCATION DETAILED: RIGHT DISTAL POSTERIOR UPPER ARM
LOCATION DETAILED: LEFT CLAVICULAR NECK
LOCATION DETAILED: LEFT INFERIOR TEMPLE
LOCATION DETAILED: LEFT RADIAL DORSAL HAND
LOCATION DETAILED: RIGHT SUPERIOR LATERAL MALAR CHEEK
LOCATION DETAILED: RIGHT SUPERIOR UPPER BACK
LOCATION DETAILED: MIDDLE STERNUM
LOCATION DETAILED: LEFT DISTAL DORSAL FOREARM
LOCATION DETAILED: RIGHT DISTAL PRETIBIAL REGION
LOCATION DETAILED: RIGHT CLAVICULAR SKIN
LOCATION DETAILED: PERIUMBILICAL SKIN
LOCATION DETAILED: RIGHT DISTAL DORSAL FOREARM
LOCATION DETAILED: LEFT ANTERIOR DISTAL THIGH

## 2023-08-28 ASSESSMENT — LOCATION SIMPLE DESCRIPTION DERM
LOCATION SIMPLE: RIGHT POPLITEAL SKIN
LOCATION SIMPLE: RIGHT PRETIBIAL REGION
LOCATION SIMPLE: LEFT POSTERIOR UPPER ARM
LOCATION SIMPLE: LEFT HAND
LOCATION SIMPLE: NOSE
LOCATION SIMPLE: LEFT UPPER ARM
LOCATION SIMPLE: LEFT TEMPLE
LOCATION SIMPLE: LEFT FOREARM
LOCATION SIMPLE: LEFT PRETIBIAL REGION
LOCATION SIMPLE: LEFT ANTERIOR NECK
LOCATION SIMPLE: LEFT THIGH
LOCATION SIMPLE: RIGHT CLAVICULAR SKIN
LOCATION SIMPLE: RIGHT HAND
LOCATION SIMPLE: RIGHT FOREARM
LOCATION SIMPLE: ABDOMEN
LOCATION SIMPLE: RIGHT POSTERIOR UPPER ARM
LOCATION SIMPLE: RIGHT CHEEK
LOCATION SIMPLE: RIGHT UPPER BACK
LOCATION SIMPLE: LEFT UPPER BACK
LOCATION SIMPLE: CHEST

## 2023-08-28 ASSESSMENT — LOCATION ZONE DERM
LOCATION ZONE: FACE
LOCATION ZONE: HAND
LOCATION ZONE: TRUNK
LOCATION ZONE: NOSE
LOCATION ZONE: LEG
LOCATION ZONE: ARM
LOCATION ZONE: NECK

## 2023-08-28 NOTE — PROCEDURE: ADDITIONAL NOTES
Detail Level: Simple
Additional Notes: Offered to send in clindamycin lotion but she states it’s not a bothersome so will hold on sending.
Render Risk Assessment In Note?: no

## 2023-08-28 NOTE — PROCEDURE: DEFER
Procedure To Be Performed At Next Visit: Excision
Introduction Text (Please End With A Colon): The following procedure was deferred:
Size Of Lesion In Cm (Optional): 0
Scheduling Instructions (Optional): 30min slot
Detail Level: Detailed

## 2023-09-11 ENCOUNTER — APPOINTMENT (RX ONLY)
Dept: URBAN - METROPOLITAN AREA CLINIC 23 | Facility: CLINIC | Age: 53
Setting detail: DERMATOLOGY
End: 2023-09-11

## 2023-09-11 DIAGNOSIS — L72.8 OTHER FOLLICULAR CYSTS OF THE SKIN AND SUBCUTANEOUS TISSUE: ICD-10-CM

## 2023-09-11 PROCEDURE — 12032 INTMD RPR S/A/T/EXT 2.6-7.5: CPT

## 2023-09-11 PROCEDURE — ? EXCISION

## 2023-09-11 PROCEDURE — 11403 EXC TR-EXT B9+MARG 2.1-3CM: CPT

## 2023-09-11 ASSESSMENT — LOCATION DETAILED DESCRIPTION DERM: LOCATION DETAILED: LEFT MID-UPPER BACK

## 2023-09-11 ASSESSMENT — LOCATION ZONE DERM: LOCATION ZONE: TRUNK

## 2023-09-11 ASSESSMENT — LOCATION SIMPLE DESCRIPTION DERM: LOCATION SIMPLE: LEFT UPPER BACK

## 2023-09-11 NOTE — PROCEDURE: EXCISION
Medical Necessity Information: It is in your best interest to select a reason for this procedure from the list below. All of these items fulfill various CMS LCD requirements except lesion extends to a margin.
Include Z78.9 (Other Specified Conditions Influencing Health Status) As An Associated Diagnosis?: No
Medical Necessity Clause: This procedure was medically necessary because the lesion that was treated was:
Lab: [id number]
Lab Facility: [id number]
Size Of Lesion In Cm: 2
X Size Of Lesion In Cm (Optional): 0
Size Of Margin In Cm: 0.2
Excision Method: Fusiform
Saucerization Depth: dermis and superficial adipose tissue
Repair Type: Intermediate
Intermediate / Complex Repair - Final Wound Length In Cm: 5.2
Suturegard Retention Suture: 2-0 Nylon
Retention Suture Bite Size: 3 mm
Length To Time In Minutes Device Was In Place: 10
Number Of Hemigard Strips Per Side: 1
Undermining Type: Entire Wound
Debridement Text: The wound edges were debrided prior to proceeding with the closure to facilitate wound healing.
Helical Rim Text: The closure involved the helical rim.
Vermilion Border Text: The closure involved the vermilion border.
Nostril Rim Text: The closure involved the nostril rim.
Retention Suture Text: Retention sutures were placed to support the closure and prevent dehiscence.
Suture Removal: 14 days
Epidermal Closure Graft Donor Site (Optional): simple interrupted
Graft Donor Site Bandage (Optional-Leave Blank If You Don't Want In Note): Steri-strips and a pressure bandage were applied to the donor site.
Detail Level: Detailed
Excision Depth: adipose tissue
Scalpel Size: 15 blade
Anesthesia Type: 1% lidocaine with epinephrine
Additional Anesthesia Volume In Cc: 6
Hemostasis: Electrocautery
Estimated Blood Loss (Cc): minimal
Anesthesia Type: 1% lidocaine with epinephrine and a 1:10 solution of 8.4% sodium bicarbonate
Deep Sutures: 4-0 PDO
Dermal Closure: buried vertical mattress
Epidermal Closure: running
Wound Care: Waterproof dressing
Dressing: pressure dressing with telfa
Suturegard Intro: Intraoperative tissue expansion was performed, utilizing the SUTUREGARD device, in order to reduce wound tension.
Suturegard Body: The suture ends were repeatedly re-tightened and re-clamped to achieve the desired tissue expansion.
Hemigard Intro: Due to skin fragility and wound tension, it was decided to use HEMIGARD adhesive retention suture devices to permit a linear closure. The skin was cleaned and dried for a 6cm distance away from the wound. Excessive hair, if present, was removed to allow for adhesion.
Hemigard Postcare Instructions: The HEMIGARD strips are to remain completely dry for at least 5-7 days.
Complex Repair Preamble Text (Leave Blank If You Do Not Want): Extensive wide undermining was performed.
Intermediate Repair Preamble Text (Leave Blank If You Do Not Want): Undermining was performed with blunt dissection.
Fusiform Excision Additional Text (Leave Blank If You Do Not Want): The margin was drawn around the clinically apparent lesion.  A fusiform shape was then drawn on the skin incorporating the lesion and margins.  Incisions were then made along these lines to the appropriate tissue plane and the lesion was extirpated.
Eliptical Excision Additional Text (Leave Blank If You Do Not Want): The margin was drawn around the clinically apparent lesion.  An elliptical shape was then drawn on the skin incorporating the lesion and margins.  Incisions were then made along these lines to the appropriate tissue plane and the lesion was extirpated.
Saucerization Excision Additional Text (Leave Blank If You Do Not Want): The margin was drawn around the clinically apparent lesion.  Incisions were then made along these lines, in a tangential fashion, to the appropriate tissue plane and the lesion was extirpated.
Slit Excision Additional Text (Leave Blank If You Do Not Want): A linear line was drawn on the skin overlying the lesion. An incision was made slowly until the lesion was visualized.  Once visualized, the lesion was removed with blunt dissection.
Excisional Biopsy Additional Text (Leave Blank If You Do Not Want): The margin was drawn around the clinically apparent lesion. An elliptical shape was then drawn on the skin incorporating the lesion and margins.  Incisions were then made along these lines to the appropriate tissue plane and the lesion was extirpated.
Perilesional Excision Additional Text (Leave Blank If You Do Not Want): The margin was drawn around the clinically apparent lesion. Incisions were then made along these lines to the appropriate tissue plane and the lesion was extirpated.
Repair Performed By Another Provider Text (Leave Blank If You Do Not Want): After the tissue was excised the defect was repaired by another provider.
No Repair - Repaired With Adjacent Surgical Defect Text (Leave Blank If You Do Not Want): After the excision the defect was repaired concurrently with another surgical defect which was in close approximation.
Adjacent Tissue Transfer Text: The defect edges were debeveled with a #15 scalpel blade.  Given the location of the defect and the proximity to free margins an adjacent tissue transfer was deemed most appropriate.  Using a sterile surgical marker, an appropriate flap was drawn incorporating the defect and placing the expected incisions within the relaxed skin tension lines where possible.    The area thus outlined was incised deep to adipose tissue with a #15 scalpel blade.  The skin margins were undermined to an appropriate distance in all directions utilizing iris scissors.
Advancement Flap (Single) Text: The defect edges were debeveled with a #15 scalpel blade.  Given the location of the defect and the proximity to free margins a single advancement flap was deemed most appropriate.  Using a sterile surgical marker, an appropriate advancement flap was drawn incorporating the defect and placing the expected incisions within the relaxed skin tension lines where possible.    The area thus outlined was incised deep to adipose tissue with a #15 scalpel blade.  The skin margins were undermined to an appropriate distance in all directions utilizing iris scissors.
Advancement Flap (Double) Text: The defect edges were debeveled with a #15 scalpel blade.  Given the location of the defect and the proximity to free margins a double advancement flap was deemed most appropriate.  Using a sterile surgical marker, the appropriate advancement flaps were drawn incorporating the defect and placing the expected incisions within the relaxed skin tension lines where possible.    The area thus outlined was incised deep to adipose tissue with a #15 scalpel blade.  The skin margins were undermined to an appropriate distance in all directions utilizing iris scissors.
Burow's Advancement Flap Text: The defect edges were debeveled with a #15 scalpel blade.  Given the location of the defect and the proximity to free margins a Burow's advancement flap was deemed most appropriate.  Using a sterile surgical marker, the appropriate advancement flap was drawn incorporating the defect and placing the expected incisions within the relaxed skin tension lines where possible.    The area thus outlined was incised deep to adipose tissue with a #15 scalpel blade.  The skin margins were undermined to an appropriate distance in all directions utilizing iris scissors.
Chonodrocutaneous Helical Advancement Flap Text: The defect edges were debeveled with a #15 scalpel blade.  Given the location of the defect and the proximity to free margins a chondrocutaneous helical advancement flap was deemed most appropriate.  Using a sterile surgical marker, the appropriate advancement flap was drawn incorporating the defect and placing the expected incisions within the relaxed skin tension lines where possible.    The area thus outlined was incised deep to adipose tissue with a #15 scalpel blade.  The skin margins were undermined to an appropriate distance in all directions utilizing iris scissors.
Crescentic Advancement Flap Text: The defect edges were debeveled with a #15 scalpel blade.  Given the location of the defect and the proximity to free margins a crescentic advancement flap was deemed most appropriate.  Using a sterile surgical marker, the appropriate advancement flap was drawn incorporating the defect and placing the expected incisions within the relaxed skin tension lines where possible.    The area thus outlined was incised deep to adipose tissue with a #15 scalpel blade.  The skin margins were undermined to an appropriate distance in all directions utilizing iris scissors.
A-T Advancement Flap Text: The defect edges were debeveled with a #15 scalpel blade.  Given the location of the defect, shape of the defect and the proximity to free margins an A-T advancement flap was deemed most appropriate.  Using a sterile surgical marker, an appropriate advancement flap was drawn incorporating the defect and placing the expected incisions within the relaxed skin tension lines where possible.    The area thus outlined was incised deep to adipose tissue with a #15 scalpel blade.  The skin margins were undermined to an appropriate distance in all directions utilizing iris scissors.
O-T Advancement Flap Text: The defect edges were debeveled with a #15 scalpel blade.  Given the location of the defect, shape of the defect and the proximity to free margins an O-T advancement flap was deemed most appropriate.  Using a sterile surgical marker, an appropriate advancement flap was drawn incorporating the defect and placing the expected incisions within the relaxed skin tension lines where possible.    The area thus outlined was incised deep to adipose tissue with a #15 scalpel blade.  The skin margins were undermined to an appropriate distance in all directions utilizing iris scissors.
O-L Flap Text: The defect edges were debeveled with a #15 scalpel blade.  Given the location of the defect, shape of the defect and the proximity to free margins an O-L flap was deemed most appropriate.  Using a sterile surgical marker, an appropriate advancement flap was drawn incorporating the defect and placing the expected incisions within the relaxed skin tension lines where possible.    The area thus outlined was incised deep to adipose tissue with a #15 scalpel blade.  The skin margins were undermined to an appropriate distance in all directions utilizing iris scissors.
O-Z Flap Text: The defect edges were debeveled with a #15 scalpel blade.  Given the location of the defect, shape of the defect and the proximity to free margins an O-Z flap was deemed most appropriate.  Using a sterile surgical marker, an appropriate transposition flap was drawn incorporating the defect and placing the expected incisions within the relaxed skin tension lines where possible. The area thus outlined was incised deep to adipose tissue with a #15 scalpel blade.  The skin margins were undermined to an appropriate distance in all directions utilizing iris scissors.
Double O-Z Flap Text: The defect edges were debeveled with a #15 scalpel blade.  Given the location of the defect, shape of the defect and the proximity to free margins a Double O-Z flap was deemed most appropriate.  Using a sterile surgical marker, an appropriate transposition flap was drawn incorporating the defect and placing the expected incisions within the relaxed skin tension lines where possible. The area thus outlined was incised deep to adipose tissue with a #15 scalpel blade.  The skin margins were undermined to an appropriate distance in all directions utilizing iris scissors.
V-Y Flap Text: The defect edges were debeveled with a #15 scalpel blade.  Given the location of the defect, shape of the defect and the proximity to free margins a V-Y flap was deemed most appropriate.  Using a sterile surgical marker, an appropriate advancement flap was drawn incorporating the defect and placing the expected incisions within the relaxed skin tension lines where possible.    The area thus outlined was incised deep to adipose tissue with a #15 scalpel blade.  The skin margins were undermined to an appropriate distance in all directions utilizing iris scissors.
Mercedes Flap Text: The defect edges were debeveled with a #15 scalpel blade.  Given the location of the defect, shape of the defect and the proximity to free margins a Mercedes flap was deemed most appropriate.  Using a sterile surgical marker, an appropriate advancement flap was drawn incorporating the defect and placing the expected incisions within the relaxed skin tension lines where possible. The area thus outlined was incised deep to adipose tissue with a #15 scalpel blade.  The skin margins were undermined to an appropriate distance in all directions utilizing iris scissors.
Modified Advancement Flap Text: The defect edges were debeveled with a #15 scalpel blade.  Given the location of the defect, shape of the defect and the proximity to free margins a modified advancement flap was deemed most appropriate.  Using a sterile surgical marker, an appropriate advancement flap was drawn incorporating the defect and placing the expected incisions within the relaxed skin tension lines where possible.    The area thus outlined was incised deep to adipose tissue with a #15 scalpel blade.  The skin margins were undermined to an appropriate distance in all directions utilizing iris scissors.
Mucosal Advancement Flap Text: Given the location of the defect, shape of the defect and the proximity to free margins a mucosal advancement flap was deemed most appropriate. Incisions were made with a 15 blade scalpel in the appropriate fashion along the cutaneous vermillion border and the mucosal lip. The remaining actinically damaged mucosal tissue was excised.  The mucosal advancement flap was then elevated to the gingival sulcus with care taken to preserve the neurovascular structures and advanced into the primary defect. Care was taken to ensure that precise realignment of the vermilion border was achieved.
Hatchet Flap Text: The defect edges were debeveled with a #15 scalpel blade.  Given the location of the defect, shape of the defect and the proximity to free margins a hatchet flap was deemed most appropriate.  Using a sterile surgical marker, an appropriate hatchet flap was drawn incorporating the defect and placing the expected incisions within the relaxed skin tension lines where possible.    The area thus outlined was incised deep to adipose tissue with a #15 scalpel blade.  The skin margins were undermined to an appropriate distance in all directions utilizing iris scissors.
Rotation Flap Text: The defect edges were debeveled with a #15 scalpel blade.  Given the location of the defect, shape of the defect and the proximity to free margins a rotation flap was deemed most appropriate.  Using a sterile surgical marker, an appropriate rotation flap was drawn incorporating the defect and placing the expected incisions within the relaxed skin tension lines where possible.    The area thus outlined was incised deep to adipose tissue with a #15 scalpel blade.  The skin margins were undermined to an appropriate distance in all directions utilizing iris scissors.
Bilateral Rotation Flap Text: The defect edges were debeveled with a #15 scalpel blade. Given the location of the defect, shape of the defect and the proximity to free margins a bilateral rotation flap was deemed most appropriate. Using a sterile surgical marker, an appropriate rotation flap was drawn incorporating the defect and placing the expected incisions within the relaxed skin tension lines where possible. The area thus outlined was incised deep to adipose tissue with a #15 scalpel blade. The skin margins were undermined to an appropriate distance in all directions utilizing iris scissors. Following this, the designed flap was carried over into the primary defect and sutured into place.
Spiral Flap Text: The defect edges were debeveled with a #15 scalpel blade.  Given the location of the defect, shape of the defect and the proximity to free margins a spiral flap was deemed most appropriate.  Using a sterile surgical marker, an appropriate rotation flap was drawn incorporating the defect and placing the expected incisions within the relaxed skin tension lines where possible. The area thus outlined was incised deep to adipose tissue with a #15 scalpel blade.  The skin margins were undermined to an appropriate distance in all directions utilizing iris scissors.
Staged Advancement Flap Text: The defect edges were debeveled with a #15 scalpel blade.  Given the location of the defect, shape of the defect and the proximity to free margins a staged advancement flap was deemed most appropriate.  Using a sterile surgical marker, an appropriate advancement flap was drawn incorporating the defect and placing the expected incisions within the relaxed skin tension lines where possible. The area thus outlined was incised deep to adipose tissue with a #15 scalpel blade.  The skin margins were undermined to an appropriate distance in all directions utilizing iris scissors.
Star Wedge Flap Text: The defect edges were debeveled with a #15 scalpel blade.  Given the location of the defect, shape of the defect and the proximity to free margins a star wedge flap was deemed most appropriate.  Using a sterile surgical marker, an appropriate rotation flap was drawn incorporating the defect and placing the expected incisions within the relaxed skin tension lines where possible. The area thus outlined was incised deep to adipose tissue with a #15 scalpel blade.  The skin margins were undermined to an appropriate distance in all directions utilizing iris scissors.
Transposition Flap Text: The defect edges were debeveled with a #15 scalpel blade.  Given the location of the defect and the proximity to free margins a transposition flap was deemed most appropriate.  Using a sterile surgical marker, an appropriate transposition flap was drawn incorporating the defect.    The area thus outlined was incised deep to adipose tissue with a #15 scalpel blade.  The skin margins were undermined to an appropriate distance in all directions utilizing iris scissors.
Muscle Hinge Flap Text: The defect edges were debeveled with a #15 scalpel blade.  Given the size, depth and location of the defect and the proximity to free margins a muscle hinge flap was deemed most appropriate.  Using a sterile surgical marker, an appropriate hinge flap was drawn incorporating the defect. The area thus outlined was incised with a #15 scalpel blade.  The skin margins were undermined to an appropriate distance in all directions utilizing iris scissors.
Mustarde Flap Text: The defect edges were debeveled with a #15 scalpel blade.  Given the size, depth and location of the defect and the proximity to free margins a Mustarde flap was deemed most appropriate.  Using a sterile surgical marker, an appropriate flap was drawn incorporating the defect. The area thus outlined was incised with a #15 scalpel blade.  The skin margins were undermined to an appropriate distance in all directions utilizing iris scissors.
Nasal Turnover Hinge Flap Text: The defect edges were debeveled with a #15 scalpel blade.  Given the size, depth, location of the defect and the defect being full thickness a nasal turnover hinge flap was deemed most appropriate.  Using a sterile surgical marker, an appropriate hinge flap was drawn incorporating the defect. The area thus outlined was incised with a #15 scalpel blade. The flap was designed to recreate the nasal mucosal lining and the alar rim. The skin margins were undermined to an appropriate distance in all directions utilizing iris scissors.
Nasalis-Muscle-Based Myocutaneous Island Pedicle Flap Text: Using a #15 blade, an incision was made around the donor flap to the level of the nasalis muscle. Wide lateral undermining was then performed in both the subcutaneous plane above the nasalis muscle, and in a submuscular plane just above periosteum. This allowed the formation of a free nasalis muscle axial pedicle (based on the angular artery) which was still attached to the actual cutaneous flap, increasing its mobility and vascular viability. Hemostasis was obtained with pinpoint electrocoagulation. The flap was mobilized into position and the pivotal anchor points positioned and stabilized with buried interrupted sutures. Subcutaneous and dermal tissues were closed in a multilayered fashion with sutures. Tissue redundancies were excised, and the epidermal edges were apposed without significant tension and sutured with sutures.
Orbicularis Oris Muscle Flap Text: The defect edges were debeveled with a #15 scalpel blade.  Given that the defect affected the competency of the oral sphincter an orbicularis oris muscle flap was deemed most appropriate to restore this competency and normal muscle function.  Using a sterile surgical marker, an appropriate flap was drawn incorporating the defect. The area thus outlined was incised with a #15 scalpel blade.
Melolabial Transposition Flap Text: The defect edges were debeveled with a #15 scalpel blade.  Given the location of the defect and the proximity to free margins a melolabial flap was deemed most appropriate.  Using a sterile surgical marker, an appropriate melolabial transposition flap was drawn incorporating the defect.    The area thus outlined was incised deep to adipose tissue with a #15 scalpel blade.  The skin margins were undermined to an appropriate distance in all directions utilizing iris scissors.
Rhombic Flap Text: The defect edges were debeveled with a #15 scalpel blade.  Given the location of the defect and the proximity to free margins a rhombic flap was deemed most appropriate.  Using a sterile surgical marker, an appropriate rhombic flap was drawn incorporating the defect.    The area thus outlined was incised deep to adipose tissue with a #15 scalpel blade.  The skin margins were undermined to an appropriate distance in all directions utilizing iris scissors.
Rhomboid Transposition Flap Text: The defect edges were debeveled with a #15 scalpel blade.  Given the location of the defect and the proximity to free margins a rhomboid transposition flap was deemed most appropriate.  Using a sterile surgical marker, an appropriate rhomboid flap was drawn incorporating the defect.    The area thus outlined was incised deep to adipose tissue with a #15 scalpel blade.  The skin margins were undermined to an appropriate distance in all directions utilizing iris scissors.
Bi-Rhombic Flap Text: The defect edges were debeveled with a #15 scalpel blade.  Given the location of the defect and the proximity to free margins a bi-rhombic flap was deemed most appropriate.  Using a sterile surgical marker, an appropriate rhombic flap was drawn incorporating the defect. The area thus outlined was incised deep to adipose tissue with a #15 scalpel blade.  The skin margins were undermined to an appropriate distance in all directions utilizing iris scissors.
Helical Rim Advancement Flap Text: The defect edges were debeveled with a #15 blade scalpel.  Given the location of the defect and the proximity to free margins (helical rim) a double helical rim advancement flap was deemed most appropriate.  Using a sterile surgical marker, the appropriate advancement flaps were drawn incorporating the defect and placing the expected incisions between the helical rim and antihelix where possible.  The area thus outlined was incised through and through with a #15 scalpel blade.  With a skin hook and iris scissors, the flaps were gently and sharply undermined and freed up.
Bilateral Helical Rim Advancement Flap Text: The defect edges were debeveled with a #15 blade scalpel.  Given the location of the defect and the proximity to free margins (helical rim) a bilateral helical rim advancement flap was deemed most appropriate.  Using a sterile surgical marker, the appropriate advancement flaps were drawn incorporating the defect and placing the expected incisions between the helical rim and antihelix where possible.  The area thus outlined was incised through and through with a #15 scalpel blade.  With a skin hook and iris scissors, the flaps were gently and sharply undermined and freed up.
Ear Star Wedge Flap Text: The defect edges were debeveled with a #15 blade scalpel.  Given the location of the defect and the proximity to free margins (helical rim) an ear star wedge flap was deemed most appropriate.  Using a sterile surgical marker, the appropriate flap was drawn incorporating the defect and placing the expected incisions between the helical rim and antihelix where possible.  The area thus outlined was incised through and through with a #15 scalpel blade.
Banner Transposition Flap Text: The defect edges were debeveled with a #15 scalpel blade.  Given the location of the defect and the proximity to free margins a Banner transposition flap was deemed most appropriate.  Using a sterile surgical marker, an appropriate flap drawn around the defect. The area thus outlined was incised deep to adipose tissue with a #15 scalpel blade.  The skin margins were undermined to an appropriate distance in all directions utilizing iris scissors.
Bilobed Flap Text: The defect edges were debeveled with a #15 scalpel blade.  Given the location of the defect and the proximity to free margins a bilobe flap was deemed most appropriate.  Using a sterile surgical marker, an appropriate bilobe flap drawn around the defect.    The area thus outlined was incised deep to adipose tissue with a #15 scalpel blade.  The skin margins were undermined to an appropriate distance in all directions utilizing iris scissors.
Bilobed Transposition Flap Text: The defect edges were debeveled with a #15 scalpel blade.  Given the location of the defect and the proximity to free margins a bilobed transposition flap was deemed most appropriate.  Using a sterile surgical marker, an appropriate bilobe flap drawn around the defect.    The area thus outlined was incised deep to adipose tissue with a #15 scalpel blade.  The skin margins were undermined to an appropriate distance in all directions utilizing iris scissors.
Trilobed Flap Text: The defect edges were debeveled with a #15 scalpel blade.  Given the location of the defect and the proximity to free margins a trilobed flap was deemed most appropriate.  Using a sterile surgical marker, an appropriate trilobed flap drawn around the defect.    The area thus outlined was incised deep to adipose tissue with a #15 scalpel blade.  The skin margins were undermined to an appropriate distance in all directions utilizing iris scissors.
Dorsal Nasal Flap Text: The defect edges were debeveled with a #15 scalpel blade.  Given the location of the defect and the proximity to free margins a dorsal nasal flap was deemed most appropriate.  Using a sterile surgical marker, an appropriate dorsal nasal flap was drawn around the defect.    The area thus outlined was incised deep to adipose tissue with a #15 scalpel blade.  The skin margins were undermined to an appropriate distance in all directions utilizing iris scissors.
Island Pedicle Flap Text: The defect edges were debeveled with a #15 scalpel blade.  Given the location of the defect, shape of the defect and the proximity to free margins an island pedicle advancement flap was deemed most appropriate.  Using a sterile surgical marker, an appropriate advancement flap was drawn incorporating the defect, outlining the appropriate donor tissue and placing the expected incisions within the relaxed skin tension lines where possible.    The area thus outlined was incised deep to adipose tissue with a #15 scalpel blade.  The skin margins were undermined to an appropriate distance in all directions around the primary defect and laterally outward around the island pedicle utilizing iris scissors.  There was minimal undermining beneath the pedicle flap.
Island Pedicle Flap With Canthal Suspension Text: The defect edges were debeveled with a #15 scalpel blade.  Given the location of the defect, shape of the defect and the proximity to free margins an island pedicle advancement flap was deemed most appropriate.  Using a sterile surgical marker, an appropriate advancement flap was drawn incorporating the defect, outlining the appropriate donor tissue and placing the expected incisions within the relaxed skin tension lines where possible. The area thus outlined was incised deep to adipose tissue with a #15 scalpel blade.  The skin margins were undermined to an appropriate distance in all directions around the primary defect and laterally outward around the island pedicle utilizing iris scissors.  There was minimal undermining beneath the pedicle flap. A suspension suture was placed in the canthal tendon to prevent tension and prevent ectropion.
Alar Island Pedicle Flap Text: The defect edges were debeveled with a #15 scalpel blade.  Given the location of the defect, shape of the defect and the proximity to the alar rim an island pedicle advancement flap was deemed most appropriate.  Using a sterile surgical marker, an appropriate advancement flap was drawn incorporating the defect, outlining the appropriate donor tissue and placing the expected incisions within the nasal ala running parallel to the alar rim. The area thus outlined was incised with a #15 scalpel blade.  The skin margins were undermined minimally to an appropriate distance in all directions around the primary defect and laterally outward around the island pedicle utilizing iris scissors.  There was minimal undermining beneath the pedicle flap.
Double Island Pedicle Flap Text: The defect edges were debeveled with a #15 scalpel blade.  Given the location of the defect, shape of the defect and the proximity to free margins a double island pedicle advancement flap was deemed most appropriate.  Using a sterile surgical marker, an appropriate advancement flap was drawn incorporating the defect, outlining the appropriate donor tissue and placing the expected incisions within the relaxed skin tension lines where possible.    The area thus outlined was incised deep to adipose tissue with a #15 scalpel blade.  The skin margins were undermined to an appropriate distance in all directions around the primary defect and laterally outward around the island pedicle utilizing iris scissors.  There was minimal undermining beneath the pedicle flap.
Island Pedicle Flap-Requiring Vessel Identification Text: The defect edges were debeveled with a #15 scalpel blade.  Given the location of the defect, shape of the defect and the proximity to free margins an island pedicle advancement flap was deemed most appropriate.  Using a sterile surgical marker, an appropriate advancement flap was drawn, based on the axial vessel mentioned above, incorporating the defect, outlining the appropriate donor tissue and placing the expected incisions within the relaxed skin tension lines where possible.    The area thus outlined was incised deep to adipose tissue with a #15 scalpel blade.  The skin margins were undermined to an appropriate distance in all directions around the primary defect and laterally outward around the island pedicle utilizing iris scissors.  There was minimal undermining beneath the pedicle flap.
Keystone Flap Text: The defect edges were debeveled with a #15 scalpel blade.  Given the location of the defect, shape of the defect a keystone flap was deemed most appropriate.  Using a sterile surgical marker, an appropriate keystone flap was drawn incorporating the defect, outlining the appropriate donor tissue and placing the expected incisions within the relaxed skin tension lines where possible. The area thus outlined was incised deep to adipose tissue with a #15 scalpel blade.  The skin margins were undermined to an appropriate distance in all directions around the primary defect and laterally outward around the flap utilizing iris scissors.
O-T Plasty Text: The defect edges were debeveled with a #15 scalpel blade.  Given the location of the defect, shape of the defect and the proximity to free margins an O-T plasty was deemed most appropriate.  Using a sterile surgical marker, an appropriate O-T plasty was drawn incorporating the defect and placing the expected incisions within the relaxed skin tension lines where possible.    The area thus outlined was incised deep to adipose tissue with a #15 scalpel blade.  The skin margins were undermined to an appropriate distance in all directions utilizing iris scissors.
O-Z Plasty Text: The defect edges were debeveled with a #15 scalpel blade.  Given the location of the defect, shape of the defect and the proximity to free margins an O-Z plasty (double transposition flap) was deemed most appropriate.  Using a sterile surgical marker, the appropriate transposition flaps were drawn incorporating the defect and placing the expected incisions within the relaxed skin tension lines where possible.    The area thus outlined was incised deep to adipose tissue with a #15 scalpel blade.  The skin margins were undermined to an appropriate distance in all directions utilizing iris scissors.  Hemostasis was achieved with electrocautery.  The flaps were then transposed into place, one clockwise and the other counterclockwise, and anchored with interrupted buried subcutaneous sutures.
Double O-Z Plasty Text: The defect edges were debeveled with a #15 scalpel blade.  Given the location of the defect, shape of the defect and the proximity to free margins a Double O-Z plasty (double transposition flap) was deemed most appropriate.  Using a sterile surgical marker, the appropriate transposition flaps were drawn incorporating the defect and placing the expected incisions within the relaxed skin tension lines where possible. The area thus outlined was incised deep to adipose tissue with a #15 scalpel blade.  The skin margins were undermined to an appropriate distance in all directions utilizing iris scissors.  Hemostasis was achieved with electrocautery.  The flaps were then transposed into place, one clockwise and the other counterclockwise, and anchored with interrupted buried subcutaneous sutures.
V-Y Plasty Text: The defect edges were debeveled with a #15 scalpel blade.  Given the location of the defect, shape of the defect and the proximity to free margins an V-Y advancement flap was deemed most appropriate.  Using a sterile surgical marker, an appropriate advancement flap was drawn incorporating the defect and placing the expected incisions within the relaxed skin tension lines where possible.    The area thus outlined was incised deep to adipose tissue with a #15 scalpel blade.  The skin margins were undermined to an appropriate distance in all directions utilizing iris scissors.
H Plasty Text: Given the location of the defect, shape of the defect and the proximity to free margins a H-plasty was deemed most appropriate for repair.  Using a sterile surgical marker, the appropriate advancement arms of the H-plasty were drawn incorporating the defect and placing the expected incisions within the relaxed skin tension lines where possible. The area thus outlined was incised deep to adipose tissue with a #15 scalpel blade. The skin margins were undermined to an appropriate distance in all directions utilizing iris scissors.  The opposing advancement arms were then advanced into place in opposite direction and anchored with interrupted buried subcutaneous sutures.
W Plasty Text: The lesion was extirpated to the level of the fat with a #15 scalpel blade.  Given the location of the defect, shape of the defect and the proximity to free margins a W-plasty was deemed most appropriate for repair.  Using a sterile surgical marker, the appropriate transposition arms of the W-plasty were drawn incorporating the defect and placing the expected incisions within the relaxed skin tension lines where possible.    The area thus outlined was incised deep to adipose tissue with a #15 scalpel blade.  The skin margins were undermined to an appropriate distance in all directions utilizing iris scissors.  The opposing transposition arms were then transposed into place in opposite direction and anchored with interrupted buried subcutaneous sutures.
Z Plasty Text: The lesion was extirpated to the level of the fat with a #15 scalpel blade.  Given the location of the defect, shape of the defect and the proximity to free margins a Z-plasty was deemed most appropriate for repair.  Using a sterile surgical marker, the appropriate transposition arms of the Z-plasty were drawn incorporating the defect and placing the expected incisions within the relaxed skin tension lines where possible.    The area thus outlined was incised deep to adipose tissue with a #15 scalpel blade.  The skin margins were undermined to an appropriate distance in all directions utilizing iris scissors.  The opposing transposition arms were then transposed into place in opposite direction and anchored with interrupted buried subcutaneous sutures.
Double Z Plasty Text: The lesion was extirpated to the level of the fat with a #15 scalpel blade. Given the location of the defect, shape of the defect and the proximity to free margins a double Z-plasty was deemed most appropriate for repair. Using a sterile surgical marker, the appropriate transposition arms of the double Z-plasty were drawn incorporating the defect and placing the expected incisions within the relaxed skin tension lines where possible. The area thus outlined was incised deep to adipose tissue with a #15 scalpel blade. The skin margins were undermined to an appropriate distance in all directions utilizing iris scissors. The opposing transposition arms were then transposed and carried over into place in opposite direction and anchored with interrupted buried subcutaneous sutures.
Zygomaticofacial Flap Text: Given the location of the defect, shape of the defect and the proximity to free margins a zygomaticofacial flap was deemed most appropriate for repair.  Using a sterile surgical marker, the appropriate flap was drawn incorporating the defect and placing the expected incisions within the relaxed skin tension lines where possible. The area thus outlined was incised deep to adipose tissue with a #15 scalpel blade with preservation of a vascular pedicle.  The skin margins were undermined to an appropriate distance in all directions utilizing iris scissors.  The flap was then placed into the defect and anchored with interrupted buried subcutaneous sutures.
Cheek Interpolation Flap Text: A decision was made to reconstruct the defect utilizing an interpolation axial flap and a staged reconstruction.  A telfa template was made of the defect.  This telfa template was then used to outline the Cheek Interpolation flap.  The donor area for the pedicle flap was then injected with anesthesia.  The flap was excised through the skin and subcutaneous tissue down to the layer of the underlying musculature.  The interpolation flap was carefully excised within this deep plane to maintain its blood supply.  The edges of the donor site were undermined.   The donor site was closed in a primary fashion.  The pedicle was then rotated into position and sutured.  Once the tube was sutured into place, adequate blood supply was confirmed with blanching and refill.  The pedicle was then wrapped with xeroform gauze and dressed appropriately with a telfa and gauze bandage to ensure continued blood supply and protect the attached pedicle.
Cheek-To-Nose Interpolation Flap Text: A decision was made to reconstruct the defect utilizing an interpolation axial flap and a staged reconstruction.  A telfa template was made of the defect.  This telfa template was then used to outline the Cheek-To-Nose Interpolation flap.  The donor area for the pedicle flap was then injected with anesthesia.  The flap was excised through the skin and subcutaneous tissue down to the layer of the underlying musculature.  The interpolation flap was carefully excised within this deep plane to maintain its blood supply.  The edges of the donor site were undermined.   The donor site was closed in a primary fashion.  The pedicle was then rotated into position and sutured.  Once the tube was sutured into place, adequate blood supply was confirmed with blanching and refill.  The pedicle was then wrapped with xeroform gauze and dressed appropriately with a telfa and gauze bandage to ensure continued blood supply and protect the attached pedicle.
Interpolation Flap Text: A decision was made to reconstruct the defect utilizing an interpolation axial flap and a staged reconstruction.  A telfa template was made of the defect.  This telfa template was then used to outline the interpolation flap.  The donor area for the pedicle flap was then injected with anesthesia.  The flap was excised through the skin and subcutaneous tissue down to the layer of the underlying musculature.  The interpolation flap was carefully excised within this deep plane to maintain its blood supply.  The edges of the donor site were undermined.   The donor site was closed in a primary fashion.  The pedicle was then rotated into position and sutured.  Once the tube was sutured into place, adequate blood supply was confirmed with blanching and refill.  The pedicle was then wrapped with xeroform gauze and dressed appropriately with a telfa and gauze bandage to ensure continued blood supply and protect the attached pedicle.
Melolabial Interpolation Flap Text: A decision was made to reconstruct the defect utilizing an interpolation axial flap and a staged reconstruction.  A telfa template was made of the defect.  This telfa template was then used to outline the melolabial interpolation flap.  The donor area for the pedicle flap was then injected with anesthesia.  The flap was excised through the skin and subcutaneous tissue down to the layer of the underlying musculature.  The pedicle flap was carefully excised within this deep plane to maintain its blood supply.  The edges of the donor site were undermined.   The donor site was closed in a primary fashion.  The pedicle was then rotated into position and sutured.  Once the tube was sutured into place, adequate blood supply was confirmed with blanching and refill.  The pedicle was then wrapped with xeroform gauze and dressed appropriately with a telfa and gauze bandage to ensure continued blood supply and protect the attached pedicle.
Mastoid Interpolation Flap Text: A decision was made to reconstruct the defect utilizing an interpolation axial flap and a staged reconstruction.  A telfa template was made of the defect.  This telfa template was then used to outline the mastoid interpolation flap.  The donor area for the pedicle flap was then injected with anesthesia.  The flap was excised through the skin and subcutaneous tissue down to the layer of the underlying musculature.  The pedicle flap was carefully excised within this deep plane to maintain its blood supply.  The edges of the donor site were undermined.   The donor site was closed in a primary fashion.  The pedicle was then rotated into position and sutured.  Once the tube was sutured into place, adequate blood supply was confirmed with blanching and refill.  The pedicle was then wrapped with xeroform gauze and dressed appropriately with a telfa and gauze bandage to ensure continued blood supply and protect the attached pedicle.
Posterior Auricular Interpolation Flap Text: A decision was made to reconstruct the defect utilizing an interpolation axial flap and a staged reconstruction.  A telfa template was made of the defect.  This telfa template was then used to outline the posterior auricular interpolation flap.  The donor area for the pedicle flap was then injected with anesthesia.  The flap was excised through the skin and subcutaneous tissue down to the layer of the underlying musculature.  The pedicle flap was carefully excised within this deep plane to maintain its blood supply.  The edges of the donor site were undermined.   The donor site was closed in a primary fashion.  The pedicle was then rotated into position and sutured.  Once the tube was sutured into place, adequate blood supply was confirmed with blanching and refill.  The pedicle was then wrapped with xeroform gauze and dressed appropriately with a telfa and gauze bandage to ensure continued blood supply and protect the attached pedicle.
Paramedian Forehead Flap Text: A decision was made to reconstruct the defect utilizing an interpolation axial flap and a staged reconstruction.  A telfa template was made of the defect.  This telfa template was then used to outline the paramedian forehead pedicle flap.  The donor area for the pedicle flap was then injected with anesthesia.  The flap was excised through the skin and subcutaneous tissue down to the layer of the underlying musculature.  The pedicle flap was carefully excised within this deep plane to maintain its blood supply.  The edges of the donor site were undermined.   The donor site was closed in a primary fashion.  The pedicle was then rotated into position and sutured.  Once the tube was sutured into place, adequate blood supply was confirmed with blanching and refill.  The pedicle was then wrapped with xeroform gauze and dressed appropriately with a telfa and gauze bandage to ensure continued blood supply and protect the attached pedicle.
Lip Wedge Excision Repair Text: Given the location of the defect and the proximity to free margins a full thickness wedge repair was deemed most appropriate.  Using a sterile surgical marker, the appropriate repair was drawn incorporating the defect and placing the expected incisions perpendicular to the vermilion border.  The vermilion border was also meticulously outlined to ensure appropriate reapproximation during the repair.  The area thus outlined was incised through and through with a #15 scalpel blade.  The muscularis and dermis were reaproximated with deep sutures following hemostasis. Care was taken to realign the vermilion border before proceeding with the superficial closure.  Once the vermilion was realigned the superfical and mucosal closure was finished.
Ftsg Text: The defect edges were debeveled with a #15 scalpel blade.  Given the location of the defect, shape of the defect and the proximity to free margins a full thickness skin graft was deemed most appropriate.  Using a sterile surgical marker, the primary defect shape was transferred to the donor site. The area thus outlined was incised deep to adipose tissue with a #15 scalpel blade.  The harvested graft was then trimmed of adipose tissue until only dermis and epidermis was left.  The skin margins of the secondary defect were undermined to an appropriate distance in all directions utilizing iris scissors.  The secondary defect was closed with interrupted buried subcutaneous sutures.  The skin edges were then re-apposed with running  sutures.  The skin graft was then placed in the primary defect and oriented appropriately.
Split-Thickness Skin Graft Text: The defect edges were debeveled with a #15 scalpel blade.  Given the location of the defect, shape of the defect and the proximity to free margins a split thickness skin graft was deemed most appropriate.  Using a sterile surgical marker, the primary defect shape was transferred to the donor site. The split thickness graft was then harvested.  The skin graft was then placed in the primary defect and oriented appropriately.
Pinch Graft Text: The defect edges were debeveled with a #15 scalpel blade. Given the location of the defect, shape of the defect and the proximity to free margins a pinch graft was deemed most appropriate. Using a sterile surgical marker, the primary defect shape was transferred to the donor site. The area thus outlined was incised deep to adipose tissue with a #15 scalpel blade.  The harvested graft was then trimmed of adipose tissue until only dermis and epidermis was left. The skin margins of the secondary defect were undermined to an appropriate distance in all directions utilizing iris scissors.  The secondary defect was closed with interrupted buried subcutaneous sutures.  The skin edges were then re-apposed with running  sutures.  The skin graft was then placed in the primary defect and oriented appropriately.
Burow's Graft Text: The defect edges were debeveled with a #15 scalpel blade.  Given the location of the defect, shape of the defect, the proximity to free margins and the presence of a standing cone deformity a Burow's skin graft was deemed most appropriate. The standing cone was removed and this tissue was then trimmed to the shape of the primary defect. The adipose tissue was also removed until only dermis and epidermis were left.  The skin margins of the secondary defect were undermined to an appropriate distance in all directions utilizing iris scissors.  The secondary defect was closed with interrupted buried subcutaneous sutures.  The skin edges were then re-apposed with running  sutures.  The skin graft was then placed in the primary defect and oriented appropriately.
Cartilage Graft Text: The defect edges were debeveled with a #15 scalpel blade.  Given the location of the defect, shape of the defect, the fact the defect involved a full thickness cartilage defect a cartilage graft was deemed most appropriate.  An appropriate donor site was identified, cleansed, and anesthetized. The cartilage graft was then harvested and transferred to the recipient site, oriented appropriately and then sutured into place.  The secondary defect was then repaired using a primary closure.
Composite Graft Text: The defect edges were debeveled with a #15 scalpel blade.  Given the location of the defect, shape of the defect, the proximity to free margins and the fact the defect was full thickness a composite graft was deemed most appropriate.  The defect was outline and then transferred to the donor site.  A full thickness graft was then excised from the donor site. The graft was then placed in the primary defect, oriented appropriately and then sutured into place.  The secondary defect was then repaired using a primary closure.
Epidermal Autograft Text: The defect edges were debeveled with a #15 scalpel blade.  Given the location of the defect, shape of the defect and the proximity to free margins an epidermal autograft was deemed most appropriate.  Using a sterile surgical marker, the primary defect shape was transferred to the donor site. The epidermal graft was then harvested.  The skin graft was then placed in the primary defect and oriented appropriately.
Dermal Autograft Text: The defect edges were debeveled with a #15 scalpel blade.  Given the location of the defect, shape of the defect and the proximity to free margins a dermal autograft was deemed most appropriate.  Using a sterile surgical marker, the primary defect shape was transferred to the donor site. The area thus outlined was incised deep to adipose tissue with a #15 scalpel blade.  The harvested graft was then trimmed of adipose and epidermal tissue until only dermis was left.  The skin graft was then placed in the primary defect and oriented appropriately.
Skin Substitute Text: The defect edges were debeveled with a #15 scalpel blade.  Given the location of the defect, shape of the defect and the proximity to free margins a skin substitute graft was deemed most appropriate.  The graft material was trimmed to fit the size of the defect. The graft was then placed in the primary defect and oriented appropriately.
Tissue Cultured Epidermal Autograft Text: The defect edges were debeveled with a #15 scalpel blade.  Given the location of the defect, shape of the defect and the proximity to free margins a tissue cultured epidermal autograft was deemed most appropriate.  The graft was then trimmed to fit the size of the defect.  The graft was then placed in the primary defect and oriented appropriately.
Xenograft Text: The defect edges were debeveled with a #15 scalpel blade.  Given the location of the defect, shape of the defect and the proximity to free margins a xenograft was deemed most appropriate.  The graft was then trimmed to fit the size of the defect.  The graft was then placed in the primary defect and oriented appropriately.
Purse String (Intermediate) Text: Given the location of the defect and the characteristics of the surrounding skin a purse string intermediate closure was deemed most appropriate.  Undermining was performed circumferentially around the surgical defect.  A purse string suture was then placed and tightened.
Purse String (Simple) Text: Given the location of the defect and the characteristics of the surrounding skin a purse string simple closure was deemed most appropriate.  Undermining was performed circumferentially around the surgical defect.  A purse string suture was then placed and tightened.
Complex Repair And Single Advancement Flap Text: The defect edges were debeveled with a #15 scalpel blade.  The primary defect was closed partially with a complex linear closure.  Given the location of the remaining defect, shape of the defect and the proximity to free margins a single advancement flap was deemed most appropriate for complete closure of the defect.  Using a sterile surgical marker, an appropriate advancement flap was drawn incorporating the defect and placing the expected incisions within the relaxed skin tension lines where possible.    The area thus outlined was incised deep to adipose tissue with a #15 scalpel blade.  The skin margins were undermined to an appropriate distance in all directions utilizing iris scissors.
Complex Repair And Double Advancement Flap Text: The defect edges were debeveled with a #15 scalpel blade.  The primary defect was closed partially with a complex linear closure.  Given the location of the remaining defect, shape of the defect and the proximity to free margins a double advancement flap was deemed most appropriate for complete closure of the defect.  Using a sterile surgical marker, an appropriate advancement flap was drawn incorporating the defect and placing the expected incisions within the relaxed skin tension lines where possible.    The area thus outlined was incised deep to adipose tissue with a #15 scalpel blade.  The skin margins were undermined to an appropriate distance in all directions utilizing iris scissors.
Complex Repair And Modified Advancement Flap Text: The defect edges were debeveled with a #15 scalpel blade.  The primary defect was closed partially with a complex linear closure.  Given the location of the remaining defect, shape of the defect and the proximity to free margins a modified advancement flap was deemed most appropriate for complete closure of the defect.  Using a sterile surgical marker, an appropriate advancement flap was drawn incorporating the defect and placing the expected incisions within the relaxed skin tension lines where possible.    The area thus outlined was incised deep to adipose tissue with a #15 scalpel blade.  The skin margins were undermined to an appropriate distance in all directions utilizing iris scissors.
Complex Repair And A-T Advancement Flap Text: The defect edges were debeveled with a #15 scalpel blade.  The primary defect was closed partially with a complex linear closure.  Given the location of the remaining defect, shape of the defect and the proximity to free margins an A-T advancement flap was deemed most appropriate for complete closure of the defect.  Using a sterile surgical marker, an appropriate advancement flap was drawn incorporating the defect and placing the expected incisions within the relaxed skin tension lines where possible.    The area thus outlined was incised deep to adipose tissue with a #15 scalpel blade.  The skin margins were undermined to an appropriate distance in all directions utilizing iris scissors.
Complex Repair And O-T Advancement Flap Text: The defect edges were debeveled with a #15 scalpel blade.  The primary defect was closed partially with a complex linear closure.  Given the location of the remaining defect, shape of the defect and the proximity to free margins an O-T advancement flap was deemed most appropriate for complete closure of the defect.  Using a sterile surgical marker, an appropriate advancement flap was drawn incorporating the defect and placing the expected incisions within the relaxed skin tension lines where possible.    The area thus outlined was incised deep to adipose tissue with a #15 scalpel blade.  The skin margins were undermined to an appropriate distance in all directions utilizing iris scissors.
Complex Repair And O-L Flap Text: The defect edges were debeveled with a #15 scalpel blade.  The primary defect was closed partially with a complex linear closure.  Given the location of the remaining defect, shape of the defect and the proximity to free margins an O-L flap was deemed most appropriate for complete closure of the defect.  Using a sterile surgical marker, an appropriate flap was drawn incorporating the defect and placing the expected incisions within the relaxed skin tension lines where possible.    The area thus outlined was incised deep to adipose tissue with a #15 scalpel blade.  The skin margins were undermined to an appropriate distance in all directions utilizing iris scissors.
Complex Repair And Bilobe Flap Text: The defect edges were debeveled with a #15 scalpel blade.  The primary defect was closed partially with a complex linear closure.  Given the location of the remaining defect, shape of the defect and the proximity to free margins a bilobe flap was deemed most appropriate for complete closure of the defect.  Using a sterile surgical marker, an appropriate advancement flap was drawn incorporating the defect and placing the expected incisions within the relaxed skin tension lines where possible.    The area thus outlined was incised deep to adipose tissue with a #15 scalpel blade.  The skin margins were undermined to an appropriate distance in all directions utilizing iris scissors.
Complex Repair And Melolabial Flap Text: The defect edges were debeveled with a #15 scalpel blade.  The primary defect was closed partially with a complex linear closure.  Given the location of the remaining defect, shape of the defect and the proximity to free margins a melolabial flap was deemed most appropriate for complete closure of the defect.  Using a sterile surgical marker, an appropriate advancement flap was drawn incorporating the defect and placing the expected incisions within the relaxed skin tension lines where possible.    The area thus outlined was incised deep to adipose tissue with a #15 scalpel blade.  The skin margins were undermined to an appropriate distance in all directions utilizing iris scissors.
Complex Repair And Rotation Flap Text: The defect edges were debeveled with a #15 scalpel blade.  The primary defect was closed partially with a complex linear closure.  Given the location of the remaining defect, shape of the defect and the proximity to free margins a rotation flap was deemed most appropriate for complete closure of the defect.  Using a sterile surgical marker, an appropriate advancement flap was drawn incorporating the defect and placing the expected incisions within the relaxed skin tension lines where possible.    The area thus outlined was incised deep to adipose tissue with a #15 scalpel blade.  The skin margins were undermined to an appropriate distance in all directions utilizing iris scissors.
Complex Repair And Rhombic Flap Text: The defect edges were debeveled with a #15 scalpel blade.  The primary defect was closed partially with a complex linear closure.  Given the location of the remaining defect, shape of the defect and the proximity to free margins a rhombic flap was deemed most appropriate for complete closure of the defect.  Using a sterile surgical marker, an appropriate advancement flap was drawn incorporating the defect and placing the expected incisions within the relaxed skin tension lines where possible.    The area thus outlined was incised deep to adipose tissue with a #15 scalpel blade.  The skin margins were undermined to an appropriate distance in all directions utilizing iris scissors.
Complex Repair And Transposition Flap Text: The defect edges were debeveled with a #15 scalpel blade.  The primary defect was closed partially with a complex linear closure.  Given the location of the remaining defect, shape of the defect and the proximity to free margins a transposition flap was deemed most appropriate for complete closure of the defect.  Using a sterile surgical marker, an appropriate advancement flap was drawn incorporating the defect and placing the expected incisions within the relaxed skin tension lines where possible.    The area thus outlined was incised deep to adipose tissue with a #15 scalpel blade.  The skin margins were undermined to an appropriate distance in all directions utilizing iris scissors.
Complex Repair And V-Y Plasty Text: The defect edges were debeveled with a #15 scalpel blade.  The primary defect was closed partially with a complex linear closure.  Given the location of the remaining defect, shape of the defect and the proximity to free margins a V-Y plasty was deemed most appropriate for complete closure of the defect.  Using a sterile surgical marker, an appropriate advancement flap was drawn incorporating the defect and placing the expected incisions within the relaxed skin tension lines where possible.    The area thus outlined was incised deep to adipose tissue with a #15 scalpel blade.  The skin margins were undermined to an appropriate distance in all directions utilizing iris scissors.
Complex Repair And M Plasty Text: The defect edges were debeveled with a #15 scalpel blade.  The primary defect was closed partially with a complex linear closure.  Given the location of the remaining defect, shape of the defect and the proximity to free margins an M plasty was deemed most appropriate for complete closure of the defect.  Using a sterile surgical marker, an appropriate advancement flap was drawn incorporating the defect and placing the expected incisions within the relaxed skin tension lines where possible.    The area thus outlined was incised deep to adipose tissue with a #15 scalpel blade.  The skin margins were undermined to an appropriate distance in all directions utilizing iris scissors.
Complex Repair And Double M Plasty Text: The defect edges were debeveled with a #15 scalpel blade.  The primary defect was closed partially with a complex linear closure.  Given the location of the remaining defect, shape of the defect and the proximity to free margins a double M plasty was deemed most appropriate for complete closure of the defect.  Using a sterile surgical marker, an appropriate advancement flap was drawn incorporating the defect and placing the expected incisions within the relaxed skin tension lines where possible.    The area thus outlined was incised deep to adipose tissue with a #15 scalpel blade.  The skin margins were undermined to an appropriate distance in all directions utilizing iris scissors.
Complex Repair And W Plasty Text: The defect edges were debeveled with a #15 scalpel blade.  The primary defect was closed partially with a complex linear closure.  Given the location of the remaining defect, shape of the defect and the proximity to free margins a W plasty was deemed most appropriate for complete closure of the defect.  Using a sterile surgical marker, an appropriate advancement flap was drawn incorporating the defect and placing the expected incisions within the relaxed skin tension lines where possible.    The area thus outlined was incised deep to adipose tissue with a #15 scalpel blade.  The skin margins were undermined to an appropriate distance in all directions utilizing iris scissors.
Complex Repair And Z Plasty Text: The defect edges were debeveled with a #15 scalpel blade.  The primary defect was closed partially with a complex linear closure.  Given the location of the remaining defect, shape of the defect and the proximity to free margins a Z plasty was deemed most appropriate for complete closure of the defect.  Using a sterile surgical marker, an appropriate advancement flap was drawn incorporating the defect and placing the expected incisions within the relaxed skin tension lines where possible.    The area thus outlined was incised deep to adipose tissue with a #15 scalpel blade.  The skin margins were undermined to an appropriate distance in all directions utilizing iris scissors.
Complex Repair And Dorsal Nasal Flap Text: The defect edges were debeveled with a #15 scalpel blade.  The primary defect was closed partially with a complex linear closure.  Given the location of the remaining defect, shape of the defect and the proximity to free margins a dorsal nasal flap was deemed most appropriate for complete closure of the defect.  Using a sterile surgical marker, an appropriate flap was drawn incorporating the defect and placing the expected incisions within the relaxed skin tension lines where possible.    The area thus outlined was incised deep to adipose tissue with a #15 scalpel blade.  The skin margins were undermined to an appropriate distance in all directions utilizing iris scissors.
Complex Repair And Ftsg Text: The defect edges were debeveled with a #15 scalpel blade.  The primary defect was closed partially with a complex linear closure.  Given the location of the defect, shape of the defect and the proximity to free margins a full thickness skin graft was deemed most appropriate to repair the remaining defect.  The graft was trimmed to fit the size of the remaining defect.  The graft was then placed in the primary defect, oriented appropriately, and sutured into place.
Complex Repair And Burow's Graft Text: The defect edges were debeveled with a #15 scalpel blade.  The primary defect was closed partially with a complex linear closure.  Given the location of the defect, shape of the defect, the proximity to free margins and the presence of a standing cone deformity a Burow's graft was deemed most appropriate to repair the remaining defect.  The graft was trimmed to fit the size of the remaining defect.  The graft was then placed in the primary defect, oriented appropriately, and sutured into place.
Complex Repair And Split-Thickness Skin Graft Text: The defect edges were debeveled with a #15 scalpel blade.  The primary defect was closed partially with a complex linear closure.  Given the location of the defect, shape of the defect and the proximity to free margins a split thickness skin graft was deemed most appropriate to repair the remaining defect.  The graft was trimmed to fit the size of the remaining defect.  The graft was then placed in the primary defect, oriented appropriately, and sutured into place.
Complex Repair And Epidermal Autograft Text: The defect edges were debeveled with a #15 scalpel blade.  The primary defect was closed partially with a complex linear closure.  Given the location of the defect, shape of the defect and the proximity to free margins an epidermal autograft was deemed most appropriate to repair the remaining defect.  The graft was trimmed to fit the size of the remaining defect.  The graft was then placed in the primary defect, oriented appropriately, and sutured into place.
Complex Repair And Dermal Autograft Text: The defect edges were debeveled with a #15 scalpel blade.  The primary defect was closed partially with a complex linear closure.  Given the location of the defect, shape of the defect and the proximity to free margins an dermal autograft was deemed most appropriate to repair the remaining defect.  The graft was trimmed to fit the size of the remaining defect.  The graft was then placed in the primary defect, oriented appropriately, and sutured into place.
Complex Repair And Tissue Cultured Epidermal Autograft Text: The defect edges were debeveled with a #15 scalpel blade.  The primary defect was closed partially with a complex linear closure.  Given the location of the defect, shape of the defect and the proximity to free margins an tissue cultured epidermal autograft was deemed most appropriate to repair the remaining defect.  The graft was trimmed to fit the size of the remaining defect.  The graft was then placed in the primary defect, oriented appropriately, and sutured into place.
Complex Repair And Xenograft Text: The defect edges were debeveled with a #15 scalpel blade.  The primary defect was closed partially with a complex linear closure.  Given the location of the defect, shape of the defect and the proximity to free margins a xenograft was deemed most appropriate to repair the remaining defect.  The graft was trimmed to fit the size of the remaining defect.  The graft was then placed in the primary defect, oriented appropriately, and sutured into place.
Complex Repair And Skin Substitute Graft Text: The defect edges were debeveled with a #15 scalpel blade.  The primary defect was closed partially with a complex linear closure.  Given the location of the remaining defect, shape of the defect and the proximity to free margins a skin substitute graft was deemed most appropriate to repair the remaining defect.  The graft was trimmed to fit the size of the remaining defect.  The graft was then placed in the primary defect, oriented appropriately, and sutured into place.
Consent was obtained from the patient. The risks and benefits to therapy were discussed in detail. Specifically, the risks of infection, scarring, bleeding, prolonged wound healing, incomplete removal, allergy to anesthesia, nerve injury and recurrence were addressed. Prior to the procedure, the treatment site was clearly identified and confirmed by the patient. All components of Universal Protocol/PAUSE Rule completed.
Render Post-Care Instructions In Note?: yes
Post-Care Instructions: I reviewed with the patient in detail post-care instructions. Patient is not to engage in any heavy lifting, exercise, or swimming for the next 14 days. Should the patient develop any fevers, chills, bleeding, severe pain patient will contact the office immediately.
Home Suture Removal Text: Patient was provided a home suture removal kit and will remove their sutures at home.  If they have any questions or difficulties they will call the office.
Where Do You Want The Question To Include Opioid Counseling Located?: Case Summary Tab
Billing Type: Third-Party Bill
Information: Selecting Yes will display possible errors in your note based on the variables you have selected. This validation is only offered as a suggestion for you. PLEASE NOTE THAT THE VALIDATION TEXT WILL BE REMOVED WHEN YOU FINALIZE YOUR NOTE. IF YOU WANT TO FAX A PRELIMINARY NOTE YOU WILL NEED TO TOGGLE THIS TO 'NO' IF YOU DO NOT WANT IT IN YOUR FAXED NOTE.

## 2023-09-25 ENCOUNTER — APPOINTMENT (RX ONLY)
Dept: URBAN - METROPOLITAN AREA CLINIC 23 | Facility: CLINIC | Age: 53
Setting detail: DERMATOLOGY
End: 2023-09-25

## 2023-09-25 DIAGNOSIS — Z48.01 ENCOUNTER FOR CHANGE OR REMOVAL OF SURGICAL WOUND DRESSING: ICD-10-CM

## 2023-09-25 PROCEDURE — ? SUTURE REMOVAL (GLOBAL PERIOD)

## 2023-09-25 PROCEDURE — 99024 POSTOP FOLLOW-UP VISIT: CPT

## 2023-09-25 ASSESSMENT — LOCATION SIMPLE DESCRIPTION DERM: LOCATION SIMPLE: LEFT UPPER BACK

## 2023-09-25 ASSESSMENT — LOCATION DETAILED DESCRIPTION DERM: LOCATION DETAILED: LEFT MID-UPPER BACK

## 2023-09-25 ASSESSMENT — LOCATION ZONE DERM: LOCATION ZONE: TRUNK

## 2023-09-25 NOTE — PROCEDURE: SUTURE REMOVAL (GLOBAL PERIOD)
Detail Level: Detailed
Add 82183 Cpt? (Important Note: In 2017 The Use Of 26663 Is Being Tracked By Cms To Determine Future Global Period Reimbursement For Global Periods): yes

## 2023-09-27 ENCOUNTER — PROCEDURE VISIT (OUTPATIENT)
Dept: NEUROLOGY | Age: 53
End: 2023-09-27
Payer: COMMERCIAL

## 2023-09-27 VITALS — HEART RATE: 76 BPM | BODY MASS INDEX: 36.8 KG/M2 | OXYGEN SATURATION: 93 % | WEIGHT: 242 LBS

## 2023-09-27 DIAGNOSIS — M25.511 CHRONIC RIGHT SHOULDER PAIN: ICD-10-CM

## 2023-09-27 DIAGNOSIS — R20.2 NUMBNESS AND TINGLING IN BOTH HANDS: Primary | ICD-10-CM

## 2023-09-27 DIAGNOSIS — R20.0 NUMBNESS AND TINGLING IN BOTH HANDS: Primary | ICD-10-CM

## 2023-09-27 DIAGNOSIS — G89.29 CHRONIC RIGHT SHOULDER PAIN: ICD-10-CM

## 2023-09-27 DIAGNOSIS — G56.01 MILD CARPAL TUNNEL SYNDROME OF RIGHT WRIST: ICD-10-CM

## 2023-09-27 PROCEDURE — 95885 MUSC TST DONE W/NERV TST LIM: CPT | Performed by: PSYCHIATRY & NEUROLOGY

## 2023-09-27 PROCEDURE — 95911 NRV CNDJ TEST 9-10 STUDIES: CPT | Performed by: PSYCHIATRY & NEUROLOGY

## 2023-09-27 NOTE — PROGRESS NOTES
EMG/Nerve Conduction Study Procedure Note  [address] [place] [address], [address] [place] [address] Rd,[address]   [phone]      Hx:    Exam:     46 y.o.    MW  female  RUE   shoulder. Janessa Pole EMG right hand mainly. Paresthesia / weakness. No Tinel. Ref[de-identified]  Dr Liz Ped[de-identified]  Kirti Arevalo[de-identified]  5'8\"           Summary        needle EMG of select mm RUE w CV. Controlled environmental factors / EMG lab. Temperature. NCV : sensory segments:    Abnormal slowed right median SCV segments . Normal right ulnar and radial SCV segments. NCV transcarpal sensory segments:     Abnormal  == slowed right median and left median transcarpal segments with normal transcarpal ulnar segments SCV -- peak difference latencies right at 1.04 msec (UL = 0.20 msec) and on the left at 0.33 msec. NCV Motor MCV segments:     Normal right median and ulnar to ADM and FDI MCV. F-wave studies:           Abnormal right median F waves are prolonged. Normal ulnar F-waves. NEEDLE EMG:   Tested muscles[de-identified]      Right APB mm = normal ==   Normal insertional activity and interference pattern/recruitment. No fasciculations fibrillations positive sharp waves. Normal MUP. No BSS AP. No giant MUP. No myotonia. No upper motor neuron sign. INTERPRETATION:        ELECTROPHYSIOLOGIC EVIDENCE OF RIGHT MEDIAN NERVE ENTRAPMENT AT THE WRIST = MILD AND EARLY. NO OTHER NEUROPATHY. NO MYOPATHY, MYOTONIA, OR FASCICULATION . CONCLUSION:      Compatible with only early bilateral right > left carpal tunnel syndromes (sensory segments involved only). Procedure Details:       Correlates with early bilateral right > left carpal tunnel syndromes. Pt made aware. Please Note[de-identified]     Data and waveforms * filed under Procedure category [facility]. See Procedure Files for complete data pages.                 Russ Figueroa MD  Consultative  Neurodiagnostics   [address] [facility]

## 2023-09-29 ENCOUNTER — OFFICE VISIT (OUTPATIENT)
Dept: ORTHOPEDIC SURGERY | Age: 53
End: 2023-09-29

## 2023-09-29 DIAGNOSIS — M75.101 TEAR OF RIGHT ROTATOR CUFF, UNSPECIFIED TEAR EXTENT, UNSPECIFIED WHETHER TRAUMATIC: Primary | ICD-10-CM

## 2023-09-29 DIAGNOSIS — M67.921 TENDINOPATHY OF RIGHT BICEPS TENDON: ICD-10-CM

## 2023-09-29 RX ORDER — METHYLPREDNISOLONE ACETATE 40 MG/ML
80 INJECTION, SUSPENSION INTRA-ARTICULAR; INTRALESIONAL; INTRAMUSCULAR; SOFT TISSUE ONCE
Status: COMPLETED | OUTPATIENT
Start: 2023-09-29 | End: 2023-09-29

## 2023-09-29 RX ADMIN — METHYLPREDNISOLONE ACETATE 80 MG: 40 INJECTION, SUSPENSION INTRA-ARTICULAR; INTRALESIONAL; INTRAMUSCULAR; SOFT TISSUE at 09:40

## 2023-09-29 NOTE — PROGRESS NOTES
Name: Pradip Bansal  YOB: 1970  Gender: female  MRN: [id number]    CC:   Chief Complaint   Patient presents with    Follow-up     EMG results right shoulder        HPI: Patient presents for EMG/NCS follow-up of the right shoulder. Patient was given a subacromial injection on 8- and notes only mild relief for 72 hours with injection. The patient has previously had MRI on the shoulder. Today the patient states continued anterior and lateral shoulder pain. She has been doing exercises and has been diagnosed with carpal tunnel and some ulnar neuritis. She has seen Dr. Clarita Regan about this and started wearing nighttime braces and doing physical therapy. She is aware in the future she would likely require surgical intervention. 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     Allergies   Allergen Reactions    Ciprofloxacin Rash    Sulfa Antibiotics Rash     Past Medical History:   Diagnosis Date    Adrenal disorder, other 2007    adrenal fatigue    Allergic rhinitis     Bilateral Renal cysts on CT 5/26/23 6/14/2023    Bone spur of foot     right foot     Cancer ([address]) 11/14/2017    Atypical skin    Chest pain     Chronic pain     lower back    Cough     Dyspepsia     Elevated troponin     Environmental allergies     GERD (gastroesophageal reflux disease)     Hepatic steatosis noted on CT 5/26/23 6/14/2023    History of positive PPD 1992    History of renal stone 4/1/2013    Insomnia     Lumbar degenerative disc disease 4/1/2013    Nausea & vomiting     Obesity     MARKO (obstructive sleep apnea) 5/20/2020    Pancreatitis 06/21/2020    Snores     SOB (shortness of breath)     Sore throat     Spastic bladder 2008    SVT (supraventricular tachycardia) ([address])     Thromboembolus ([address])     \" possible DVT after IVF procedure and loss of pregnancy \"     Urge incontinence     Urinary frequency     Vertigo     Vitamin D deficiency

## 2023-10-10 DIAGNOSIS — Z20.2 POSSIBLE EXPOSURE TO STD: Primary | ICD-10-CM

## 2023-10-11 ENCOUNTER — NURSE ONLY (OUTPATIENT)
Dept: FAMILY MEDICINE CLINIC | Facility: CLINIC | Age: 53
End: 2023-10-11

## 2023-10-11 DIAGNOSIS — Z20.2 POSSIBLE EXPOSURE TO STD: ICD-10-CM

## 2023-10-14 LAB
C TRACH RRNA SPEC QL NAA+PROBE: NEGATIVE
N GONORRHOEA RRNA SPEC QL NAA+PROBE: NEGATIVE
SPECIMEN SOURCE: NORMAL
T VAGINALIS RRNA SPEC QL NAA+PROBE: NEGATIVE

## 2023-11-09 ENCOUNTER — PATIENT MESSAGE (OUTPATIENT)
Dept: FAMILY MEDICINE CLINIC | Facility: CLINIC | Age: 53
End: 2023-11-09

## 2023-11-10 NOTE — TELEPHONE ENCOUNTER
From: Ca Sanders  To: Dr. Escamilla Lisandro: 11/9/2023 7:28 PM EST  Subject: Sarahantonio Benavides Dr [facility] [place], are there any vaccinations required for high school? Or any blood work you would like [place] to have? Dr Vanessa Mclaughlin has placed orders for several baseline tests to be drawn. We have decided to have an IUD placed by Dr Cristi Larose [facility]) in [place] on Monday, Nov 27th while under anesthesia. The bloodwork will be drawn during that procedure time. NOT sure if you have access to [facility] records but I'll try ro screenshot what has been ordered and send it to you.  Thanks, Ca Sanders

## 2023-11-14 ENCOUNTER — OFFICE VISIT (OUTPATIENT)
Dept: ORTHOPEDIC SURGERY | Age: 53
End: 2023-11-14
Payer: COMMERCIAL

## 2023-11-14 DIAGNOSIS — M75.101 TEAR OF RIGHT ROTATOR CUFF, UNSPECIFIED TEAR EXTENT, UNSPECIFIED WHETHER TRAUMATIC: Primary | ICD-10-CM

## 2023-11-14 DIAGNOSIS — M67.921 TENDINOPATHY OF RIGHT BICEPS TENDON: ICD-10-CM

## 2023-11-14 PROCEDURE — 99213 OFFICE O/P EST LOW 20 MIN: CPT | Performed by: ORTHOPAEDIC SURGERY

## 2023-11-14 PROCEDURE — 3017F COLORECTAL CA SCREEN DOC REV: CPT | Performed by: ORTHOPAEDIC SURGERY

## 2023-11-14 PROCEDURE — 1036F TOBACCO NON-USER: CPT | Performed by: ORTHOPAEDIC SURGERY

## 2023-11-14 PROCEDURE — G8417 CALC BMI ABV UP PARAM F/U: HCPCS | Performed by: ORTHOPAEDIC SURGERY

## 2023-11-14 PROCEDURE — G8484 FLU IMMUNIZE NO ADMIN: HCPCS | Performed by: ORTHOPAEDIC SURGERY

## 2023-11-14 PROCEDURE — G8427 DOCREV CUR MEDS BY ELIG CLIN: HCPCS | Performed by: ORTHOPAEDIC SURGERY

## 2023-11-14 NOTE — PROGRESS NOTES
Chief Complaint   Patient presents with     Derm Problem     SCC R chauncey Cook, EMT     muscle. Independent review of the right shoulder is performed today. There is a small less than a centimeter intrasubstance signal change that there pointing out on image 9 of the coronal series. AC arthritis is noted. Some widening of the intra-articular aspect of the biceps tendon. Agree the teres minor is not really well visualized     EMG/NCS from 9-27-23:          A/Plan:     ICD-10-CM    1. Tear of right rotator cuff, unspecified tear extent, unspecified whether traumatic  M75.101       2. Tendinopathy of right biceps tendon  M67.921            Reviewed her options again. Discussed possibly definitive treatment with tenotomy or tenodesis. Ultimately activity modification and even topical Voltaren gel. I reviewed with her how to take that. She will reach out to us through [facility] if she would like to proceed in some form or fashion. No follow-ups on file.         Louis Carter MD  11/14/23

## 2024-01-15 ENCOUNTER — APPOINTMENT (RX ONLY)
Dept: URBAN - METROPOLITAN AREA CLINIC 23 | Facility: CLINIC | Age: 54
Setting detail: DERMATOLOGY
End: 2024-01-15

## 2024-01-15 DIAGNOSIS — D18.0 HEMANGIOMA: ICD-10-CM

## 2024-01-15 DIAGNOSIS — L738 OTHER SPECIFIED DISEASES OF HAIR AND HAIR FOLLICLES: ICD-10-CM

## 2024-01-15 DIAGNOSIS — T07XXXA ABRASION OR FRICTION BURN OF OTHER, MULTIPLE, AND UNSPECIFIED SITES, WITHOUT MENTION OF INFECTION: ICD-10-CM

## 2024-01-15 DIAGNOSIS — Z71.89 OTHER SPECIFIED COUNSELING: ICD-10-CM

## 2024-01-15 DIAGNOSIS — L73.8 OTHER SPECIFIED FOLLICULAR DISORDERS: ICD-10-CM

## 2024-01-15 DIAGNOSIS — L91.8 OTHER HYPERTROPHIC DISORDERS OF THE SKIN: ICD-10-CM

## 2024-01-15 DIAGNOSIS — D22 MELANOCYTIC NEVI: ICD-10-CM

## 2024-01-15 DIAGNOSIS — Z87.2 PERSONAL HISTORY OF DISEASES OF THE SKIN AND SUBCUTANEOUS TISSUE: ICD-10-CM

## 2024-01-15 DIAGNOSIS — L73.9 FOLLICULAR DISORDER, UNSPECIFIED: ICD-10-CM

## 2024-01-15 DIAGNOSIS — Z85.828 PERSONAL HISTORY OF OTHER MALIGNANT NEOPLASM OF SKIN: ICD-10-CM

## 2024-01-15 DIAGNOSIS — L663 OTHER SPECIFIED DISEASES OF HAIR AND HAIR FOLLICLES: ICD-10-CM

## 2024-01-15 DIAGNOSIS — L82.1 OTHER SEBORRHEIC KERATOSIS: ICD-10-CM

## 2024-01-15 PROBLEM — D22.72 MELANOCYTIC NEVI OF LEFT LOWER LIMB, INCLUDING HIP: Status: ACTIVE | Noted: 2024-01-15

## 2024-01-15 PROBLEM — D48.5 NEOPLASM OF UNCERTAIN BEHAVIOR OF SKIN: Status: ACTIVE | Noted: 2024-01-15

## 2024-01-15 PROBLEM — D22.71 MELANOCYTIC NEVI OF RIGHT LOWER LIMB, INCLUDING HIP: Status: ACTIVE | Noted: 2024-01-15

## 2024-01-15 PROBLEM — L02.02 FURUNCLE OF FACE: Status: ACTIVE | Noted: 2024-01-15

## 2024-01-15 PROBLEM — D22.61 MELANOCYTIC NEVI OF RIGHT UPPER LIMB, INCLUDING SHOULDER: Status: ACTIVE | Noted: 2024-01-15

## 2024-01-15 PROBLEM — D22.5 MELANOCYTIC NEVI OF TRUNK: Status: ACTIVE | Noted: 2024-01-15

## 2024-01-15 PROBLEM — D22.62 MELANOCYTIC NEVI OF LEFT UPPER LIMB, INCLUDING SHOULDER: Status: ACTIVE | Noted: 2024-01-15

## 2024-01-15 PROBLEM — S00.30XA UNSPECIFIED SUPERFICIAL INJURY OF NOSE, INITIAL ENCOUNTER: Status: ACTIVE | Noted: 2024-01-15

## 2024-01-15 PROBLEM — D18.01 HEMANGIOMA OF SKIN AND SUBCUTANEOUS TISSUE: Status: ACTIVE | Noted: 2024-01-15

## 2024-01-15 PROCEDURE — 99213 OFFICE O/P EST LOW 20 MIN: CPT

## 2024-01-15 PROCEDURE — ? COUNSELING

## 2024-01-15 PROCEDURE — ? PRESCRIPTION

## 2024-01-15 PROCEDURE — ? ADDITIONAL NOTES

## 2024-01-15 PROCEDURE — ? DEFER

## 2024-01-15 RX ORDER — CLINDAMYCIN PHOSPHATE 10 MG/ML
LOTION TOPICAL QD
Qty: 60 | Refills: 1 | Status: ERX | COMMUNITY
Start: 2024-01-15

## 2024-01-15 RX ADMIN — CLINDAMYCIN PHOSPHATE: 10 LOTION TOPICAL at 00:00

## 2024-01-15 ASSESSMENT — LOCATION DETAILED DESCRIPTION DERM
LOCATION DETAILED: LEFT ANTERIOR DISTAL THIGH
LOCATION DETAILED: LEFT INFRAMAMMARY CREASE (INNER QUADRANT)
LOCATION DETAILED: RIGHT INFRAMAMMARY CREASE (INNER QUADRANT)
LOCATION DETAILED: LEFT PROXIMAL PRETIBIAL REGION
LOCATION DETAILED: PERIUMBILICAL SKIN
LOCATION DETAILED: RIGHT ULNAR DORSAL HAND
LOCATION DETAILED: RIGHT POPLITEAL SKIN
LOCATION DETAILED: RIGHT SUPERIOR UPPER BACK
LOCATION DETAILED: LEFT INFERIOR MEDIAL UPPER BACK
LOCATION DETAILED: RIGHT LATERAL ABDOMEN
LOCATION DETAILED: LEFT DISTAL DORSAL FOREARM
LOCATION DETAILED: RIGHT DISTAL PRETIBIAL REGION
LOCATION DETAILED: LEFT CLAVICULAR NECK
LOCATION DETAILED: NASAL DORSUM
LOCATION DETAILED: LEFT DISTAL POSTERIOR UPPER ARM
LOCATION DETAILED: INFERIOR THORACIC SPINE
LOCATION DETAILED: LEFT MEDIAL BREAST 8-9:00 REGION
LOCATION DETAILED: RIGHT INFERIOR LATERAL MALAR CHEEK
LOCATION DETAILED: LEFT RADIAL DORSAL HAND
LOCATION DETAILED: RIGHT CENTRAL TEMPLE
LOCATION DETAILED: LEFT PROXIMAL POSTERIOR UPPER ARM
LOCATION DETAILED: SUBXIPHOID
LOCATION DETAILED: LEFT ANTERIOR PROXIMAL THIGH
LOCATION DETAILED: MIDDLE STERNUM
LOCATION DETAILED: LEFT LATERAL TEMPLE
LOCATION DETAILED: RIGHT MEDIAL UPPER BACK
LOCATION DETAILED: RIGHT DISTAL DORSAL FOREARM
LOCATION DETAILED: LEFT ANTERIOR DISTAL UPPER ARM
LOCATION DETAILED: RIGHT PROXIMAL PRETIBIAL REGION
LOCATION DETAILED: RIGHT ANTERIOR PROXIMAL THIGH
LOCATION DETAILED: RIGHT INFERIOR LATERAL NECK
LOCATION DETAILED: RIGHT DISTAL POSTERIOR UPPER ARM

## 2024-01-15 ASSESSMENT — LOCATION SIMPLE DESCRIPTION DERM
LOCATION SIMPLE: RIGHT HAND
LOCATION SIMPLE: LEFT UPPER ARM
LOCATION SIMPLE: LEFT FOREARM
LOCATION SIMPLE: LEFT UPPER BACK
LOCATION SIMPLE: RIGHT TEMPLE
LOCATION SIMPLE: LEFT TEMPLE
LOCATION SIMPLE: RIGHT CHEEK
LOCATION SIMPLE: RIGHT FOREARM
LOCATION SIMPLE: CHEST
LOCATION SIMPLE: LEFT ANTERIOR NECK
LOCATION SIMPLE: LEFT HAND
LOCATION SIMPLE: LEFT THIGH
LOCATION SIMPLE: LEFT POSTERIOR UPPER ARM
LOCATION SIMPLE: RIGHT THIGH
LOCATION SIMPLE: NOSE
LOCATION SIMPLE: UPPER BACK
LOCATION SIMPLE: ABDOMEN
LOCATION SIMPLE: RIGHT BREAST
LOCATION SIMPLE: RIGHT POPLITEAL SKIN
LOCATION SIMPLE: LEFT PRETIBIAL REGION
LOCATION SIMPLE: RIGHT UPPER BACK
LOCATION SIMPLE: RIGHT POSTERIOR UPPER ARM
LOCATION SIMPLE: RIGHT ANTERIOR NECK
LOCATION SIMPLE: RIGHT PRETIBIAL REGION
LOCATION SIMPLE: LEFT BREAST

## 2024-01-15 ASSESSMENT — LOCATION ZONE DERM
LOCATION ZONE: NECK
LOCATION ZONE: ARM
LOCATION ZONE: NOSE
LOCATION ZONE: TRUNK
LOCATION ZONE: FACE
LOCATION ZONE: LEG
LOCATION ZONE: HAND

## 2024-01-15 NOTE — PROCEDURE: ADDITIONAL NOTES
Render Risk Assessment In Note?: no
Detail Level: Simple
Additional Notes: Caused by CPAP mask. Recommended to see if there is an alternative option of mask.
Additional Notes: Patient has >20 tags and SKs; $300 quote for removal.

## 2024-01-15 NOTE — PROCEDURE: DEFER
Scheduling Instructions (Optional): 30 min
X Size Of Lesion In Cm (Optional): 0
Procedure To Be Performed At Next Visit: Excision by punch method
Introduction Text (Please End With A Colon): The following procedure was deferred:
Detail Level: Detailed
Procedure To Be Performed At Next Visit: Electrodesiccation
Other Procedure: and cryotherapy

## 2024-01-29 ENCOUNTER — APPOINTMENT (RX ONLY)
Dept: URBAN - METROPOLITAN AREA CLINIC 23 | Facility: CLINIC | Age: 54
Setting detail: DERMATOLOGY
End: 2024-01-29

## 2024-01-29 DIAGNOSIS — L91.8 OTHER HYPERTROPHIC DISORDERS OF THE SKIN: ICD-10-CM

## 2024-01-29 DIAGNOSIS — L73.8 OTHER SPECIFIED FOLLICULAR DISORDERS: ICD-10-CM

## 2024-01-29 PROBLEM — D48.5 NEOPLASM OF UNCERTAIN BEHAVIOR OF SKIN: Status: ACTIVE | Noted: 2024-01-29

## 2024-01-29 PROCEDURE — 11104 PUNCH BX SKIN SINGLE LESION: CPT | Mod: 59

## 2024-01-29 PROCEDURE — ? INVENTORY

## 2024-01-29 PROCEDURE — ? BENIGN DESTRUCTION COSMETIC

## 2024-01-29 PROCEDURE — ? COUNSELING

## 2024-01-29 PROCEDURE — ? BIOPSY BY PUNCH METHOD

## 2024-01-29 PROCEDURE — ? EXCISION

## 2024-01-29 ASSESSMENT — LOCATION DETAILED DESCRIPTION DERM
LOCATION DETAILED: LEFT INFERIOR ANTERIOR NECK
LOCATION DETAILED: RIGHT INFERIOR ANTERIOR NECK
LOCATION DETAILED: LEFT INFRAMAMMARY CREASE (INNER QUADRANT)
LOCATION DETAILED: LEFT MEDIAL BREAST 7-8:00 REGION
LOCATION DETAILED: RIGHT ANTERIOR PROXIMAL THIGH
LOCATION DETAILED: RIGHT MEDIAL BREAST 5-6:00 REGION
LOCATION DETAILED: LEFT ANTERIOR PROXIMAL THIGH
LOCATION DETAILED: LEFT MEDIAL BREAST 8-9:00 REGION
LOCATION DETAILED: LEFT CLAVICULAR NECK
LOCATION DETAILED: RIGHT RIB CAGE
LOCATION DETAILED: RIGHT INFERIOR LATERAL NECK
LOCATION DETAILED: INFERIOR THORACIC SPINE
LOCATION DETAILED: LEFT RIB CAGE
LOCATION DETAILED: RIGHT INFRAMAMMARY CREASE (INNER QUADRANT)
LOCATION DETAILED: LEFT INFERIOR LATERAL NECK

## 2024-01-29 ASSESSMENT — LOCATION SIMPLE DESCRIPTION DERM
LOCATION SIMPLE: RIGHT ANTERIOR NECK
LOCATION SIMPLE: LEFT THIGH
LOCATION SIMPLE: UPPER BACK
LOCATION SIMPLE: ABDOMEN
LOCATION SIMPLE: LEFT BREAST
LOCATION SIMPLE: RIGHT BREAST
LOCATION SIMPLE: LEFT ANTERIOR NECK
LOCATION SIMPLE: RIGHT THIGH

## 2024-01-29 ASSESSMENT — LOCATION ZONE DERM
LOCATION ZONE: LEG
LOCATION ZONE: TRUNK
LOCATION ZONE: NECK

## 2024-01-29 NOTE — PROCEDURE: BIOPSY BY PUNCH METHOD
Detail Level: Detailed
Was A Bandage Applied: Yes
Lab: [id number]
Punch Size In Mm: 4
Size Of Lesion In Cm (Optional): 0
Depth Of Punch Biopsy: dermis
Biopsy Type: H and E
Anesthesia Type: 1% lidocaine with epinephrine
Anesthesia Volume In Cc: 0.5
Hemostasis: None
Epidermal Sutures: 5-0 Prolene
Wound Care: Petrolatum
Dressing: bandage
Suture Removal: 14 days
Patient Will Remove Sutures At Home?: No
Consent: Written consent was obtained and risks were reviewed including but not limited to scarring, infection, bleeding, scabbing, incomplete removal, nerve damage and allergy to anesthesia.
Post-Care Instructions: I reviewed with the patient in detail post-care instructions. Patient is to keep the biopsy site dry overnight, and then apply bacitracin twice daily until healed. Patient may apply hydrogen peroxide soaks to remove any crusting.
Home Suture Removal Text: Patient was provided a home suture removal kit and will remove their sutures at home.  If they have any questions or difficulties they will call the office.
Notification Instructions: Patient will be notified of biopsy results. However, patient instructed to call the office if not contacted within 2 weeks.
Billing Type: Third-Party Bill
Information: Selecting Yes will display possible errors in your note based on the variables you have selected. This validation is only offered as a suggestion for you. PLEASE NOTE THAT THE VALIDATION TEXT WILL BE REMOVED WHEN YOU FINALIZE YOUR NOTE. IF YOU WANT TO FAX A PRELIMINARY NOTE YOU WILL NEED TO TOGGLE THIS TO 'NO' IF YOU DO NOT WANT IT IN YOUR FAXED NOTE.
Lab Facility: [id number]

## 2024-01-29 NOTE — PROCEDURE: EXCISION
Medical Necessity Information: It is in your best interest to select a reason for this procedure from the list below. All of these items fulfill various CMS LCD requirements except lesion extends to a margin.
Include Z78.9 (Other Specified Conditions Influencing Health Status) As An Associated Diagnosis?: No
Medical Necessity Clause: This procedure was medically necessary because the lesion that was treated was:
Lab: [id number]
Lab Facility: [id number]
Size Of Lesion In Cm: 1
X Size Of Lesion In Cm (Optional): 0
Eye Clamp Note Details: An eye clamp was used during the procedure.
Excision Method: Elliptical
Saucerization Depth: dermis and superficial adipose tissue
Repair Type: None
Suturegard Retention Suture: 2-0 Nylon
Retention Suture Bite Size: 3 mm
Length To Time In Minutes Device Was In Place: 10
Undermining Type: Entire Wound
Debridement Text: The wound edges were debrided prior to proceeding with the closure to facilitate wound healing.
Helical Rim Text: The closure involved the helical rim.
Vermilion Border Text: The closure involved the vermilion border.
Nostril Rim Text: The closure involved the nostril rim.
Retention Suture Text: Retention sutures were placed to support the closure and prevent dehiscence.
Suture Removal: 14 days
Epidermal Closure Graft Donor Site (Optional): simple interrupted
Graft Donor Site Bandage (Optional-Leave Blank If You Don't Want In Note): Steri-strips and a pressure bandage were applied to the donor site.
Detail Level: Detailed
Excision Depth: adipose tissue
Scalpel Size: 15 blade
Anesthesia Type: 1% lidocaine with epinephrine and a 1:10 solution of 8.4% sodium bicarbonate
Additional Anesthesia Volume In Cc: 6
Hemostasis: Electrocautery
Estimated Blood Loss (Cc): minimal
Repair Anesthesia Method: local infiltration
Deep Sutures: 4-0 PDO
Epidermal Closure: running
Wound Care: Petrolatum
Dressing: dry sterile dressing
Suturegard Intro: Intraoperative tissue expansion was performed, utilizing the SUTUREGARD device, in order to reduce wound tension.
Suturegard Body: The suture ends were repeatedly re-tightened and re-clamped to achieve the desired tissue expansion.
Hemigard Intro: Due to skin fragility and wound tension, it was decided to use HEMIGARD adhesive retention suture devices to permit a linear closure. The skin was cleaned and dried for a 6cm distance away from the wound. Excessive hair, if present, was removed to allow for adhesion.
Hemigard Postcare Instructions: The HEMIGARD strips are to remain completely dry for at least 5-7 days.
Positioning (Leave Blank If You Do Not Want): The patient was placed in a comfortable position exposing the surgical site.
Complex Repair Preamble Text (Leave Blank If You Do Not Want): Extensive wide undermining was performed.
Intermediate Repair Preamble Text (Leave Blank If You Do Not Want): Undermining was performed with blunt dissection.
Fusiform Excision Additional Text (Leave Blank If You Do Not Want): The margin was drawn around the clinically apparent lesion.  A fusiform shape was then drawn on the skin incorporating the lesion and margins.  Incisions were then made along these lines to the appropriate tissue plane and the lesion was extirpated.
Eliptical Excision Additional Text (Leave Blank If You Do Not Want): The margin was drawn around the clinically apparent lesion.  An elliptical shape was then drawn on the skin incorporating the lesion and margins.  Incisions were then made along these lines to the appropriate tissue plane and the lesion was extirpated.
Saucerization Excision Additional Text (Leave Blank If You Do Not Want): The margin was drawn around the clinically apparent lesion.  Incisions were then made along these lines, in a tangential fashion, to the appropriate tissue plane and the lesion was extirpated.
Slit Excision Additional Text (Leave Blank If You Do Not Want): A linear line was drawn on the skin overlying the lesion. An incision was made slowly until the lesion was visualized.  Once visualized, the lesion was removed with blunt dissection.
Excisional Biopsy Additional Text (Leave Blank If You Do Not Want): The margin was drawn around the clinically apparent lesion. An elliptical shape was then drawn on the skin incorporating the lesion and margins.  Incisions were then made along these lines to the appropriate tissue plane and the lesion was extirpated.
Perilesional Excision Additional Text (Leave Blank If You Do Not Want): The margin was drawn around the clinically apparent lesion. Incisions were then made along these lines to the appropriate tissue plane and the lesion was extirpated.
Repair Performed By Another Provider Text (Leave Blank If You Do Not Want): After the tissue was excised the defect was repaired by another provider.
No Repair - Repaired With Adjacent Surgical Defect Text (Leave Blank If You Do Not Want): After the excision the defect was repaired concurrently with another surgical defect which was in close approximation.
Adjacent Tissue Transfer Text: The defect edges were debeveled with a #15 scalpel blade.  Given the location of the defect and the proximity to free margins an adjacent tissue transfer was deemed most appropriate.  Using a sterile surgical marker, an appropriate flap was drawn incorporating the defect and placing the expected incisions within the relaxed skin tension lines where possible.    The area thus outlined was incised deep to adipose tissue with a #15 scalpel blade.  The skin margins were undermined to an appropriate distance in all directions utilizing iris scissors.
Advancement Flap (Single) Text: The defect edges were debeveled with a #15 scalpel blade.  Given the location of the defect and the proximity to free margins a single advancement flap was deemed most appropriate.  Using a sterile surgical marker, an appropriate advancement flap was drawn incorporating the defect and placing the expected incisions within the relaxed skin tension lines where possible.    The area thus outlined was incised deep to adipose tissue with a #15 scalpel blade.  The skin margins were undermined to an appropriate distance in all directions utilizing iris scissors.
Advancement Flap (Double) Text: The defect edges were debeveled with a #15 scalpel blade.  Given the location of the defect and the proximity to free margins a double advancement flap was deemed most appropriate.  Using a sterile surgical marker, the appropriate advancement flaps were drawn incorporating the defect and placing the expected incisions within the relaxed skin tension lines where possible.    The area thus outlined was incised deep to adipose tissue with a #15 scalpel blade.  The skin margins were undermined to an appropriate distance in all directions utilizing iris scissors.
Burow's Advancement Flap Text: The defect edges were debeveled with a #15 scalpel blade.  Given the location of the defect and the proximity to free margins a Burow's advancement flap was deemed most appropriate.  Using a sterile surgical marker, the appropriate advancement flap was drawn incorporating the defect and placing the expected incisions within the relaxed skin tension lines where possible.    The area thus outlined was incised deep to adipose tissue with a #15 scalpel blade.  The skin margins were undermined to an appropriate distance in all directions utilizing iris scissors.
Chonodrocutaneous Helical Advancement Flap Text: The defect edges were debeveled with a #15 scalpel blade.  Given the location of the defect and the proximity to free margins a chondrocutaneous helical advancement flap was deemed most appropriate.  Using a sterile surgical marker, the appropriate advancement flap was drawn incorporating the defect and placing the expected incisions within the relaxed skin tension lines where possible.    The area thus outlined was incised deep to adipose tissue with a #15 scalpel blade.  The skin margins were undermined to an appropriate distance in all directions utilizing iris scissors.
Crescentic Advancement Flap Text: The defect edges were debeveled with a #15 scalpel blade.  Given the location of the defect and the proximity to free margins a crescentic advancement flap was deemed most appropriate.  Using a sterile surgical marker, the appropriate advancement flap was drawn incorporating the defect and placing the expected incisions within the relaxed skin tension lines where possible.    The area thus outlined was incised deep to adipose tissue with a #15 scalpel blade.  The skin margins were undermined to an appropriate distance in all directions utilizing iris scissors.
A-T Advancement Flap Text: The defect edges were debeveled with a #15 scalpel blade.  Given the location of the defect, shape of the defect and the proximity to free margins an A-T advancement flap was deemed most appropriate.  Using a sterile surgical marker, an appropriate advancement flap was drawn incorporating the defect and placing the expected incisions within the relaxed skin tension lines where possible.    The area thus outlined was incised deep to adipose tissue with a #15 scalpel blade.  The skin margins were undermined to an appropriate distance in all directions utilizing iris scissors.
O-T Advancement Flap Text: The defect edges were debeveled with a #15 scalpel blade.  Given the location of the defect, shape of the defect and the proximity to free margins an O-T advancement flap was deemed most appropriate.  Using a sterile surgical marker, an appropriate advancement flap was drawn incorporating the defect and placing the expected incisions within the relaxed skin tension lines where possible.    The area thus outlined was incised deep to adipose tissue with a #15 scalpel blade.  The skin margins were undermined to an appropriate distance in all directions utilizing iris scissors.
O-L Flap Text: The defect edges were debeveled with a #15 scalpel blade.  Given the location of the defect, shape of the defect and the proximity to free margins an O-L flap was deemed most appropriate.  Using a sterile surgical marker, an appropriate advancement flap was drawn incorporating the defect and placing the expected incisions within the relaxed skin tension lines where possible.    The area thus outlined was incised deep to adipose tissue with a #15 scalpel blade.  The skin margins were undermined to an appropriate distance in all directions utilizing iris scissors.
O-Z Flap Text: The defect edges were debeveled with a #15 scalpel blade.  Given the location of the defect, shape of the defect and the proximity to free margins an O-Z flap was deemed most appropriate.  Using a sterile surgical marker, an appropriate transposition flap was drawn incorporating the defect and placing the expected incisions within the relaxed skin tension lines where possible. The area thus outlined was incised deep to adipose tissue with a #15 scalpel blade.  The skin margins were undermined to an appropriate distance in all directions utilizing iris scissors.
Double O-Z Flap Text: The defect edges were debeveled with a #15 scalpel blade.  Given the location of the defect, shape of the defect and the proximity to free margins a Double O-Z flap was deemed most appropriate.  Using a sterile surgical marker, an appropriate transposition flap was drawn incorporating the defect and placing the expected incisions within the relaxed skin tension lines where possible. The area thus outlined was incised deep to adipose tissue with a #15 scalpel blade.  The skin margins were undermined to an appropriate distance in all directions utilizing iris scissors.
V-Y Flap Text: The defect edges were debeveled with a #15 scalpel blade.  Given the location of the defect, shape of the defect and the proximity to free margins a V-Y flap was deemed most appropriate.  Using a sterile surgical marker, an appropriate advancement flap was drawn incorporating the defect and placing the expected incisions within the relaxed skin tension lines where possible.    The area thus outlined was incised deep to adipose tissue with a #15 scalpel blade.  The skin margins were undermined to an appropriate distance in all directions utilizing iris scissors.
Advancement-Rotation Flap Text: The defect edges were debeveled with a #15 scalpel blade.  Given the location of the defect, shape of the defect and the proximity to free margins an advancement-rotation flap was deemed most appropriate.  Using a sterile surgical marker, an appropriate flap was drawn incorporating the defect and placing the expected incisions within the relaxed skin tension lines where possible. The area thus outlined was incised deep to adipose tissue with a #15 scalpel blade.  The skin margins were undermined to an appropriate distance in all directions utilizing iris scissors.
Mercedes Flap Text: The defect edges were debeveled with a #15 scalpel blade.  Given the location of the defect, shape of the defect and the proximity to free margins a Mercedes flap was deemed most appropriate.  Using a sterile surgical marker, an appropriate advancement flap was drawn incorporating the defect and placing the expected incisions within the relaxed skin tension lines where possible. The area thus outlined was incised deep to adipose tissue with a #15 scalpel blade.  The skin margins were undermined to an appropriate distance in all directions utilizing iris scissors.
Modified Advancement Flap Text: The defect edges were debeveled with a #15 scalpel blade.  Given the location of the defect, shape of the defect and the proximity to free margins a modified advancement flap was deemed most appropriate.  Using a sterile surgical marker, an appropriate advancement flap was drawn incorporating the defect and placing the expected incisions within the relaxed skin tension lines where possible.    The area thus outlined was incised deep to adipose tissue with a #15 scalpel blade.  The skin margins were undermined to an appropriate distance in all directions utilizing iris scissors.
Mucosal Advancement Flap Text: Given the location of the defect, shape of the defect and the proximity to free margins a mucosal advancement flap was deemed most appropriate. Incisions were made with a 15 blade scalpel in the appropriate fashion along the cutaneous vermilion border and the mucosal lip. The remaining actinically damaged mucosal tissue was excised.  The mucosal advancement flap was then elevated to the gingival sulcus with care taken to preserve the neurovascular structures and advanced into the primary defect. Care was taken to ensure that precise realignment of the vermilion border was achieved.
Peng Advancement Flap Text: The defect edges were debeveled with a #15 scalpel blade.  Given the location of the defect, shape of the defect and the proximity to free margins a Peng advancement flap was deemed most appropriate.  Using a sterile surgical marker, an appropriate advancement flap was drawn incorporating the defect and placing the expected incisions within the relaxed skin tension lines where possible. The area thus outlined was incised deep to adipose tissue with a #15 scalpel blade.  The skin margins were undermined to an appropriate distance in all directions utilizing iris scissors.
Hatchet Flap Text: The defect edges were debeveled with a #15 scalpel blade.  Given the location of the defect, shape of the defect and the proximity to free margins a hatchet flap was deemed most appropriate.  Using a sterile surgical marker, an appropriate hatchet flap was drawn incorporating the defect and placing the expected incisions within the relaxed skin tension lines where possible.    The area thus outlined was incised deep to adipose tissue with a #15 scalpel blade.  The skin margins were undermined to an appropriate distance in all directions utilizing iris scissors.
Rotation Flap Text: The defect edges were debeveled with a #15 scalpel blade.  Given the location of the defect, shape of the defect and the proximity to free margins a rotation flap was deemed most appropriate.  Using a sterile surgical marker, an appropriate rotation flap was drawn incorporating the defect and placing the expected incisions within the relaxed skin tension lines where possible.    The area thus outlined was incised deep to adipose tissue with a #15 scalpel blade.  The skin margins were undermined to an appropriate distance in all directions utilizing iris scissors.
Bilateral Rotation Flap Text: The defect edges were debeveled with a #15 scalpel blade. Given the location of the defect, shape of the defect and the proximity to free margins a bilateral rotation flap was deemed most appropriate. Using a sterile surgical marker, an appropriate rotation flap was drawn incorporating the defect and placing the expected incisions within the relaxed skin tension lines where possible. The area thus outlined was incised deep to adipose tissue with a #15 scalpel blade. The skin margins were undermined to an appropriate distance in all directions utilizing iris scissors. Following this, the designed flap was carried over into the primary defect and sutured into place.
Spiral Flap Text: The defect edges were debeveled with a #15 scalpel blade.  Given the location of the defect, shape of the defect and the proximity to free margins a spiral flap was deemed most appropriate.  Using a sterile surgical marker, an appropriate rotation flap was drawn incorporating the defect and placing the expected incisions within the relaxed skin tension lines where possible. The area thus outlined was incised deep to adipose tissue with a #15 scalpel blade.  The skin margins were undermined to an appropriate distance in all directions utilizing iris scissors.
Staged Advancement Flap Text: The defect edges were debeveled with a #15 scalpel blade.  Given the location of the defect, shape of the defect and the proximity to free margins a staged advancement flap was deemed most appropriate.  Using a sterile surgical marker, an appropriate advancement flap was drawn incorporating the defect and placing the expected incisions within the relaxed skin tension lines where possible. The area thus outlined was incised deep to adipose tissue with a #15 scalpel blade.  The skin margins were undermined to an appropriate distance in all directions utilizing iris scissors.
Star Wedge Flap Text: The defect edges were debeveled with a #15 scalpel blade.  Given the location of the defect, shape of the defect and the proximity to free margins a star wedge flap was deemed most appropriate.  Using a sterile surgical marker, an appropriate rotation flap was drawn incorporating the defect and placing the expected incisions within the relaxed skin tension lines where possible. The area thus outlined was incised deep to adipose tissue with a #15 scalpel blade.  The skin margins were undermined to an appropriate distance in all directions utilizing iris scissors.
Transposition Flap Text: The defect edges were debeveled with a #15 scalpel blade.  Given the location of the defect and the proximity to free margins a transposition flap was deemed most appropriate.  Using a sterile surgical marker, an appropriate transposition flap was drawn incorporating the defect.    The area thus outlined was incised deep to adipose tissue with a #15 scalpel blade.  The skin margins were undermined to an appropriate distance in all directions utilizing iris scissors.
Muscle Hinge Flap Text: The defect edges were debeveled with a #15 scalpel blade.  Given the size, depth and location of the defect and the proximity to free margins a muscle hinge flap was deemed most appropriate.  Using a sterile surgical marker, an appropriate hinge flap was drawn incorporating the defect. The area thus outlined was incised with a #15 scalpel blade.  The skin margins were undermined to an appropriate distance in all directions utilizing iris scissors.
Mustarde Flap Text: The defect edges were debeveled with a #15 scalpel blade.  Given the size, depth and location of the defect and the proximity to free margins a Mustarde flap was deemed most appropriate.  Using a sterile surgical marker, an appropriate flap was drawn incorporating the defect. The area thus outlined was incised with a #15 scalpel blade.  The skin margins were undermined to an appropriate distance in all directions utilizing iris scissors.
Nasal Turnover Hinge Flap Text: The defect edges were debeveled with a #15 scalpel blade.  Given the size, depth, location of the defect and the defect being full thickness a nasal turnover hinge flap was deemed most appropriate.  Using a sterile surgical marker, an appropriate hinge flap was drawn incorporating the defect. The area thus outlined was incised with a #15 scalpel blade. The flap was designed to recreate the nasal mucosal lining and the alar rim. The skin margins were undermined to an appropriate distance in all directions utilizing iris scissors.
Nasalis-Muscle-Based Myocutaneous Island Pedicle Flap Text: Using a #15 blade, an incision was made around the donor flap to the level of the nasalis muscle. Wide lateral undermining was then performed in both the subcutaneous plane above the nasalis muscle, and in a submuscular plane just above periosteum. This allowed the formation of a free nasalis muscle axial pedicle (based on the angular artery) which was still attached to the actual cutaneous flap, increasing its mobility and vascular viability. Hemostasis was obtained with pinpoint electrocoagulation. The flap was mobilized into position and the pivotal anchor points positioned and stabilized with buried interrupted sutures. Subcutaneous and dermal tissues were closed in a multilayered fashion with sutures. Tissue redundancies were excised, and the epidermal edges were apposed without significant tension and sutured with sutures.
Orbicularis Oris Muscle Flap Text: The defect edges were debeveled with a #15 scalpel blade.  Given that the defect affected the competency of the oral sphincter an orbicularis oris muscle flap was deemed most appropriate to restore this competency and normal muscle function.  Using a sterile surgical marker, an appropriate flap was drawn incorporating the defect. The area thus outlined was incised with a #15 scalpel blade.
Melolabial Transposition Flap Text: The defect edges were debeveled with a #15 scalpel blade.  Given the location of the defect and the proximity to free margins a melolabial flap was deemed most appropriate.  Using a sterile surgical marker, an appropriate melolabial transposition flap was drawn incorporating the defect.    The area thus outlined was incised deep to adipose tissue with a #15 scalpel blade.  The skin margins were undermined to an appropriate distance in all directions utilizing iris scissors.
Rhombic Flap Text: The defect edges were debeveled with a #15 scalpel blade.  Given the location of the defect and the proximity to free margins a rhombic flap was deemed most appropriate.  Using a sterile surgical marker, an appropriate rhombic flap was drawn incorporating the defect.    The area thus outlined was incised deep to adipose tissue with a #15 scalpel blade.  The skin margins were undermined to an appropriate distance in all directions utilizing iris scissors.
Rhomboid Transposition Flap Text: The defect edges were debeveled with a #15 scalpel blade.  Given the location of the defect and the proximity to free margins a rhomboid transposition flap was deemed most appropriate.  Using a sterile surgical marker, an appropriate rhomboid flap was drawn incorporating the defect.    The area thus outlined was incised deep to adipose tissue with a #15 scalpel blade.  The skin margins were undermined to an appropriate distance in all directions utilizing iris scissors.
Bi-Rhombic Flap Text: The defect edges were debeveled with a #15 scalpel blade.  Given the location of the defect and the proximity to free margins a bi-rhombic flap was deemed most appropriate.  Using a sterile surgical marker, an appropriate rhombic flap was drawn incorporating the defect. The area thus outlined was incised deep to adipose tissue with a #15 scalpel blade.  The skin margins were undermined to an appropriate distance in all directions utilizing iris scissors.
Helical Rim Advancement Flap Text: The defect edges were debeveled with a #15 blade scalpel.  Given the location of the defect and the proximity to free margins (helical rim) a double helical rim advancement flap was deemed most appropriate.  Using a sterile surgical marker, the appropriate advancement flaps were drawn incorporating the defect and placing the expected incisions between the helical rim and antihelix where possible.  The area thus outlined was incised through and through with a #15 scalpel blade.  With a skin hook and iris scissors, the flaps were gently and sharply undermined and freed up.
Bilateral Helical Rim Advancement Flap Text: The defect edges were debeveled with a #15 blade scalpel.  Given the location of the defect and the proximity to free margins (helical rim) a bilateral helical rim advancement flap was deemed most appropriate.  Using a sterile surgical marker, the appropriate advancement flaps were drawn incorporating the defect and placing the expected incisions between the helical rim and antihelix where possible.  The area thus outlined was incised through and through with a #15 scalpel blade.  With a skin hook and iris scissors, the flaps were gently and sharply undermined and freed up.
Ear Star Wedge Flap Text: The defect edges were debeveled with a #15 blade scalpel.  Given the location of the defect and the proximity to free margins (helical rim) an ear star wedge flap was deemed most appropriate.  Using a sterile surgical marker, the appropriate flap was drawn incorporating the defect and placing the expected incisions between the helical rim and antihelix where possible.  The area thus outlined was incised through and through with a #15 scalpel blade.
Banner Transposition Flap Text: The defect edges were debeveled with a #15 scalpel blade.  Given the location of the defect and the proximity to free margins a Banner transposition flap was deemed most appropriate.  Using a sterile surgical marker, an appropriate flap drawn around the defect. The area thus outlined was incised deep to adipose tissue with a #15 scalpel blade.  The skin margins were undermined to an appropriate distance in all directions utilizing iris scissors.
Bilobed Flap Text: The defect edges were debeveled with a #15 scalpel blade.  Given the location of the defect and the proximity to free margins a bilobe flap was deemed most appropriate.  Using a sterile surgical marker, an appropriate bilobe flap drawn around the defect.    The area thus outlined was incised deep to adipose tissue with a #15 scalpel blade.  The skin margins were undermined to an appropriate distance in all directions utilizing iris scissors.
Bilobed Transposition Flap Text: The defect edges were debeveled with a #15 scalpel blade.  Given the location of the defect and the proximity to free margins a bilobed transposition flap was deemed most appropriate.  Using a sterile surgical marker, an appropriate bilobe flap drawn around the defect.    The area thus outlined was incised deep to adipose tissue with a #15 scalpel blade.  The skin margins were undermined to an appropriate distance in all directions utilizing iris scissors.
Trilobed Flap Text: The defect edges were debeveled with a #15 scalpel blade.  Given the location of the defect and the proximity to free margins a trilobed flap was deemed most appropriate.  Using a sterile surgical marker, an appropriate trilobed flap drawn around the defect.    The area thus outlined was incised deep to adipose tissue with a #15 scalpel blade.  The skin margins were undermined to an appropriate distance in all directions utilizing iris scissors.
Dorsal Nasal Flap Text: The defect edges were debeveled with a #15 scalpel blade.  Given the location of the defect and the proximity to free margins a dorsal nasal flap was deemed most appropriate.  Using a sterile surgical marker, an appropriate dorsal nasal flap was drawn around the defect.    The area thus outlined was incised deep to adipose tissue with a #15 scalpel blade.  The skin margins were undermined to an appropriate distance in all directions utilizing iris scissors.
Island Pedicle Flap Text: The defect edges were debeveled with a #15 scalpel blade.  Given the location of the defect, shape of the defect and the proximity to free margins an island pedicle advancement flap was deemed most appropriate.  Using a sterile surgical marker, an appropriate advancement flap was drawn incorporating the defect, outlining the appropriate donor tissue and placing the expected incisions within the relaxed skin tension lines where possible.    The area thus outlined was incised deep to adipose tissue with a #15 scalpel blade.  The skin margins were undermined to an appropriate distance in all directions around the primary defect and laterally outward around the island pedicle utilizing iris scissors.  There was minimal undermining beneath the pedicle flap.
Island Pedicle Flap With Canthal Suspension Text: The defect edges were debeveled with a #15 scalpel blade.  Given the location of the defect, shape of the defect and the proximity to free margins an island pedicle advancement flap was deemed most appropriate.  Using a sterile surgical marker, an appropriate advancement flap was drawn incorporating the defect, outlining the appropriate donor tissue and placing the expected incisions within the relaxed skin tension lines where possible. The area thus outlined was incised deep to adipose tissue with a #15 scalpel blade.  The skin margins were undermined to an appropriate distance in all directions around the primary defect and laterally outward around the island pedicle utilizing iris scissors.  There was minimal undermining beneath the pedicle flap. A suspension suture was placed in the canthal tendon to prevent tension and prevent ectropion.
Alar Island Pedicle Flap Text: The defect edges were debeveled with a #15 scalpel blade.  Given the location of the defect, shape of the defect and the proximity to the alar rim an island pedicle advancement flap was deemed most appropriate.  Using a sterile surgical marker, an appropriate advancement flap was drawn incorporating the defect, outlining the appropriate donor tissue and placing the expected incisions within the nasal ala running parallel to the alar rim. The area thus outlined was incised with a #15 scalpel blade.  The skin margins were undermined minimally to an appropriate distance in all directions around the primary defect and laterally outward around the island pedicle utilizing iris scissors.  There was minimal undermining beneath the pedicle flap.
Double Island Pedicle Flap Text: The defect edges were debeveled with a #15 scalpel blade.  Given the location of the defect, shape of the defect and the proximity to free margins a double island pedicle advancement flap was deemed most appropriate.  Using a sterile surgical marker, an appropriate advancement flap was drawn incorporating the defect, outlining the appropriate donor tissue and placing the expected incisions within the relaxed skin tension lines where possible.    The area thus outlined was incised deep to adipose tissue with a #15 scalpel blade.  The skin margins were undermined to an appropriate distance in all directions around the primary defect and laterally outward around the island pedicle utilizing iris scissors.  There was minimal undermining beneath the pedicle flap.
Island Pedicle Flap-Requiring Vessel Identification Text: The defect edges were debeveled with a #15 scalpel blade.  Given the location of the defect, shape of the defect and the proximity to free margins an island pedicle advancement flap was deemed most appropriate.  Using a sterile surgical marker, an appropriate advancement flap was drawn, based on the axial vessel mentioned above, incorporating the defect, outlining the appropriate donor tissue and placing the expected incisions within the relaxed skin tension lines where possible.    The area thus outlined was incised deep to adipose tissue with a #15 scalpel blade.  The skin margins were undermined to an appropriate distance in all directions around the primary defect and laterally outward around the island pedicle utilizing iris scissors.  There was minimal undermining beneath the pedicle flap.
Keystone Flap Text: The defect edges were debeveled with a #15 scalpel blade.  Given the location of the defect, shape of the defect a keystone flap was deemed most appropriate.  Using a sterile surgical marker, an appropriate keystone flap was drawn incorporating the defect, outlining the appropriate donor tissue and placing the expected incisions within the relaxed skin tension lines where possible. The area thus outlined was incised deep to adipose tissue with a #15 scalpel blade.  The skin margins were undermined to an appropriate distance in all directions around the primary defect and laterally outward around the flap utilizing iris scissors.
O-T Plasty Text: The defect edges were debeveled with a #15 scalpel blade.  Given the location of the defect, shape of the defect and the proximity to free margins an O-T plasty was deemed most appropriate.  Using a sterile surgical marker, an appropriate O-T plasty was drawn incorporating the defect and placing the expected incisions within the relaxed skin tension lines where possible.    The area thus outlined was incised deep to adipose tissue with a #15 scalpel blade.  The skin margins were undermined to an appropriate distance in all directions utilizing iris scissors.
O-Z Plasty Text: The defect edges were debeveled with a #15 scalpel blade.  Given the location of the defect, shape of the defect and the proximity to free margins an O-Z plasty (double transposition flap) was deemed most appropriate.  Using a sterile surgical marker, the appropriate transposition flaps were drawn incorporating the defect and placing the expected incisions within the relaxed skin tension lines where possible.    The area thus outlined was incised deep to adipose tissue with a #15 scalpel blade.  The skin margins were undermined to an appropriate distance in all directions utilizing iris scissors.  Hemostasis was achieved with electrocautery.  The flaps were then transposed into place, one clockwise and the other counterclockwise, and anchored with interrupted buried subcutaneous sutures.
Double O-Z Plasty Text: The defect edges were debeveled with a #15 scalpel blade.  Given the location of the defect, shape of the defect and the proximity to free margins a Double O-Z plasty (double transposition flap) was deemed most appropriate.  Using a sterile surgical marker, the appropriate transposition flaps were drawn incorporating the defect and placing the expected incisions within the relaxed skin tension lines where possible. The area thus outlined was incised deep to adipose tissue with a #15 scalpel blade.  The skin margins were undermined to an appropriate distance in all directions utilizing iris scissors.  Hemostasis was achieved with electrocautery.  The flaps were then transposed into place, one clockwise and the other counterclockwise, and anchored with interrupted buried subcutaneous sutures.
V-Y Plasty Text: The defect edges were debeveled with a #15 scalpel blade.  Given the location of the defect, shape of the defect and the proximity to free margins an V-Y advancement flap was deemed most appropriate.  Using a sterile surgical marker, an appropriate advancement flap was drawn incorporating the defect and placing the expected incisions within the relaxed skin tension lines where possible.    The area thus outlined was incised deep to adipose tissue with a #15 scalpel blade.  The skin margins were undermined to an appropriate distance in all directions utilizing iris scissors.
H Plasty Text: Given the location of the defect, shape of the defect and the proximity to free margins a H-plasty was deemed most appropriate for repair.  Using a sterile surgical marker, the appropriate advancement arms of the H-plasty were drawn incorporating the defect and placing the expected incisions within the relaxed skin tension lines where possible. The area thus outlined was incised deep to adipose tissue with a #15 scalpel blade. The skin margins were undermined to an appropriate distance in all directions utilizing iris scissors.  The opposing advancement arms were then advanced into place in opposite direction and anchored with interrupted buried subcutaneous sutures.
W Plasty Text: The lesion was extirpated to the level of the fat with a #15 scalpel blade.  Given the location of the defect, shape of the defect and the proximity to free margins a W-plasty was deemed most appropriate for repair.  Using a sterile surgical marker, the appropriate transposition arms of the W-plasty were drawn incorporating the defect and placing the expected incisions within the relaxed skin tension lines where possible.    The area thus outlined was incised deep to adipose tissue with a #15 scalpel blade.  The skin margins were undermined to an appropriate distance in all directions utilizing iris scissors.  The opposing transposition arms were then transposed into place in opposite direction and anchored with interrupted buried subcutaneous sutures.
Z Plasty Text: The lesion was extirpated to the level of the fat with a #15 scalpel blade.  Given the location of the defect, shape of the defect and the proximity to free margins a Z-plasty was deemed most appropriate for repair.  Using a sterile surgical marker, the appropriate transposition arms of the Z-plasty were drawn incorporating the defect and placing the expected incisions within the relaxed skin tension lines where possible.    The area thus outlined was incised deep to adipose tissue with a #15 scalpel blade.  The skin margins were undermined to an appropriate distance in all directions utilizing iris scissors.  The opposing transposition arms were then transposed into place in opposite direction and anchored with interrupted buried subcutaneous sutures.
Double Z Plasty Text: The lesion was extirpated to the level of the fat with a #15 scalpel blade. Given the location of the defect, shape of the defect and the proximity to free margins a double Z-plasty was deemed most appropriate for repair. Using a sterile surgical marker, the appropriate transposition arms of the double Z-plasty were drawn incorporating the defect and placing the expected incisions within the relaxed skin tension lines where possible. The area thus outlined was incised deep to adipose tissue with a #15 scalpel blade. The skin margins were undermined to an appropriate distance in all directions utilizing iris scissors. The opposing transposition arms were then transposed and carried over into place in opposite direction and anchored with interrupted buried subcutaneous sutures.
Zygomaticofacial Flap Text: Given the location of the defect, shape of the defect and the proximity to free margins a zygomaticofacial flap was deemed most appropriate for repair.  Using a sterile surgical marker, the appropriate flap was drawn incorporating the defect and placing the expected incisions within the relaxed skin tension lines where possible. The area thus outlined was incised deep to adipose tissue with a #15 scalpel blade with preservation of a vascular pedicle.  The skin margins were undermined to an appropriate distance in all directions utilizing iris scissors.  The flap was then placed into the defect and anchored with interrupted buried subcutaneous sutures.
Cheek Interpolation Flap Text: A decision was made to reconstruct the defect utilizing an interpolation axial flap and a staged reconstruction.  A telfa template was made of the defect.  This telfa template was then used to outline the Cheek Interpolation flap.  The donor area for the pedicle flap was then injected with anesthesia.  The flap was excised through the skin and subcutaneous tissue down to the layer of the underlying musculature.  The interpolation flap was carefully excised within this deep plane to maintain its blood supply.  The edges of the donor site were undermined.   The donor site was closed in a primary fashion.  The pedicle was then rotated into position and sutured.  Once the tube was sutured into place, adequate blood supply was confirmed with blanching and refill.  The pedicle was then wrapped with xeroform gauze and dressed appropriately with a telfa and gauze bandage to ensure continued blood supply and protect the attached pedicle.
Cheek-To-Nose Interpolation Flap Text: A decision was made to reconstruct the defect utilizing an interpolation axial flap and a staged reconstruction.  A telfa template was made of the defect.  This telfa template was then used to outline the Cheek-To-Nose Interpolation flap.  The donor area for the pedicle flap was then injected with anesthesia.  The flap was excised through the skin and subcutaneous tissue down to the layer of the underlying musculature.  The interpolation flap was carefully excised within this deep plane to maintain its blood supply.  The edges of the donor site were undermined.   The donor site was closed in a primary fashion.  The pedicle was then rotated into position and sutured.  Once the tube was sutured into place, adequate blood supply was confirmed with blanching and refill.  The pedicle was then wrapped with xeroform gauze and dressed appropriately with a telfa and gauze bandage to ensure continued blood supply and protect the attached pedicle.
Interpolation Flap Text: A decision was made to reconstruct the defect utilizing an interpolation axial flap and a staged reconstruction.  A telfa template was made of the defect.  This telfa template was then used to outline the interpolation flap.  The donor area for the pedicle flap was then injected with anesthesia.  The flap was excised through the skin and subcutaneous tissue down to the layer of the underlying musculature.  The interpolation flap was carefully excised within this deep plane to maintain its blood supply.  The edges of the donor site were undermined.   The donor site was closed in a primary fashion.  The pedicle was then rotated into position and sutured.  Once the tube was sutured into place, adequate blood supply was confirmed with blanching and refill.  The pedicle was then wrapped with xeroform gauze and dressed appropriately with a telfa and gauze bandage to ensure continued blood supply and protect the attached pedicle.
Melolabial Interpolation Flap Text: A decision was made to reconstruct the defect utilizing an interpolation axial flap and a staged reconstruction.  A telfa template was made of the defect.  This telfa template was then used to outline the melolabial interpolation flap.  The donor area for the pedicle flap was then injected with anesthesia.  The flap was excised through the skin and subcutaneous tissue down to the layer of the underlying musculature.  The pedicle flap was carefully excised within this deep plane to maintain its blood supply.  The edges of the donor site were undermined.   The donor site was closed in a primary fashion.  The pedicle was then rotated into position and sutured.  Once the tube was sutured into place, adequate blood supply was confirmed with blanching and refill.  The pedicle was then wrapped with xeroform gauze and dressed appropriately with a telfa and gauze bandage to ensure continued blood supply and protect the attached pedicle.
Mastoid Interpolation Flap Text: A decision was made to reconstruct the defect utilizing an interpolation axial flap and a staged reconstruction.  A telfa template was made of the defect.  This telfa template was then used to outline the mastoid interpolation flap.  The donor area for the pedicle flap was then injected with anesthesia.  The flap was excised through the skin and subcutaneous tissue down to the layer of the underlying musculature.  The pedicle flap was carefully excised within this deep plane to maintain its blood supply.  The edges of the donor site were undermined.   The donor site was closed in a primary fashion.  The pedicle was then rotated into position and sutured.  Once the tube was sutured into place, adequate blood supply was confirmed with blanching and refill.  The pedicle was then wrapped with xeroform gauze and dressed appropriately with a telfa and gauze bandage to ensure continued blood supply and protect the attached pedicle.
Posterior Auricular Interpolation Flap Text: A decision was made to reconstruct the defect utilizing an interpolation axial flap and a staged reconstruction.  A telfa template was made of the defect.  This telfa template was then used to outline the posterior auricular interpolation flap.  The donor area for the pedicle flap was then injected with anesthesia.  The flap was excised through the skin and subcutaneous tissue down to the layer of the underlying musculature.  The pedicle flap was carefully excised within this deep plane to maintain its blood supply.  The edges of the donor site were undermined.   The donor site was closed in a primary fashion.  The pedicle was then rotated into position and sutured.  Once the tube was sutured into place, adequate blood supply was confirmed with blanching and refill.  The pedicle was then wrapped with xeroform gauze and dressed appropriately with a telfa and gauze bandage to ensure continued blood supply and protect the attached pedicle.
Paramedian Forehead Flap Text: A decision was made to reconstruct the defect utilizing an interpolation axial flap and a staged reconstruction.  A telfa template was made of the defect.  This telfa template was then used to outline the paramedian forehead pedicle flap.  The donor area for the pedicle flap was then injected with anesthesia.  The flap was excised through the skin and subcutaneous tissue down to the layer of the underlying musculature.  The pedicle flap was carefully excised within this deep plane to maintain its blood supply.  The edges of the donor site were undermined.   The donor site was closed in a primary fashion.  The pedicle was then rotated into position and sutured.  Once the tube was sutured into place, adequate blood supply was confirmed with blanching and refill.  The pedicle was then wrapped with xeroform gauze and dressed appropriately with a telfa and gauze bandage to ensure continued blood supply and protect the attached pedicle.
Abbe Flap (Upper To Lower Lip) Text: The defect of the lower lip was assessed and measured.  Given the location and size of the defect, an Abbe flap was deemed most appropriate.  Using a sterile surgical marker, an appropriate Abbe flap was measured and drawn on the upper lip. Local anesthesia was then infiltrated.  A scalpel was then used to incise the upper lip through and through the skin, vermilion, muscle and mucosa, leaving the flap pedicled on the opposite side.  The flap was then rotated and transferred to the lower lip defect.  The flap was then sutured into place with a three layer technique, closing the orbicularis oris muscle layer with subcutaneous buried sutures, followed by a mucosal layer and an epidermal layer.
Abbe Flap (Lower To Upper Lip) Text: The defect of the upper lip was assessed and measured.  Given the location and size of the defect, an Abbe flap was deemed most appropriate.  Using a sterile surgical marker, an appropriate Abbe flap was measured and drawn on the lower lip. Local anesthesia was then infiltrated. A scalpel was then used to incise the upper lip through and through the skin, vermilion, muscle and mucosa, leaving the flap pedicled on the opposite side.  The flap was then rotated and transferred to the lower lip defect.  The flap was then sutured into place with a three layer technique, closing the orbicularis oris muscle layer with subcutaneous buried sutures, followed by a mucosal layer and an epidermal layer.
Estlander Flap (Upper To Lower Lip) Text: The defect of the lower lip was assessed and measured.  Given the location and size of the defect, an Estlander flap was deemed most appropriate.  Using a sterile surgical marker, an appropriate Estlander flap was measured and drawn on the upper lip. Local anesthesia was then infiltrated. A scalpel was then used to incise the lateral aspect of the flap, through skin, muscle and mucosa, leaving the flap pedicled medially.  The flap was then rotated and positioned to fill the lower lip defect.  The flap was then sutured into place with a three layer technique, closing the orbicularis oris muscle layer with subcutaneous buried sutures, followed by a mucosal layer and an epidermal layer.
Lip Wedge Excision Repair Text: Given the location of the defect and the proximity to free margins a full thickness wedge repair was deemed most appropriate.  Using a sterile surgical marker, the appropriate repair was drawn incorporating the defect and placing the expected incisions perpendicular to the vermilion border.  The vermilion border was also meticulously outlined to ensure appropriate reapproximation during the repair.  The area thus outlined was incised through and through with a #15 scalpel blade.  The muscularis and dermis were reaproximated with deep sutures following hemostasis. Care was taken to realign the vermilion border before proceeding with the superficial closure.  Once the vermilion was realigned the superfical and mucosal closure was finished.
Ftsg Text: The defect edges were debeveled with a #15 scalpel blade.  Given the location of the defect, shape of the defect and the proximity to free margins a full thickness skin graft was deemed most appropriate.  Using a sterile surgical marker, the primary defect shape was transferred to the donor site. The area thus outlined was incised deep to adipose tissue with a #15 scalpel blade.  The harvested graft was then trimmed of adipose tissue until only dermis and epidermis was left.  The skin margins of the secondary defect were undermined to an appropriate distance in all directions utilizing iris scissors.  The secondary defect was closed with interrupted buried subcutaneous sutures.  The skin edges were then re-apposed with running  sutures.  The skin graft was then placed in the primary defect and oriented appropriately.
Split-Thickness Skin Graft Text: The defect edges were debeveled with a #15 scalpel blade.  Given the location of the defect, shape of the defect and the proximity to free margins a split thickness skin graft was deemed most appropriate.  Using a sterile surgical marker, the primary defect shape was transferred to the donor site. The split thickness graft was then harvested.  The skin graft was then placed in the primary defect and oriented appropriately.
Pinch Graft Text: The defect edges were debeveled with a #15 scalpel blade. Given the location of the defect, shape of the defect and the proximity to free margins a pinch graft was deemed most appropriate. Using a sterile surgical marker, the primary defect shape was transferred to the donor site. The area thus outlined was incised deep to adipose tissue with a #15 scalpel blade.  The harvested graft was then trimmed of adipose tissue until only dermis and epidermis was left. The skin margins of the secondary defect were undermined to an appropriate distance in all directions utilizing iris scissors.  The secondary defect was closed with interrupted buried subcutaneous sutures.  The skin edges were then re-apposed with running  sutures.  The skin graft was then placed in the primary defect and oriented appropriately.
Burow's Graft Text: The defect edges were debeveled with a #15 scalpel blade.  Given the location of the defect, shape of the defect, the proximity to free margins and the presence of a standing cone deformity a Burow's skin graft was deemed most appropriate. The standing cone was removed and this tissue was then trimmed to the shape of the primary defect. The adipose tissue was also removed until only dermis and epidermis were left.  The skin margins of the secondary defect were undermined to an appropriate distance in all directions utilizing iris scissors.  The secondary defect was closed with interrupted buried subcutaneous sutures.  The skin edges were then re-apposed with running  sutures.  The skin graft was then placed in the primary defect and oriented appropriately.
Cartilage Graft Text: The defect edges were debeveled with a #15 scalpel blade.  Given the location of the defect, shape of the defect, the fact the defect involved a full thickness cartilage defect a cartilage graft was deemed most appropriate.  An appropriate donor site was identified, cleansed, and anesthetized. The cartilage graft was then harvested and transferred to the recipient site, oriented appropriately and then sutured into place.  The secondary defect was then repaired using a primary closure.
Composite Graft Text: The defect edges were debeveled with a #15 scalpel blade.  Given the location of the defect, shape of the defect, the proximity to free margins and the fact the defect was full thickness a composite graft was deemed most appropriate.  The defect was outline and then transferred to the donor site.  A full thickness graft was then excised from the donor site. The graft was then placed in the primary defect, oriented appropriately and then sutured into place.  The secondary defect was then repaired using a primary closure.
Epidermal Autograft Text: The defect edges were debeveled with a #15 scalpel blade.  Given the location of the defect, shape of the defect and the proximity to free margins an epidermal autograft was deemed most appropriate.  Using a sterile surgical marker, the primary defect shape was transferred to the donor site. The epidermal graft was then harvested.  The skin graft was then placed in the primary defect and oriented appropriately.
Dermal Autograft Text: The defect edges were debeveled with a #15 scalpel blade.  Given the location of the defect, shape of the defect and the proximity to free margins a dermal autograft was deemed most appropriate.  Using a sterile surgical marker, the primary defect shape was transferred to the donor site. The area thus outlined was incised deep to adipose tissue with a #15 scalpel blade.  The harvested graft was then trimmed of adipose and epidermal tissue until only dermis was left.  The skin graft was then placed in the primary defect and oriented appropriately.
Skin Substitute Text: The defect edges were debeveled with a #15 scalpel blade.  Given the location of the defect, shape of the defect and the proximity to free margins a skin substitute graft was deemed most appropriate.  The graft material was trimmed to fit the size of the defect. The graft was then placed in the primary defect and oriented appropriately.
Tissue Cultured Epidermal Autograft Text: The defect edges were debeveled with a #15 scalpel blade.  Given the location of the defect, shape of the defect and the proximity to free margins a tissue cultured epidermal autograft was deemed most appropriate.  The graft was then trimmed to fit the size of the defect.  The graft was then placed in the primary defect and oriented appropriately.
Xenograft Text: The defect edges were debeveled with a #15 scalpel blade.  Given the location of the defect, shape of the defect and the proximity to free margins a xenograft was deemed most appropriate.  The graft was then trimmed to fit the size of the defect.  The graft was then placed in the primary defect and oriented appropriately.
Purse String (Intermediate) Text: Given the location of the defect and the characteristics of the surrounding skin a purse string intermediate closure was deemed most appropriate.  Undermining was performed circumferentially around the surgical defect.  A purse string suture was then placed and tightened.
Purse String (Simple) Text: Given the location of the defect and the characteristics of the surrounding skin a purse string simple closure was deemed most appropriate.  Undermining was performed circumferentially around the surgical defect.  A purse string suture was then placed and tightened.
Complex Repair And Single Advancement Flap Text: The defect edges were debeveled with a #15 scalpel blade.  The primary defect was closed partially with a complex linear closure.  Given the location of the remaining defect, shape of the defect and the proximity to free margins a single advancement flap was deemed most appropriate for complete closure of the defect.  Using a sterile surgical marker, an appropriate advancement flap was drawn incorporating the defect and placing the expected incisions within the relaxed skin tension lines where possible.    The area thus outlined was incised deep to adipose tissue with a #15 scalpel blade.  The skin margins were undermined to an appropriate distance in all directions utilizing iris scissors.
Complex Repair And Double Advancement Flap Text: The defect edges were debeveled with a #15 scalpel blade.  The primary defect was closed partially with a complex linear closure.  Given the location of the remaining defect, shape of the defect and the proximity to free margins a double advancement flap was deemed most appropriate for complete closure of the defect.  Using a sterile surgical marker, an appropriate advancement flap was drawn incorporating the defect and placing the expected incisions within the relaxed skin tension lines where possible.    The area thus outlined was incised deep to adipose tissue with a #15 scalpel blade.  The skin margins were undermined to an appropriate distance in all directions utilizing iris scissors.
Complex Repair And Modified Advancement Flap Text: The defect edges were debeveled with a #15 scalpel blade.  The primary defect was closed partially with a complex linear closure.  Given the location of the remaining defect, shape of the defect and the proximity to free margins a modified advancement flap was deemed most appropriate for complete closure of the defect.  Using a sterile surgical marker, an appropriate advancement flap was drawn incorporating the defect and placing the expected incisions within the relaxed skin tension lines where possible.    The area thus outlined was incised deep to adipose tissue with a #15 scalpel blade.  The skin margins were undermined to an appropriate distance in all directions utilizing iris scissors.
Complex Repair And A-T Advancement Flap Text: The defect edges were debeveled with a #15 scalpel blade.  The primary defect was closed partially with a complex linear closure.  Given the location of the remaining defect, shape of the defect and the proximity to free margins an A-T advancement flap was deemed most appropriate for complete closure of the defect.  Using a sterile surgical marker, an appropriate advancement flap was drawn incorporating the defect and placing the expected incisions within the relaxed skin tension lines where possible.    The area thus outlined was incised deep to adipose tissue with a #15 scalpel blade.  The skin margins were undermined to an appropriate distance in all directions utilizing iris scissors.
Complex Repair And O-T Advancement Flap Text: The defect edges were debeveled with a #15 scalpel blade.  The primary defect was closed partially with a complex linear closure.  Given the location of the remaining defect, shape of the defect and the proximity to free margins an O-T advancement flap was deemed most appropriate for complete closure of the defect.  Using a sterile surgical marker, an appropriate advancement flap was drawn incorporating the defect and placing the expected incisions within the relaxed skin tension lines where possible.    The area thus outlined was incised deep to adipose tissue with a #15 scalpel blade.  The skin margins were undermined to an appropriate distance in all directions utilizing iris scissors.
Complex Repair And O-L Flap Text: The defect edges were debeveled with a #15 scalpel blade.  The primary defect was closed partially with a complex linear closure.  Given the location of the remaining defect, shape of the defect and the proximity to free margins an O-L flap was deemed most appropriate for complete closure of the defect.  Using a sterile surgical marker, an appropriate flap was drawn incorporating the defect and placing the expected incisions within the relaxed skin tension lines where possible.    The area thus outlined was incised deep to adipose tissue with a #15 scalpel blade.  The skin margins were undermined to an appropriate distance in all directions utilizing iris scissors.
Complex Repair And Bilobe Flap Text: The defect edges were debeveled with a #15 scalpel blade.  The primary defect was closed partially with a complex linear closure.  Given the location of the remaining defect, shape of the defect and the proximity to free margins a bilobe flap was deemed most appropriate for complete closure of the defect.  Using a sterile surgical marker, an appropriate advancement flap was drawn incorporating the defect and placing the expected incisions within the relaxed skin tension lines where possible.    The area thus outlined was incised deep to adipose tissue with a #15 scalpel blade.  The skin margins were undermined to an appropriate distance in all directions utilizing iris scissors.
Complex Repair And Melolabial Flap Text: The defect edges were debeveled with a #15 scalpel blade.  The primary defect was closed partially with a complex linear closure.  Given the location of the remaining defect, shape of the defect and the proximity to free margins a melolabial flap was deemed most appropriate for complete closure of the defect.  Using a sterile surgical marker, an appropriate advancement flap was drawn incorporating the defect and placing the expected incisions within the relaxed skin tension lines where possible.    The area thus outlined was incised deep to adipose tissue with a #15 scalpel blade.  The skin margins were undermined to an appropriate distance in all directions utilizing iris scissors.
Complex Repair And Rotation Flap Text: The defect edges were debeveled with a #15 scalpel blade.  The primary defect was closed partially with a complex linear closure.  Given the location of the remaining defect, shape of the defect and the proximity to free margins a rotation flap was deemed most appropriate for complete closure of the defect.  Using a sterile surgical marker, an appropriate advancement flap was drawn incorporating the defect and placing the expected incisions within the relaxed skin tension lines where possible.    The area thus outlined was incised deep to adipose tissue with a #15 scalpel blade.  The skin margins were undermined to an appropriate distance in all directions utilizing iris scissors.
Complex Repair And Rhombic Flap Text: The defect edges were debeveled with a #15 scalpel blade.  The primary defect was closed partially with a complex linear closure.  Given the location of the remaining defect, shape of the defect and the proximity to free margins a rhombic flap was deemed most appropriate for complete closure of the defect.  Using a sterile surgical marker, an appropriate advancement flap was drawn incorporating the defect and placing the expected incisions within the relaxed skin tension lines where possible.    The area thus outlined was incised deep to adipose tissue with a #15 scalpel blade.  The skin margins were undermined to an appropriate distance in all directions utilizing iris scissors.
Complex Repair And Transposition Flap Text: The defect edges were debeveled with a #15 scalpel blade.  The primary defect was closed partially with a complex linear closure.  Given the location of the remaining defect, shape of the defect and the proximity to free margins a transposition flap was deemed most appropriate for complete closure of the defect.  Using a sterile surgical marker, an appropriate advancement flap was drawn incorporating the defect and placing the expected incisions within the relaxed skin tension lines where possible.    The area thus outlined was incised deep to adipose tissue with a #15 scalpel blade.  The skin margins were undermined to an appropriate distance in all directions utilizing iris scissors.
Complex Repair And V-Y Plasty Text: The defect edges were debeveled with a #15 scalpel blade.  The primary defect was closed partially with a complex linear closure.  Given the location of the remaining defect, shape of the defect and the proximity to free margins a V-Y plasty was deemed most appropriate for complete closure of the defect.  Using a sterile surgical marker, an appropriate advancement flap was drawn incorporating the defect and placing the expected incisions within the relaxed skin tension lines where possible.    The area thus outlined was incised deep to adipose tissue with a #15 scalpel blade.  The skin margins were undermined to an appropriate distance in all directions utilizing iris scissors.
Complex Repair And M Plasty Text: The defect edges were debeveled with a #15 scalpel blade.  The primary defect was closed partially with a complex linear closure.  Given the location of the remaining defect, shape of the defect and the proximity to free margins an M plasty was deemed most appropriate for complete closure of the defect.  Using a sterile surgical marker, an appropriate advancement flap was drawn incorporating the defect and placing the expected incisions within the relaxed skin tension lines where possible.    The area thus outlined was incised deep to adipose tissue with a #15 scalpel blade.  The skin margins were undermined to an appropriate distance in all directions utilizing iris scissors.
Complex Repair And Double M Plasty Text: The defect edges were debeveled with a #15 scalpel blade.  The primary defect was closed partially with a complex linear closure.  Given the location of the remaining defect, shape of the defect and the proximity to free margins a double M plasty was deemed most appropriate for complete closure of the defect.  Using a sterile surgical marker, an appropriate advancement flap was drawn incorporating the defect and placing the expected incisions within the relaxed skin tension lines where possible.    The area thus outlined was incised deep to adipose tissue with a #15 scalpel blade.  The skin margins were undermined to an appropriate distance in all directions utilizing iris scissors.
Complex Repair And W Plasty Text: The defect edges were debeveled with a #15 scalpel blade.  The primary defect was closed partially with a complex linear closure.  Given the location of the remaining defect, shape of the defect and the proximity to free margins a W plasty was deemed most appropriate for complete closure of the defect.  Using a sterile surgical marker, an appropriate advancement flap was drawn incorporating the defect and placing the expected incisions within the relaxed skin tension lines where possible.    The area thus outlined was incised deep to adipose tissue with a #15 scalpel blade.  The skin margins were undermined to an appropriate distance in all directions utilizing iris scissors.
Complex Repair And Z Plasty Text: The defect edges were debeveled with a #15 scalpel blade.  The primary defect was closed partially with a complex linear closure.  Given the location of the remaining defect, shape of the defect and the proximity to free margins a Z plasty was deemed most appropriate for complete closure of the defect.  Using a sterile surgical marker, an appropriate advancement flap was drawn incorporating the defect and placing the expected incisions within the relaxed skin tension lines where possible.    The area thus outlined was incised deep to adipose tissue with a #15 scalpel blade.  The skin margins were undermined to an appropriate distance in all directions utilizing iris scissors.
Complex Repair And Dorsal Nasal Flap Text: The defect edges were debeveled with a #15 scalpel blade.  The primary defect was closed partially with a complex linear closure.  Given the location of the remaining defect, shape of the defect and the proximity to free margins a dorsal nasal flap was deemed most appropriate for complete closure of the defect.  Using a sterile surgical marker, an appropriate flap was drawn incorporating the defect and placing the expected incisions within the relaxed skin tension lines where possible.    The area thus outlined was incised deep to adipose tissue with a #15 scalpel blade.  The skin margins were undermined to an appropriate distance in all directions utilizing iris scissors.
Complex Repair And Ftsg Text: The defect edges were debeveled with a #15 scalpel blade.  The primary defect was closed partially with a complex linear closure.  Given the location of the defect, shape of the defect and the proximity to free margins a full thickness skin graft was deemed most appropriate to repair the remaining defect.  The graft was trimmed to fit the size of the remaining defect.  The graft was then placed in the primary defect, oriented appropriately, and sutured into place.
Complex Repair And Burow's Graft Text: The defect edges were debeveled with a #15 scalpel blade.  The primary defect was closed partially with a complex linear closure.  Given the location of the defect, shape of the defect, the proximity to free margins and the presence of a standing cone deformity a Burow's graft was deemed most appropriate to repair the remaining defect.  The graft was trimmed to fit the size of the remaining defect.  The graft was then placed in the primary defect, oriented appropriately, and sutured into place.
Complex Repair And Split-Thickness Skin Graft Text: The defect edges were debeveled with a #15 scalpel blade.  The primary defect was closed partially with a complex linear closure.  Given the location of the defect, shape of the defect and the proximity to free margins a split thickness skin graft was deemed most appropriate to repair the remaining defect.  The graft was trimmed to fit the size of the remaining defect.  The graft was then placed in the primary defect, oriented appropriately, and sutured into place.
Complex Repair And Epidermal Autograft Text: The defect edges were debeveled with a #15 scalpel blade.  The primary defect was closed partially with a complex linear closure.  Given the location of the defect, shape of the defect and the proximity to free margins an epidermal autograft was deemed most appropriate to repair the remaining defect.  The graft was trimmed to fit the size of the remaining defect.  The graft was then placed in the primary defect, oriented appropriately, and sutured into place.
Complex Repair And Dermal Autograft Text: The defect edges were debeveled with a #15 scalpel blade.  The primary defect was closed partially with a complex linear closure.  Given the location of the defect, shape of the defect and the proximity to free margins an dermal autograft was deemed most appropriate to repair the remaining defect.  The graft was trimmed to fit the size of the remaining defect.  The graft was then placed in the primary defect, oriented appropriately, and sutured into place.
Complex Repair And Tissue Cultured Epidermal Autograft Text: The defect edges were debeveled with a #15 scalpel blade.  The primary defect was closed partially with a complex linear closure.  Given the location of the defect, shape of the defect and the proximity to free margins an tissue cultured epidermal autograft was deemed most appropriate to repair the remaining defect.  The graft was trimmed to fit the size of the remaining defect.  The graft was then placed in the primary defect, oriented appropriately, and sutured into place.
Complex Repair And Xenograft Text: The defect edges were debeveled with a #15 scalpel blade.  The primary defect was closed partially with a complex linear closure.  Given the location of the defect, shape of the defect and the proximity to free margins a xenograft was deemed most appropriate to repair the remaining defect.  The graft was trimmed to fit the size of the remaining defect.  The graft was then placed in the primary defect, oriented appropriately, and sutured into place.
Complex Repair And Skin Substitute Graft Text: The defect edges were debeveled with a #15 scalpel blade.  The primary defect was closed partially with a complex linear closure.  Given the location of the remaining defect, shape of the defect and the proximity to free margins a skin substitute graft was deemed most appropriate to repair the remaining defect.  The graft was trimmed to fit the size of the remaining defect.  The graft was then placed in the primary defect, oriented appropriately, and sutured into place.
Path Notes (To The Dermatopathologist): Please check margins.
Consent was obtained from the patient. The risks and benefits to therapy were discussed in detail. Specifically, the risks of infection, scarring, bleeding, prolonged wound healing, incomplete removal, allergy to anesthesia, nerve injury and recurrence were addressed. Prior to the procedure, the treatment site was clearly identified and confirmed by the patient. All components of Universal Protocol/PAUSE Rule completed.
Render Post-Care Instructions In Note?: yes
Post-Care Instructions: I reviewed with the patient in detail post-care instructions. Patient is not to engage in any heavy lifting, exercise, or swimming for the next 14 days. Should the patient develop any fevers, chills, bleeding, severe pain patient will contact the office immediately.
Home Suture Removal Text: Patient was provided a home suture removal kit and will remove their sutures at home.  If they have any questions or difficulties they will call the office.
Where Do You Want The Question To Include Opioid Counseling Located?: Case Summary Tab
Billing Type: Third-Party Bill
Information: Selecting Yes will display possible errors in your note based on the variables you have selected. This validation is only offered as a suggestion for you. PLEASE NOTE THAT THE VALIDATION TEXT WILL BE REMOVED WHEN YOU FINALIZE YOUR NOTE. IF YOU WANT TO FAX A PRELIMINARY NOTE YOU WILL NEED TO TOGGLE THIS TO 'NO' IF YOU DO NOT WANT IT IN YOUR FAXED NOTE.

## 2024-01-29 NOTE — PROCEDURE: BENIGN DESTRUCTION COSMETIC
Post-Care Instructions: I reviewed with the patient in detail post-care instructions. Patient is to wear sunprotection, and avoid picking at any of the treated lesions. Pt may apply Vaseline to crusted or scabbing areas.
Anesthesia Type: 1% lidocaine without epinephrine
Anesthesia Volume In Cc: 1.5
Consent: The patient's consent was obtained including but not limited to risks of crusting, scabbing, blistering, scarring, darker or lighter pigmentary change, recurrence, incomplete removal and infection.
Detail Level: Detailed

## 2024-02-12 ENCOUNTER — APPOINTMENT (RX ONLY)
Dept: URBAN - METROPOLITAN AREA CLINIC 23 | Facility: CLINIC | Age: 54
Setting detail: DERMATOLOGY
End: 2024-02-12

## 2024-02-12 DIAGNOSIS — Z48.01 ENCOUNTER FOR CHANGE OR REMOVAL OF SURGICAL WOUND DRESSING: ICD-10-CM

## 2024-02-12 DIAGNOSIS — L91.8 OTHER HYPERTROPHIC DISORDERS OF THE SKIN: ICD-10-CM

## 2024-02-12 PROCEDURE — ? SUTURE REMOVAL (GLOBAL PERIOD)

## 2024-02-12 PROCEDURE — 99024 POSTOP FOLLOW-UP VISIT: CPT

## 2024-02-12 ASSESSMENT — LOCATION ZONE DERM
LOCATION ZONE: AXILLAE
LOCATION ZONE: TRUNK

## 2024-02-12 ASSESSMENT — LOCATION SIMPLE DESCRIPTION DERM
LOCATION SIMPLE: RIGHT AXILLARY VAULT
LOCATION SIMPLE: UPPER BACK
LOCATION SIMPLE: LEFT AXILLARY VAULT

## 2024-02-12 ASSESSMENT — LOCATION DETAILED DESCRIPTION DERM
LOCATION DETAILED: LEFT AXILLARY VAULT
LOCATION DETAILED: RIGHT AXILLARY VAULT
LOCATION DETAILED: INFERIOR THORACIC SPINE

## 2024-02-12 NOTE — PROCEDURE: SUTURE REMOVAL (GLOBAL PERIOD)
Detail Level: Detailed
Add 79690 Cpt? (Important Note: In 2017 The Use Of 42670 Is Being Tracked By Cms To Determine Future Global Period Reimbursement For Global Periods): yes

## 2024-02-12 NOTE — PROCEDURE: REASSURANCE
Hide Additional Notes?: No
Additional Note: Treated with LN2 today free of charge as these lesions did not resolve at previous visit
Detail Level: Zone

## 2024-07-03 ENCOUNTER — TRANSCRIBE ORDERS (OUTPATIENT)
Facility: HOSPITAL | Age: 54
End: 2024-07-03

## 2024-07-03 DIAGNOSIS — Z12.31 SCREENING MAMMOGRAM FOR HIGH-RISK PATIENT: Primary | ICD-10-CM

## 2024-07-31 NOTE — PROGRESS NOTES
[facility]  [address]  [place] [address]  [phone]    PATIENT ID    Ms. Luis F West  1970  Her primary provider is Jordon Ceballos MD    CC: Ms. West returns to the clinic today for follow up of her obstructive sleep apnea.    INTERVAL HISTORY  Ms. West was originally diagnosed with severe obstructive sleep apnea in 2019 with an AHI of 103 and a low SPO2 of 50%.  She is joined by her daughter Tory who provides collateral history.  She thinks she had he had another sinus septoplasty surgery with Dr. Diaz  but cannot find the exact date and is unsure if it was before or after I saw her last year.  She is wondering if she is fitting her mask right because she has been having irritation over her nose off and on for months, also notes indentation over her head when she feels her skull with her fingers, not visible to the external observer because of her hair.  She denies any daytime sleepiness morning headaches, she notes she is staying more active eating better and has lost over 30 pounds since last seen.  She is unsure if she snores or not without CPAP because she uses it every night, she did sleep in the ER and was not told that she snored when her daughter was in this ER for psychiatric visit overnight.  She notes with weight loss she is having less knee pain and overall has more energy.   She is currently using an FP Vitera mask on CPAP fixed pressure of 13 cm H2O    She was last seen by me in 8/23 and was compliant and getting good clinical benefits from CPAP therapy.  Since implementing CPAP therapy Ms. West feels more rested and less fatigued.  On CPAP download review today she is utilizing her CPAP machine 100% of the time, greater than 4 hours per night, for a median daily usage of 8 hours 52 minutes per night. Her AHI is down to 2 and her leak rate is acceptable.  Past medical and surgical histories, medications and allergies, problem

## 2024-08-02 ENCOUNTER — OFFICE VISIT (OUTPATIENT)
Dept: SLEEP MEDICINE | Age: 54
End: 2024-08-02

## 2024-08-02 VITALS
RESPIRATION RATE: 16 BRPM | HEART RATE: 71 BPM | BODY MASS INDEX: 33.86 KG/M2 | WEIGHT: 223.4 LBS | OXYGEN SATURATION: 93 % | TEMPERATURE: 97.7 F | DIASTOLIC BLOOD PRESSURE: 66 MMHG | HEIGHT: 68 IN | SYSTOLIC BLOOD PRESSURE: 103 MMHG

## 2024-08-02 DIAGNOSIS — G47.33 OSA (OBSTRUCTIVE SLEEP APNEA): Primary | ICD-10-CM

## 2024-08-02 DIAGNOSIS — E66.01 OBESITY, CLASS III, BMI 40-49.9 (MORBID OBESITY) (HCC): ICD-10-CM

## 2024-08-02 NOTE — ASSESSMENT & PLAN NOTE
This is improved, she has lost over 30 pounds, ongoing weight loss efforts encouraged she is now down to class I obesity.

## 2024-08-02 NOTE — ASSESSMENT & PLAN NOTE
Very severe, she unfortunately does have nasion irritation and needs to not use her current mask until this heals completely, we will order an urgent mask fitting with [employer] to use either nasal or oronasal mask, unfortunately we do not have any samples to give her today.  I did a mask fitting with her FP Maynor and showed her now how to not overtighten the mask, it does appear to fit well.  As she has lost enough weight we will retest her to see if she has persistent obstructive sleep apnea to see if therapy options such as oral appliance therapy or inspire are another option as well.  She is to hold CPAP 3 days prior and we will call her about 2 weeks after to review the results, tentatively following up in 4 to 6 months.   Discussed condition and exacerbating conditions/situations (e.g., dry/arid environments, overhead fans, air conditioners, side effect of medications).

## 2024-08-08 ENCOUNTER — HOSPITAL ENCOUNTER (OUTPATIENT)
Dept: MAMMOGRAPHY | Age: 54
Discharge: HOME | End: 2024-08-08
Attending: FAMILY MEDICINE
Payer: COMMERCIAL

## 2024-08-08 DIAGNOSIS — Z12.31 SCREENING MAMMOGRAM FOR HIGH-RISK PATIENT: ICD-10-CM

## 2024-08-08 PROCEDURE — 77067 SCR MAMMO BI INCL CAD: CPT

## 2024-08-20 ENCOUNTER — OFFICE VISIT (OUTPATIENT)
Dept: FAMILY MEDICINE CLINIC | Facility: CLINIC | Age: 54
End: 2024-08-20
Payer: COMMERCIAL

## 2024-08-20 VITALS
RESPIRATION RATE: 16 BRPM | BODY MASS INDEX: 33.42 KG/M2 | OXYGEN SATURATION: 95 % | TEMPERATURE: 97.5 F | DIASTOLIC BLOOD PRESSURE: 61 MMHG | SYSTOLIC BLOOD PRESSURE: 120 MMHG | HEIGHT: 68 IN | WEIGHT: 220.5 LBS | HEART RATE: 73 BPM

## 2024-08-20 DIAGNOSIS — J30.89 NON-SEASONAL ALLERGIC RHINITIS, UNSPECIFIED TRIGGER: ICD-10-CM

## 2024-08-20 DIAGNOSIS — M67.441 GANGLION CYST OF FINGER OF RIGHT HAND: ICD-10-CM

## 2024-08-20 DIAGNOSIS — R06.2 WHEEZE: ICD-10-CM

## 2024-08-20 DIAGNOSIS — Z00.00 ENCOUNTER FOR WELL ADULT EXAM WITHOUT ABNORMAL FINDINGS: Primary | ICD-10-CM

## 2024-08-20 DIAGNOSIS — R73.03 PREDIABETES: ICD-10-CM

## 2024-08-20 DIAGNOSIS — G47.33 OSA ON CPAP: ICD-10-CM

## 2024-08-20 DIAGNOSIS — E78.2 MIXED HYPERLIPIDEMIA: ICD-10-CM

## 2024-08-20 DIAGNOSIS — E66.9 CLASS 1 OBESITY WITHOUT SERIOUS COMORBIDITY WITH BODY MASS INDEX (BMI) OF 33.0 TO 33.9 IN ADULT, UNSPECIFIED OBESITY TYPE: ICD-10-CM

## 2024-08-20 LAB
ALBUMIN SERPL-MCNC: 4 G/DL (ref 3.5–5)
ALBUMIN/GLOB SERPL: 1.4 (ref 1–1.9)
ALP SERPL-CCNC: 121 U/L (ref 35–104)
ALT SERPL-CCNC: 16 U/L (ref 12–65)
ANION GAP SERPL CALC-SCNC: 9 MMOL/L (ref 9–18)
AST SERPL-CCNC: 19 U/L (ref 15–37)
BASOPHILS # BLD: 0 K/UL (ref 0–0.2)
BASOPHILS NFR BLD: 1 % (ref 0–2)
BILIRUB SERPL-MCNC: 0.2 MG/DL (ref 0–1.2)
BUN SERPL-MCNC: 23 MG/DL (ref 6–23)
CALCIUM SERPL-MCNC: 9.7 MG/DL (ref 8.8–10.2)
CHLORIDE SERPL-SCNC: 103 MMOL/L (ref 98–107)
CHOLEST SERPL-MCNC: 194 MG/DL (ref 0–200)
CO2 SERPL-SCNC: 30 MMOL/L (ref 20–28)
CREAT SERPL-MCNC: 0.84 MG/DL (ref 0.6–1.1)
DIFFERENTIAL METHOD BLD: ABNORMAL
EOSINOPHIL # BLD: 0.1 K/UL (ref 0–0.8)
EOSINOPHIL NFR BLD: 1 % (ref 0.5–7.8)
ERYTHROCYTE [DISTWIDTH] IN BLOOD BY AUTOMATED COUNT: 13 % (ref 11.9–14.6)
EST. AVERAGE GLUCOSE BLD GHB EST-MCNC: 110 MG/DL
GLOBULIN SER CALC-MCNC: 2.9 G/DL (ref 2.3–3.5)
GLUCOSE SERPL-MCNC: 99 MG/DL (ref 70–99)
HBA1C MFR BLD: 5.5 % (ref 0–5.6)
HCT VFR BLD AUTO: 46.1 % (ref 35.8–46.3)
HDLC SERPL-MCNC: 62 MG/DL (ref 40–60)
HDLC SERPL: 3.1 (ref 0–5)
HGB BLD-MCNC: 14.4 G/DL (ref 11.7–15.4)
IMM GRANULOCYTES # BLD AUTO: 0 K/UL (ref 0–0.5)
IMM GRANULOCYTES NFR BLD AUTO: 0 % (ref 0–5)
LDLC SERPL CALC-MCNC: 117 MG/DL (ref 0–100)
LYMPHOCYTES # BLD: 1.2 K/UL (ref 0.5–4.6)
LYMPHOCYTES NFR BLD: 18 % (ref 13–44)
MCH RBC QN AUTO: 28.7 PG (ref 26.1–32.9)
MCHC RBC AUTO-ENTMCNC: 31.2 G/DL (ref 31.4–35)
MCV RBC AUTO: 91.8 FL (ref 82–102)
MONOCYTES # BLD: 0.5 K/UL (ref 0.1–1.3)
MONOCYTES NFR BLD: 8 % (ref 4–12)
NEUTS SEG # BLD: 4.8 K/UL (ref 1.7–8.2)
NEUTS SEG NFR BLD: 72 % (ref 43–78)
NRBC # BLD: 0 K/UL (ref 0–0.2)
PLATELET # BLD AUTO: 198 K/UL (ref 150–450)
PMV BLD AUTO: 11.9 FL (ref 9.4–12.3)
POTASSIUM SERPL-SCNC: 4.8 MMOL/L (ref 3.5–5.1)
PROT SERPL-MCNC: 6.9 G/DL (ref 6.3–8.2)
RBC # BLD AUTO: 5.02 M/UL (ref 4.05–5.2)
SODIUM SERPL-SCNC: 142 MMOL/L (ref 136–145)
TRIGL SERPL-MCNC: 72 MG/DL (ref 0–150)
TSH, 3RD GENERATION: 1.55 UIU/ML (ref 0.27–4.2)
VLDLC SERPL CALC-MCNC: 14 MG/DL (ref 6–23)
WBC # BLD AUTO: 6.6 K/UL (ref 4.3–11.1)

## 2024-08-20 PROCEDURE — 99396 PREV VISIT EST AGE 40-64: CPT | Performed by: PHYSICIAN ASSISTANT

## 2024-08-20 RX ORDER — MONTELUKAST SODIUM 10 MG/1
10 TABLET ORAL NIGHTLY
Qty: 90 TABLET | Refills: 3 | Status: SHIPPED | OUTPATIENT
Start: 2024-08-20

## 2024-08-20 RX ORDER — FLUTICASONE PROPIONATE 44 UG/1
2 AEROSOL, METERED RESPIRATORY (INHALATION) 2 TIMES DAILY
Qty: 3 EACH | Refills: 3 | Status: SHIPPED | OUTPATIENT
Start: 2024-08-20

## 2024-08-20 RX ORDER — LEVOCETIRIZINE DIHYDROCHLORIDE 5 MG/1
5 TABLET, FILM COATED ORAL DAILY
Qty: 90 TABLET | Refills: 3 | Status: SHIPPED | OUTPATIENT
Start: 2024-08-20

## 2024-08-20 RX ORDER — BECLOMETHASONE DIPROPIONATE 80 UG/1
2 AEROSOL, METERED NASAL DAILY
Qty: 3 EACH | Refills: 3 | Status: SHIPPED | OUTPATIENT
Start: 2024-08-20

## 2024-08-20 SDOH — ECONOMIC STABILITY: FOOD INSECURITY: WITHIN THE PAST 12 MONTHS, YOU WORRIED THAT YOUR FOOD WOULD RUN OUT BEFORE YOU GOT MONEY TO BUY MORE.: NEVER TRUE

## 2024-08-20 SDOH — ECONOMIC STABILITY: FOOD INSECURITY: WITHIN THE PAST 12 MONTHS, THE FOOD YOU BOUGHT JUST DIDN'T LAST AND YOU DIDN'T HAVE MONEY TO GET MORE.: NEVER TRUE

## 2024-08-20 SDOH — ECONOMIC STABILITY: INCOME INSECURITY: HOW HARD IS IT FOR YOU TO PAY FOR THE VERY BASICS LIKE FOOD, HOUSING, MEDICAL CARE, AND HEATING?: NOT HARD AT ALL

## 2024-08-20 ASSESSMENT — ENCOUNTER SYMPTOMS
VOMITING: 0
SORE THROAT: 0
ABDOMINAL PAIN: 0
RHINORRHEA: 1
DIARRHEA: 0
SHORTNESS OF BREATH: 0
NAUSEA: 0
CONSTIPATION: 0
BLOOD IN STOOL: 0
CHEST TIGHTNESS: 0
BACK PAIN: 1
WHEEZING: 0
COUGH: 0

## 2024-08-20 ASSESSMENT — PATIENT HEALTH QUESTIONNAIRE - PHQ9
2. FEELING DOWN, DEPRESSED OR HOPELESS: NOT AT ALL
SUM OF ALL RESPONSES TO PHQ9 QUESTIONS 1 & 2: 0
SUM OF ALL RESPONSES TO PHQ QUESTIONS 1-9: 0
1. LITTLE INTEREST OR PLEASURE IN DOING THINGS: NOT AT ALL
SUM OF ALL RESPONSES TO PHQ QUESTIONS 1-9: 0

## 2024-08-20 NOTE — PROGRESS NOTES
[facility] [address] [place] [address]  Phone [phone]      Patient: Luis F West  YOB: 1970  Age 54 y.o.  Sex female  Medical Record:  [id number]  Visit Date: 08/20/24  Author:  Michel Leal PA-C    Family Practice Clinic Note    Chief Complaint   Patient presents with    Annual Exam       History of Present Illness  This is a 54-year-old female who returns today for annual physical exam.  She has a history of hyperlipidemia and prediabetes although her last labs showed these to be better controlled.  She also has a history of obesity but is happy to note that she is lost approximately 60 pounds since last year.  She has been watching her carbs and sugar intake and is walking more regularly for exercise.  She has a history of chronic allergy issues and is following with ENT for allergy injections.  She also has a history of sleep apnea and is on a CPAP.  He is having this adjusted by her sleep medicine provider.  After her recent weight loss the pressures have been a little too high.  She also has a lump on her third finger of right hand that has been present for several weeks.  Denies any injury or trauma to the area.  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    Past History:    Past Medical history   Past Medical History:   Diagnosis Date    Adrenal disorder, other 2007    adrenal fatigue    Allergic rhinitis     Bilateral Renal cysts on CT 5/26/23 6/14/2023    Bone spur of foot     right foot     Cancer (HCC) 11/14/2017    Atypical skin    Chest pain     Chronic pain     lower back    Cough     Dyspepsia     Elevated troponin     Environmental allergies     GERD (gastroesophageal reflux disease)     Hepatic steatosis noted on CT 5/26/23 6/14/2023    History of positive PPD 1992    History of renal stone 4/1/2013    Insomnia     Lumbar degenerative disc disease 4/1/2013    Nausea & vomiting     Obesity     MARKO

## 2024-08-28 ENCOUNTER — TELEPHONE (OUTPATIENT)
Dept: SLEEP MEDICINE | Age: 54
End: 2024-08-28

## 2024-09-05 ENCOUNTER — HOSPITAL ENCOUNTER (OUTPATIENT)
Dept: SLEEP CENTER | Age: 54
Discharge: HOME | End: 2024-09-08
Payer: COMMERCIAL

## 2024-09-05 PROCEDURE — 95806 SLEEP STUDY UNATT&RESP EFFT: CPT

## 2024-09-23 DIAGNOSIS — G47.33 OSA (OBSTRUCTIVE SLEEP APNEA): Primary | ICD-10-CM

## 2024-10-28 ENCOUNTER — OFFICE VISIT (OUTPATIENT)
Dept: SLEEP MEDICINE | Age: 54
End: 2024-10-28
Payer: COMMERCIAL

## 2024-10-28 VITALS
DIASTOLIC BLOOD PRESSURE: 78 MMHG | OXYGEN SATURATION: 97 % | HEART RATE: 75 BPM | RESPIRATION RATE: 16 BRPM | SYSTOLIC BLOOD PRESSURE: 124 MMHG | WEIGHT: 219 LBS | TEMPERATURE: 97.5 F | HEIGHT: 68 IN | BODY MASS INDEX: 33.19 KG/M2

## 2024-10-28 DIAGNOSIS — G47.33 OSA (OBSTRUCTIVE SLEEP APNEA): Primary | ICD-10-CM

## 2024-10-28 DIAGNOSIS — G47.34 NOCTURNAL HYPOXEMIA: ICD-10-CM

## 2024-10-28 PROCEDURE — G8417 CALC BMI ABV UP PARAM F/U: HCPCS | Performed by: PHYSICIAN ASSISTANT

## 2024-10-28 PROCEDURE — G8427 DOCREV CUR MEDS BY ELIG CLIN: HCPCS | Performed by: PHYSICIAN ASSISTANT

## 2024-10-28 PROCEDURE — 1036F TOBACCO NON-USER: CPT | Performed by: PHYSICIAN ASSISTANT

## 2024-10-28 PROCEDURE — 99214 OFFICE O/P EST MOD 30 MIN: CPT | Performed by: PHYSICIAN ASSISTANT

## 2024-10-28 PROCEDURE — 3017F COLORECTAL CA SCREEN DOC REV: CPT | Performed by: PHYSICIAN ASSISTANT

## 2024-10-28 PROCEDURE — G8484 FLU IMMUNIZE NO ADMIN: HCPCS | Performed by: PHYSICIAN ASSISTANT

## 2024-10-28 ASSESSMENT — SLEEP AND FATIGUE QUESTIONNAIRES
HOW LIKELY ARE YOU TO NOD OFF OR FALL ASLEEP WHILE SITTING AND READING: WOULD NEVER DOZE
HOW LIKELY ARE YOU TO NOD OFF OR FALL ASLEEP WHILE SITTING INACTIVE IN A PUBLIC PLACE: WOULD NEVER DOZE
HOW LIKELY ARE YOU TO NOD OFF OR FALL ASLEEP IN A CAR, WHILE STOPPED FOR A FEW MINUTES IN TRAFFIC: WOULD NEVER DOZE
HOW LIKELY ARE YOU TO NOD OFF OR FALL ASLEEP WHILE SITTING QUIETLY AFTER LUNCH WITHOUT ALCOHOL: WOULD NEVER DOZE
ESS TOTAL SCORE: 1
HOW LIKELY ARE YOU TO NOD OFF OR FALL ASLEEP WHILE LYING DOWN TO REST IN THE AFTERNOON WHEN CIRCUMSTANCES PERMIT: WOULD NEVER DOZE
HOW LIKELY ARE YOU TO NOD OFF OR FALL ASLEEP WHILE WATCHING TV: WOULD NEVER DOZE
HOW LIKELY ARE YOU TO NOD OFF OR FALL ASLEEP WHILE SITTING AND TALKING TO SOMEONE: WOULD NEVER DOZE
HOW LIKELY ARE YOU TO NOD OFF OR FALL ASLEEP WHEN YOU ARE A PASSENGER IN A CAR FOR AN HOUR WITHOUT A BREAK: SLIGHT CHANCE OF DOZING

## 2025-01-20 ENCOUNTER — APPOINTMENT (OUTPATIENT)
Dept: URBAN - METROPOLITAN AREA CLINIC 23 | Facility: CLINIC | Age: 55
Setting detail: DERMATOLOGY
End: 2025-01-20

## 2025-01-20 DIAGNOSIS — L70.8 OTHER ACNE: ICD-10-CM

## 2025-01-20 DIAGNOSIS — D22 MELANOCYTIC NEVI: ICD-10-CM

## 2025-01-20 DIAGNOSIS — D18.0 HEMANGIOMA: ICD-10-CM

## 2025-01-20 DIAGNOSIS — Z87.2 PERSONAL HISTORY OF DISEASES OF THE SKIN AND SUBCUTANEOUS TISSUE: ICD-10-CM

## 2025-01-20 DIAGNOSIS — L82.1 OTHER SEBORRHEIC KERATOSIS: ICD-10-CM

## 2025-01-20 DIAGNOSIS — Z71.89 OTHER SPECIFIED COUNSELING: ICD-10-CM

## 2025-01-20 DIAGNOSIS — D485 NEOPLASM OF UNCERTAIN BEHAVIOR OF SKIN: ICD-10-CM

## 2025-01-20 DIAGNOSIS — Z85.828 PERSONAL HISTORY OF OTHER MALIGNANT NEOPLASM OF SKIN: ICD-10-CM

## 2025-01-20 PROBLEM — D22.5 MELANOCYTIC NEVI OF TRUNK: Status: ACTIVE | Noted: 2025-01-20

## 2025-01-20 PROBLEM — D22.72 MELANOCYTIC NEVI OF LEFT LOWER LIMB, INCLUDING HIP: Status: ACTIVE | Noted: 2025-01-20

## 2025-01-20 PROBLEM — D18.01 HEMANGIOMA OF SKIN AND SUBCUTANEOUS TISSUE: Status: ACTIVE | Noted: 2025-01-20

## 2025-01-20 PROBLEM — D22.62 MELANOCYTIC NEVI OF LEFT UPPER LIMB, INCLUDING SHOULDER: Status: ACTIVE | Noted: 2025-01-20

## 2025-01-20 PROBLEM — D22.71 MELANOCYTIC NEVI OF RIGHT LOWER LIMB, INCLUDING HIP: Status: ACTIVE | Noted: 2025-01-20

## 2025-01-20 PROBLEM — D22.61 MELANOCYTIC NEVI OF RIGHT UPPER LIMB, INCLUDING SHOULDER: Status: ACTIVE | Noted: 2025-01-20

## 2025-01-20 PROBLEM — D48.5 NEOPLASM OF UNCERTAIN BEHAVIOR OF SKIN: Status: ACTIVE | Noted: 2025-01-20

## 2025-01-20 PROCEDURE — ? OBSERVATION

## 2025-01-20 PROCEDURE — ? COUNSELING

## 2025-01-20 PROCEDURE — 99213 OFFICE O/P EST LOW 20 MIN: CPT

## 2025-01-20 ASSESSMENT — LOCATION SIMPLE DESCRIPTION DERM
LOCATION SIMPLE: LEFT UPPER ARM
LOCATION SIMPLE: LEFT POSTERIOR UPPER ARM
LOCATION SIMPLE: RIGHT PRETIBIAL REGION
LOCATION SIMPLE: RIGHT FOREARM
LOCATION SIMPLE: LEFT FOREARM
LOCATION SIMPLE: LEFT THIGH
LOCATION SIMPLE: RIGHT POSTERIOR UPPER ARM
LOCATION SIMPLE: LEFT BREAST
LOCATION SIMPLE: LEFT UPPER BACK
LOCATION SIMPLE: LEFT HAND
LOCATION SIMPLE: LEFT PRETIBIAL REGION
LOCATION SIMPLE: RIGHT POPLITEAL SKIN
LOCATION SIMPLE: RIGHT FOREHEAD
LOCATION SIMPLE: ABDOMEN
LOCATION SIMPLE: RIGHT HAND
LOCATION SIMPLE: RIGHT CHEEK
LOCATION SIMPLE: RIGHT TEMPLE
LOCATION SIMPLE: CHEST
LOCATION SIMPLE: RIGHT UPPER BACK

## 2025-01-20 ASSESSMENT — LOCATION DETAILED DESCRIPTION DERM
LOCATION DETAILED: RIGHT LATERAL ABDOMEN
LOCATION DETAILED: LEFT ANTERIOR DISTAL THIGH
LOCATION DETAILED: LEFT INFRAMAMMARY CREASE (INNER QUADRANT)
LOCATION DETAILED: RIGHT LATERAL TEMPLE
LOCATION DETAILED: RIGHT DISTAL DORSAL FOREARM
LOCATION DETAILED: PERIUMBILICAL SKIN
LOCATION DETAILED: RIGHT PROXIMAL PRETIBIAL REGION
LOCATION DETAILED: RIGHT SUPERIOR UPPER BACK
LOCATION DETAILED: MIDDLE STERNUM
LOCATION DETAILED: RIGHT ULNAR DORSAL HAND
LOCATION DETAILED: RIGHT DISTAL PRETIBIAL REGION
LOCATION DETAILED: RIGHT DISTAL POSTERIOR UPPER ARM
LOCATION DETAILED: RIGHT FOREHEAD
LOCATION DETAILED: SUBXIPHOID
LOCATION DETAILED: LEFT PROXIMAL POSTERIOR UPPER ARM
LOCATION DETAILED: RIGHT INFERIOR LATERAL MALAR CHEEK
LOCATION DETAILED: LEFT RADIAL DORSAL HAND
LOCATION DETAILED: LEFT ANTERIOR DISTAL UPPER ARM
LOCATION DETAILED: LEFT DISTAL DORSAL FOREARM
LOCATION DETAILED: LEFT INFERIOR MEDIAL UPPER BACK
LOCATION DETAILED: RIGHT POPLITEAL SKIN
LOCATION DETAILED: LEFT PROXIMAL PRETIBIAL REGION
LOCATION DETAILED: LEFT DISTAL POSTERIOR UPPER ARM
LOCATION DETAILED: RIGHT MEDIAL UPPER BACK

## 2025-01-20 ASSESSMENT — LOCATION ZONE DERM
LOCATION ZONE: FACE
LOCATION ZONE: TRUNK
LOCATION ZONE: HAND
LOCATION ZONE: LEG
LOCATION ZONE: ARM

## 2025-01-20 NOTE — PROCEDURE: COUNSELING
Detail Level: Detailed
Detail Level: Generalized
Detail Level: Zone
Sarecycline Pregnancy And Lactation Text: This medication is Pregnancy Category D and not consider safe during pregnancy. It is also excreted in breast milk.
Azelaic Acid Counseling: Patient counseled that medicine may cause skin irritation and to avoid applying near the eyes.  In the event of skin irritation, the patient was advised to reduce the amount of the drug applied or use it less frequently.   The patient verbalized understanding of the proper use and possible adverse effects of azelaic acid.  All of the patient's questions and concerns were addressed.
Tazorac Pregnancy And Lactation Text: This medication is not safe during pregnancy. It is unknown if this medication is excreted in breast milk.
Bactrim Pregnancy And Lactation Text: This medication is Pregnancy Category D and is known to cause fetal risk.  It is also excreted in breast milk.
Doxycycline Counseling:  Patient counseled regarding possible photosensitivity and increased risk for sunburn.  Patient instructed to avoid sunlight, if possible.  When exposed to sunlight, patients should wear protective clothing, sunglasses, and sunscreen.  The patient was instructed to call the office immediately if the following severe adverse effects occur:  hearing changes, easy bruising/bleeding, severe headache, or vision changes.  The patient verbalized understanding of the proper use and possible adverse effects of doxycycline.  All of the patient's questions and concerns were addressed.
Topical Retinoid Pregnancy And Lactation Text: This medication is Pregnancy Category C. It is unknown if this medication is excreted in breast milk.
Winlevi Counseling:  I discussed with the patient the risks of topical clascoterone including but not limited to erythema, scaling, itching, and stinging. Patient voiced their understanding.
Azithromycin Pregnancy And Lactation Text: This medication is considered safe during pregnancy and is also secreted in breast milk.
Aklief counseling:  Patient advised to apply a pea-sized amount only at bedtime and wait 30 minutes after washing their face before applying.  If too drying, patient may add a non-comedogenic moisturizer.  The most commonly reported side effects including irritation, redness, scaling, dryness, stinging, burning, itching, and increased risk of sunburn.  The patient verbalized understanding of the proper use and possible adverse effects of retinoids.  All of the patient's questions and concerns were addressed.
Erythromycin Counseling:  I discussed with the patient the risks of erythromycin including but not limited to GI upset, allergic reaction, drug rash, diarrhea, increase in liver enzymes, and yeast infections.
High Dose Vitamin A Pregnancy And Lactation Text: High dose vitamin A therapy is contraindicated during pregnancy and breast feeding.
Tetracycline Counseling: Patient counseled regarding possible photosensitivity and increased risk for sunburn.  Patient instructed to avoid sunlight, if possible.  When exposed to sunlight, patients should wear protective clothing, sunglasses, and sunscreen.  The patient was instructed to call the office immediately if the following severe adverse effects occur:  hearing changes, easy bruising/bleeding, severe headache, or vision changes.  The patient verbalized understanding of the proper use and possible adverse effects of tetracycline.  All of the patient's questions and concerns were addressed. Patient understands to avoid pregnancy while on therapy due to potential birth defects.
Birth Control Pills Counseling: Birth Control Pill Counseling: I discussed with the patient the potential side effects of OCPs including but not limited to increased risk of stroke, heart attack, thrombophlebitis, deep venous thrombosis, hepatic adenomas, breast changes, GI upset, headaches, and depression.  The patient verbalized understanding of the proper use and possible adverse effects of OCPs. All of the patient's questions and concerns were addressed.
Azelaic Acid Pregnancy And Lactation Text: This medication is considered safe during pregnancy and breast feeding.
Spironolactone Counseling: Patient advised regarding risks of diarrhea, abdominal pain, hyperkalemia, birth defects (for female patients), liver toxicity and renal toxicity. The patient may need blood work to monitor liver and kidney function and potassium levels while on therapy. The patient verbalized understanding of the proper use and possible adverse effects of spironolactone.  All of the patient's questions and concerns were addressed.
Topical Sulfur Applications Counseling: Topical Sulfur Counseling: Patient counseled that this medication may cause skin irritation or allergic reactions.  In the event of skin irritation, the patient was advised to reduce the amount of the drug applied or use it less frequently.   The patient verbalized understanding of the proper use and possible adverse effects of topical sulfur application.  All of the patient's questions and concerns were addressed.
Benzoyl Peroxide Pregnancy And Lactation Text: This medication is Pregnancy Category C. It is unknown if benzoyl peroxide is excreted in breast milk.
Dapsone Counseling: I discussed with the patient the risks of dapsone including but not limited to hemolytic anemia, agranulocytosis, rashes, methemoglobinemia, kidney failure, peripheral neuropathy, headaches, GI upset, and liver toxicity.  Patients who start dapsone require monitoring including baseline LFTs and weekly CBCs for the first month, then every month thereafter.  The patient verbalized understanding of the proper use and possible adverse effects of dapsone.  All of the patient's questions and concerns were addressed.
Isotretinoin Pregnancy And Lactation Text: This medication is Pregnancy Category X and is considered extremely dangerous during pregnancy. It is unknown if it is excreted in breast milk.
Topical Clindamycin Counseling: Patient counseled that this medication may cause skin irritation or allergic reactions.  In the event of skin irritation, the patient was advised to reduce the amount of the drug applied or use it less frequently.   The patient verbalized understanding of the proper use and possible adverse effects of clindamycin.  All of the patient's questions and concerns were addressed.
Erythromycin Pregnancy And Lactation Text: This medication is Pregnancy Category B and is considered safe during pregnancy. It is also excreted in breast milk.
Sarecycline Counseling: Patient advised regarding possible photosensitivity and discoloration of the teeth, skin, lips, tongue and gums.  Patient instructed to avoid sunlight, if possible.  When exposed to sunlight, patients should wear protective clothing, sunglasses, and sunscreen.  The patient was instructed to call the office immediately if the following severe adverse effects occur:  hearing changes, easy bruising/bleeding, severe headache, or vision changes.  The patient verbalized understanding of the proper use and possible adverse effects of sarecycline.  All of the patient's questions and concerns were addressed.
Winlevi Pregnancy And Lactation Text: This medication is considered safe during pregnancy and breastfeeding.
Doxycycline Pregnancy And Lactation Text: This medication is Pregnancy Category D and not consider safe during pregnancy. It is also excreted in breast milk but is considered safe for shorter treatment courses.
Minocycline Counseling: Patient advised regarding possible photosensitivity and discoloration of the teeth, skin, lips, tongue and gums.  Patient instructed to avoid sunlight, if possible.  When exposed to sunlight, patients should wear protective clothing, sunglasses, and sunscreen.  The patient was instructed to call the office immediately if the following severe adverse effects occur:  hearing changes, easy bruising/bleeding, severe headache, or vision changes.  The patient verbalized understanding of the proper use and possible adverse effects of minocycline.  All of the patient's questions and concerns were addressed.
Use Enhanced Medication Counseling?: No
Aklief Pregnancy And Lactation Text: It is unknown if this medication is safe to use during pregnancy.  It is unknown if this medication is excreted in breast milk.  Breastfeeding women should use the topical cream on the smallest area of the skin for the shortest time needed while breastfeeding.  Do not apply to nipple and areola.
Tazorac Counseling:  Patient advised that medication is irritating and drying.  Patient may need to apply sparingly and wash off after an hour before eventually leaving it on overnight.  The patient verbalized understanding of the proper use and possible adverse effects of tazorac.  All of the patient's questions and concerns were addressed.
Bactrim Counseling:  I discussed with the patient the risks of sulfa antibiotics including but not limited to GI upset, allergic reaction, drug rash, diarrhea, dizziness, photosensitivity, and yeast infections.  Rarely, more serious reactions can occur including but not limited to aplastic anemia, agranulocytosis, methemoglobinemia, blood dyscrasias, liver or kidney failure, lung infiltrates or desquamative/blistering drug rashes.
Azithromycin Counseling:  I discussed with the patient the risks of azithromycin including but not limited to GI upset, allergic reaction, drug rash, diarrhea, and yeast infections.
Dapsone Pregnancy And Lactation Text: This medication is Pregnancy Category C and is not considered safe during pregnancy or breast feeding.
Topical Retinoid counseling:  Patient advised to apply a pea-sized amount only at bedtime and wait 30 minutes after washing their face before applying.  If too drying, patient may add a non-comedogenic moisturizer. The patient verbalized understanding of the proper use and possible adverse effects of retinoids.  All of the patient's questions and concerns were addressed.
Topical Sulfur Applications Pregnancy And Lactation Text: This medication is Pregnancy Category C and has an unknown safety profile during pregnancy. It is unknown if this topical medication is excreted in breast milk.
Isotretinoin Counseling: Patient should get monthly blood tests, not donate blood, not drive at night if vision affected, not share medication, and not undergo elective surgery for 6 months after tx completed. Side effects reviewed, pt to contact office should one occur.
Topical Clindamycin Pregnancy And Lactation Text: This medication is Pregnancy Category B and is considered safe during pregnancy. It is unknown if it is excreted in breast milk.
Birth Control Pills Pregnancy And Lactation Text: This medication should be avoided if pregnant and for the first 30 days post-partum.
High Dose Vitamin A Counseling: Side effects reviewed, pt to contact office should one occur.
Benzoyl Peroxide Counseling: Patient counseled that medicine may cause skin irritation and bleach clothing.  In the event of skin irritation, the patient was advised to reduce the amount of the drug applied or use it less frequently.   The patient verbalized understanding of the proper use and possible adverse effects of benzoyl peroxide.  All of the patient's questions and concerns were addressed.
Spironolactone Pregnancy And Lactation Text: This medication can cause feminization of the male fetus and should be avoided during pregnancy. The active metabolite is also found in breast milk.

## 2025-02-10 NOTE — PROGRESS NOTES
encounter.        The patient (or guardian, if applicable) and other individuals in attendance with the patient were advised that Artificial Intelligence will be utilized during this visit to record, process the conversation to generate a clinical note, and support improvement of the AI technology. The patient (or guardian, if applicable) and other individuals in attendance at the appointment consented to the use of AI, including the recording.

## 2025-02-11 ENCOUNTER — OFFICE VISIT (OUTPATIENT)
Dept: SLEEP MEDICINE | Age: 55
End: 2025-02-11
Payer: COMMERCIAL

## 2025-02-11 VITALS
BODY MASS INDEX: 37.35 KG/M2 | SYSTOLIC BLOOD PRESSURE: 131 MMHG | HEART RATE: 68 BPM | HEIGHT: 67 IN | WEIGHT: 238 LBS | TEMPERATURE: 97.1 F | RESPIRATION RATE: 19 BRPM | DIASTOLIC BLOOD PRESSURE: 81 MMHG | OXYGEN SATURATION: 96 %

## 2025-02-11 DIAGNOSIS — G47.33 OSA (OBSTRUCTIVE SLEEP APNEA): Primary | ICD-10-CM

## 2025-02-11 DIAGNOSIS — J30.89 NON-SEASONAL ALLERGIC RHINITIS, UNSPECIFIED TRIGGER: ICD-10-CM

## 2025-02-11 DIAGNOSIS — R06.2 WHEEZE: ICD-10-CM

## 2025-02-11 DIAGNOSIS — E66.01 OBESITY, CLASS III, BMI 40-49.9 (MORBID OBESITY): ICD-10-CM

## 2025-02-11 PROCEDURE — G8417 CALC BMI ABV UP PARAM F/U: HCPCS | Performed by: INTERNAL MEDICINE

## 2025-02-11 PROCEDURE — 1036F TOBACCO NON-USER: CPT | Performed by: INTERNAL MEDICINE

## 2025-02-11 PROCEDURE — 3017F COLORECTAL CA SCREEN DOC REV: CPT | Performed by: INTERNAL MEDICINE

## 2025-02-11 PROCEDURE — G2211 COMPLEX E/M VISIT ADD ON: HCPCS | Performed by: INTERNAL MEDICINE

## 2025-02-11 PROCEDURE — 99214 OFFICE O/P EST MOD 30 MIN: CPT | Performed by: INTERNAL MEDICINE

## 2025-02-11 PROCEDURE — G8427 DOCREV CUR MEDS BY ELIG CLIN: HCPCS | Performed by: INTERNAL MEDICINE

## 2025-02-11 RX ORDER — BECLOMETHASONE DIPROPIONATE 80 UG/1
2 AEROSOL, METERED NASAL DAILY
Qty: 3 EACH | Refills: 3 | Status: SHIPPED | OUTPATIENT
Start: 2025-02-11

## 2025-02-11 RX ORDER — FLUTICASONE PROPIONATE 44 UG/1
2 AEROSOL, METERED RESPIRATORY (INHALATION) 2 TIMES DAILY
Qty: 3 EACH | Refills: 3 | Status: SHIPPED | OUTPATIENT
Start: 2025-02-11

## 2025-02-11 RX ORDER — MONTELUKAST SODIUM 10 MG/1
10 TABLET ORAL NIGHTLY
Qty: 90 TABLET | Refills: 3 | Status: SHIPPED | OUTPATIENT
Start: 2025-02-11

## 2025-02-11 RX ORDER — ECHINACEA PURPUREA AERIAL 350 MG
140 CAPSULE ORAL 2 TIMES DAILY
COMMUNITY
Start: 2023-11-01

## 2025-02-11 RX ORDER — LEVOCETIRIZINE DIHYDROCHLORIDE 5 MG/1
5 TABLET, FILM COATED ORAL DAILY
Qty: 90 TABLET | Refills: 3 | Status: SHIPPED | OUTPATIENT
Start: 2025-02-11

## 2025-02-11 ASSESSMENT — SLEEP AND FATIGUE QUESTIONNAIRES
HOW LIKELY ARE YOU TO NOD OFF OR FALL ASLEEP WHILE SITTING AND READING: WOULD NEVER DOZE
HOW LIKELY ARE YOU TO NOD OFF OR FALL ASLEEP IN A CAR, WHILE STOPPED FOR A FEW MINUTES IN TRAFFIC: WOULD NEVER DOZE
HOW LIKELY ARE YOU TO NOD OFF OR FALL ASLEEP WHILE SITTING QUIETLY AFTER LUNCH WITHOUT ALCOHOL: WOULD NEVER DOZE
ESS TOTAL SCORE: 0
HOW LIKELY ARE YOU TO NOD OFF OR FALL ASLEEP WHILE WATCHING TV: WOULD NEVER DOZE
HOW LIKELY ARE YOU TO NOD OFF OR FALL ASLEEP WHILE SITTING AND TALKING TO SOMEONE: WOULD NEVER DOZE
HOW LIKELY ARE YOU TO NOD OFF OR FALL ASLEEP WHEN YOU ARE A PASSENGER IN A CAR FOR AN HOUR WITHOUT A BREAK: WOULD NEVER DOZE
HOW LIKELY ARE YOU TO NOD OFF OR FALL ASLEEP WHILE LYING DOWN TO REST IN THE AFTERNOON WHEN CIRCUMSTANCES PERMIT: WOULD NEVER DOZE
HOW LIKELY ARE YOU TO NOD OFF OR FALL ASLEEP WHILE SITTING INACTIVE IN A PUBLIC PLACE: WOULD NEVER DOZE

## 2025-02-12 ASSESSMENT — PATIENT HEALTH QUESTIONNAIRE - PHQ9
SUM OF ALL RESPONSES TO PHQ QUESTIONS 1-9: 0
2. FEELING DOWN, DEPRESSED OR HOPELESS: NOT AT ALL
SUM OF ALL RESPONSES TO PHQ9 QUESTIONS 1 & 2: 0
2. FEELING DOWN, DEPRESSED OR HOPELESS: NOT AT ALL
1. LITTLE INTEREST OR PLEASURE IN DOING THINGS: NOT AT ALL
SUM OF ALL RESPONSES TO PHQ9 QUESTIONS 1 & 2: 0
SUM OF ALL RESPONSES TO PHQ QUESTIONS 1-9: 0
1. LITTLE INTEREST OR PLEASURE IN DOING THINGS: NOT AT ALL

## 2025-02-16 SDOH — ECONOMIC STABILITY: TRANSPORTATION INSECURITY
IN THE PAST 12 MONTHS, HAS THE LACK OF TRANSPORTATION KEPT YOU FROM MEDICAL APPOINTMENTS OR FROM GETTING MEDICATIONS?: NO

## 2025-02-16 SDOH — ECONOMIC STABILITY: INCOME INSECURITY: IN THE LAST 12 MONTHS, WAS THERE A TIME WHEN YOU WERE NOT ABLE TO PAY THE MORTGAGE OR RENT ON TIME?: NO

## 2025-02-16 SDOH — ECONOMIC STABILITY: FOOD INSECURITY: WITHIN THE PAST 12 MONTHS, THE FOOD YOU BOUGHT JUST DIDN'T LAST AND YOU DIDN'T HAVE MONEY TO GET MORE.: NEVER TRUE

## 2025-02-16 SDOH — ECONOMIC STABILITY: TRANSPORTATION INSECURITY
IN THE PAST 12 MONTHS, HAS LACK OF TRANSPORTATION KEPT YOU FROM MEETINGS, WORK, OR FROM GETTING THINGS NEEDED FOR DAILY LIVING?: NO

## 2025-02-16 SDOH — ECONOMIC STABILITY: FOOD INSECURITY: WITHIN THE PAST 12 MONTHS, YOU WORRIED THAT YOUR FOOD WOULD RUN OUT BEFORE YOU GOT MONEY TO BUY MORE.: NEVER TRUE

## 2025-02-17 ENCOUNTER — OFFICE VISIT (OUTPATIENT)
Dept: FAMILY MEDICINE CLINIC | Facility: CLINIC | Age: 55
End: 2025-02-17
Payer: COMMERCIAL

## 2025-02-17 VITALS
SYSTOLIC BLOOD PRESSURE: 121 MMHG | TEMPERATURE: 97.3 F | HEART RATE: 65 BPM | OXYGEN SATURATION: 99 % | HEIGHT: 68 IN | BODY MASS INDEX: 35.61 KG/M2 | DIASTOLIC BLOOD PRESSURE: 73 MMHG | WEIGHT: 235 LBS | RESPIRATION RATE: 16 BRPM

## 2025-02-17 DIAGNOSIS — E66.812 CLASS 2 OBESITY DUE TO EXCESS CALORIES WITHOUT SERIOUS COMORBIDITY WITH BODY MASS INDEX (BMI) OF 35.0 TO 35.9 IN ADULT: ICD-10-CM

## 2025-02-17 DIAGNOSIS — Z12.89 ENCOUNTER FOR PELVIC SCREENING FOR CANCER: ICD-10-CM

## 2025-02-17 DIAGNOSIS — R06.2 WHEEZE: ICD-10-CM

## 2025-02-17 DIAGNOSIS — E66.09 CLASS 2 OBESITY DUE TO EXCESS CALORIES WITHOUT SERIOUS COMORBIDITY WITH BODY MASS INDEX (BMI) OF 35.0 TO 35.9 IN ADULT: ICD-10-CM

## 2025-02-17 DIAGNOSIS — K76.0 HEPATIC STEATOSIS: ICD-10-CM

## 2025-02-17 DIAGNOSIS — R10.9 LEFT SIDED ABDOMINAL PAIN: ICD-10-CM

## 2025-02-17 DIAGNOSIS — R10.9 LEFT SIDED ABDOMINAL PAIN: Primary | ICD-10-CM

## 2025-02-17 DIAGNOSIS — R74.8 ELEVATED ALKALINE PHOSPHATASE LEVEL: ICD-10-CM

## 2025-02-17 DIAGNOSIS — Z23 NEED FOR PNEUMOCOCCAL 20-VALENT CONJUGATE VACCINATION: ICD-10-CM

## 2025-02-17 DIAGNOSIS — G47.33 OSA ON CPAP: ICD-10-CM

## 2025-02-17 DIAGNOSIS — K57.30 COLON, DIVERTICULOSIS: ICD-10-CM

## 2025-02-17 DIAGNOSIS — J30.89 NON-SEASONAL ALLERGIC RHINITIS, UNSPECIFIED TRIGGER: ICD-10-CM

## 2025-02-17 LAB
ALBUMIN SERPL-MCNC: 4 G/DL (ref 3.5–5)
ALBUMIN/GLOB SERPL: 1.2 (ref 1–1.9)
ALP SERPL-CCNC: 123 U/L (ref 35–104)
ALT SERPL-CCNC: 18 U/L (ref 8–45)
AST SERPL-CCNC: 21 U/L (ref 15–37)
BASOPHILS # BLD: 0.02 K/UL (ref 0–0.2)
BASOPHILS NFR BLD: 0.3 % (ref 0–2)
BILIRUB DIRECT SERPL-MCNC: <0.2 MG/DL (ref 0–0.4)
BILIRUB SERPL-MCNC: 0.2 MG/DL (ref 0–1.2)
DIFFERENTIAL METHOD BLD: NORMAL
EOSINOPHIL # BLD: 0.07 K/UL (ref 0–0.8)
EOSINOPHIL NFR BLD: 1.1 % (ref 0.5–7.8)
ERYTHROCYTE [DISTWIDTH] IN BLOOD BY AUTOMATED COUNT: 13.2 % (ref 11.9–14.6)
GLOBULIN SER CALC-MCNC: 3.3 G/DL (ref 2.3–3.5)
HCT VFR BLD AUTO: 45.3 % (ref 35.8–46.3)
HGB BLD-MCNC: 14.7 G/DL (ref 11.7–15.4)
IMM GRANULOCYTES # BLD AUTO: 0.02 K/UL (ref 0–0.5)
IMM GRANULOCYTES NFR BLD AUTO: 0.3 % (ref 0–5)
LIPASE SERPL-CCNC: 48 U/L (ref 13–60)
LYMPHOCYTES # BLD: 1.36 K/UL (ref 0.5–4.6)
LYMPHOCYTES NFR BLD: 21.1 % (ref 13–44)
MCH RBC QN AUTO: 29.5 PG (ref 26.1–32.9)
MCHC RBC AUTO-ENTMCNC: 32.5 G/DL (ref 31.4–35)
MCV RBC AUTO: 90.8 FL (ref 82–102)
MONOCYTES # BLD: 0.5 K/UL (ref 0.1–1.3)
MONOCYTES NFR BLD: 7.8 % (ref 4–12)
NEUTS SEG # BLD: 4.48 K/UL (ref 1.7–8.2)
NEUTS SEG NFR BLD: 69.4 % (ref 43–78)
NRBC # BLD: 0 K/UL (ref 0–0.2)
PLATELET # BLD AUTO: 196 K/UL (ref 150–450)
PMV BLD AUTO: 11.8 FL (ref 9.4–12.3)
PROT SERPL-MCNC: 7.3 G/DL (ref 6.3–8.2)
RBC # BLD AUTO: 4.99 M/UL (ref 4.05–5.2)
WBC # BLD AUTO: 6.5 K/UL (ref 4.3–11.1)

## 2025-02-17 PROCEDURE — G8417 CALC BMI ABV UP PARAM F/U: HCPCS | Performed by: FAMILY MEDICINE

## 2025-02-17 PROCEDURE — 99214 OFFICE O/P EST MOD 30 MIN: CPT | Performed by: FAMILY MEDICINE

## 2025-02-17 PROCEDURE — 1036F TOBACCO NON-USER: CPT | Performed by: FAMILY MEDICINE

## 2025-02-17 PROCEDURE — G8427 DOCREV CUR MEDS BY ELIG CLIN: HCPCS | Performed by: FAMILY MEDICINE

## 2025-02-17 PROCEDURE — 3017F COLORECTAL CA SCREEN DOC REV: CPT | Performed by: FAMILY MEDICINE

## 2025-02-17 NOTE — PROGRESS NOTES
[facility] [place] [address]  Phone: [phone] Fax [phone]  Jordon Ceballos M.D.  2/17/2025        Luis F West is a 55 y.o. female     HPI:  Patient is seen for Follow-up Chronic Condition (6 month f/u fasting)     Patient comes in today overall doing fairly well.  She has been trying to work on her diet with history of hepatic steatosis.  She also has been exercising regularly with walking most days and doing some resistance exercise regularly.    Occasionally she will have some left-sided abdominal discomfort that is sharp and usually in the left upper quadrant.  Recalls a CT scan from prior with report of \"cyst \".  She had fasting labs drawn this past August with notation for mild elevation of alkaline phosphatase.  She has had a prior episode of pancreatitis.  Had a normal colonoscopy in 2019 with evidence for diverticulosis only.  Has not had any recent heartburn or reflux.  Has had normal bowel movements.  No nausea or vomiting.    Patient did see Dr. Angela on 2/11/2025.  She has not had any significant cough or wheeze.  Does have underlying history of allergies and receives allergy shots from .  She plans on having a methacholine challenge test in the near future.  Would like to get off some of her medicines if possible.  States she has never had a formal diagnosis of asthma.  Does use her CPAP nightly.    States that she occasionally notices some mild tachycardia but usually during times of stress.  No other associated symptoms.     She would like to have her Pap smear today if due.  Last performed in 2020.  She is uncertain if she had the cervix removed when she had her hysterectomy in 2015.  This was done secondary to fibroids.  She does know her ovaries were left.      ROS:  Denies any chest pain dyspnea diaphoresis or edema.  No reported palpitations. No polydipsia or polyphagia or polyuria.  No large weight fluctuations. No

## 2025-02-18 NOTE — RESULT ENCOUNTER NOTE
Call back liver functions similar to prior with mild elevation of alkaline phosphatase at 123.  Lipase normal at 48.  White blood cell count, hemoglobin, and platelets normal.  Overall labs okay.

## 2025-02-25 LAB
COLLECTION METHOD: NORMAL
CYTOLOGIST CVX/VAG CYTO: NORMAL
CYTOLOGY CVX/VAG DOC THIN PREP: NORMAL
HPV APTIMA: NEGATIVE
HPV GENOTYPE REFLEX: NORMAL
Lab: NORMAL
OTHER PT INFO: NORMAL
PAP SOURCE: NORMAL
PATH REPORT.FINAL DX SPEC: NORMAL
PREV CYTO INFO: NEGATIVE
PREV TREATMENT RESULTS: NORMAL
PREV TREATMENT: NORMAL
STAT OF ADQ CVX/VAG CYTO-IMP: NORMAL

## 2025-03-10 DIAGNOSIS — G47.33 OSA (OBSTRUCTIVE SLEEP APNEA): ICD-10-CM

## 2025-03-11 ENCOUNTER — TELEPHONE (OUTPATIENT)
Dept: NEUROLOGY | Age: 55
End: 2025-03-11

## 2025-03-12 ENCOUNTER — RESULTS FOLLOW-UP (OUTPATIENT)
Dept: SLEEP MEDICINE | Age: 55
End: 2025-03-12

## 2025-03-12 NOTE — TELEPHONE ENCOUNTER
----- Message from Dr. Sonido Angela MD sent at 3/12/2025  8:05 AM EDT -----  The cpap on RA overnight is fine.  Christiano can you please let the patient know.  Sonido Angela MD

## 2025-03-13 NOTE — TELEPHONE ENCOUNTER
Patient called and LVM stating she did not need to call me back due to not having any questions regarding the ASYA results. She stated to not call her back regarding the ASYA unless there was more information regarding it. Due to the voicemail, patient is aware of results and does not need a return call.

## 2025-03-17 ENCOUNTER — NURSE ONLY (OUTPATIENT)
Dept: PULMONOLOGY | Age: 55
End: 2025-03-17
Payer: COMMERCIAL

## 2025-03-17 DIAGNOSIS — R06.2 WHEEZE: Primary | ICD-10-CM

## 2025-03-17 PROCEDURE — 94070 EVALUATION OF WHEEZING: CPT | Performed by: STUDENT IN AN ORGANIZED HEALTH CARE EDUCATION/TRAINING PROGRAM

## 2025-03-17 PROCEDURE — 96372 THER/PROPH/DIAG INJ SC/IM: CPT | Performed by: STUDENT IN AN ORGANIZED HEALTH CARE EDUCATION/TRAINING PROGRAM

## 2025-03-17 PROCEDURE — 95070 INHLJ BRNCL CHALLENGE TSTG: CPT | Performed by: STUDENT IN AN ORGANIZED HEALTH CARE EDUCATION/TRAINING PROGRAM

## 2025-03-17 NOTE — PROGRESS NOTES
Methacholine challenge performed.  Pt stated she followed all instructions prior to testing.  Results reviewed by Dr. Rain.  Pt told test was negative for Asthma.

## 2025-03-20 ENCOUNTER — RESULTS FOLLOW-UP (OUTPATIENT)
Dept: PULMONOLOGY | Age: 55
End: 2025-03-20

## 2025-03-20 NOTE — TELEPHONE ENCOUNTER
----- Message from Dr. Sonido Angela MD sent at 3/20/2025  1:31 PM EDT -----  Tell her the methacholine study was negative -- so NO ASTHMA. Therefore can stop Flovent, since will not help. Thanks    Sonido Angela MD  ----- Message -----  From: Amelia Tony VEDA  Sent: 3/17/2025   1:48 PM EDT  To: Sonido Angela MD

## 2025-03-20 NOTE — TELEPHONE ENCOUNTER
Patient is aware of results. However, she wants to know if she should Qnasal. Patient is wanting to know if the asthma diagnoses can be removed from her medical record as it affects life insurance and other things.

## 2025-03-21 ENCOUNTER — PATIENT MESSAGE (OUTPATIENT)
Dept: FAMILY MEDICINE CLINIC | Facility: CLINIC | Age: 55
End: 2025-03-21

## 2025-03-21 DIAGNOSIS — J10.1 INFLUENZA A: Primary | ICD-10-CM

## 2025-03-21 RX ORDER — OSELTAMIVIR PHOSPHATE 75 MG/1
75 CAPSULE ORAL 2 TIMES DAILY
Qty: 10 CAPSULE | Refills: 0 | Status: SHIPPED | OUTPATIENT
Start: 2025-03-21 | End: 2025-03-26

## 2025-04-11 NOTE — PROGRESS NOTES
Ana Maria Nisha  : 1970  Primary: [facility]*  Secondary:  [facility] [place] [address] [facility], [place], [facility] [address] [facility]  [phone]   [phone]      20    Patient called over the weekend, had to cancel due to daughter diagnosed with the flu and, \"quarantined. \" This is the patient's first cancellation and was >24 hours before the scheduled appointment.     Thank you,  Nathan Smith, PT, DPT No

## 2025-07-14 ENCOUNTER — TRANSCRIBE ORDERS (OUTPATIENT)
Facility: HOSPITAL | Age: 55
End: 2025-07-14

## 2025-07-14 DIAGNOSIS — Z12.31 ENCOUNTER FOR SCREENING MAMMOGRAM FOR MALIGNANT NEOPLASM OF BREAST: Primary | ICD-10-CM

## 2025-08-14 ENCOUNTER — HOSPITAL ENCOUNTER (OUTPATIENT)
Dept: MAMMOGRAPHY | Age: 55
Discharge: HOME | End: 2025-08-17
Attending: FAMILY MEDICINE
Payer: COMMERCIAL

## 2025-08-14 DIAGNOSIS — Z12.31 ENCOUNTER FOR SCREENING MAMMOGRAM FOR MALIGNANT NEOPLASM OF BREAST: ICD-10-CM

## 2025-08-14 PROCEDURE — 77063 BREAST TOMOSYNTHESIS BI: CPT

## 2025-08-18 ENCOUNTER — OFFICE VISIT (OUTPATIENT)
Dept: FAMILY MEDICINE CLINIC | Facility: CLINIC | Age: 55
End: 2025-08-18

## 2025-08-18 VITALS
DIASTOLIC BLOOD PRESSURE: 69 MMHG | OXYGEN SATURATION: 96 % | HEIGHT: 68 IN | SYSTOLIC BLOOD PRESSURE: 133 MMHG | TEMPERATURE: 98.4 F | WEIGHT: 251.4 LBS | BODY MASS INDEX: 38.1 KG/M2 | RESPIRATION RATE: 16 BRPM | HEART RATE: 71 BPM

## 2025-08-18 DIAGNOSIS — R73.03 PREDIABETES: ICD-10-CM

## 2025-08-18 DIAGNOSIS — E03.9 HYPOTHYROIDISM, UNSPECIFIED TYPE: ICD-10-CM

## 2025-08-18 DIAGNOSIS — G47.33 OSA ON CPAP: ICD-10-CM

## 2025-08-18 DIAGNOSIS — E78.2 MIXED HYPERLIPIDEMIA: ICD-10-CM

## 2025-08-18 DIAGNOSIS — F41.9 ANXIETY: ICD-10-CM

## 2025-08-18 DIAGNOSIS — Z00.00 ENCOUNTER FOR WELL ADULT EXAM WITHOUT ABNORMAL FINDINGS: Primary | ICD-10-CM

## 2025-08-18 DIAGNOSIS — J30.89 NON-SEASONAL ALLERGIC RHINITIS, UNSPECIFIED TRIGGER: ICD-10-CM

## 2025-08-18 LAB
ALBUMIN SERPL-MCNC: 3.7 G/DL (ref 3.5–5)
ALBUMIN/GLOB SERPL: 1.2 (ref 1–1.9)
ALP SERPL-CCNC: 117 U/L (ref 35–104)
ALT SERPL-CCNC: 18 U/L (ref 8–45)
ANION GAP SERPL CALC-SCNC: 12 MMOL/L (ref 7–16)
AST SERPL-CCNC: 18 U/L (ref 15–37)
BASOPHILS # BLD: 0.02 K/UL (ref 0–0.2)
BASOPHILS NFR BLD: 0.3 % (ref 0–2)
BILIRUB SERPL-MCNC: 0.2 MG/DL (ref 0–1.2)
BUN SERPL-MCNC: 22 MG/DL (ref 6–23)
CALCIUM SERPL-MCNC: 9.7 MG/DL (ref 8.8–10.2)
CHLORIDE SERPL-SCNC: 103 MMOL/L (ref 98–107)
CHOLEST SERPL-MCNC: 190 MG/DL (ref 0–200)
CO2 SERPL-SCNC: 25 MMOL/L (ref 20–29)
CREAT SERPL-MCNC: 0.71 MG/DL (ref 0.6–1.1)
DIFFERENTIAL METHOD BLD: NORMAL
EOSINOPHIL # BLD: 0.08 K/UL (ref 0–0.8)
EOSINOPHIL NFR BLD: 1.1 % (ref 0.5–7.8)
ERYTHROCYTE [DISTWIDTH] IN BLOOD BY AUTOMATED COUNT: 13 % (ref 11.9–14.6)
EST. AVERAGE GLUCOSE BLD GHB EST-MCNC: 116 MG/DL
GLOBULIN SER CALC-MCNC: 3 G/DL (ref 2.3–3.5)
GLUCOSE SERPL-MCNC: 95 MG/DL (ref 70–99)
HBA1C MFR BLD: 5.7 % (ref 0–5.6)
HCT VFR BLD AUTO: 43 % (ref 35.8–46.3)
HDLC SERPL-MCNC: 48 MG/DL (ref 40–60)
HDLC SERPL: 4 (ref 0–5)
HGB BLD-MCNC: 14.1 G/DL (ref 11.7–15.4)
IMM GRANULOCYTES # BLD AUTO: 0.04 K/UL (ref 0–0.5)
IMM GRANULOCYTES NFR BLD AUTO: 0.5 % (ref 0–5)
LDLC SERPL CALC-MCNC: 97 MG/DL (ref 0–100)
LYMPHOCYTES # BLD: 1.18 K/UL (ref 0.5–4.6)
LYMPHOCYTES NFR BLD: 15.8 % (ref 13–44)
MCH RBC QN AUTO: 29.3 PG (ref 26.1–32.9)
MCHC RBC AUTO-ENTMCNC: 32.8 G/DL (ref 31.4–35)
MCV RBC AUTO: 89.2 FL (ref 82–102)
MONOCYTES # BLD: 0.52 K/UL (ref 0.1–1.3)
MONOCYTES NFR BLD: 7 % (ref 4–12)
NEUTS SEG # BLD: 5.61 K/UL (ref 1.7–8.2)
NEUTS SEG NFR BLD: 75.3 % (ref 43–78)
NRBC # BLD: 0 K/UL (ref 0–0.2)
PLATELET # BLD AUTO: 201 K/UL (ref 150–450)
PMV BLD AUTO: 11.6 FL (ref 9.4–12.3)
POTASSIUM SERPL-SCNC: 4.6 MMOL/L (ref 3.5–5.1)
PROT SERPL-MCNC: 6.6 G/DL (ref 6.3–8.2)
RBC # BLD AUTO: 4.82 M/UL (ref 4.05–5.2)
SODIUM SERPL-SCNC: 141 MMOL/L (ref 136–145)
T4 FREE SERPL-MCNC: 1 NG/DL (ref 0.9–1.7)
TRIGL SERPL-MCNC: 225 MG/DL (ref 0–150)
TSH, 3RD GENERATION: 2.18 UIU/ML (ref 0.27–4.2)
VLDLC SERPL CALC-MCNC: 45 MG/DL (ref 6–23)
WBC # BLD AUTO: 7.5 K/UL (ref 4.3–11.1)

## 2025-08-18 RX ORDER — ESCITALOPRAM OXALATE 10 MG/1
10 TABLET ORAL DAILY
Qty: 30 TABLET | Refills: 5 | Status: SHIPPED | OUTPATIENT
Start: 2025-08-18

## 2025-08-18 ASSESSMENT — PATIENT HEALTH QUESTIONNAIRE - PHQ9
SUM OF ALL RESPONSES TO PHQ QUESTIONS 1-9: 0
1. LITTLE INTEREST OR PLEASURE IN DOING THINGS: NOT AT ALL
2. FEELING DOWN, DEPRESSED OR HOPELESS: NOT AT ALL
SUM OF ALL RESPONSES TO PHQ QUESTIONS 1-9: 0

## 2025-08-18 ASSESSMENT — ENCOUNTER SYMPTOMS
SHORTNESS OF BREATH: 0
GASTROINTESTINAL NEGATIVE: 1

## 2025-08-19 LAB — T3FREE SERPL-MCNC: 3.3 PG/ML (ref 2–4.4)

## (undated) DEVICE — TRAY PREP DRY W/ PREM GLV 2 APPL 6 SPNG 2 UNDPD 1 OVERWRAP

## (undated) DEVICE — SYR 10ML LUER LOK 1/5ML GRAD --

## (undated) DEVICE — SKIN MARKER FINE TIP: Brand: DEVON

## (undated) DEVICE — 2000CC GUARDIAN II: Brand: GUARDIAN

## (undated) DEVICE — ABDOMINAL PAD: Brand: DERMACEA

## (undated) DEVICE — DRAPE,CHEST,FENES,15X10,STERIL: Brand: MEDLINE

## (undated) DEVICE — Device: Brand: MEDCO MANUFACTURING INC

## (undated) DEVICE — SOLUTION LACTATED RINGERS INJECTION USP

## (undated) DEVICE — SUTURE VCRL SZ 2-0 L36IN ABSRB UD L36MM CT-1 1/2 CIR J945H

## (undated) DEVICE — SLIM BODY SKIN STAPLER: Brand: APPOSE ULC

## (undated) DEVICE — SUTURE ABSRB X-1 REV CUT 1/2 CIR 22MM UD BRAID 27IN SZ 3-0 J458H

## (undated) DEVICE — SYR LR LCK 1ML GRAD NSAF 30ML --

## (undated) DEVICE — (D)PREP SKN CHLRAPRP APPL 26ML -- CONVERT TO ITEM 371833

## (undated) DEVICE — SUTURE PDS II SZ 4-0 L18IN ABSRB UD L19MM PS-2 3/8 CIR PRIM Z496G

## (undated) DEVICE — SOLUTION IV 1000ML 0.9% SOD CHL

## (undated) DEVICE — DRAPE TWL SURG 16X26IN BLU ORB04] ALLCARE INC]

## (undated) DEVICE — CONTAINER SPEC HISTOLOGY 900ML POLYPR

## (undated) DEVICE — CARDINAL HEALTH FLEXIBLE LIGHT HANDLE COVER: Brand: CARDINAL HEALTH

## (undated) DEVICE — OCCLUSIVE GAUZE STRIP,3% BISMUTH TRIBROMOPHENATE IN PETROLATUM BLEND: Brand: XEROFORM

## (undated) DEVICE — MASTISOL ADHESIVE LIQ 2/3ML

## (undated) DEVICE — SPONGE LAP 18X18IN STRL -- 5/PK

## (undated) DEVICE — (D)STRIP SKN CLSR 0.5X4IN WHT --

## (undated) DEVICE — TETRA-FLEX CF WOVEN LATEX FREE ELASTIC BANDAGE 6" X 11 YD: Brand: TETRA-FLEX™CF

## (undated) DEVICE — UTILITY MARKER,BLACK WITH LABELS: Brand: DEVON

## (undated) DEVICE — OINTMENT BACITRACIN 1 OZ TUBE --

## (undated) DEVICE — TRAY CATH 16F DRN BG LTX -- CONVERT TO ITEM 363158

## (undated) DEVICE — REM POLYHESIVE ADULT PATIENT RETURN ELECTRODE: Brand: VALLEYLAB

## (undated) DEVICE — MEDI-VAC YANKAUER SUCTION HANDLE W/BULBOUS TIP: Brand: CARDINAL HEALTH

## (undated) DEVICE — TUBING ASPIR L8IN OD3/8IN CLR VYN DISP

## (undated) DEVICE — BUTTON SWITCH PENCIL BLADE ELECTRODE, HOLSTER: Brand: EDGE

## (undated) DEVICE — KENDALL SCD EXPRESS SLEEVES, KNEE LENGTH, MEDIUM: Brand: KENDALL SCD

## (undated) DEVICE — STERILE TETRA-FLEX CF LF, 6IN X 11 YD: Brand: TETRA-FLEX™ CF

## (undated) DEVICE — STRIP,CLOSURE,WOUND,MEDI-STRIP,1/2X4: Brand: MEDLINE

## (undated) DEVICE — SHEET, DRAPE, SPLIT, STERILE: Brand: MEDLINE

## (undated) DEVICE — STAPLER SKIN SQ 30 ABSRB STPL DISP INSORB

## (undated) DEVICE — DRAPE,TOP,102X53,STERILE: Brand: MEDLINE

## (undated) DEVICE — SURGICAL PROCEDURE PACK BASIC ST FRANCIS